# Patient Record
Sex: MALE | Race: WHITE | NOT HISPANIC OR LATINO | Employment: FULL TIME | URBAN - NONMETROPOLITAN AREA
[De-identification: names, ages, dates, MRNs, and addresses within clinical notes are randomized per-mention and may not be internally consistent; named-entity substitution may affect disease eponyms.]

---

## 2017-04-11 ENCOUNTER — OFFICE VISIT (OUTPATIENT)
Dept: MEDICAL GROUP | Facility: CLINIC | Age: 57
End: 2017-04-11
Payer: OTHER MISCELLANEOUS

## 2017-04-11 VITALS
TEMPERATURE: 98.8 F | BODY MASS INDEX: 39.55 KG/M2 | HEIGHT: 72 IN | SYSTOLIC BLOOD PRESSURE: 142 MMHG | OXYGEN SATURATION: 98 % | HEART RATE: 88 BPM | RESPIRATION RATE: 18 BRPM | WEIGHT: 292 LBS | DIASTOLIC BLOOD PRESSURE: 90 MMHG

## 2017-04-11 DIAGNOSIS — M54.50 CHRONIC LOW BACK PAIN WITHOUT SCIATICA, UNSPECIFIED BACK PAIN LATERALITY: ICD-10-CM

## 2017-04-11 DIAGNOSIS — G89.29 CHRONIC LOW BACK PAIN WITHOUT SCIATICA, UNSPECIFIED BACK PAIN LATERALITY: ICD-10-CM

## 2017-04-11 DIAGNOSIS — L91.8 SKIN TAG: ICD-10-CM

## 2017-04-11 DIAGNOSIS — M25.561 CHRONIC PAIN OF BOTH KNEES: ICD-10-CM

## 2017-04-11 DIAGNOSIS — Z13.6 SCREENING FOR CARDIOVASCULAR CONDITION: ICD-10-CM

## 2017-04-11 DIAGNOSIS — Z72.0 TOBACCO ABUSE: ICD-10-CM

## 2017-04-11 DIAGNOSIS — Z00.00 WELL ADULT EXAM: ICD-10-CM

## 2017-04-11 DIAGNOSIS — G89.29 CHRONIC PAIN OF BOTH KNEES: ICD-10-CM

## 2017-04-11 DIAGNOSIS — E66.9 OBESITY (BMI 30-39.9): ICD-10-CM

## 2017-04-11 DIAGNOSIS — M25.562 CHRONIC PAIN OF BOTH KNEES: ICD-10-CM

## 2017-04-11 PROCEDURE — 99386 PREV VISIT NEW AGE 40-64: CPT | Performed by: PHYSICIAN ASSISTANT

## 2017-04-11 RX ORDER — COVID-19 ANTIGEN TEST
KIT MISCELLANEOUS
Qty: 60 CAP | COMMUNITY
Start: 2017-04-11 | End: 2019-01-15

## 2017-04-11 RX ORDER — MULTIVIT-MIN/FOLIC/VIT K/LYCOP 400-300MCG
TABLET ORAL
Qty: 30 TAB | COMMUNITY
Start: 2017-04-11 | End: 2019-01-15

## 2017-04-11 ASSESSMENT — PATIENT HEALTH QUESTIONNAIRE - PHQ9: CLINICAL INTERPRETATION OF PHQ2 SCORE: 0

## 2017-04-11 NOTE — ASSESSMENT & PLAN NOTE
Pt has chronic pain that he feels is not worth treating at this time. It doesn't change throughout the day and he doesn't notice anything makes it better or worse.

## 2017-04-11 NOTE — PROGRESS NOTES
Chief Complaint   Patient presents with   • Establish Care       HISTORY OF THE PRESENT ILLNESS: This is a 56 y.o. male new patient to our practice. This patient is here today for discussion and treatment of the following:    Chronic low back pain without sciatica  Previously diagnosed Bone spurs, patient has pain but has no treatment desired.    Obesity (BMI 30-39.9)  Patient's goal weight is approximately 220 pounds. He understands the need to lose weight and would like to start exercising more and eating better. He has not wished out counseling regarding this issue and will return for follow-up visit if he feels he needs more support.    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 Patient recalls having one high blood pressure reading at one [employer] visit but stopped taking the medication after 2 months.    Tobacco abuse  Patient and his wife concur that they both would like to stop smoking. He has stopped smoking in the past with the assistance of Wellbutrin. Patient states he would like to stop smoking on his own but will return to the clinic if he feels he needs medical assistance in doing so.    Skin tag  10-12 skin tags visible above the patient's collar line. These are bothering him on a daily basis and he would like to have them removed.    Chronic pain of both knees  Pt has chronic pain that he feels is not worth treating at this time. It doesn't change throughout the day and he doesn't notice anything makes it better or worse.        Past Medical History   Diagnosis Date   • Hypertension      stopped taking blood pressure medication on his own   • Alcoholic (CMS-HCC)      stopped drinking in        Past Surgical History   Procedure Laterality Date   • Cholecystectomy         Family Status   Relation Status Death Age   • Mother     • Father     • Sister Alive    • Sister Alive      Family History    Problem Relation Age of Onset   • Cancer Mother      colon   • Respiratory Disease Mother      emphysema- smoker   • Alcohol abuse Mother    • Heart Disease Father    • Cancer Sister      brain tumor   • GI Sister        Social History   Substance Use Topics   • Smoking status: Current Every Day Smoker -- 1.00 packs/day for 35 years   • Smokeless tobacco: None   • Alcohol Use: 0.0 oz/week     0 Standard drinks or equivalent per week       Allergies: Review of patient's allergies indicates no known allergies.    Current Outpatient Prescriptions Ordered in [facility]   Medication Sig Dispense Refill   • Naproxen Sodium (ALEVE) 220 MG Cap Take  by mouth. 60 Cap    • Multiple Vitamin (ONE-A-DAY MENS) Tab Take  by mouth. 30 Tab      No current Epic-ordered facility-administered medications on file.     Review of Systems:   Constitutional: Negative for fever, chills, unplanned weight change and malaise/fatigue.   HENT: Negative for ear pain or tinnitus, nosebleeds, congestion, sore throat and neck pain.    Eyes: Negative for blurred or decreased vision.   Respiratory: Negative for cough, sputum production, shortness of breath and wheezing.    Cardiovascular: Negative for chest pain, palpitations, orthopnea, syncope and leg swelling.   Gastrointestinal: Negative for heartburn, nausea, vomiting and abdominal pain.   Musculoskeletal: Positive for back pain and joint pain.  Especially lumbar and knees bilaterally.  Skin: Positive for tenderness of skin tags on the patient's background is collar line. These have been bothering him for multiple years and he would like to have them removed. Negative for rash, itching, nail changes.  Neurological: Negative for dizziness, tremors, sensory change, focal weakness, tingling and headaches.   Endo/Heme/Allergies: Does not bruise/bleed easily.    Psychiatric/Behavioral: Negative for depression, suicidal ideas and memory loss. The patient is not nervous/anxious and does not have insomnia.   "Pt does not use recreational drugs or excessive alcohol although he does have a history of alcohol abuse he has been sober for 8 years.   All other systems reviewed and are negative except as in HPI.      Exam:  Blood pressure 142/90, pulse 88, temperature 37.1 °C (98.8 °F), resp. rate 18, height 1.829 m (6' 0.01\"), weight 132.45 kg (292 lb), SpO2 98 %.  General: Obese male in NAD  Eyes: Conjunctiva clear, lids without ptosis, pupils equal and reactive to light accommodation.  ENMT: Nose and lips without deformity. Nasal mucosa and septum pink without evidence of purulent drainage. External auditory canals clear of excessive cerumen. Tympanic membranes pearly grey without bulge or visible fluid line. Oropharynx pink without lesions or edema.   Neck: Thick and Supple without masses upon palpation. Thyroid is not enlarged and rises symmetrically upon swallowing.  Pulmonary: Normal effort. Fine crackles and inspiratory wheezes noted on auscultation of bilateral lower lung fields   Cardiovascular: Regular rate and rhythm without murmur. Carotid and radial pulses are intact and equal bilaterally. Bilateral upper and lower extremities without edema.   Extremities: Mild clubbing. No cyanosis.  GI: Normoactive bowel sounds in all 4 quadrants. Soft, protuberant, nontender, nondistended. Without palpable hepatosplenomegaly.   Neurologic: Deep tendon reflexes 2+/4 bilaterally.   Skin: Warm and dry. Multiple skin tags present on the patient's collar line.  Musculoskeletal: Normal gait. No extremity cyanosis, clubbing, or edema.  Psych: Normal mood and affect. Alert and oriented x3. Judgment and insight is normal.      Medical Decision Making & Discussion: Patient is an obese 56-year-old male presenting for a wellness visit. The patient is overweight and continues to smoke. Patient takes a daily multivitamin and uses Aleve on a regular basis to alleviate joint pain. Otherwise patient takes no medications.      Patient will like " to have skin tags removed at a following appointment which will be scheduled today.     Please note that this dictation was created using voice recognition software. I have made every reasonable attempt to correct obvious errors, but I expect that there are errors of grammar and possibly content that I did not discover before finalizing the note.      Assessment/Plan  1. Obesity (BMI 30-39.9)     2. Well adult exam  REFERRAL TO GI FOR COLONOSCOPY    VITAMIN D, 1,25 + 25-HYDROXY    TSH WITH REFLEX TO FT4    PROSTATE SPECIFIC AG SCREENING    LIPID PANEL    CMP (12)    CBC WITHOUT DIFFERENTIAL   3. Skin tag     4. Screening for cardiovascular condition  Patient identified as having weight management issue.  Appropriate orders and counseling given.    REFERRAL TO GI FOR COLONOSCOPY    LIPID PANEL    CMP (12)    CBC WITHOUT DIFFERENTIAL   5. Tobacco abuse     6. Chronic pain of both knees     7. Chronic low back pain without sciatica, unspecified back pain laterality

## 2017-04-11 NOTE — ASSESSMENT & PLAN NOTE
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 Patient recalls having one high blood pressure reading at one [employer] visit but stopped taking the medication after 2 months.

## 2017-04-11 NOTE — ASSESSMENT & PLAN NOTE
10-12 skin tags visible above the patient's collar line. These are bothering him on a daily basis and he would like to have them removed.

## 2017-04-11 NOTE — ASSESSMENT & PLAN NOTE
Patient's goal weight is approximately 220 pounds. He understands the need to lose weight and would like to start exercising more and eating better. He has not wished out counseling regarding this issue and will return for follow-up visit if he feels he needs more support.

## 2017-04-11 NOTE — MR AVS SNAPSHOT
"        Maximino Crowley   2017 2:00 PM   Office Visit   MRN: [id number]    [facility] Phone[facility]  [phone]    Description:  Male : 1960   Provider:  Phyllis Sparks PA-C           Reason for Visit     Establish Care           Allergies as of 2017     No Known Allergies      You were diagnosed with     Obesity (BMI 30-39.9)   [607025]       Well adult exam   [771242]       Skin tag   [226537]       Screening for cardiovascular condition   [905846]       Tobacco abuse   [649537]       Chronic pain of both knees   [8977813]       Chronic low back pain without sciatica, unspecified back pain laterality   [6073385]         Vital Signs     Blood Pressure Pulse Temperature Respirations Height Weight    142/90 mmHg 88 37.1 °C (98.8 °F) 18 1.829 m (6' 0.01\") 132.45 kg (292 lb)    Body Mass Index Oxygen Saturation Smoking Status             39.59 kg/m2 98% Current Every Day Smoker         Basic Information     Date Of Birth Sex Race Ethnicity Preferred Language    1960 Male White Non- English      Problem List              ICD-10-CM Priority Class Noted - Resolved    Obesity (BMI 30-39.9) E66.9   2017 - Present    Well adult exam Z00.00   2017 - Present    Tobacco abuse Z72.0   2017 - Present    Skin tag L91.8   2017 - Present    Chronic low back pain without sciatica M54.5, G89.29   2017 - Present    Chronic pain of both knees M25.561, M25.562, G89.29   2017 - Present      Health Maintenance        Date Due Completion Dates    IMM DTaP/Tdap/Td Vaccine (1 - Tdap) 1979 ---    COLONOSCOPY 2010 ---            Current Immunizations     No immunizations on file.      Below and/or attached are the medications your provider expects you to take. Review all of your home medications and newly ordered medications with your provider and/or pharmacist. Follow medication instructions as directed by your provider and/or pharmacist. Please keep " your medication list with you and share with your provider. Update the information when medications are discontinued, doses are changed, or new medications (including over-the-counter products) are added; and carry medication information at all times in the event of emergency situations     Allergies:  No Known Allergies          Medications  Valid as of: April 11, 2017 -  3:11 PM    Generic Name Brand Name Tablet Size Instructions for use    Multiple Vitamin (Tab) ONE-A-DAY MENS  Take  by mouth.        Naproxen Sodium (Cap) Naproxen Sodium 220 MG Take  by mouth.        .                 Medicines prescribed today were sent to:     [facility] - [place], [place] - [address]    [address] [facility] [address]    Phone: [phone] Fax: [phone]    Open 24 Hours?: No      Medication refill instructions:       If your prescription bottle indicates you have medication refills left, it is not necessary to call your provider’s office. Please contact your pharmacy and they will refill your medication.    If your prescription bottle indicates you do not have any refills left, you may request refills at any time through one of the following ways: The online [employer] system (except Urgent Care), by calling your provider’s office, or by asking your pharmacy to contact your provider’s office with a refill request. Medication refills are processed only during regular business hours and may not be available until the next business day. Your provider may request additional information or to have a follow-up visit with you prior to refilling your medication.   *Please Note: Medication refills are assigned a new Rx number when refilled electronically. Your pharmacy may indicate that no refills were authorized even though a new prescription for the same medication is available at the pharmacy. Please request the medicine by name with the pharmacy before contacting your provider for a refill.           Your To Do List     Future Labs/Procedures Complete By Expires    CBC WITHOUT DIFFERENTIAL  As directed 10/12/2017    PROSTATE SPECIFIC AG SCREENING  As directed 4/11/2018    TSH WITH REFLEX TO FT4  As directed 4/11/2018      Referral     A referral request has been sent to our patient care coordination department. Please allow 3-5 business days for us to process this request and contact you either by phone or mail. If you do not hear from us by the 5th business day, please call us at [phone].           "[employer] Access Code: [email or web address]  Expires: 5/11/2017  3:11 PM    Your email address is not on file at [facility].  Email Addresses are required for you to sign up for "[employer] please contact [phone] to verify your personal information and to provide your email address prior to attempting to register for "[employer].    Maximino Crowley  [address] [place] [address]    "[employer]  A secure, online tool to manage your health information     [facility]’s "[employer]® is a secure, online tool that connects you to your personalized health information from the privacy of your home -- day or night - making it very easy for you to manage your healthcare. Once the activation process is completed, you can even access your medical information using the "[employer] micheal, which is available for free in the Apple Micheal store or Google Play store.     To learn more about "[employer] visit [email or web address]/"[email or web address]    There are two levels of access available (as shown below):   My Chart Features  [facility] Primary Care Doctor [facility]  Specialists [facility]  Urgent  Care Non-[facility] Primary Care Doctor   Email your healthcare team securely and privately 24/7 X X X    Manage appointments: schedule your next appointment; view details of past/upcoming appointments X      Request prescription refills. X      View recent personal medical records, including lab and immunizations X X X X   View health record, including health  history, allergies, medications X X X X   Read reports about your outpatient visits, procedures, consult and ER notes X X X X   See your discharge summary, which is a recap of your hospital and/or ER visit that includes your diagnosis, lab results, and care plan X X  X     How to register for [employer]:  Once your e-mail address has been verified, follow the following steps to sign up for [employer].     1. Go to  [email or web address]  2. Click on the Sign Up Now box, which takes you to the New Member Sign Up page. You will need to provide the following information:  a. Enter your [employer] Access Code exactly as it appears at the top of this page. (You will not need to use this code after you’ve completed the sign-up process. If you do not sign up before the expiration date, you must request a new code.)   b. Enter your date of birth.   c. Enter your home email address.   d. Click Submit, and follow the next screen’s instructions.  3. Create a One[employer] ID. This will be your [employer] login ID and cannot be changed, so think of one that is secure and easy to remember.  4. Create a [employer] password. You can change your password at any time.  5. Enter your Password Reset Question and Answer. This can be used at a later time if you forget your password.   6. Enter your e-mail address. This allows you to receive e-mail notifications when new information is available in [employer].  7. Click Sign Up. You can now view your health information.    For assistance activating your [employer] account, call [phone]         Quit Tobacco Information     Do you want to quit using tobacco?    Quitting tobacco decreases risks of cancer, heart and lung disease, increases life expectancy, improves sense of taste and smell, and increases spending money, among other benefits.    If you are thinking about quitting, we can help.  • [facility] Quit Tobacco Program: 483.965.5635  o Program occurs weekly for four weeks and includes pharmacist  consultation on products to support quitting smoking or chewing tobacco. A provider referral is needed for pharmacist consultation.  • Tobacco Users Help Hotline: 2-800QUIT-NOW (322-2487) or https://[place].quitlogix.org/  o Free, confidential telephone and online coaching for [place] residents. Sessions are designed on a schedule that is convenient for you. Eligible clients receive free nicotine replacement therapy.  • Nationally: www.smokefree.gov  o Information and professional assistance to support both immediate and long-term needs as you become, and remain, a non-smoker. Smokefree.gov allows you to choose the help that best fits your needs.

## 2017-04-11 NOTE — ASSESSMENT & PLAN NOTE
Patient and his wife concur that they both would like to stop smoking. He has stopped smoking in the past with the assistance of Wellbutrin. Patient states he would like to stop smoking on his own but will return to the clinic if he feels he needs medical assistance in doing so.

## 2017-04-13 ENCOUNTER — NON-PROVIDER VISIT (OUTPATIENT)
Dept: MEDICAL GROUP | Facility: CLINIC | Age: 57
End: 2017-04-13
Payer: OTHER MISCELLANEOUS

## 2017-04-13 ENCOUNTER — HOSPITAL ENCOUNTER (OUTPATIENT)
Facility: MEDICAL CENTER | Age: 57
End: 2017-04-13
Attending: PHYSICIAN ASSISTANT
Payer: OTHER MISCELLANEOUS

## 2017-04-13 DIAGNOSIS — Z13.6 SCREENING FOR CARDIOVASCULAR CONDITION: ICD-10-CM

## 2017-04-13 DIAGNOSIS — Z00.00 WELL ADULT EXAM: ICD-10-CM

## 2017-04-13 DIAGNOSIS — G89.29 OTHER CHRONIC PAIN: ICD-10-CM

## 2017-04-13 LAB
25(OH)D3 SERPL-MCNC: 6 NG/ML (ref 30–100)
ALBUMIN SERPL BCP-MCNC: 3.7 G/DL (ref 3.2–4.9)
ALBUMIN/GLOB SERPL: 1 G/DL
ALP SERPL-CCNC: 97 U/L (ref 30–99)
ALT SERPL-CCNC: 175 U/L (ref 2–50)
ANION GAP SERPL CALC-SCNC: 9 MMOL/L (ref 0–11.9)
AST SERPL-CCNC: 126 U/L (ref 12–45)
BILIRUB SERPL-MCNC: 0.8 MG/DL (ref 0.1–1.5)
BUN SERPL-MCNC: 13 MG/DL (ref 8–22)
CALCIUM SERPL-MCNC: 9.5 MG/DL (ref 8.5–10.5)
CHLORIDE SERPL-SCNC: 108 MMOL/L (ref 96–112)
CHOLEST SERPL-MCNC: 205 MG/DL (ref 100–199)
CO2 SERPL-SCNC: 25 MMOL/L (ref 20–33)
CREAT SERPL-MCNC: 0.56 MG/DL (ref 0.5–1.4)
ERYTHROCYTE [DISTWIDTH] IN BLOOD BY AUTOMATED COUNT: 41.9 FL (ref 35.9–50)
GFR SERPL CREATININE-BSD FRML MDRD: >60 ML/MIN/1.73 M 2
GLOBULIN SER CALC-MCNC: 3.6 G/DL (ref 1.9–3.5)
GLUCOSE SERPL-MCNC: 158 MG/DL (ref 65–99)
HCT VFR BLD AUTO: 50 % (ref 42–52)
HDLC SERPL-MCNC: 44 MG/DL
HGB BLD-MCNC: 17.6 G/DL (ref 14–18)
LDLC SERPL CALC-MCNC: 141 MG/DL
MCH RBC QN AUTO: 32.7 PG (ref 27–33)
MCHC RBC AUTO-ENTMCNC: 35.2 G/DL (ref 33.7–35.3)
MCV RBC AUTO: 92.8 FL (ref 81.4–97.8)
PLATELET # BLD AUTO: 87 K/UL (ref 164–446)
POTASSIUM SERPL-SCNC: 3.9 MMOL/L (ref 3.6–5.5)
PROT SERPL-MCNC: 7.3 G/DL (ref 6–8.2)
PSA SERPL-MCNC: 0.08 NG/ML (ref 0–4)
RBC # BLD AUTO: 5.39 M/UL (ref 4.7–6.1)
SODIUM SERPL-SCNC: 142 MMOL/L (ref 135–145)
TRIGL SERPL-MCNC: 100 MG/DL (ref 0–149)
TSH SERPL DL<=0.005 MIU/L-ACNC: 1.05 UIU/ML (ref 0.3–3.7)
WBC # BLD AUTO: 7.6 K/UL (ref 4.8–10.8)

## 2017-04-13 PROCEDURE — 82306 VITAMIN D 25 HYDROXY: CPT

## 2017-04-13 PROCEDURE — 99000 SPECIMEN HANDLING OFFICE-LAB: CPT | Performed by: PHYSICIAN ASSISTANT

## 2017-04-13 PROCEDURE — 80053 COMPREHEN METABOLIC PANEL: CPT

## 2017-04-13 PROCEDURE — 84153 ASSAY OF PSA TOTAL: CPT

## 2017-04-13 PROCEDURE — 85027 COMPLETE CBC AUTOMATED: CPT

## 2017-04-13 PROCEDURE — 84443 ASSAY THYROID STIM HORMONE: CPT

## 2017-04-13 PROCEDURE — 80061 LIPID PANEL: CPT

## 2017-04-13 PROCEDURE — 36415 COLL VENOUS BLD VENIPUNCTURE: CPT | Performed by: PHYSICIAN ASSISTANT

## 2017-04-14 ENCOUNTER — TELEPHONE (OUTPATIENT)
Dept: MEDICAL GROUP | Facility: CLINIC | Age: 57
End: 2017-04-14

## 2017-04-14 NOTE — TELEPHONE ENCOUNTER
----- Message from Phyllis Sparks PA-C sent at 4/14/2017  8:02 AM PDT -----  I reviewed labs. I would like to discuss a few of the values with the patient including Vitamin D, Cholesterol and a couple of others. He should keep his appointment on April 25th with me and start taking Vitamin D supplements 4000 units per day in the mean time.

## 2017-04-14 NOTE — PROGRESS NOTES
Quick Note:    I reviewed labs. I would like to discuss a few of the values with the patient including Vitamin D, Cholesterol and a couple of others. He should keep his appointment on April 25th with me and start taking Vitamin D supplements 4000 units per day in the mean time.  ______

## 2017-04-18 ENCOUNTER — TELEPHONE (OUTPATIENT)
Dept: MEDICAL GROUP | Facility: CLINIC | Age: 57
End: 2017-04-18

## 2017-04-25 ENCOUNTER — NON-PROVIDER VISIT (OUTPATIENT)
Dept: URGENT CARE | Facility: PHYSICIAN GROUP | Age: 57
End: 2017-04-25

## 2017-04-25 ENCOUNTER — OFFICE VISIT (OUTPATIENT)
Dept: MEDICAL GROUP | Facility: CLINIC | Age: 57
End: 2017-04-25
Payer: OTHER MISCELLANEOUS

## 2017-04-25 ENCOUNTER — TELEPHONE (OUTPATIENT)
Dept: MEDICAL GROUP | Facility: CLINIC | Age: 57
End: 2017-04-25

## 2017-04-25 VITALS
HEIGHT: 72 IN | BODY MASS INDEX: 39.55 KG/M2 | TEMPERATURE: 98.5 F | HEART RATE: 76 BPM | RESPIRATION RATE: 18 BRPM | OXYGEN SATURATION: 94 % | SYSTOLIC BLOOD PRESSURE: 150 MMHG | DIASTOLIC BLOOD PRESSURE: 80 MMHG | WEIGHT: 292 LBS

## 2017-04-25 DIAGNOSIS — R20.8 LOCALIZED SKIN TENDERNESS: ICD-10-CM

## 2017-04-25 DIAGNOSIS — L91.8 SKIN TAG: ICD-10-CM

## 2017-04-25 DIAGNOSIS — Z02.1 PRE-EMPLOYMENT DRUG SCREENING: ICD-10-CM

## 2017-04-25 DIAGNOSIS — I10 ESSENTIAL HYPERTENSION: ICD-10-CM

## 2017-04-25 LAB
BREATH ALCOHOL COMMENT: NORMAL
POC BREATHALIZER: 0 PERCENT (ref 0–0.01)

## 2017-04-25 PROCEDURE — 82075 ASSAY OF BREATH ETHANOL: CPT | Performed by: NURSE PRACTITIONER

## 2017-04-25 PROCEDURE — 99213 OFFICE O/P EST LOW 20 MIN: CPT | Mod: 25 | Performed by: PHYSICIAN ASSISTANT

## 2017-04-25 PROCEDURE — 80305 DRUG TEST PRSMV DIR OPT OBS: CPT | Performed by: NURSE PRACTITIONER

## 2017-04-25 PROCEDURE — 11400 EXC TR-EXT B9+MARG 0.5 CM<: CPT | Performed by: PHYSICIAN ASSISTANT

## 2017-04-25 RX ORDER — CHLORTHALIDONE 25 MG/1
12.5 TABLET ORAL EVERY MORNING
Qty: 45 TAB | Refills: 0 | Status: SHIPPED | OUTPATIENT
Start: 2017-04-25 | End: 2017-06-01 | Stop reason: SDUPTHER

## 2017-04-25 NOTE — MR AVS SNAPSHOT
Maximino Crowley   2017 2:40 PM   Office Visit   MRN: [id number]    [facility]  [facility] Phone:  [phone]    Description:  Male : 1960   Provider:  Phyllis Sparks PA-C           Reason for Visit     Other skin tag removal      Allergies as of 2017     No Known Allergies      You were diagnosed with     Essential hypertension   [[id number]]         Vital Signs     Blood Pressure Pulse Temperature Respirations Height Weight    150/80 mmHg 76 36.9 °C (98.5 °F) 18 1.829 m (6') 132.45 kg (292 lb)    Body Mass Index Oxygen Saturation Smoking Status             39.59 kg/m2 94% Current Every Day Smoker         Basic Information     Date Of Birth Sex Race Ethnicity Preferred Language    1960 Male White Non- English      Your appointments     May 01, 2017  2:30 PM   Established Patient with Eladio Horton M.D.   [facility] (--)    [address] [place] [id number] [phone]           You will be receiving a confirmation call a few days before your appointment from our automated call confirmation system.            Oct 10, 2017  2:00 PM   Established Patient with Phyllis Sparks PA-C   [facility] (--)    [address] [place] [address] [id number] [phone]           You will be receiving a confirmation call a few days before your appointment from our automated call confirmation system.              Problem List              ICD-10-CM Priority Class Noted - Resolved    Obesity (BMI 30-39.9) E66.9   2017 - Present    Well adult exam Z00.00   2017 - Present    Tobacco abuse Z72.0   2017 - Present    Skin tag L91.8   2017 - Present    Chronic low back pain without sciatica M54.5, G89.29   2017 - Present    Chronic pain of both knees M25.561, M25.562, G89.29   2017 - Present      Health Maintenance        Date Due Completion Dates    IMM DTaP/Tdap/Td Vaccine (1 -  Tdap) 6/21/1979 ---    IMM PNEUMOCOCCAL 19-64 (ADULT) MEDIUM RISK SERIES (1 of 1 - PPSV23) 6/21/1979 ---    COLONOSCOPY 6/21/2010 ---            Current Immunizations     No immunizations on file.      Below and/or attached are the medications your provider expects you to take. Review all of your home medications and newly ordered medications with your provider and/or pharmacist. Follow medication instructions as directed by your provider and/or pharmacist. Please keep your medication list with you and share with your provider. Update the information when medications are discontinued, doses are changed, or new medications (including over-the-counter products) are added; and carry medication information at all times in the event of emergency situations     Allergies:  No Known Allergies          Medications  Valid as of: April 25, 2017 -  3:13 PM    Generic Name Brand Name Tablet Size Instructions for use    Chlorthalidone (Tab) HYGROTON 25 MG Take 0.5 Tabs by mouth every morning.        Cholecalciferol (Tab) cholecalciferol 1000 UNIT Take 5,000 Units by mouth every day.        Multiple Vitamin (Tab) ONE-A-DAY MENS  Take  by mouth.        Naproxen Sodium (Cap) Naproxen Sodium 220 MG Take  by mouth.        .                 Medicines prescribed today were sent to:     [facility] - [place], [place] - [address] [place] [address] [address] [facility] [address]    Phone: [phone] Fax: [phone]    Open 24 Hours?: No      Medication refill instructions:       If your prescription bottle indicates you have medication refills left, it is not necessary to call your provider’s office. Please contact your pharmacy and they will refill your medication.    If your prescription bottle indicates you do not have any refills left, you may request refills at any time through one of the following ways: The online [employer] system (except Urgent Care), by calling your provider’s office, or by asking your  pharmacy to contact your provider’s office with a refill request. Medication refills are processed only during regular business hours and may not be available until the next business day. Your provider may request additional information or to have a follow-up visit with you prior to refilling your medication.   *Please Note: Medication refills are assigned a new Rx number when refilled electronically. Your pharmacy may indicate that no refills were authorized even though a new prescription for the same medication is available at the pharmacy. Please request the medicine by name with the pharmacy before contacting your provider for a refill.           VILOOP Access Code: [email or web address]  Expires: 5/11/2017  3:11 PM    Your email address is not on file at [facility].  Email Addresses are required for you to sign up for VILOOP, please contact [phone] to verify your personal information and to provide your email address prior to attempting to register for VILOOP.    Maximino Crowley  [address] [place] [id number]    VILOOP  A secure, online tool to manage your health information     [facility]’s VILOOP® is a secure, online tool that connects you to your personalized health information from the privacy of your home -- day or night - making it very easy for you to manage your healthcare. Once the activation process is completed, you can even access your medical information using the VILOOP micheal, which is available for free in the Apple Micheal store or Google Play store.     To learn more about VILOOP, visit [email or web address]    There are two levels of access available (as shown below):   My Chart Features  [facility] Primary Care Doctor [facility]  Specialists [facility]  Urgent  Care Non-[facility] Primary Care Doctor   Email your healthcare team securely and privately 24/7 X X X    Manage appointments: schedule your next appointment; view details of past/upcoming appointments X      Request prescription  refills. X      View recent personal medical records, including lab and immunizations X X X X   View health record, including health history, allergies, medications X X X X   Read reports about your outpatient visits, procedures, consult and ER notes X X X X   See your discharge summary, which is a recap of your hospital and/or ER visit that includes your diagnosis, lab results, and care plan X X  X     How to register for Tervela:  Once your e-mail address has been verified, follow the following steps to sign up for Tervela.     1. Go to  [email or web address]  2. Click on the Sign Up Now box, which takes you to the New Member Sign Up page. You will need to provide the following information:  a. Enter your Tervela Access Code exactly as it appears at the top of this page. (You will not need to use this code after you’ve completed the sign-up process. If you do not sign up before the expiration date, you must request a new code.)   b. Enter your date of birth.   c. Enter your home email address.   d. Click Submit, and follow the next screen’s instructions.  3. Create a Tervela ID. This will be your Tervela login ID and cannot be changed, so think of one that is secure and easy to remember.  4. Create a Tervela password. You can change your password at any time.  5. Enter your Password Reset Question and Answer. This can be used at a later time if you forget your password.   6. Enter your e-mail address. This allows you to receive e-mail notifications when new information is available in Tervela.  7. Click Sign Up. You can now view your health information.    For assistance activating your Tervela account, call [phone]         Quit Tobacco Information     Do you want to quit using tobacco?    Quitting tobacco decreases risks of cancer, heart and lung disease, increases life expectancy, improves sense of taste and smell, and increases spending money, among other benefits.    If you are thinking about  quitting, we can help.  • [facility] Quit Tobacco Program: 127-939-7586  o Program occurs weekly for four weeks and includes pharmacist consultation on products to support quitting smoking or chewing tobacco. A provider referral is needed for pharmacist consultation.  • Tobacco Users Help Hotline: 7-800-QUIT-NOW (223-6560) or https://[place].quitlogix.org/  o Free, confidential telephone and online coaching for [place] residents. Sessions are designed on a schedule that is convenient for you. Eligible clients receive free nicotine replacement therapy.  • Nationally: www.smokefree.gov  o Information and professional assistance to support both immediate and long-term needs as you become, and remain, a non-smoker. Smokefree.gov allows you to choose the help that best fits your needs.

## 2017-04-25 NOTE — ASSESSMENT & PLAN NOTE
Procedure Note:  Discussed with the patient the risks benefits alternatives to excision of the lesion. Informed consent was obtained and the patient consented to the procedure with risks to include bleeding and infection as well as damage to adjacent organs. The area was identified. The area was betadine swabed and 2 cc of lidocaine with epi was administered. The area was prepped in the usual sterile fashion. A curved scissor with tweezers was used to excise the 9 lesions. Hemostasis was obtained with pressure. A bandage was applied to each area where a skin tag was removed. The patient was given wound care instructions. The patient was instructed to call if there was any excessive bleeding or followup sooner if there were any signs of infection to include pain, warmth, redness or purulence.

## 2017-04-25 NOTE — PROGRESS NOTES
Chief Complaint   Patient presents with   • Other     skin tag removal       HISTORY OF PRESENT ILLNESS: Patient is a 56 y.o. male established patient who presents today for evaluation and management of:    Skin tag  Procedure Note:  Discussed with the patient the risks benefits alternatives to excision of the lesion. Informed consent was obtained and the patient consented to the procedure with risks to include bleeding and infection as well as damage to adjacent organs. The area was identified. The area was betadine swabed and 2 cc of lidocaine with epi was administered. The area was prepped in the usual sterile fashion. A curved scissor with tweezers was used to excise the 9 lesions. Hemostasis was obtained with pressure. A bandage was applied to each area where a skin tag was removed. The patient was given wound care instructions. The patient was instructed to call if there was any excessive bleeding or followup sooner if there were any signs of infection to include pain, warmth, redness or purulence.        Essential hypertension  Patient's hypertension continues to be high at this visit. His machine was calibrated against our manual readings and his at-home pressure machine is approximately 30mmHg higher systolic.         Patient Active Problem List    Diagnosis Date Noted   • Localized skin tenderness 04/26/2017   • Essential hypertension 04/25/2017   • Obesity (BMI 30-39.9) 04/11/2017   • Well adult exam 04/11/2017   • Tobacco abuse 04/11/2017   • Skin tag 04/11/2017   • Chronic low back pain without sciatica 04/11/2017   • Chronic pain of both knees 04/11/2017       Allergies:Review of patient's allergies indicates no known allergies.    Current Outpatient Prescriptions   Medication Sig Dispense Refill   • vitamin D (CHOLECALCIFEROL) 1000 UNIT Tab Take 5,000 Units by mouth every day.     • chlorthalidone (HYGROTON) 25 MG Tab Take 0.5 Tabs by mouth every morning. 45 Tab 0   • Multiple Vitamin (ONE-A-DAY MENS)  Tab Take  by mouth. 30 Tab    • Naproxen Sodium (ALEVE) 220 MG Cap Take  by mouth. 60 Cap      No current facility-administered medications for this visit.       Social History   Substance Use Topics   • Smoking status: Current Every Day Smoker -- 1.00 packs/day for 35 years   • Smokeless tobacco: Not on file   • Alcohol Use: 0.0 oz/week     0 Standard drinks or equivalent per week       Family Status   Relation Status Death Age   • Mother     • Father     • Sister Alive    • Sister Alive      Family History   Problem Relation Age of Onset   • Cancer Mother      colon   • Respiratory Disease Mother      emphysema- smoker   • Alcohol abuse Mother    • Heart Disease Father    • Cancer Sister      brain tumor   • GI Sister        Review of Systems:   Constitutional: Negative for fever, chills, weight loss and malaise/fatigue.   HENT: Negative for ear pain, nosebleeds, congestion, sore throat and neck pain.    Eyes: Negative for blurred vision.   Cardiovascular: Negative for chest pain, palpitations, orthopnea and leg swelling.   Skin: Negative for rash and itching. Positive for multiple skin tags around the collar line and under the left armpit  Neurological: Negative for dizziness, tingling, tremors, sensory change, focal weakness and headaches.   Endo/Heme/Allergies: Does not bruise/bleed easily.   Psychiatric/Behavioral: Negative for depression, suicidal ideas and memory loss.  The patient is not nervous/anxious and does not have insomnia.      Exam:  Blood pressure 150/80, pulse 76, temperature 36.9 °C (98.5 °F), resp. rate 18, height 1.829 m (6'), weight 132.45 kg (292 lb), SpO2 94 %.  Body mass index is 39.59 kg/(m^2).  General:  Obese male in NAD  Head: is grossly normal.  Neck: Supple without masses. Thyroid is not visibly enlarged.  Skin: 2 skin tags exist under left axilla. There is one skin tag behind each of his left and right ears as well as 5 along his collar line.       Please note that  this dictation was created using voice recognition software. I have made every reasonable attempt to correct obvious errors, but I expect that there are errors of grammar and possibly content that I did not discover before finalizing the note.    Assessment/Plan:  1. Essential hypertension  chlorthalidone (HYGROTON) 25 MG Tab   2. Skin tag     3. Localized skin tenderness            Return if symptoms worsen or fail to improve.

## 2017-04-25 NOTE — ASSESSMENT & PLAN NOTE
Patient's hypertension continues to be high at this visit. His machine was calibrated against our manual readings and his at-home pressure machine is approximately 30mmHg higher systolic.

## 2017-04-26 ENCOUNTER — TELEPHONE (OUTPATIENT)
Dept: MEDICAL GROUP | Facility: CLINIC | Age: 57
End: 2017-04-26

## 2017-04-26 DIAGNOSIS — R73.03 PREDIABETES: ICD-10-CM

## 2017-04-26 PROBLEM — R20.8: Status: ACTIVE | Noted: 2017-04-26

## 2017-04-27 ENCOUNTER — HOSPITAL ENCOUNTER (OUTPATIENT)
Facility: MEDICAL CENTER | Age: 57
End: 2017-04-27
Attending: PHYSICIAN ASSISTANT
Payer: OTHER MISCELLANEOUS

## 2017-04-27 PROCEDURE — 82043 UR ALBUMIN QUANTITATIVE: CPT

## 2017-04-27 PROCEDURE — 83036 HEMOGLOBIN GLYCOSYLATED A1C: CPT

## 2017-04-27 PROCEDURE — 82570 ASSAY OF URINE CREATININE: CPT

## 2017-04-28 ENCOUNTER — NON-PROVIDER VISIT (OUTPATIENT)
Dept: MEDICAL GROUP | Facility: CLINIC | Age: 57
End: 2017-04-28
Payer: OTHER MISCELLANEOUS

## 2017-04-28 DIAGNOSIS — R73.03 PREDIABETES: ICD-10-CM

## 2017-04-28 DIAGNOSIS — I10 ESSENTIAL HYPERTENSION: ICD-10-CM

## 2017-04-28 LAB
CREAT UR-MCNC: 205.3 MG/DL
EST. AVERAGE GLUCOSE BLD GHB EST-MCNC: 160 MG/DL
HBA1C MFR BLD: 7.2 % (ref 0–5.6)
MICROALBUMIN UR-MCNC: 1.6 MG/DL
MICROALBUMIN/CREAT UR: 8 MG/G (ref 0–30)

## 2017-04-28 PROCEDURE — 36415 COLL VENOUS BLD VENIPUNCTURE: CPT | Performed by: NURSE PRACTITIONER

## 2017-05-01 ENCOUNTER — OFFICE VISIT (OUTPATIENT)
Dept: MEDICAL GROUP | Facility: CLINIC | Age: 57
End: 2017-05-01

## 2017-05-01 VITALS
OXYGEN SATURATION: 96 % | HEIGHT: 73 IN | BODY MASS INDEX: 38.43 KG/M2 | WEIGHT: 290 LBS | SYSTOLIC BLOOD PRESSURE: 150 MMHG | DIASTOLIC BLOOD PRESSURE: 82 MMHG | TEMPERATURE: 98.4 F | RESPIRATION RATE: 16 BRPM | HEART RATE: 94 BPM

## 2017-05-01 DIAGNOSIS — Z02.4 ENCOUNTER FOR CDL (COMMERCIAL DRIVING LICENSE) EXAM: ICD-10-CM

## 2017-05-01 PROCEDURE — 7100 PR DOT PHYSICAL: Performed by: FAMILY MEDICINE

## 2017-05-01 NOTE — PROGRESS NOTES
Quick Note:    I reviewed labs. There is a value I am concerned about and will start him on a medication called Metformin for diabetes. He should take this as prescribed and return for a follow up visit in 1 month.  ______

## 2017-05-01 NOTE — MR AVS SNAPSHOT
"        Maximino La Liat   2017 2:30 PM   Office Visit   MRN: [id number]    Department:  [facility]   Dept Phone:  [phone]    Description:  Male : 1960   Provider:  Eladio Horton M.D.           Reason for Visit     Annual Exam dmv      Allergies as of 2017     No Known Allergies      Vital Signs     Blood Pressure Pulse Temperature Respirations Height Weight    150/82 mmHg 94 36.9 °C (98.4 °F) 16 1.854 m (6' 1\") 131.543 kg (290 lb)    Body Mass Index Oxygen Saturation Smoking Status             38.27 kg/m2 96% Current Every Day Smoker         Basic Information     Date Of Birth Sex Race Ethnicity Preferred Language    1960 Male White Non- English      Your appointments     Oct 10, 2017  2:00 PM   Established Patient with Phyllis Sparks PA-C   [facility] (--)    [address] [id number] [phone]           You will be receiving a confirmation call a few days before your appointment from our automated call confirmation system.              Problem List              ICD-10-CM Priority Class Noted - Resolved    Obesity (BMI 30-39.9) E66.9   2017 - Present    Well adult exam Z00.00   2017 - Present    Tobacco abuse Z72.0   2017 - Present    Skin tag L91.8   2017 - Present    Chronic low back pain without sciatica M54.5, G89.29   2017 - Present    Chronic pain of both knees M25.561, M25.562, G89.29   2017 - Present    Essential hypertension I10   2017 - Present    Localized skin tenderness R20.8   2017 - Present      Health Maintenance        Date Due Completion Dates    IMM DTaP/Tdap/Td Vaccine (1 - Tdap) 1979 ---    IMM PNEUMOCOCCAL 19-64 (ADULT) MEDIUM RISK SERIES (1 of 1 - PPSV23) 1979 ---    COLONOSCOPY 2010 ---            Current Immunizations     No immunizations on file.      Below and/or attached are the medications your provider expects you to take. Review all of " your home medications and newly ordered medications with your provider and/or pharmacist. Follow medication instructions as directed by your provider and/or pharmacist. Please keep your medication list with you and share with your provider. Update the information when medications are discontinued, doses are changed, or new medications (including over-the-counter products) are added; and carry medication information at all times in the event of emergency situations     Allergies:  No Known Allergies          Medications  Valid as of: May 01, 2017 -  3:01 PM    Generic Name Brand Name Tablet Size Instructions for use    Chlorthalidone (Tab) HYGROTON 25 MG Take 0.5 Tabs by mouth every morning.        Cholecalciferol (Tab) cholecalciferol 1000 UNIT Take 5,000 Units by mouth every day.        Multiple Vitamin (Tab) ONE-A-DAY MENS  Take  by mouth.        Naproxen Sodium (Cap) Naproxen Sodium 220 MG Take  by mouth.        .                 Medicines prescribed today were sent to:     [facility] - [place], [place] - [address] [facility]    [address] [place] [address] [address] [facility] [address]    Phone: [phone] Fax: [phone]    Open 24 Hours?: No      Medication refill instructions:       If your prescription bottle indicates you have medication refills left, it is not necessary to call your provider’s office. Please contact your pharmacy and they will refill your medication.    If your prescription bottle indicates you do not have any refills left, you may request refills at any time through one of the following ways: The online [employer] system (except Urgent Care), by calling your provider’s office, or by asking your pharmacy to contact your provider’s office with a refill request. Medication refills are processed only during regular business hours and may not be available until the next business day. Your provider may request additional information or to have a follow-up visit with you prior to refilling your  medication.   *Please Note: Medication refills are assigned a new Rx number when refilled electronically. Your pharmacy may indicate that no refills were authorized even though a new prescription for the same medication is available at the pharmacy. Please request the medicine by name with the pharmacy before contacting your provider for a refill.           PEVESA Access Code: [email or web address]  Expires: 5/11/2017  3:11 PM    Your email address is not on file at [facility].  Email Addresses are required for you to sign up for PEVESA, please contact [phone] to verify your personal information and to provide your email address prior to attempting to register for PEVESA.    Maximino Crowley  [address] [place] [id number]    PEVESA  A secure, online tool to manage your health information     [facility]’s PEVESA® is a secure, online tool that connects you to your personalized health information from the privacy of your home -- day or night - making it very easy for you to manage your healthcare. Once the activation process is completed, you can even access your medical information using the PEVESA micheal, which is available for free in the Apple Micheal store or Google Play store.     To learn more about PEVESA, visit [email or web address]    There are two levels of access available (as shown below):   My Chart Features  [facility] Primary Care Doctor [facility]  Specialists [facility]  Urgent  Care Non-[facility] Primary Care Doctor   Email your healthcare team securely and privately 24/7 X X X    Manage appointments: schedule your next appointment; view details of past/upcoming appointments X      Request prescription refills. X      View recent personal medical records, including lab and immunizations X X X X   View health record, including health history, allergies, medications X X X X   Read reports about your outpatient visits, procedures, consult and ER notes X X X X   See your discharge summary, which is a  recap of your hospital and/or ER visit that includes your diagnosis, lab results, and care plan X X  X     How to register for [facility]:  Once your e-mail address has been verified, follow the following steps to sign up for [employer].     1. Go to  [email or web address]  2. Click on the Sign Up Now box, which takes you to the New Member Sign Up page. You will need to provide the following information:  a. Enter your [employer] Access Code exactly as it appears at the top of this page. (You will not need to use this code after you’ve completed the sign-up process. If you do not sign up before the expiration date, you must request a new code.)   b. Enter your date of birth.   c. Enter your home email address.   d. Click Submit, and follow the next screen’s instructions.  3. Create a [employer] ID. This will be your [facility] login ID and cannot be changed, so think of one that is secure and easy to remember.  4. Create a [employer] password. You can change your password at any time.  5. Enter your Password Reset Question and Answer. This can be used at a later time if you forget your password.   6. Enter your e-mail address. This allows you to receive e-mail notifications when new information is available in [employer].  7. Click Sign Up. You can now view your health information.    For assistance activating your [facility] account, call [phone]         Quit Tobacco Information     Do you want to quit using tobacco?    Quitting tobacco decreases risks of cancer, heart and lung disease, increases life expectancy, improves sense of taste and smell, and increases spending money, among other benefits.    If you are thinking about quitting, we can help.  • [facility] Quit Tobacco Program: 570.335.3822  o Program occurs weekly for four weeks and includes pharmacist consultation on products to support quitting smoking or chewing tobacco. A provider referral is needed for pharmacist consultation.  • Tobacco Users Help Hotline:  1-800-QUIT-NOW (143-5478) or https://[place].quitlogix.org/  o Free, confidential telephone and online coaching for [place] residents. Sessions are designed on a schedule that is convenient for you. Eligible clients receive free nicotine replacement therapy.  • Nationally: www.smokefree.gov  o Information and professional assistance to support both immediate and long-term needs as you become, and remain, a non-smoker. Smokefree.gov allows you to choose the help that best fits your needs.

## 2017-05-04 ENCOUNTER — TELEPHONE (OUTPATIENT)
Dept: MEDICAL GROUP | Facility: CLINIC | Age: 57
End: 2017-05-04

## 2017-05-04 NOTE — TELEPHONE ENCOUNTER
----- Message from Phyllis Sparks PA-C sent at 5/1/2017  3:16 PM PDT -----  I reviewed labs. There is a value I am concerned about and will start him on a medication called Metformin for diabetes. He should take this as prescribed and return for a follow up visit in 1 month.

## 2017-06-01 ENCOUNTER — OFFICE VISIT (OUTPATIENT)
Dept: MEDICAL GROUP | Facility: CLINIC | Age: 57
End: 2017-06-01
Payer: OTHER MISCELLANEOUS

## 2017-06-01 VITALS
OXYGEN SATURATION: 93 % | TEMPERATURE: 97.9 F | SYSTOLIC BLOOD PRESSURE: 134 MMHG | DIASTOLIC BLOOD PRESSURE: 86 MMHG | BODY MASS INDEX: 38.5 KG/M2 | RESPIRATION RATE: 20 BRPM | WEIGHT: 290.5 LBS | HEART RATE: 87 BPM | HEIGHT: 73 IN

## 2017-06-01 DIAGNOSIS — R74.8 ELEVATED LIVER ENZYMES: ICD-10-CM

## 2017-06-01 DIAGNOSIS — I10 ESSENTIAL HYPERTENSION: ICD-10-CM

## 2017-06-01 DIAGNOSIS — E11.9 TYPE 2 DIABETES MELLITUS WITHOUT COMPLICATION, WITHOUT LONG-TERM CURRENT USE OF INSULIN (HCC): ICD-10-CM

## 2017-06-01 PROBLEM — L91.8 SKIN TAG: Status: RESOLVED | Noted: 2017-04-11 | Resolved: 2017-06-01

## 2017-06-01 PROBLEM — Z00.00 WELL ADULT EXAM: Status: RESOLVED | Noted: 2017-04-11 | Resolved: 2017-06-01

## 2017-06-01 PROCEDURE — 99213 OFFICE O/P EST LOW 20 MIN: CPT | Performed by: PHYSICIAN ASSISTANT

## 2017-06-01 RX ORDER — CHLORTHALIDONE 25 MG/1
12.5 TABLET ORAL EVERY MORNING
Qty: 45 TAB | Refills: 2 | Status: SHIPPED | OUTPATIENT
Start: 2017-06-01 | End: 2019-01-15

## 2017-06-01 NOTE — PROGRESS NOTES
Chief Complaint   Patient presents with   • Results     DM, HTN       HISTORY OF PRESENT ILLNESS: Patient is a 56 y.o. male established patient who presents today for evaluation and management of:    Essential hypertension  Well-controlled on current dose of chlorthalidone. No recent chest pain, shortness of breath or swelling in legs. Patient has not noticed increase in urination frequency.    Type 2 diabetes mellitus without complication, without long-term current use of insulin (CMS-HCC)  Patient has been checking his blood sugars at home and has been running at approximately 200-225. He does not feel symptomatic as he is not excessively thirsty or hungry and does not feel other various endocrine symptoms.    Elevated liver enzymes  Patient was an alcoholic for many years and his liver enzymes remain approximately twice the upper limit of normal. He is asymptomatic without pain in his right upper quadrant or jaundice.         Patient Active Problem List    Diagnosis Date Noted   • Type 2 diabetes mellitus without complication, without long-term current use of insulin (CMS-HCC) 06/01/2017   • Elevated liver enzymes 06/01/2017   • Localized skin tenderness 04/26/2017   • Essential hypertension 04/25/2017   • Obesity (BMI 30-39.9) 04/11/2017   • Tobacco abuse 04/11/2017   • Chronic low back pain without sciatica 04/11/2017   • Chronic pain of both knees 04/11/2017       Allergies:Review of patient's allergies indicates no known allergies.    Current Outpatient Prescriptions   Medication Sig Dispense Refill   • metformin (GLUCOPHAGE) 500 MG Tab 1000mg PO with first meal of the day and 500 mg PO with last meal of day 45 Tab 5   • chlorthalidone (HYGROTON) 25 MG Tab Take 0.5 Tabs by mouth every morning. 45 Tab 2   • vitamin D (CHOLECALCIFEROL) 1000 UNIT Tab Take 5,000 Units by mouth every day.     • Naproxen Sodium (ALEVE) 220 MG Cap Take  by mouth. 60 Cap    • Multiple Vitamin (ONE-A-DAY MENS) Tab Take  by mouth. 30  "Tab      No current facility-administered medications for this visit.       Social History   Substance Use Topics   • Smoking status: Current Every Day Smoker -- 1.00 packs/day for 35 years   • Smokeless tobacco: None   • Alcohol Use: 0.0 oz/week     0 Standard drinks or equivalent per week       Family Status   Relation Status Death Age   • Mother     • Father     • Sister Alive    • Sister Alive      Family History   Problem Relation Age of Onset   • Cancer Mother      colon   • Respiratory Disease Mother      emphysema- smoker   • Alcohol abuse Mother    • Heart Disease Father    • Cancer Sister      brain tumor   • GI Sister        Review of Systems:   Constitutional: Negative for fever, chills, weight loss and malaise/fatigue.   HENT: Negative for ear pain, nosebleeds, congestion, sore throat and neck pain.    Eyes: Negative for blurred vision.   Respiratory: Negative for cough, sputum production, shortness of breath and wheezing.    Cardiovascular: Negative for chest pain, palpitations, orthopnea and leg swelling.   Gastrointestinal: Negative for heartburn, nausea, vomiting and abdominal pain.   Genitourinary: Negative for dysuria, urgency and frequency.   Musculoskeletal: Negative for myalgias, back pain and joint pain.   Skin: Negative for rash and itching.   Neurological: Negative for dizziness, tingling, tremors, sensory change, focal weakness and headaches.   Endo/Heme/Allergies: Does not bruise/bleed easily.   Psychiatric/Behavioral: Negative for depression, suicidal ideas and memory loss.  The patient is not nervous/anxious and does not have insomnia.      Exam:  Blood pressure 134/86, pulse 87, temperature 36.6 °C (97.9 °F), resp. rate 20, height 1.854 m (6' 1\"), weight 131.77 kg (290 lb 8 oz), SpO2 93 %.  Body mass index is 38.34 kg/(m^2).  General:  Obese male in NAD  Head: is grossly normal.  Neck: Supple without masses. Thyroid is not visibly enlarged.  Pulmonary: Clear to " ausculation. Normal effort. No rales, ronchi, or wheezing.  Cardiovascular: Regular rate and rhythm without murmur. Carotid and radial pulses are intact and equal bilaterally.  Extremities: no clubbing, cyanosis, or edema.    Medical decision-making and discussion:  1. Type 2 diabetes mellitus without complication, without long-term current use of insulin (CMS-[facility])  Increase metformin dose from 500 mg 2 times a day to the following  - metformin (GLUCOPHAGE) 500 MG Tab; 1000mg PO with first meal of the day and 500 mg PO with last meal of day  Dispense: 45 Tab; Refill: 5    2. Essential hypertension  Remain on current chlorthalidone dose.   - chlorthalidone (HYGROTON) 25 MG Tab; Take 0.5 Tabs by mouth every morning.  Dispense: 45 Tab; Refill: 2    3. Elevated liver enzymes  - HEPATIC FUNCTION PANEL; Future      Please note that this dictation was created using voice recognition software. I have made every reasonable attempt to correct obvious errors, but I expect that there are errors of grammar and possibly content that I did not discover before finalizing the note.      Return in about 3 months (around 9/1/2017) for DM, HTN, liver recheck.

## 2017-06-01 NOTE — ASSESSMENT & PLAN NOTE
Well-controlled on current dose of chlorthalidone. No recent chest pain, shortness of breath or swelling in legs. Patient has not noticed increase in urination frequency.

## 2017-06-01 NOTE — ASSESSMENT & PLAN NOTE
Patient was an alcoholic for many years and his liver enzymes remain approximately twice the upper limit of normal. He is asymptomatic without pain in his right upper quadrant or jaundice.

## 2017-06-01 NOTE — ASSESSMENT & PLAN NOTE
Patient has been checking his blood sugars at home and has been running at approximately 200-225. He does not feel symptomatic as he is not excessively thirsty or hungry and does not feel other various endocrine symptoms.

## 2017-10-23 ENCOUNTER — OFFICE VISIT (OUTPATIENT)
Dept: MEDICAL GROUP | Facility: CLINIC | Age: 57
End: 2017-10-23
Payer: OTHER MISCELLANEOUS

## 2017-10-23 VITALS
RESPIRATION RATE: 14 BRPM | TEMPERATURE: 98.4 F | DIASTOLIC BLOOD PRESSURE: 80 MMHG | OXYGEN SATURATION: 96 % | BODY MASS INDEX: 39.1 KG/M2 | HEIGHT: 73 IN | WEIGHT: 295 LBS | HEART RATE: 83 BPM | SYSTOLIC BLOOD PRESSURE: 144 MMHG

## 2017-10-23 DIAGNOSIS — E66.9 OBESITY (BMI 35.0-39.9 WITHOUT COMORBIDITY): ICD-10-CM

## 2017-10-23 DIAGNOSIS — E11.9 TYPE 2 DIABETES MELLITUS WITHOUT COMPLICATION, WITHOUT LONG-TERM CURRENT USE OF INSULIN (HCC): ICD-10-CM

## 2017-10-23 DIAGNOSIS — I10 ESSENTIAL HYPERTENSION: ICD-10-CM

## 2017-10-23 DIAGNOSIS — Z72.0 TOBACCO ABUSE: ICD-10-CM

## 2017-10-23 LAB
HBA1C MFR BLD: 7.4 % (ref ?–5.8)
INT CON NEG: NEGATIVE
INT CON POS: POSITIVE

## 2017-10-23 PROCEDURE — 83036 HEMOGLOBIN GLYCOSYLATED A1C: CPT | Performed by: PHYSICIAN ASSISTANT

## 2017-10-23 PROCEDURE — 99213 OFFICE O/P EST LOW 20 MIN: CPT | Performed by: PHYSICIAN ASSISTANT

## 2017-10-23 RX ORDER — BUPROPION HYDROCHLORIDE 100 MG/1
100 TABLET ORAL 2 TIMES DAILY
Qty: 60 TAB | Refills: 2 | Status: SHIPPED | OUTPATIENT
Start: 2017-10-23 | End: 2019-01-15

## 2017-10-23 NOTE — ASSESSMENT & PLAN NOTE
Last A1c: 7.4% today   DM Medications: metformin Patient reports poor medication compliance.   HTN: Blood pressure goal <140/<90 No  ACE: chlorthaladone  Hyperlipidemia: Cholesterol goal LDL <100 No.   Currently Rx Statin: none, working on diet and stabilizing on current medications before starting new.   Diabetic diet: No  Exercise: not much, walks dogs occasionally.   Last monofilament foot exam: 10/23/17 Checks feet at home: No, no sores currently   Last Eye exam: mid 2017 wnl for diabetic check.    Kidney function: wnl in April 2017  Microalbumin screening: wnl April 2017  Has patient received flu vaccine: Yes  Has patient received Hep B series:No    A1c goal <7 No  Current barriers to control include - few food choices, education on diabetic diet  Glucose monitoring frequency: rare  Hypoglycemic episodes No  Diabetic complications: none    The patient is not taking ASA every day and is not taking all other medications as prescribed. Patient denies any side effects of medication.

## 2017-10-23 NOTE — ASSESSMENT & PLAN NOTE
Patient is now smoking about 18 cigarettes per day, down from 1.5ppd. He and his wife are working together to quit at this time.

## 2017-10-23 NOTE — ASSESSMENT & PLAN NOTE
Patient has not been taking his prescribed medications. He is without chest pain, shortness of breath or increasing edema

## 2017-10-23 NOTE — PROGRESS NOTES
Chief Complaint   Patient presents with   • Hypertension       HISTORY OF PRESENT ILLNESS: Patient is a 57 y.o. male established patient who presents today for evaluation and management of:    Type 2 diabetes mellitus without complication, without long-term current use of insulin (CMS-[facility])  Last A1c: 7.4% today   DM Medications: metformin Patient reports poor medication compliance.   HTN: Blood pressure goal <140/<90 No  ACE: chlorthaladone  Hyperlipidemia: Cholesterol goal LDL <100 No.   Currently Rx Statin: none, working on diet and stabilizing on current medications before starting new.   Diabetic diet: No  Exercise: not much, walks dogs occasionally.   Last monofilament foot exam: 10/23/17 Checks feet at home: No, no sores currently   Last Eye exam: mid 2017 wnl for diabetic check.    Kidney function: wnl in April 2017  Microalbumin screening: wnl April 2017  Has patient received flu vaccine: Yes  Has patient received Hep B series:No    A1c goal <7 No  Current barriers to control include - few food choices, education on diabetic diet  Glucose monitoring frequency: rare  Hypoglycemic episodes No  Diabetic complications: none    The patient is not taking ASA every day and is not taking all other medications as prescribed. Patient denies any side effects of medication.        Tobacco abuse  Patient is now smoking about 18 cigarettes per day, down from 1.5ppd. He and his wife are working together to quit at this time.    Essential hypertension  Patient has not been taking his prescribed medications. He is without chest pain, shortness of breath or increasing edema    Obesity (BMI 30-39.9)  Patient is aware that he is obese and wishes to lose about 100lbs. He realizes that if he were able to lose this weight he would likely be able to stop taking all of the medications he is now supposed to be taking.        Patient Active Problem List    Diagnosis Date Noted   • Obesity (BMI 35.0-39.9 without  comorbidity) ([facility]) 10/23/2017   • Type 2 diabetes mellitus without complication, without long-term current use of insulin (CMS-HCC) 2017   • Elevated liver enzymes 2017   • Localized skin tenderness 2017   • Essential hypertension 2017   • Obesity (BMI 30-39.9) 2017   • Tobacco abuse 2017   • Chronic low back pain without sciatica 2017   • Chronic pain of both knees 2017       Allergies:Review of patient's allergies indicates no known allergies.    Current Outpatient Prescriptions   Medication Sig Dispense Refill   • buPROPion (WELLBUTRIN) 100 MG Tab Take 1 Tab by mouth 2 times a day. 60 Tab 2   • Multiple Vitamin (ONE-A-DAY MENS) Tab Take  by mouth. 30 Tab    • metformin (GLUCOPHAGE) 500 MG Tab 1000mg PO with first meal of the day and 500 mg PO with last meal of day 45 Tab 5   • chlorthalidone (HYGROTON) 25 MG Tab Take 0.5 Tabs by mouth every morning. 45 Tab 2   • vitamin D (CHOLECALCIFEROL) 1000 UNIT Tab Take 5,000 Units by mouth every day.     • Naproxen Sodium (ALEVE) 220 MG Cap Take  by mouth. 60 Cap      No current facility-administered medications for this visit.        Social History   Substance Use Topics   • Smoking status: Current Every Day Smoker     Packs/day: 1.00     Years: 35.00   • Smokeless tobacco: Never Used   • Alcohol use 0.0 oz/week       Family Status   Relation Status   • Mother    • Father    • Sister Alive   • Sister Alive   • Sister    • Sister      Family History   Problem Relation Age of Onset   • Cancer Mother      colon   • Respiratory Disease Mother      emphysema- smoker   • Alcohol abuse Mother    • Heart Disease Father    • Cancer Sister      brain tumor   • GI Sister        Review of Systems:   Constitutional: Negative for fever, chills, weight loss and malaise.   HENT: Negative for ear pain, nosebleeds, congestion, sore throat and neck pain.    Eyes: Negative for blurred vision.   Respiratory: Positive for daily  "a.m. smoker's cough. Negative for other cough, sputum production, shortness of breath and wheezing.    Cardiovascular: Negative for chest pain, palpitations, orthopnea.   Gastrointestinal: Negative for heartburn, nausea, vomiting and abdominal pain.   Genitourinary: Positive for urinary frequency. Negative for dysuria, urgency .   Skin: Negative for rash and itching.   Neurological: Negative for dizziness, tingling, tremors, sensory change, focal weakness and headaches.   Endo/Heme/Allergies: Does not bruise/bleed easily.   Psychiatric/Behavioral: Negative for depression, suicidal ideas and memory loss.  The patient is not nervous/anxious and does not have insomnia.      Exam:  Blood pressure 144/80, pulse 83, temperature 36.9 °C (98.4 °F), resp. rate 14, height 1.854 m (6' 1\"), weight (!) 133.8 kg (295 lb), SpO2 96 %.  Body mass index is 38.92 kg/m².  General:  Obese male in NAD  Head: is grossly normal.  Neck: Supple without masses. Thyroid is not visibly enlarged.  Pulmonary:  Normal effort.  Cardiovascular: Radial pulses are intact and equal bilaterally.  Extremities:clubbing, cyanosis, and 1+/4 pitting edema present.    Medical decision-making and discussion:  1. Type 2 diabetes mellitus without complication, without long-term current use of insulin (CMS-[facility])  Patient advised that taking his 500 mg metformin dose once daily will likely reduce his blood sugars into a normal range.  - POCT  A1C  - Diabetic Monofilament LE Exam    2. Tobacco abuse  Patient and his wife have set a quit date in order to quit together. Her health is very dependent on doing so so the patient is more dedicated to doing this at this time.  - buPROPion (WELLBUTRIN) 100 MG Tab; Take 1 Tab by mouth 2 times a day.  Dispense: 60 Tab; Refill: 2    3. Essential hypertension  Patient advised to take his daily chlorthalidone as directed and to reduce his dietary salt intake    4. Obesity (BMI 30-39.9)  Patient advised to count the calories and " his food and, while at home on Wednesday, his day off, he should research on the Internet how many calories are in each item of food that he consumes in order to better understand how to cut calories in order to lose weight. He was encouraged to increase his daily exercise as he is able.      Please note that this dictation was created using voice recognition software. I have made every reasonable attempt to correct obvious errors, but I expect that there are errors of grammar and possibly content that I did not discover before finalizing the note.      Return in about 6 weeks (around 12/4/2017) for diabetes and smoking recheck.

## 2017-10-23 NOTE — ASSESSMENT & PLAN NOTE
Patient is aware that he is obese and wishes to lose about 100lbs. He realizes that if he were able to lose this weight he would likely be able to stop taking all of the medications he is now supposed to be taking.

## 2018-12-21 ENCOUNTER — APPOINTMENT (OUTPATIENT)
Dept: RADIOLOGY | Facility: MEDICAL CENTER | Age: 58
End: 2018-12-21
Attending: OTOLARYNGOLOGY
Payer: COMMERCIAL

## 2019-01-09 ENCOUNTER — PATIENT OUTREACH (OUTPATIENT)
Dept: OTHER | Facility: MEDICAL CENTER | Age: 59
End: 2019-01-09

## 2019-01-09 NOTE — PROGRESS NOTES
On 1/9/2019 at 1353 this ONN called patient. Introduced self and explained role of nurse navigator. At this time patient states his biggest barrier to care is financial. Patient states there is very little money coming in right now as patient is working limited shifts and patient's wife is receiving disability. Patient emphasized that if he does treatment he will not be missing work to do so. Patient states that even with the Financial Assistance Program and outside organizations helping, patient is concerned about cost of care. Patient states that with his insurance his copays are $90 per visit. Patient is concerned that radiation would be $90 per visit. This ONN explained that this ONN cannot speak to that but would refer patient back to [facility] [place]. Patient agreeable to speaking with FRA again. Patient states when he first spoke with FRA that patient did not have full information on diagnosis and plan of care. Explained to patient that plan of care would likely be developed after consult with Radiation Oncologist and this ONN encouraged patient to make appointment with Radiation Oncologist. Patient agreeable to this ONN transferring patient to Radiation Oncology and to referring patient back to FRA for further discussion.

## 2019-01-15 ENCOUNTER — HOSPITAL ENCOUNTER (OUTPATIENT)
Dept: RADIATION ONCOLOGY | Facility: MEDICAL CENTER | Age: 59
End: 2019-01-31
Attending: RADIOLOGY
Payer: COMMERCIAL

## 2019-01-15 VITALS
HEART RATE: 75 BPM | HEIGHT: 73 IN | OXYGEN SATURATION: 96 % | BODY MASS INDEX: 36.45 KG/M2 | DIASTOLIC BLOOD PRESSURE: 84 MMHG | WEIGHT: 275 LBS | TEMPERATURE: 97.2 F | SYSTOLIC BLOOD PRESSURE: 171 MMHG

## 2019-01-15 DIAGNOSIS — C09.9 TONSIL CANCER (HCC): ICD-10-CM

## 2019-01-15 DIAGNOSIS — R22.1 LOCALIZED SWELLING, MASS OR LUMP OF NECK: ICD-10-CM

## 2019-01-15 PROCEDURE — 99205 OFFICE O/P NEW HI 60 MIN: CPT | Mod: 25 | Performed by: RADIOLOGY

## 2019-01-15 PROCEDURE — 31575 DIAGNOSTIC LARYNGOSCOPY: CPT | Performed by: RADIOLOGY

## 2019-01-15 PROCEDURE — 99214 OFFICE O/P EST MOD 30 MIN: CPT | Performed by: RADIOLOGY

## 2019-01-15 RX ORDER — ASPIRIN 325 MG
325 TABLET ORAL EVERY 6 HOURS PRN
COMMUNITY
End: 2019-02-20

## 2019-01-15 ASSESSMENT — PAIN SCALES - GENERAL: PAINLEVEL: NO PAIN

## 2019-01-15 NOTE — CONSULTS
RADIATION ONCOLOGY H&N CONSULT    DATE OF SERVICE:   1/15/2019    IDENTIFICATION:   A 58 y.o. 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  He is being seen at the kind request of Dr. Wolf for radiotherapy evaluation..      HISTORY OF PRESENT ILLNESS:   Patient is a  with at least a 40-pack-year tobacco history and still continues to smoke approximately quarter pack per day.  He has poor dentition and is been under regular dental care.  Approximately 2 months ago on a dental visit he was noted to have abnormality involving the right tonsillar region suspicious for malignancy.  He was referred initially to oral surgery and subsequently from oral surgery was referred to Dr. Wolf.  On Dr. Wolf's evaluation December 13, 2018 he noted a large right tonsillar mass extending onto the right soft palate and towards the base of tongue involving a small amount of retromolar trigone region.  In addition he noted bilateral neck nodes approximately 2 nodes in the right neck and one on the left neck level 2 region.    Biopsies were arranged as well as CT neck and soft tissue.  Patient however canceled most of these appointments concerned about the cost associated with care.  Dr. Wolf was able to convince him to at least have biopsy of the tonsil region which was done on 12/27/2018 demonstrating squamous cell carcinoma.  No differentiation given.  Positive for P 16.    He was referred here for additional evaluation and management.  Patient presents today still concerned about costs associated with care which is resulting in significant distress.  I did assuage some of his concerns by referring him to financial counseling as well as the .    Surprisingly, he offers very little pharyngeal symptoms.  He reports mild sore throat denies otalgia.  He denies any significant weight loss, he denies dysphagia, he denies odynophagia.      PAST MEDICAL HISTORY:   Past Medical  "History:   Diagnosis Date   • Alcoholic (HCC)     stopped drinking in 2009   • Diabetes (HCC)     diet controlled   • Hypertension     stopped taking blood pressure medication on his own   • Tonsil cancer (HCC)      right tonsil - SCC       PAST SURGICAL HISTORY:  Past Surgical History:   Procedure Laterality Date   • OTHER  12/27/2018    rt tonsil biopsy    • CHOLECYSTECTOMY         CURRENT MEDICATIONS:  Current Outpatient Prescriptions   Medication Sig Dispense Refill   • aspirin (ASA) 325 MG Tab Take 325 mg by mouth every 6 hours as needed.       No current facility-administered medications for this encounter.        ALLERGIES:  Patient has no known allergies.    FAMILY HISTORY:    Mother - colon cancer, sister - brain tumor.    SOCIAL HISTORY:     reports that he has been smoking.  He has a 35.00 pack-year smoking history. He has never used smokeless tobacco. He reports that he does not drink alcohol or use drugs.   Patient is , has 2 grown children and lives in [place]. Patient is a .     REVIEW OF SYSTEMS:    A review of systems for today's date of service was reviewed and uploaded into the electronic medical record.     PHYSICAL EXAM:   ECOG PERFORMANCE STATUS:  0= Fully active, able to carry on all pre-disease performance without restriction.     Pain Scale: 0-10  Pain Assessement: 0  Pain Location, Orientation and Scale: no complaints of pain.    BP (!) 171/84   Pulse 75   Temp 36.2 °C (97.2 °F)   Ht 1.854 m (6' 1\")   Wt 124.7 kg (275 lb)   SpO2 96%   BMI 36.28 kg/m²   GENERAL: Alert, oriented, no acute distress  HEENT:  Pupils are equal, round, and reactive to light.  Extraocular muscles   are intact. Sclerae nonicteric.  Conjunctivae pink.  Oral cavity, tongue   protrudes midline.  Dentition is poor with several loose teeth.  In the right tonsillar region there is a large mass that involves the entire right tonsil area extending to the base of tongue onto the tonsillar " pillars and small part of the soft palate and retromolar trigone region.  To palpation this area is firm and tender.  There does expect appear to be extension fdown the right lateral pharyngeal wall.  Exam was limited secondary to gag reflex.  NODES: At least 2 palpable nodes on the right level 2 region and one in the left level 2 region.  On the right the largest node measures approximately 2 cm left approximately 1.  LUNGS:  Clear to ascultation and resonant to percussion.  HEART:  Regular rate and rhythm.  No murmur appreciated  ABDOMEN:  Soft. No evidence of hepatosplenomegaly.  Positive bowel sounds.  EXTREMITIES:  Without Edema.  NEUROLOGIC:  Cranial nerves II through XII were intact.  Strength is 5/5 in   lower extremities bilaterally.  DTRs were symmetrical.  There was no focal   sensory deficit appreciated.    FLEXIBLE FIBEROPTIC EXAM:  See separate report.  Mass noted involving the right lingual tonsil extending down to the right side of the vallecula and possibly PE fold and adjacent base of tongue.      LABORATORY DATA:   Lab Results   Component Value Date/Time    SODIUM 142 04/13/2017 07:40 AM    POTASSIUM 3.9 04/13/2017 07:40 AM    CHLORIDE 108 04/13/2017 07:40 AM    CO2 25 04/13/2017 07:40 AM    GLUCOSE 158 (H) 04/13/2017 07:40 AM    BUN 13 04/13/2017 07:40 AM    CREATININE 0.56 04/13/2017 07:40 AM     Lab Results   Component Value Date/Time    ALKPHOSPHAT 97 04/13/2017 07:40 AM    ASTSGOT 126 (H) 04/13/2017 07:40 AM    ALTSGPT 175 (H) 04/13/2017 07:40 AM    TBILIRUBIN 0.8 04/13/2017 07:40 AM      Lab Results   Component Value Date/Time    WBC 7.6 04/13/2017 07:40 AM    RBC 5.39 04/13/2017 07:40 AM    HEMOGLOBIN 17.6 04/13/2017 07:40 AM    HEMATOCRIT 50.0 04/13/2017 07:40 AM    MCV 92.8 04/13/2017 07:40 AM    MCH 32.7 04/13/2017 07:40 AM    MCHC 35.2 04/13/2017 07:40 AM        RADIOLOGY DATA:  No results found.    IMPRESSION:    A 58 y.o. 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  Did recommend additional staging workup including MRI of the soft tissue neck and PET/CT.  Assuming he has no metastatic disease would be stage II using the AJCC eighth addition HPV mediated oropharyngeal cancer staging guideline.  He did fall into the intermediate risk group considering his 40+-pack-year smoking history with a 60% 5-year survival rate.    He would benefit from cisplatin-based chemotherapy given concurrently with radiotherapy.  He is scheduled to see  medical oncology on January 28    We discussed IMRT based radiotherapy. This will involve 35 treatments delivered over 6 weeks. Treatments would be daily except 1 day out of the week when 2 treatments will be given. We discussed the acute radiation effects associated with therapy including but not limited to: Xerostomia, dysgeusia, radiation pharyngitis, radiation mucositis, rare risk of osteoradionecrosis, dysphagia, and odynophagia. We discussed the importance of nutrition and maintaining fluid balance during therapy. We also discussed the importance of not having any treatment breaks during therapy. Measures to ameliorate radiation effects include analgesics, and placement of a feeding tube  to help with nutrition and fluid during therapy.     We reviewed the importance of dental care prior to start of radiotherapy, during and after completion of radiotherapy.  He is previously seen oral surgery and can have spoken with his dentist.  He recommended patient have a full extraction.  Have spoken with Dr. Escalante, oral surgeon, who will be contacting patient to make appropriate arrangements.    Patient will return for follow-up after undergoing staging MRI and PET/CT to review results.  Will begin the planning process after dental extractions date TBD.  Anticipate start of therapy approximately 10 days to 2  weeks post extractions to allow time for wound healing.    Thank you for the opportunity to participate in his care.  If any questions or comments, please do not hesitate in calling.    Edward SKELTON M.D.  Electronically signed by: Edward Harris V, 1/15/2019 4:20 PM  [phone]

## 2019-01-15 NOTE — NON-PROVIDER
"Patient was seen today in clinic with Dr. Harris for rt tonsil cancer.  Vitals signs and weight were obtained and pain assessment was completed.  Allergies and medications were reviewed with the patient.  Review of systems completed.     Vitals/Pain:  Vitals:    01/15/19 1418   BP: (!) 171/84   Pulse: 75   Temp: 36.2 °C (97.2 °F)   SpO2: 96%   Weight: 124.7 kg (275 lb)   Height: 1.854 m (6' 1\")      No complaints of pain.       Allergies:   Patient has no known allergies.    Current Medications:  Current Outpatient Prescriptions   Medication Sig Dispense Refill   • aspirin (ASA) 325 MG Tab Take 325 mg by mouth every 6 hours as needed.       No current facility-administered medications for this encounter.          PCP:  No primary care provider on file.        Stephany Raines R.N.  "

## 2019-01-16 NOTE — PROCEDURES
DATE OF SERVICE: 01/15/19        Fiberoptic Naso-pharyngoscopy Note      Flexible fiberoptic exam performed after anesthesia of left nostril and oropharynx.    Nasopharynx: Eustachian canal opening, torus, fossa of Rosenmuller normal       Oropharynx:   Mass noted involving the right lateral pharyngeal wall/lingual tonsil extending downward towards   the vallecula and PE fold obscuring PE folds on the right.  There appears to be medial extension   onto the base of tongue.      Larynx:  AE folds, false vocal folds, arytenoids normal    Cords meet at midline on the phonation of vowel E.      Piriform sinuses showed no masses.    Edward SKELTON M.D.  Electronically signed by: Edward Harris V, 1/15/2019 4:26 PM  [phone]

## 2019-01-17 ENCOUNTER — HOSPITAL ENCOUNTER (OUTPATIENT)
Dept: RADIOLOGY | Facility: MEDICAL CENTER | Age: 59
End: 2019-01-17
Attending: RADIOLOGY
Payer: COMMERCIAL

## 2019-01-17 DIAGNOSIS — R22.1 LOCALIZED SWELLING, MASS OR LUMP OF NECK: ICD-10-CM

## 2019-01-17 PROCEDURE — 700117 HCHG RX CONTRAST REV CODE 255: Performed by: RADIOLOGY

## 2019-01-17 PROCEDURE — 70543 MRI ORBT/FAC/NCK W/O &W/DYE: CPT

## 2019-01-17 PROCEDURE — A9585 GADOBUTROL INJECTION: HCPCS | Performed by: RADIOLOGY

## 2019-01-17 RX ORDER — GADOBUTROL 604.72 MG/ML
13 INJECTION INTRAVENOUS ONCE
Status: COMPLETED | OUTPATIENT
Start: 2019-01-17 | End: 2019-01-17

## 2019-01-17 RX ADMIN — GADOBUTROL 13 ML: 604.72 INJECTION INTRAVENOUS at 14:58

## 2019-01-18 ENCOUNTER — HOSPITAL ENCOUNTER (OUTPATIENT)
Dept: RADIOLOGY | Facility: MEDICAL CENTER | Age: 59
End: 2019-01-18
Attending: RADIOLOGY
Payer: COMMERCIAL

## 2019-01-18 DIAGNOSIS — C09.9 TONSIL CANCER (HCC): ICD-10-CM

## 2019-01-18 PROCEDURE — A9552 F18 FDG: HCPCS

## 2019-01-28 ENCOUNTER — OFFICE VISIT (OUTPATIENT)
Dept: HEMATOLOGY ONCOLOGY | Facility: MEDICAL CENTER | Age: 59
End: 2019-01-28
Payer: COMMERCIAL

## 2019-01-28 VITALS
SYSTOLIC BLOOD PRESSURE: 134 MMHG | DIASTOLIC BLOOD PRESSURE: 80 MMHG | RESPIRATION RATE: 16 BRPM | HEIGHT: 73 IN | HEART RATE: 74 BPM | TEMPERATURE: 97.8 F | WEIGHT: 280.87 LBS | BODY MASS INDEX: 37.22 KG/M2 | OXYGEN SATURATION: 96 %

## 2019-01-28 DIAGNOSIS — I10 ESSENTIAL HYPERTENSION: ICD-10-CM

## 2019-01-28 DIAGNOSIS — C09.9 TONSIL CANCER (HCC): ICD-10-CM

## 2019-01-28 DIAGNOSIS — E66.9 OBESITY (BMI 30-39.9): ICD-10-CM

## 2019-01-28 PROCEDURE — 99205 OFFICE O/P NEW HI 60 MIN: CPT | Performed by: INTERNAL MEDICINE

## 2019-01-28 RX ORDER — 0.9 % SODIUM CHLORIDE 0.9 %
5 VIAL (ML) INJECTION PRN
Status: CANCELLED | OUTPATIENT
Start: 2019-02-10

## 2019-01-28 RX ORDER — SODIUM CHLORIDE 9 MG/ML
500 INJECTION, SOLUTION INTRAVENOUS ONCE
Status: CANCELLED | OUTPATIENT
Start: 2019-02-11

## 2019-01-28 RX ORDER — ONDANSETRON 2 MG/ML
4 INJECTION INTRAMUSCULAR; INTRAVENOUS
Status: CANCELLED | OUTPATIENT
Start: 2019-02-11

## 2019-01-28 RX ORDER — SODIUM CHLORIDE 9 MG/ML
INJECTION, SOLUTION INTRAVENOUS CONTINUOUS
Status: CANCELLED | OUTPATIENT
Start: 2019-02-11

## 2019-01-28 RX ORDER — PROCHLORPERAZINE MALEATE 10 MG
10 TABLET ORAL EVERY 6 HOURS PRN
Status: CANCELLED | OUTPATIENT
Start: 2019-02-11

## 2019-01-28 RX ORDER — 0.9 % SODIUM CHLORIDE 0.9 %
5 VIAL (ML) INJECTION PRN
Status: CANCELLED | OUTPATIENT
Start: 2019-02-11

## 2019-01-28 RX ORDER — 0.9 % SODIUM CHLORIDE 0.9 %
VIAL (ML) INJECTION PRN
Status: CANCELLED | OUTPATIENT
Start: 2019-02-11

## 2019-01-28 RX ORDER — 0.9 % SODIUM CHLORIDE 0.9 %
VIAL (ML) INJECTION PRN
Status: CANCELLED | OUTPATIENT
Start: 2019-02-10

## 2019-01-28 RX ORDER — ONDANSETRON 8 MG/1
8 TABLET, ORALLY DISINTEGRATING ORAL
Status: CANCELLED | OUTPATIENT
Start: 2019-02-11

## 2019-01-28 RX ORDER — IBUPROFEN 800 MG/1
800 TABLET ORAL EVERY 8 HOURS PRN
COMMUNITY
End: 2019-02-20

## 2019-01-28 ASSESSMENT — PAIN SCALES - GENERAL: PAINLEVEL: NO PAIN

## 2019-01-28 NOTE — PROGRESS NOTES
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  In addition he noted bilateral neck nodes approximately 2 nodes in the right neck and one on the left neck level 2 region.  -Patient had a lot of concerns about cost of care and inability to work and delayed care  -12/27/18-tonsillar biopsy-squamous cell carcinoma, P 16+  -Seen by Dr. Harris.  -1/70/19-MRI-RIGHT base of tongue mass  2.  Masslike enlargement of RIGHT palatine tonsil  3.  Enlarged RIGHT level Ib, 2 and 3 neck lymph nodes  4.  Enlarged necrotic appearing LEFT level 2 neck lymph node  -PET scan-uptake within the known right-sided tonsillar cancer.  2.  There is uptake within right-sided level IB, II, and III lymph nodes consistent with metastatic disease.  3.  There is uptake within a left level II node consistent with metastases  -He is awaiting dental workup.  He has a lot of anxiety about missing work and cost of care.  He has not had developed significant dysphagia and denies any acute complaints    Past Medical History:    Past Medical History:   Diagnosis Date   • Alcoholic (HCC)     stopped drinking in 2009   • Diabetes (HCC)     diet controlled   • Hypertension     stopped taking blood pressure medication on his own   • Tonsil cancer (HCC)      right tonsil - SCC       Past surgical history:    Past Surgical History:   Procedure Laterality Date   • OTHER  12/27/2018    rt tonsil biopsy    • CHOLECYSTECTOMY          Allergies:  Patient has no known allergies.    Medications:    Current Outpatient Prescriptions   Medication Sig Dispense Refill   • ibuprofen (MOTRIN) 800 MG Tab Take 800 mg by mouth every 8 hours as needed.     • aspirin (ASA) 325 MG Tab Take 325 mg by mouth every 6 hours as needed.       No current facility-administered medications for this visit.        Social History:     Social History     Social History   • Marital status:      Spouse name: N/A   • Number of children: N/A   • Years of education: N/A     Occupational History   •       Social History Main Topics   • Smoking status: Current Every Day Smoker     Packs/day: 1.00     Years: 35.00   • Smokeless tobacco: Never Used      Comment: pt is trying to cut down currently 3/4 ppd   • Alcohol use No      Comment: alcoholism  last 2009   • Drug use: No   • Sexual activity: No     Other Topics Concern   • Not on file     Social History Narrative   • No narrative on file       Family History:     Family History   Problem Relation Age of Onset   • Cancer Mother         colon   • Respiratory Disease Mother         emphysema- smoker   • Alcohol abuse Mother    • Heart Disease Father    • Cancer Sister         brain tumor   • GI Sister        Review of Systems:  All other review of systems are negative except what was mentioned above in the HPI.    Constitutional: No fever, chills, weight loss ,malaise/fatigue.    HEENT: No new auditory or visual complaints. No sore throat and neck pain.     Respiratory:No new cough, sputum production, shortness of breath and wheezing.    Cardiovascular: No new chest pain, palpitations, orthopnea and leg swelling.    Gastrointestinal: No heartburn, nausea, vomiting ,abdominal pain, hematochezia or melena     Genitourinary: Negative for dysuria, hematuria    Musculoskeletal: No new arthralgias or myalgias   Skin: Negative for rash and itching.    Neurological: Negative for focal weakness or headaches.   "  Endo/Heme/Allergies: No abnormal bleed/bruise.    Psychiatric/Behavioral: No new depression/anxiety.    Physical Exam:  Vitals:   /80 (BP Location: Right arm, Patient Position: Sitting, BP Cuff Size: Large adult)   Pulse 74   Temp 36.6 °C (97.8 °F) (Temporal)   Resp 16   Ht 1.854 m (6' 1\")   Wt (!) 127.4 kg (280 lb 13.9 oz)   SpO2 96%   BMI 37.06 kg/m²     General: Not in acute distress, alert and oriented x 3  HEENT: No pallor, icterus. Oropharynx-enlarged right tonsillar mass visible  Neck: Supple, significant lymphadenopathy involving the upper cervical region.  Rest of the lymph nodes are hard to palpate. CVS: regular rate and rhythm, no rubs or gallops  RESP: Clear to auscultate bilaterally, no wheezing or crackles.   ABD: Soft, non tender, non distended, positive bowel sounds, no palpable organomegaly  EXT: No edema or cyanosis  CNS: Alert and oriented x3, No focal deficits.  Skin- No rash      Labs:   No results for input(s): RBC, HEMOGLOBIN, HEMATOCRIT, PLATELETCT, PROTHROMBTM, APTT, INR, IRON, FERRITIN, TOTIRONBC in the last 72 hours.  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  PET scan also showed involvement of a small lymph node in the contralateral side.    -He has significant issues with getting treatment due to work-related/financial issues.  -He insists on continuing his job as a  during the treatment.  -Reviewed the PET scan results with him.  Informed him that standard of care will be " chemoradiation which will give him a chance of cure.  -Even though he is P 16+, significant history of tobacco use will abrogate some of the favorable risk associated with HPV related malignancy.    -Discussed the standard of care which will be a full dose cisplatin however this comes with significant adverse effects and the patient informs me that he prefers not to go through full dose cisplatin.  Discussed alternate option including weekly cisplatin at 40 mg/m² which has shown equal and outcomes according to the [facility] data analysis.  He is amenable to this with the understanding that he will not be able to spend much time for chemotherapy due to work and radiation.  We will obtain routine labs and arrange chemotherapy education.  He will receive only pre-chemo hydration with normal saline 500 cc along with potassium and magnesium replacement  Discussed the potential adverse effects including but not limited to fatigue, cytopenias, additive radiation toxicity including dysphagia mucositis, renal insufficiency loculated abnormalities nausea including delayed emesis.  Hopefully, he can avoid PEG tube placement.  We will coordinate starting chemotherapy along with his radiation.      Complex patient requiring complex decision making. Reviewed images/ lab with patient.  Antineoplastic therapy requiring close monitoring.  He agreed and verbalized his agreement and understanding with the current plan.  I answered all questions and concerns he has at this time.              Thank you for allowing me to participate in his care.    Please note that this dictation was created using voice recognition software. I have made every reasonable attempt to correct obvious errors, but I expect that there are errors of grammar and possibly content that I did not discover before finalizing the note.      SIGNATURES:  Surjit Looney    CC:  No primary care provider on file.  Khalida Porter M.*

## 2019-01-30 ENCOUNTER — HOSPITAL ENCOUNTER (OUTPATIENT)
Dept: LAB | Facility: MEDICAL CENTER | Age: 59
End: 2019-01-30
Attending: INTERNAL MEDICINE
Payer: COMMERCIAL

## 2019-01-30 DIAGNOSIS — C09.9 TONSIL CANCER (HCC): ICD-10-CM

## 2019-01-30 LAB
ALBUMIN SERPL BCP-MCNC: 3.5 G/DL (ref 3.2–4.9)
ALBUMIN/GLOB SERPL: 0.9 G/DL
ALP SERPL-CCNC: 95 U/L (ref 30–99)
ALT SERPL-CCNC: 151 U/L (ref 2–50)
ANION GAP SERPL CALC-SCNC: 3 MMOL/L (ref 0–11.9)
AST SERPL-CCNC: 160 U/L (ref 12–45)
BASOPHILS # BLD AUTO: 1.2 % (ref 0–1.8)
BASOPHILS # BLD: 0.07 K/UL (ref 0–0.12)
BILIRUB SERPL-MCNC: 0.9 MG/DL (ref 0.1–1.5)
BUN SERPL-MCNC: 8 MG/DL (ref 8–22)
CALCIUM SERPL-MCNC: 9.9 MG/DL (ref 8.5–10.5)
CHLORIDE SERPL-SCNC: 109 MMOL/L (ref 96–112)
CO2 SERPL-SCNC: 28 MMOL/L (ref 20–33)
CREAT SERPL-MCNC: 0.66 MG/DL (ref 0.5–1.4)
EOSINOPHIL # BLD AUTO: 0.38 K/UL (ref 0–0.51)
EOSINOPHIL NFR BLD: 6.4 % (ref 0–6.9)
ERYTHROCYTE [DISTWIDTH] IN BLOOD BY AUTOMATED COUNT: 43.1 FL (ref 35.9–50)
FASTING STATUS PATIENT QL REPORTED: NORMAL
GLOBULIN SER CALC-MCNC: 3.9 G/DL (ref 1.9–3.5)
GLUCOSE SERPL-MCNC: 102 MG/DL (ref 65–99)
HCT VFR BLD AUTO: 46.2 % (ref 42–52)
HGB BLD-MCNC: 15.8 G/DL (ref 14–18)
IMM GRANULOCYTES # BLD AUTO: 0.01 K/UL (ref 0–0.11)
IMM GRANULOCYTES NFR BLD AUTO: 0.2 % (ref 0–0.9)
LYMPHOCYTES # BLD AUTO: 1.93 K/UL (ref 1–4.8)
LYMPHOCYTES NFR BLD: 32.3 % (ref 22–41)
MCH RBC QN AUTO: 32.6 PG (ref 27–33)
MCHC RBC AUTO-ENTMCNC: 34.2 G/DL (ref 33.7–35.3)
MCV RBC AUTO: 95.5 FL (ref 81.4–97.8)
MONOCYTES # BLD AUTO: 0.5 K/UL (ref 0–0.85)
MONOCYTES NFR BLD AUTO: 8.4 % (ref 0–13.4)
NEUTROPHILS # BLD AUTO: 3.08 K/UL (ref 1.82–7.42)
NEUTROPHILS NFR BLD: 51.5 % (ref 44–72)
NRBC # BLD AUTO: 0 K/UL
NRBC BLD-RTO: 0 /100 WBC
PLATELET # BLD AUTO: 92 K/UL (ref 164–446)
POTASSIUM SERPL-SCNC: 3.8 MMOL/L (ref 3.6–5.5)
PROT SERPL-MCNC: 7.4 G/DL (ref 6–8.2)
RBC # BLD AUTO: 4.84 M/UL (ref 4.7–6.1)
SODIUM SERPL-SCNC: 140 MMOL/L (ref 135–145)
WBC # BLD AUTO: 6 K/UL (ref 4.8–10.8)

## 2019-01-30 PROCEDURE — 85025 COMPLETE CBC W/AUTO DIFF WBC: CPT

## 2019-01-30 PROCEDURE — 36415 COLL VENOUS BLD VENIPUNCTURE: CPT

## 2019-01-30 PROCEDURE — 80053 COMPREHEN METABOLIC PANEL: CPT

## 2019-01-31 ENCOUNTER — TELEPHONE (OUTPATIENT)
Dept: HEMATOLOGY ONCOLOGY | Facility: MEDICAL CENTER | Age: 59
End: 2019-01-31

## 2019-01-31 NOTE — TELEPHONE ENCOUNTER
"Patient called regarding upcoming appointment with our office and stated \"I noticed I have two upcoming appointments with your office. So I would like to get a call back regarding the appointment on 02/08/2019, is there a way in which I can do for the same day. It's not easy to get off work and I would not like to miss if I can have on the same day.\" I let patient know I will relay message to Micki PERRY, Medical Assistant of Dr. Looney and will give a call back as soon as we can. Patient agreed.  "

## 2019-02-01 ENCOUNTER — HOSPITAL ENCOUNTER (OUTPATIENT)
Dept: RADIATION ONCOLOGY | Facility: MEDICAL CENTER | Age: 59
End: 2019-02-01

## 2019-02-01 ENCOUNTER — PATIENT OUTREACH (OUTPATIENT)
Dept: OTHER | Facility: MEDICAL CENTER | Age: 59
End: 2019-02-01

## 2019-02-01 ENCOUNTER — DOCUMENTATION (OUTPATIENT)
Dept: HEMATOLOGY ONCOLOGY | Facility: MEDICAL CENTER | Age: 59
End: 2019-02-01

## 2019-02-01 ENCOUNTER — HOSPITAL ENCOUNTER (OUTPATIENT)
Dept: RADIATION ONCOLOGY | Facility: MEDICAL CENTER | Age: 59
End: 2019-02-28
Attending: RADIOLOGY
Payer: COMMERCIAL

## 2019-02-01 PROCEDURE — 77334 RADIATION TREATMENT AID(S): CPT | Mod: 26 | Performed by: RADIOLOGY

## 2019-02-01 PROCEDURE — 77334 RADIATION TREATMENT AID(S): CPT | Performed by: RADIOLOGY

## 2019-02-01 PROCEDURE — 77470 SPECIAL RADIATION TREATMENT: CPT | Performed by: RADIOLOGY

## 2019-02-01 PROCEDURE — 77263 THER RADIOLOGY TX PLNG CPLX: CPT | Performed by: RADIOLOGY

## 2019-02-01 PROCEDURE — 77470 SPECIAL RADIATION TREATMENT: CPT | Mod: 26 | Performed by: RADIOLOGY

## 2019-02-01 PROCEDURE — 77290 THER RAD SIMULAJ FIELD CPLX: CPT | Performed by: RADIOLOGY

## 2019-02-01 NOTE — PROGRESS NOTES
"Nutrition Services: RD consultation/new XRT/chemo start - actual start date is currently pending.  Dx: P 16+ small cell carcinoma of R tonsil  PMHx: tobacco abuse; pt still continues to smoke, poor dentition s/p full teeth extraction on 1/25, ETOH abuse (pt stopped drinking in 2009), type diabetes (pt does not currently take medication or insulin; well controlled), hypertension, obesity.     RD met with patient today to establish rapport and review estimated nutrient needs for weight maintenance throughout cancer treatment.    · Patient is agreeable to PEG tube placement; choice form has been signed by patient and obtained - [facility] likely most appropriate d/t patient's insurance.  · Placement of PEG tube pending for week of February 28, 2019, per RN report.   · Pt states UBW of 300 lbs six months ago - he has been voluntarily changing PO diet habits in an effort to lose weight over the last six months, he reports.   · Per RD physical exam, patient appears well nourished; did not observe temporal, clavicle muscle wasting, nor any muscle wasting to interosseus spaces of the forearms during visit today.   · Obese status appears to be partially related to high % muscle composition, per RD observation today.    · Pt is s/p full teeth extraction on 1/25 - he is only able to tolerate PO soft items, cooked/peeled vegetables that he can \"mash\" up, ice cream, mashed potatoes with gravy and ground meat.  Pt is unable to \"gum\" foods d/t his gums do not fit together when he closes them.  · Temporary dentures are going to be available on 2/7, pt states  · RD reviewed potential side of effects during chemotherapy that could become nutritionally pertinent: N/V, diarrhea, constipation, changes in taste, loss of appetite resulting in weight loss.  · Date for chemo/RTX start is currently pending    Recent wt change: 20 lbs (% wt change x6 months = 6.6, which is classified as insignificant loss, per guidelines).    Labs (1/30): " fasting glucose 102, AST//151   GI: pt denies any changes in bowel habits, N/V or abdominal pain after eating, he reports.     Malnutrition Risk: patient at risk for severe malnutrition status r/t hypermetabolic disease state; increased protein needs r/t H/N cancer diagnosis, as well as need for PEG tube.    Goals/ Recommendations:   1) RD reviewed specific foods and snacks containing high protein for weight maintenance and preservation of LBM, optimal nutrition throughout cancer treatment.  Handouts provided.    -RD encouraged pt to select nutrition supplement = minimum 350 kcal, 14 grams protein/ 8 oz bottle.     2)    Pt to benefit from PEG tube placement to meet increased protein and kcal needs during cancer treatment; prevention of cancer related cachexia.   -will write orders for TwoCal HN initially, given patient is still able to swallow PO diet at this time/ reduced daily volume - 6 cartons/day will provide patient with 2850 kcal, 119 grams protein, 996 ml free water + PO diet.   -Pt is also a good candidate for Pivot 1.5 d/t H/N cancer d/t provision of immune support from arginine, GI tract integrity from glutamine and modulation of inflammatory response from omega-3 fatty acids (DHA + EPA) - RD will monitor pt's nutrition status and change prn.     3) Reviewed with patient remedies to try for changes in taste, if this becomes an issue throughout treatment.    Male            Nutrition Needs Assessment   cm/kg       Height (inches) 73 185       Weight (lbs)  280 127       Age (years) 58         BMI 37         Total Calorie Needs per HBE x.1.0  2820 22 kcal/kg   Total Protein Needs (1.2 - 1.5g/kg of CBW) 153 191       Daily Fluids (20 ml/kg of CBW) 2545             RD available at [phone] prn.

## 2019-02-01 NOTE — PROGRESS NOTES
On 1/31/2019 patient left voicemail for this ONN requesting a call back.     On 2/1/2019 at 0823 this ONN called patient. Patient states that he is unable to make it to appointment on 2/6/19 for chemo ed and then come back for pre-chemo on 2/8/2019. This ONN agreed to call [facility] to help adjust schedule to fit patient's work schedule. Patient unaware that chemotherapy appointment is longer than 30 minutes, patient states in [facility] chemo appointments are listed as being 30 minutes. This ONN explained that chemo appointments would be about 4 hours. Patient states he was unaware but made verbal understanding. Patient denies other concerns at this time.     After speaking with patient this ONN called [facility]. This ONN spoke with ANITA Alexander and explained situation and patient unable to come to two appointments next week. This ONN was informed that [facility] would work on adjusting schedule.     At 0918 this ONN called [facility] again, [facility] staff stated that they had spoken to patient and adjusted schedule.     This ONN then called patient and patient stated new schedule was acceptable for patient. Patient denied other questions but stated he would call this ONN as needed. This ONN agreed to meet patient at first Infusion appointment on 2/11/2019.

## 2019-02-07 PROCEDURE — 77301 RADIOTHERAPY DOSE PLAN IMRT: CPT | Performed by: RADIOLOGY

## 2019-02-07 PROCEDURE — 77301 RADIOTHERAPY DOSE PLAN IMRT: CPT | Mod: 26 | Performed by: RADIOLOGY

## 2019-02-07 PROCEDURE — 77300 RADIATION THERAPY DOSE PLAN: CPT | Mod: 26 | Performed by: RADIOLOGY

## 2019-02-07 PROCEDURE — 77338 DESIGN MLC DEVICE FOR IMRT: CPT | Performed by: RADIOLOGY

## 2019-02-07 PROCEDURE — 77300 RADIATION THERAPY DOSE PLAN: CPT | Performed by: RADIOLOGY

## 2019-02-07 PROCEDURE — 77338 DESIGN MLC DEVICE FOR IMRT: CPT | Mod: 26 | Performed by: RADIOLOGY

## 2019-02-08 ENCOUNTER — OFFICE VISIT (OUTPATIENT)
Dept: HEMATOLOGY ONCOLOGY | Facility: MEDICAL CENTER | Age: 59
End: 2019-02-08
Payer: COMMERCIAL

## 2019-02-08 VITALS
BODY MASS INDEX: 36.32 KG/M2 | WEIGHT: 274.03 LBS | HEART RATE: 69 BPM | SYSTOLIC BLOOD PRESSURE: 142 MMHG | HEIGHT: 73 IN | OXYGEN SATURATION: 97 % | RESPIRATION RATE: 16 BRPM | TEMPERATURE: 97.5 F | DIASTOLIC BLOOD PRESSURE: 80 MMHG

## 2019-02-08 DIAGNOSIS — Z71.89 ENCOUNTER FOR MEDICATION COUNSELING: ICD-10-CM

## 2019-02-08 DIAGNOSIS — C09.9 TONSIL CANCER (HCC): ICD-10-CM

## 2019-02-08 RX ORDER — PROCHLORPERAZINE MALEATE 10 MG
10 TABLET ORAL EVERY 6 HOURS PRN
Qty: 30 TAB | Refills: 3 | Status: CANCELLED | OUTPATIENT
Start: 2019-02-08

## 2019-02-08 RX ORDER — ONDANSETRON 4 MG/1
4 TABLET, FILM COATED ORAL EVERY 4 HOURS PRN
Qty: 30 TAB | Refills: 6 | Status: SHIPPED | OUTPATIENT
Start: 2019-02-08 | End: 2019-02-11 | Stop reason: SDUPTHER

## 2019-02-08 RX ORDER — LIDOCAINE AND PRILOCAINE 25; 25 MG/G; MG/G
CREAM TOPICAL
Qty: 1 TUBE | Refills: 3 | Status: SHIPPED | OUTPATIENT
Start: 2019-02-08 | End: 2019-02-11

## 2019-02-08 RX ORDER — PROCHLORPERAZINE MALEATE 10 MG
10 TABLET ORAL EVERY 6 HOURS PRN
Qty: 30 TAB | Refills: 6 | Status: SHIPPED | OUTPATIENT
Start: 2019-02-08 | End: 2019-02-11 | Stop reason: SDUPTHER

## 2019-02-08 RX ORDER — ONDANSETRON 4 MG/1
4 TABLET, FILM COATED ORAL EVERY 4 HOURS PRN
Qty: 30 TAB | Refills: 3 | Status: CANCELLED | OUTPATIENT
Start: 2019-02-08

## 2019-02-08 ASSESSMENT — PAIN SCALES - GENERAL: PAINLEVEL: NO PAIN

## 2019-02-08 NOTE — PROGRESS NOTES
"Subjective:      Maximino Green is a 58 y.o. 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    HPI     Patient in clinic for chemotherapy education prior to starting concurrent weekly Cisplatin and radiation for newly diagnosed squamous cell carcinoma of tonsil.  He is established with Dr. Looney for cancer care.  Patient is unaccompanied.  Denies oral pain and states cancer was discovered by an oral surgeon.  Patient lives one hour from [place].  He is  but his wife is unable to accompany him.  He has sister in [place] who can help with transportation.      No Known Allergies    Current Outpatient Prescriptions on File Prior to Visit   Medication Sig Dispense Refill   • ibuprofen (MOTRIN) 800 MG Tab Take 800 mg by mouth every 8 hours as needed.     • aspirin (ASA) 325 MG Tab Take 325 mg by mouth every 6 hours as needed.       No current facility-administered medications on file prior to visit.        ROS       Objective:     /80 (BP Location: Left arm, Patient Position: Sitting, BP Cuff Size: Adult)   Pulse 69   Temp 36.4 °C (97.5 °F) (Temporal)   Resp 16   Ht 1.854 m (6' 1\")   Wt 124.3 kg (274 lb 0.5 oz)   SpO2 97%   BMI 36.15 kg/m²      Physical Exam    Hospital Outpatient Visit on 01/30/2019   Component Date Value Ref Range Status   • WBC 01/30/2019 6.0  4.8 - 10.8 K/uL Final   • RBC 01/30/2019 4.84  4.70 - 6.10 M/uL Final   • Hemoglobin 01/30/2019 15.8  14.0 - 18.0 g/dL Final   • Hematocrit 01/30/2019 46.2  42.0 - 52.0 % Final   • MCV 01/30/2019 95.5  81.4 - 97.8 fL Final   • MCH 01/30/2019 32.6  27.0 - 33.0 pg Final   • MCHC 01/30/2019 34.2  33.7 - 35.3 g/dL Final   • RDW 01/30/2019 43.1  35.9 - 50.0 fL Final   • Platelet Count 01/30/2019 92* 164 - 446 K/uL Final   • Neutrophils-Polys 01/30/2019 51.50  44.00 - 72.00 % Final   • Lymphocytes 01/30/2019 32.30  22.00 - 41.00 % Final   • Monocytes 01/30/2019 8.40  0.00 - 13.40 " % Final   • Eosinophils 01/30/2019 6.40  0.00 - 6.90 % Final   • Basophils 01/30/2019 1.20  0.00 - 1.80 % Final   • Immature Granulocytes 01/30/2019 0.20  0.00 - 0.90 % Final   • Nucleated RBC 01/30/2019 0.00  /100 WBC Final   • Neutrophils (Absolute) 01/30/2019 3.08  1.82 - 7.42 K/uL Final    Includes immature neutrophils, if present.   • Lymphs (Absolute) 01/30/2019 1.93  1.00 - 4.80 K/uL Final   • Monos (Absolute) 01/30/2019 0.50  0.00 - 0.85 K/uL Final   • Eos (Absolute) 01/30/2019 0.38  0.00 - 0.51 K/uL Final   • Baso (Absolute) 01/30/2019 0.07  0.00 - 0.12 K/uL Final   • Immature Granulocytes (abs) 01/30/2019 0.01  0.00 - 0.11 K/uL Final   • NRBC (Absolute) 01/30/2019 0.00  K/uL Final   • Sodium 01/30/2019 140  135 - 145 mmol/L Final   • Potassium 01/30/2019 3.8  3.6 - 5.5 mmol/L Final   • Chloride 01/30/2019 109  96 - 112 mmol/L Final   • Co2 01/30/2019 28  20 - 33 mmol/L Final   • Anion Gap 01/30/2019 3.0  0.0 - 11.9 Final   • Glucose 01/30/2019 102* 65 - 99 mg/dL Final   • Bun 01/30/2019 8  8 - 22 mg/dL Final   • Creatinine 01/30/2019 0.66  0.50 - 1.40 mg/dL Final   • Calcium 01/30/2019 9.9  8.5 - 10.5 mg/dL Final   • AST(SGOT) 01/30/2019 160* 12 - 45 U/L Final   • ALT(SGPT) 01/30/2019 151* 2 - 50 U/L Final   • Alkaline Phosphatase 01/30/2019 95  30 - 99 U/L Final   • Total Bilirubin 01/30/2019 0.9  0.1 - 1.5 mg/dL Final   • Albumin 01/30/2019 3.5  3.2 - 4.9 g/dL Final   • Total Protein 01/30/2019 7.4  6.0 - 8.2 g/dL Final   • Globulin 01/30/2019 3.9* 1.9 - 3.5 g/dL Final   • A-G Ratio 01/30/2019 0.9  g/dL Final   • Fasting Status 01/30/2019 Fasting   Final   • GFR If  01/30/2019 >60  >60 mL/min/1.73 m 2 Final   • GFR If Non  01/30/2019 >60  >60 mL/min/1.73 m 2 Final       Assessment/Plan:     1. Tonsil cancer (HCC).  Encounter for medication counseling.   Educated patient on chemotherapy including but not limited to: Infusion Policies and procedures, cycling of chemotherapy,  infusion reactions, rare but severe side effects, alopecia, myelosuppression, infection prevention, peripheral neuropathy, fatigue, sexual changes, GI distress, use of specific nausea medications, avoidance of alcoholic beverages and supplement medications, nutrition, hydration, use of sunscreen, chemotherapy precautions at home and invasive procedures. Patient informed of severe and emergent side effects that warrant a call to MD and/or visit to the emergency department.  Patient was provided with [facility] chemotherapy handout, [facility] chemotherapy and you book, [facility] eating hints book, [facility] side effects sheet.  Neutropenia reminder card provided to patient.    CBC and CMP reviewed.  Platelets low at 92. AST/ALT elevated at 160 and 151.  Messaged Dr. Looney regarding lab results.  Will defer to him for next steps regarding treatment and dosing.  Patient will have labs redrawn prior to first chemotherapy in 3 days.    Additional teaching includes: possible seizure, encephalopathy  If hospitalized, inform staff of immunotherapy treatment and have them contact oncologist - immunotherapy alert card provided    The following appointments, labs, and medications have been provided to the patient:  Line: PIV  Labs: In treatment plan.  To be drawn prior to treatment for weekly Cisplatin.  Follow up appts: scheduled  Chemo/immunotherapy teaching: completed  Nausea medication: zofran/compazine prescribed  Nurse trena: Referral placed  Dietician:  Previous encounter 2/1/19.  Patient to have PEG tube placed for anticipated difficulty eating due to radiation.  SW: Referral placed    Consent for chemotherapy signed    Spent 90 minutes of continuous, uninterrupted, face-to-face patient contact in which greater than 50% of the visit was spent counseling and coordinating of care.

## 2019-02-10 NOTE — PROGRESS NOTES
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 Int J Radiat Oncol Biol Phys. 2011 Mar 15;79(4):1073-80.   Winsome MORGAN, et al. Radiother Oncol. 2006 Apr;79(1):34-8.      Allergies: Patient has no known allergies.    /68   Pulse 77   Temp 36.4 °C (97.5 °F) (Temporal)   Resp 18   Ht 1.829 m (6')   Wt 123.1 kg (271 lb 6.2 oz)   SpO2 96%   BMI 36.81 kg/m²  Body surface area is 2.5 meters squared.    Labs 02/11/19  ANC~ 3500   Plt = 97k     Hgb = 16.3 SCr = 0.59 mg/dL CrCl >125 ml/min    LFTs = AST/ALT/AP = 151/154/91   Tbili = 0.6 K = 3.7  Mg = 2   MD is aware of labs, ok to proceed with chemo today       Drug Order   (Drug name, dose, route, IV Fluid & volume, frequency, number of doses) Cycle 1      Previous treatment: n/a      Medication = cisplatin (Platinol)  Base Dose= 40mg/m2  Calc Dose: Base Dose x 2.5m2 = 100mg  Final Dose = 100 mg  Route = IV  Fluid & Volume =  mL  Admin Duration = Over 1 hour           <5% difference, ok to treat with final dose       By my signature below, I confirm this process was performed independently with the BSA and all final chemotherapy dosing calculations congruent. I have reviewed the above chemotherapy order and that my calculation of the final dose and BSA (when applicable) corroborate those calculations of the  pharmacist. Discrepancies of 5% or greater in the written dose have been addressed and documented within the [facility] Progress notes.    Lea Velez, PharmD, BCOP

## 2019-02-10 NOTE — PROGRESS NOTES
Pharmacy Chemotherapy calculation:      Patient Name: Maximino Green  Dx: squamous cell carcinoma of the right tonsil     Cycle 1  Previous treatment = none    Protocol: cisplatin + XRT     *Dosing Reference*  Cisplatin (CDDP) 30-40mg/m2 IV on day 1  Every 7 days for 6 weeks with radiation  NCCN Guidelines for Head and Neck Cancers.V.1.2017  Samantha PATTERSON, et al. Int J Radiat Oncol Biol Phys. 2011 Mar 15;79(4):1073-80.   Winsome MORGAN, et al. Radiother Oncol. 2006 Apr;79(1):34-8.     Allergies: Patient has no known allergies.    /68   Pulse 77   Temp 36.4 °C (97.5 °F) (Temporal)   Resp 18   Ht 1.829 m (6')   Wt 123.1 kg (271 lb 6.2 oz)   SpO2 96%   BMI 36.81 kg/m²   Body surface area is 2.5 meters squared.     All lab results within treatment parameters, except for plt and LFts.  MD aware of all current lab results. Orders received to proceed with treatment.       Cisplatin (CDDP) 40mg/m2  X 2.5m2 = 100mg   <5% difference, ok to treat with final dose = 100mg IV      NORA Carter Pharm.D.

## 2019-02-11 ENCOUNTER — DOCUMENTATION (OUTPATIENT)
Dept: HEMATOLOGY ONCOLOGY | Facility: MEDICAL CENTER | Age: 59
End: 2019-02-11

## 2019-02-11 ENCOUNTER — OUTPATIENT INFUSION SERVICES (OUTPATIENT)
Dept: ONCOLOGY | Facility: MEDICAL CENTER | Age: 59
End: 2019-02-11
Attending: INTERNAL MEDICINE
Payer: COMMERCIAL

## 2019-02-11 VITALS
RESPIRATION RATE: 18 BRPM | TEMPERATURE: 97.5 F | HEIGHT: 72 IN | DIASTOLIC BLOOD PRESSURE: 68 MMHG | BODY MASS INDEX: 36.76 KG/M2 | OXYGEN SATURATION: 96 % | WEIGHT: 271.39 LBS | HEART RATE: 77 BPM | SYSTOLIC BLOOD PRESSURE: 153 MMHG

## 2019-02-11 DIAGNOSIS — C09.9 TONSIL CANCER (HCC): ICD-10-CM

## 2019-02-11 LAB
ALBUMIN SERPL BCP-MCNC: 3.5 G/DL (ref 3.2–4.9)
ALBUMIN/GLOB SERPL: 0.9 G/DL
ALP SERPL-CCNC: 91 U/L (ref 30–99)
ALT SERPL-CCNC: 154 U/L (ref 2–50)
ANION GAP SERPL CALC-SCNC: 6 MMOL/L (ref 0–11.9)
AST SERPL-CCNC: 151 U/L (ref 12–45)
BASOPHILS # BLD AUTO: 1.3 % (ref 0–1.8)
BASOPHILS # BLD: 0.09 K/UL (ref 0–0.12)
BILIRUB SERPL-MCNC: 0.6 MG/DL (ref 0.1–1.5)
BUN SERPL-MCNC: 7 MG/DL (ref 8–22)
CALCIUM SERPL-MCNC: 9.5 MG/DL (ref 8.5–10.5)
CHLORIDE SERPL-SCNC: 109 MMOL/L (ref 96–112)
CO2 SERPL-SCNC: 25 MMOL/L (ref 20–33)
CREAT SERPL-MCNC: 0.59 MG/DL (ref 0.5–1.4)
EOSINOPHIL # BLD AUTO: 0.36 K/UL (ref 0–0.51)
EOSINOPHIL NFR BLD: 5.4 % (ref 0–6.9)
ERYTHROCYTE [DISTWIDTH] IN BLOOD BY AUTOMATED COUNT: 43 FL (ref 35.9–50)
GLOBULIN SER CALC-MCNC: 3.9 G/DL (ref 1.9–3.5)
GLUCOSE SERPL-MCNC: 89 MG/DL (ref 65–99)
HCT VFR BLD AUTO: 46.2 % (ref 42–52)
HGB BLD-MCNC: 16.3 G/DL (ref 14–18)
IMM GRANULOCYTES # BLD AUTO: 0.02 K/UL (ref 0–0.11)
IMM GRANULOCYTES NFR BLD AUTO: 0.3 % (ref 0–0.9)
LYMPHOCYTES # BLD AUTO: 2.22 K/UL (ref 1–4.8)
LYMPHOCYTES NFR BLD: 33.1 % (ref 22–41)
MAGNESIUM SERPL-MCNC: 2 MG/DL (ref 1.5–2.5)
MCH RBC QN AUTO: 33.1 PG (ref 27–33)
MCHC RBC AUTO-ENTMCNC: 35.3 G/DL (ref 33.7–35.3)
MCV RBC AUTO: 93.9 FL (ref 81.4–97.8)
MONOCYTES # BLD AUTO: 0.53 K/UL (ref 0–0.85)
MONOCYTES NFR BLD AUTO: 7.9 % (ref 0–13.4)
NEUTROPHILS # BLD AUTO: 3.48 K/UL (ref 1.82–7.42)
NEUTROPHILS NFR BLD: 52 % (ref 44–72)
NRBC # BLD AUTO: 0 K/UL
NRBC BLD-RTO: 0 /100 WBC
PLATELET # BLD AUTO: 97 K/UL (ref 164–446)
POTASSIUM SERPL-SCNC: 3.7 MMOL/L (ref 3.6–5.5)
PROT SERPL-MCNC: 7.4 G/DL (ref 6–8.2)
RBC # BLD AUTO: 4.92 M/UL (ref 4.7–6.1)
SODIUM SERPL-SCNC: 140 MMOL/L (ref 135–145)
WBC # BLD AUTO: 6.7 K/UL (ref 4.8–10.8)

## 2019-02-11 PROCEDURE — 80053 COMPREHEN METABOLIC PANEL: CPT

## 2019-02-11 PROCEDURE — 700105 HCHG RX REV CODE 258: Performed by: INTERNAL MEDICINE

## 2019-02-11 PROCEDURE — 96375 TX/PRO/DX INJ NEW DRUG ADDON: CPT

## 2019-02-11 PROCEDURE — 96367 TX/PROPH/DG ADDL SEQ IV INF: CPT

## 2019-02-11 PROCEDURE — 96361 HYDRATE IV INFUSION ADD-ON: CPT

## 2019-02-11 PROCEDURE — 77386 HCHG IMRT DELIVERY COMPLEX: CPT | Performed by: RADIOLOGY

## 2019-02-11 PROCEDURE — 83735 ASSAY OF MAGNESIUM: CPT

## 2019-02-11 PROCEDURE — 96413 CHEMO IV INFUSION 1 HR: CPT

## 2019-02-11 PROCEDURE — 700111 HCHG RX REV CODE 636 W/ 250 OVERRIDE (IP): Performed by: INTERNAL MEDICINE

## 2019-02-11 PROCEDURE — 77280 THER RAD SIMULAJ FIELD SMPL: CPT | Performed by: RADIOLOGY

## 2019-02-11 PROCEDURE — 85025 COMPLETE CBC W/AUTO DIFF WBC: CPT

## 2019-02-11 RX ORDER — PROCHLORPERAZINE MALEATE 10 MG
10 TABLET ORAL EVERY 6 HOURS PRN
Qty: 30 TAB | Refills: 6 | Status: SHIPPED | OUTPATIENT
Start: 2019-02-11 | End: 2019-02-20

## 2019-02-11 RX ORDER — SODIUM CHLORIDE 9 MG/ML
INJECTION, SOLUTION INTRAVENOUS CONTINUOUS
Status: DISCONTINUED | OUTPATIENT
Start: 2019-02-11 | End: 2019-02-11 | Stop reason: HOSPADM

## 2019-02-11 RX ORDER — ONDANSETRON 4 MG/1
4 TABLET, FILM COATED ORAL EVERY 4 HOURS PRN
Qty: 30 TAB | Refills: 6 | Status: SHIPPED | OUTPATIENT
Start: 2019-02-11 | End: 2019-02-20

## 2019-02-11 RX ORDER — SODIUM CHLORIDE 9 MG/ML
500 INJECTION, SOLUTION INTRAVENOUS ONCE
Status: COMPLETED | OUTPATIENT
Start: 2019-02-11 | End: 2019-02-11

## 2019-02-11 RX ADMIN — ONDANSETRON HYDROCHLORIDE 16 MG: 2 SOLUTION INTRAMUSCULAR; INTRAVENOUS at 12:59

## 2019-02-11 RX ADMIN — SODIUM CHLORIDE: 9 INJECTION, SOLUTION INTRAVENOUS at 12:00

## 2019-02-11 RX ADMIN — POTASSIUM CHLORIDE: 2 INJECTION, SOLUTION, CONCENTRATE INTRAVENOUS at 15:47

## 2019-02-11 RX ADMIN — CISPLATIN 100 MG: 1 INJECTION, SOLUTION INTRAVENOUS at 14:31

## 2019-02-11 RX ADMIN — SODIUM CHLORIDE 500 ML: 9 INJECTION, SOLUTION INTRAVENOUS at 10:30

## 2019-02-11 RX ADMIN — DEXAMETHASONE SODIUM PHOSPHATE 12 MG: 4 INJECTION, SOLUTION INTRAMUSCULAR; INTRAVENOUS at 13:20

## 2019-02-11 RX ADMIN — SODIUM CHLORIDE 150 MG: 9 INJECTION, SOLUTION INTRAVENOUS at 13:42

## 2019-02-11 NOTE — PROGRESS NOTES
Chemotherapy Verification - PRIMARY RN      Height = 182.9 cm  Weight = 123.1 kg  BSA = 2.5 m2       Medication: Cisplatin  Dose: 40 mg/m2  Calculated Dose: 100.3 mg                             (In mg/m2, AUC, mg/kg)         I confirm this process was performed independently with the BSA and all final chemotherapy dosing calculations congruent.  Any discrepancies of 5% or greater have been addressed with the chemotherapy pharmacist. The resolution of the discrepancy has been documented in the EPIC progress notes.

## 2019-02-11 NOTE — PROGRESS NOTES
Chemotherapy Verification - SECONDARY RN       Height = 1.829 m  Weight = 123.1 kg  BSA = 2.5 m2       Medication: cisplatin  Dose: 40 mg/m2  Calculated Dose: 100 mg                             (In mg/m2, AUC, mg/kg)     I confirm that this process was performed independently.

## 2019-02-12 ENCOUNTER — HOSPITAL ENCOUNTER (OUTPATIENT)
Dept: RADIATION ONCOLOGY | Facility: MEDICAL CENTER | Age: 59
End: 2019-02-12

## 2019-02-12 ENCOUNTER — PATIENT OUTREACH (OUTPATIENT)
Dept: OTHER | Facility: MEDICAL CENTER | Age: 59
End: 2019-02-12

## 2019-02-12 PROCEDURE — 77014 PR CT GUIDANCE PLACEMENT RAD THERAPY FIELDS: CPT | Mod: 26 | Performed by: RADIOLOGY

## 2019-02-12 PROCEDURE — 77386 HCHG IMRT DELIVERY COMPLEX: CPT | Performed by: RADIOLOGY

## 2019-02-12 NOTE — PROGRESS NOTES
On 2/12/2019 at 1148 this ONN called patient. Patient states he is doing well after starting treatment yesterday. Patient did relay to this ONN frustration regarding length of chemotherapy appointment. Patient states he was told appointment would be 5 hours but that appointment was 7 1/2 hours. Patient states his platelets were lower than parameters for chemotherapy and patient had to wait for physician approval to move forward with treatment. Patient was very understanding of circumstances. Patient states he was reviewing his scheduled appointments and patient noted that on 3/11/2019 patient's chemo was scheduled for 0900 but patient's pre-chemo was scheduled for 0940. This ONN reviewed rest of schedule with patient. Patient understands that on 2/18/2019 patient will not have radiation therapy due to office being closed for President's Day. This ONN agreed to call [facility] and try and fix schedule on 3/11/2019. Patient denies other questions or concerns at this time. Patient took this ONN's phone number.  Patient did say he has an upcoming appointment for g-tube education. Patient states he has not yet filled script for anti-emetics but patient agreed to do so today. Encouraged patient to call as needed.

## 2019-02-12 NOTE — PROGRESS NOTES
Arrives for first chemotherapy tx.  Labs redrawn re cisplatin tx and low plt ct.  Reviewed potential side effects of tx as went through day.  Hydration completed while waiting for labs and approval to tx from MD.  Premeds and chemo as well as post hydration infused without issue.  Also reported to KELLY ALEMAN that upper mucosal membrane red, not quite healed after teeth extraction.  Pt states may be new plate rubbing on it.  Will continue with salt and soda rinses.  Also clarified we are unable to infuse post hydration faster re potassium content.  Delay with completion waiting for approval to tx.  Pt, though a , prefers not to drive at [facility].  Schedule adjusted for Mar 11 and we will see first to start labs and hydration before MD visit.  DC to self care.  MD office also sending new rx for antiemetics as pts pharmacy had to change re insurance.

## 2019-02-13 ENCOUNTER — PATIENT OUTREACH (OUTPATIENT)
Dept: OTHER | Facility: MEDICAL CENTER | Age: 59
End: 2019-02-13

## 2019-02-13 ENCOUNTER — HOSPITAL ENCOUNTER (OUTPATIENT)
Dept: RADIATION ONCOLOGY | Facility: MEDICAL CENTER | Age: 59
End: 2019-02-13

## 2019-02-13 PROCEDURE — 77386 HCHG IMRT DELIVERY COMPLEX: CPT | Performed by: RADIOLOGY

## 2019-02-13 PROCEDURE — 77014 PR CT GUIDANCE PLACEMENT RAD THERAPY FIELDS: CPT | Mod: 26 | Performed by: RADIOLOGY

## 2019-02-13 PROCEDURE — 77336 RADIATION PHYSICS CONSULT: CPT | Performed by: RADIOLOGY

## 2019-02-13 NOTE — PROGRESS NOTES
On 2/13/2019 at 0941 this ONN called patient. This ONN updated patient on new schedule. This ONN explained that patient would come in and got to Radiation on 3/11/2019 at 0745 and from there go to Medical Oncology at 0800 and then patient would go to chemotherapy appointment at 0900. Patient made verbal understanding of adjusted scheduled. Patient inquired about 3/4/2019 schedule as Radiation Therapy and Chemotherapy are both schedule for 0830. Explained to patient that patient should go to Radiation first and then Chemotherapy appointment. Patient made verbal understanding. Patient denies further questions or concerns at this time.

## 2019-02-14 ENCOUNTER — HOSPITAL ENCOUNTER (OUTPATIENT)
Dept: RADIATION ONCOLOGY | Facility: MEDICAL CENTER | Age: 59
End: 2019-02-14

## 2019-02-14 ENCOUNTER — OFFICE VISIT (OUTPATIENT)
Dept: HEMATOLOGY ONCOLOGY | Facility: MEDICAL CENTER | Age: 59
End: 2019-02-14
Payer: COMMERCIAL

## 2019-02-14 VITALS
BODY MASS INDEX: 38.09 KG/M2 | TEMPERATURE: 98.4 F | SYSTOLIC BLOOD PRESSURE: 138 MMHG | HEIGHT: 72 IN | HEART RATE: 60 BPM | OXYGEN SATURATION: 96 % | WEIGHT: 281.2 LBS | RESPIRATION RATE: 18 BRPM | DIASTOLIC BLOOD PRESSURE: 82 MMHG

## 2019-02-14 DIAGNOSIS — C09.9 TONSIL CANCER (HCC): ICD-10-CM

## 2019-02-14 DIAGNOSIS — Z51.11 ENCOUNTER FOR ANTINEOPLASTIC CHEMOTHERAPY: ICD-10-CM

## 2019-02-14 PROCEDURE — 77014 PR CT GUIDANCE PLACEMENT RAD THERAPY FIELDS: CPT | Mod: 26 | Performed by: RADIOLOGY

## 2019-02-14 PROCEDURE — 77386 HCHG IMRT DELIVERY COMPLEX: CPT | Performed by: RADIOLOGY

## 2019-02-14 PROCEDURE — 99214 OFFICE O/P EST MOD 30 MIN: CPT | Performed by: NURSE PRACTITIONER

## 2019-02-14 RX ORDER — 0.9 % SODIUM CHLORIDE 0.9 %
VIAL (ML) INJECTION PRN
Status: CANCELLED | OUTPATIENT
Start: 2019-02-17

## 2019-02-14 RX ORDER — ONDANSETRON 8 MG/1
8 TABLET, ORALLY DISINTEGRATING ORAL
Status: CANCELLED | OUTPATIENT
Start: 2019-02-18

## 2019-02-14 RX ORDER — 0.9 % SODIUM CHLORIDE 0.9 %
5 VIAL (ML) INJECTION PRN
Status: CANCELLED | OUTPATIENT
Start: 2019-02-18

## 2019-02-14 RX ORDER — 0.9 % SODIUM CHLORIDE 0.9 %
VIAL (ML) INJECTION PRN
Status: CANCELLED | OUTPATIENT
Start: 2019-02-18

## 2019-02-14 RX ORDER — SODIUM CHLORIDE 9 MG/ML
INJECTION, SOLUTION INTRAVENOUS CONTINUOUS
Status: CANCELLED | OUTPATIENT
Start: 2019-02-18

## 2019-02-14 RX ORDER — SODIUM CHLORIDE 9 MG/ML
500 INJECTION, SOLUTION INTRAVENOUS ONCE
Status: CANCELLED | OUTPATIENT
Start: 2019-02-18

## 2019-02-14 RX ORDER — PROCHLORPERAZINE MALEATE 10 MG
10 TABLET ORAL EVERY 6 HOURS PRN
Status: CANCELLED | OUTPATIENT
Start: 2019-02-18

## 2019-02-14 RX ORDER — 0.9 % SODIUM CHLORIDE 0.9 %
5 VIAL (ML) INJECTION PRN
Status: CANCELLED | OUTPATIENT
Start: 2019-02-17

## 2019-02-14 RX ORDER — ONDANSETRON 2 MG/ML
4 INJECTION INTRAMUSCULAR; INTRAVENOUS
Status: CANCELLED | OUTPATIENT
Start: 2019-02-18

## 2019-02-14 ASSESSMENT — ENCOUNTER SYMPTOMS
WHEEZING: 0
CHILLS: 0
HEADACHES: 0
TINGLING: 0
DIARRHEA: 0
COUGH: 0
MYALGIAS: 0
SPUTUM PRODUCTION: 1
PALPITATIONS: 0
DIZZINESS: 0
NAUSEA: 0
VOMITING: 0
SORE THROAT: 0
SHORTNESS OF BREATH: 0
FEVER: 0
INSOMNIA: 0
CONSTIPATION: 0
WEIGHT LOSS: 0

## 2019-02-14 ASSESSMENT — PAIN SCALES - GENERAL: PAINLEVEL: NO PAIN

## 2019-02-14 NOTE — PROGRESS NOTES
"Subjective:      Maximino Green is a 58 y.o. male who presents for Follow-Up ( prechemo (Dr. Looney)) evaluation following initiation/prior to cycle 2 weekly cisplatin with concurrent radiation for tonsillar cancer        HPI   Mr. 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  He is unaccompanied for today's visit.    Patient reports mild fatigue but is continuing at the same activity, continues working although workload was decreased this week due to weather.  He is completing saline rinses 3-6 times daily and denies increase in dysphasia.  He is noted to have gained weight and is trying to \"bulk up\" prior to placement of G-tube.  Overall he reports he is fairly asymptomatic and is tolerating treatment very well.        No Known Allergies      No current outpatient prescriptions on file prior to visit.     No current facility-administered medications on file prior to visit.          Review of Systems   Constitutional: Positive for malaise/fatigue (same activity; continues to drive to treatment from [place] daily). Negative for chills, fever and weight loss (stable).   HENT: Negative for sore throat and tinnitus.         Saline rinses 3-6   Respiratory: Positive for sputum production (a bit more). Negative for cough, shortness of breath and wheezing.    Cardiovascular: Negative for chest pain, palpitations and leg swelling.   Gastrointestinal: Negative for constipation (Last BM this am), diarrhea, nausea and vomiting.   Genitourinary: Negative for dysuria.   Musculoskeletal: Negative for myalgias.   Neurological: Negative for dizziness, tingling and headaches.   Psychiatric/Behavioral: Positive for substance abuse (continues smoking). The patient does not have insomnia.           Objective:     /82 (BP Location: Left arm, Patient Position: " Sitting, BP Cuff Size: Adult)   Pulse 60   Temp 36.9 °C (98.4 °F) (Temporal)   Resp 18   Ht 1.829 m (6')   Wt (!) 127.6 kg (281 lb 3.2 oz)   SpO2 96%   BMI 38.14 kg/m²        Physical Exam   Constitutional: He is oriented to person, place, and time. He appears well-developed and well-nourished. No distress.   HENT:   Head: Normocephalic and atraumatic.   Eyes: EOM are normal. Right eye exhibits no discharge. Left eye exhibits no discharge. No scleral icterus.   Neck: Normal range of motion.   Tender at right neck   Cardiovascular: Normal rate, regular rhythm and normal heart sounds.  Exam reveals no gallop and no friction rub.    No murmur heard.  Pulmonary/Chest: Effort normal and breath sounds normal. No respiratory distress. He has no wheezes.   Abdominal: Soft. He exhibits no distension. There is no tenderness.   Musculoskeletal: Normal range of motion. He exhibits no edema.   Neurological: He is alert and oriented to person, place, and time.   Skin: Skin is warm and dry. No rash noted. He is not diaphoretic. No erythema. No pallor.   Psychiatric: He has a normal mood and affect. His behavior is normal.   Vitals reviewed.      No visits with results within 1 Day(s) from this visit.   Latest known visit with results is:   Outpatient Infusion Services on 02/11/2019   Component Date Value Ref Range Status   • Sodium 02/11/2019 140  135 - 145 mmol/L Final   • Potassium 02/11/2019 3.7  3.6 - 5.5 mmol/L Final   • Chloride 02/11/2019 109  96 - 112 mmol/L Final   • Co2 02/11/2019 25  20 - 33 mmol/L Final   • Anion Gap 02/11/2019 6.0  0.0 - 11.9 Final   • Glucose 02/11/2019 89  65 - 99 mg/dL Final   • Bun 02/11/2019 7* 8 - 22 mg/dL Final   • Creatinine 02/11/2019 0.59  0.50 - 1.40 mg/dL Final   • Calcium 02/11/2019 9.5  8.5 - 10.5 mg/dL Final   • AST(SGOT) 02/11/2019 151* 12 - 45 U/L Final   • ALT(SGPT) 02/11/2019 154* 2 - 50 U/L Final   • Alkaline Phosphatase 02/11/2019 91  30 - 99 U/L Final   • Total Bilirubin  02/11/2019 0.6  0.1 - 1.5 mg/dL Final   • Albumin 02/11/2019 3.5  3.2 - 4.9 g/dL Final   • Total Protein 02/11/2019 7.4  6.0 - 8.2 g/dL Final   • Globulin 02/11/2019 3.9* 1.9 - 3.5 g/dL Final   • A-G Ratio 02/11/2019 0.9  g/dL Final   • Magnesium 02/11/2019 2.0  1.5 - 2.5 mg/dL Final   • WBC 02/11/2019 6.7  4.8 - 10.8 K/uL Final   • RBC 02/11/2019 4.92  4.70 - 6.10 M/uL Final   • Hemoglobin 02/11/2019 16.3  14.0 - 18.0 g/dL Final   • Hematocrit 02/11/2019 46.2  42.0 - 52.0 % Final   • MCV 02/11/2019 93.9  81.4 - 97.8 fL Final   • MCH 02/11/2019 33.1* 27.0 - 33.0 pg Final   • MCHC 02/11/2019 35.3  33.7 - 35.3 g/dL Final   • RDW 02/11/2019 43.0  35.9 - 50.0 fL Final   • Platelet Count 02/11/2019 97* 164 - 446 K/uL Final   • Neutrophils-Polys 02/11/2019 52.00  44.00 - 72.00 % Final   • Lymphocytes 02/11/2019 33.10  22.00 - 41.00 % Final   • Monocytes 02/11/2019 7.90  0.00 - 13.40 % Final   • Eosinophils 02/11/2019 5.40  0.00 - 6.90 % Final   • Basophils 02/11/2019 1.30  0.00 - 1.80 % Final   • Immature Granulocytes 02/11/2019 0.30  0.00 - 0.90 % Final   • Nucleated RBC 02/11/2019 0.00  /100 WBC Final   • Neutrophils (Absolute) 02/11/2019 3.48  1.82 - 7.42 K/uL Final    Includes immature neutrophils, if present.   • Lymphs (Absolute) 02/11/2019 2.22  1.00 - 4.80 K/uL Final   • Monos (Absolute) 02/11/2019 0.53  0.00 - 0.85 K/uL Final   • Eos (Absolute) 02/11/2019 0.36  0.00 - 0.51 K/uL Final   • Baso (Absolute) 02/11/2019 0.09  0.00 - 0.12 K/uL Final   • Immature Granulocytes (abs) 02/11/2019 0.02  0.00 - 0.11 K/uL Final   • NRBC (Absolute) 02/11/2019 0.00  K/uL Final   • GFR If  02/11/2019 >60  >60 mL/min/1.73 m 2 Final   • GFR If Non  02/11/2019 >60  >60 mL/min/1.73 m 2 Final         PET  1/18/2019 3:10 PM    HISTORY/REASON FOR EXAM:  Initial staging right tonsillar cancer with neck adenopathy.      TECHNIQUE/EXAM DESCRIPTION AND NUMBER OF VIEWS:  PET body imaging.    Initially, 17.6 mCi  F-18 FDG was administered intravenously under standardized conditions.  Approximately 45 minutes after FDG administration, the patient was placed in the supine position on the PET CT table. Blood glucose level was 87mg/dL.    Low dose spiral CT imaging was performed from the skull vertex to the mid thighs.    PET imaging was then performed from the skull vertex to the mid thighs. CT images, PET images, and PET/CT fused images were reviewed on a PACS 3D workstation.    The limited non-contrast CT data are used primarily for attenuation correction and anatomic correlation.  Evaluation of solid organs and bowel are especially limited utilizing this technique.      COMPARISON: Soft tissue MRI of the neck 1/17/2019    FINDINGS:  Head and Neck:  There is uptake within the known right-sided tonsillar mass with extension into the right nasopharynx and probably right posterior tongue base. Maximum SUV is approximately 32.17.    There is uptake within several right-sided level II lymph nodes as well as a right level IB lymph node in the submandibular region. Maximum SUV in the largest right level II necrotic node measuring approximately 2.8 cm is 19.32. There is uptake within a   right level III node at the level the true cords measuring 12 mm with maximum SUV 10.55.    There is also uptake within a smaller left level II node measuring approximately 9 mm with maximum SUV of 6.41.    Thorax:   There is no abnormal FDG uptake.    Abdomen/Pelvis:   There is no abnormal FDG uptake.    Musculoskeletal:  There is no abnormal FDG uptake.    Additional CT findings:  There are no suspicious lung nodules. There is a nonobstructive 11 mm right renal calcification. The gallbladder is surgically absent. There is atherosclerosis of the aorta.     Impression   1.  There is uptake within the known right-sided tonsillar cancer.  2.  There is uptake within right-sided level IB, II, and III lymph nodes consistent with metastatic disease.  3.   There is uptake within a left level II node consistent with metastases.  4.  There is no evidence of metastatic disease in the chest, abdomen, or pelvis.     Reading Provider Reading Date   Ness Mahoney M.D. Jan 18, 2019   Signing Provider Signing Date Signing Time   Ness Mahoney M.D. Jan 18, 2019  5:03 PM            Assessment/Plan:     1. Tonsil cancer (HCC)     2. Encounter for antineoplastic chemotherapy           1.  Tonsillar cancer: Patient appears to be tolerating treatment very well.  CBC and CMP will be repeated prior to dosing and if within acceptable limits he will proceed with cycle 2 of treatment.  He will return in 1 week for evaluation prior to cycle 3, sooner as needed.    Patient is scheduled for G-tube placement on 2/22/19.            The patient verbalized agreement and understanding of current plan. All questions and concerns were addressed at time of visit.    Please note that this dictation was created using voice recognition software. I have made every reasonable attempt to correct obvious errors, but I expect that there are errors of grammar and possibly content that I did not discover before finalizing the note.

## 2019-02-15 ENCOUNTER — HOSPITAL ENCOUNTER (OUTPATIENT)
Dept: RADIATION ONCOLOGY | Facility: MEDICAL CENTER | Age: 59
End: 2019-02-15

## 2019-02-15 PROCEDURE — 77386 HCHG IMRT DELIVERY COMPLEX: CPT | Performed by: RADIOLOGY

## 2019-02-15 PROCEDURE — 77427 RADIATION TX MANAGEMENT X5: CPT | Performed by: RADIOLOGY

## 2019-02-15 PROCEDURE — 77014 PR CT GUIDANCE PLACEMENT RAD THERAPY FIELDS: CPT | Mod: 26 | Performed by: RADIOLOGY

## 2019-02-18 ENCOUNTER — OUTPATIENT INFUSION SERVICES (OUTPATIENT)
Dept: ONCOLOGY | Facility: MEDICAL CENTER | Age: 59
End: 2019-02-18
Attending: INTERNAL MEDICINE
Payer: COMMERCIAL

## 2019-02-18 VITALS
BODY MASS INDEX: 35.83 KG/M2 | DIASTOLIC BLOOD PRESSURE: 75 MMHG | OXYGEN SATURATION: 96 % | RESPIRATION RATE: 18 BRPM | SYSTOLIC BLOOD PRESSURE: 95 MMHG | HEIGHT: 72 IN | TEMPERATURE: 97 F | HEART RATE: 68 BPM | WEIGHT: 264.55 LBS

## 2019-02-18 DIAGNOSIS — E87.6 HYPOKALEMIA: ICD-10-CM

## 2019-02-18 DIAGNOSIS — C09.9 TONSIL CANCER (HCC): ICD-10-CM

## 2019-02-18 LAB
ANION GAP SERPL CALC-SCNC: 7 MMOL/L (ref 0–11.9)
BASOPHILS # BLD AUTO: 0.8 % (ref 0–1.8)
BASOPHILS # BLD: 0.06 K/UL (ref 0–0.12)
BUN SERPL-MCNC: 11 MG/DL (ref 8–22)
CALCIUM SERPL-MCNC: 9.3 MG/DL (ref 8.5–10.5)
CHLORIDE SERPL-SCNC: 108 MMOL/L (ref 96–112)
CO2 SERPL-SCNC: 23 MMOL/L (ref 20–33)
CREAT SERPL-MCNC: 0.62 MG/DL (ref 0.5–1.4)
EOSINOPHIL # BLD AUTO: 0.3 K/UL (ref 0–0.51)
EOSINOPHIL NFR BLD: 4 % (ref 0–6.9)
ERYTHROCYTE [DISTWIDTH] IN BLOOD BY AUTOMATED COUNT: 41.7 FL (ref 35.9–50)
GLUCOSE SERPL-MCNC: 229 MG/DL (ref 65–99)
HCT VFR BLD AUTO: 45.9 % (ref 42–52)
HGB BLD-MCNC: 16.2 G/DL (ref 14–18)
IMM GRANULOCYTES # BLD AUTO: 0.04 K/UL (ref 0–0.11)
IMM GRANULOCYTES NFR BLD AUTO: 0.5 % (ref 0–0.9)
LYMPHOCYTES # BLD AUTO: 1.75 K/UL (ref 1–4.8)
LYMPHOCYTES NFR BLD: 23.6 % (ref 22–41)
MAGNESIUM SERPL-MCNC: 1.6 MG/DL (ref 1.5–2.5)
MCH RBC QN AUTO: 32.8 PG (ref 27–33)
MCHC RBC AUTO-ENTMCNC: 35.3 G/DL (ref 33.7–35.3)
MCV RBC AUTO: 92.9 FL (ref 81.4–97.8)
MONOCYTES # BLD AUTO: 0.72 K/UL (ref 0–0.85)
MONOCYTES NFR BLD AUTO: 9.7 % (ref 0–13.4)
NEUTROPHILS # BLD AUTO: 4.54 K/UL (ref 1.82–7.42)
NEUTROPHILS NFR BLD: 61.4 % (ref 44–72)
NRBC # BLD AUTO: 0 K/UL
NRBC BLD-RTO: 0 /100 WBC
PLATELET # BLD AUTO: 115 K/UL (ref 164–446)
PMV BLD AUTO: 14.7 FL (ref 9–12.9)
POTASSIUM SERPL-SCNC: 3.5 MMOL/L (ref 3.6–5.5)
RBC # BLD AUTO: 4.94 M/UL (ref 4.7–6.1)
SODIUM SERPL-SCNC: 138 MMOL/L (ref 135–145)
WBC # BLD AUTO: 7.4 K/UL (ref 4.8–10.8)

## 2019-02-18 PROCEDURE — 700105 HCHG RX REV CODE 258: Performed by: NURSE PRACTITIONER

## 2019-02-18 PROCEDURE — 96361 HYDRATE IV INFUSION ADD-ON: CPT

## 2019-02-18 PROCEDURE — 96375 TX/PRO/DX INJ NEW DRUG ADDON: CPT

## 2019-02-18 PROCEDURE — 96367 TX/PROPH/DG ADDL SEQ IV INF: CPT

## 2019-02-18 PROCEDURE — 700111 HCHG RX REV CODE 636 W/ 250 OVERRIDE (IP): Performed by: NURSE PRACTITIONER

## 2019-02-18 PROCEDURE — 80048 BASIC METABOLIC PNL TOTAL CA: CPT

## 2019-02-18 PROCEDURE — 700102 HCHG RX REV CODE 250 W/ 637 OVERRIDE(OP): Performed by: INTERNAL MEDICINE

## 2019-02-18 PROCEDURE — 85025 COMPLETE CBC W/AUTO DIFF WBC: CPT

## 2019-02-18 PROCEDURE — A9270 NON-COVERED ITEM OR SERVICE: HCPCS | Performed by: INTERNAL MEDICINE

## 2019-02-18 PROCEDURE — 96413 CHEMO IV INFUSION 1 HR: CPT

## 2019-02-18 PROCEDURE — 83735 ASSAY OF MAGNESIUM: CPT

## 2019-02-18 RX ORDER — SODIUM CHLORIDE 9 MG/ML
500 INJECTION, SOLUTION INTRAVENOUS ONCE
Status: COMPLETED | OUTPATIENT
Start: 2019-02-18 | End: 2019-02-18

## 2019-02-18 RX ORDER — POTASSIUM CHLORIDE 20 MEQ/1
40 TABLET, EXTENDED RELEASE ORAL ONCE
Status: COMPLETED | OUTPATIENT
Start: 2019-02-18 | End: 2019-02-18

## 2019-02-18 RX ADMIN — SODIUM CHLORIDE 500 ML: 9 INJECTION, SOLUTION INTRAVENOUS at 09:14

## 2019-02-18 RX ADMIN — CISPLATIN 98.8 MG: 1 INJECTION, SOLUTION INTRAVENOUS at 10:11

## 2019-02-18 RX ADMIN — POTASSIUM CHLORIDE 40 MEQ: 1500 TABLET, EXTENDED RELEASE ORAL at 10:37

## 2019-02-18 RX ADMIN — DEXAMETHASONE SODIUM PHOSPHATE 12 MG: 4 INJECTION, SOLUTION INTRAMUSCULAR; INTRAVENOUS at 09:20

## 2019-02-18 RX ADMIN — POTASSIUM CHLORIDE: 2 INJECTION, SOLUTION, CONCENTRATE INTRAVENOUS at 11:12

## 2019-02-18 RX ADMIN — SODIUM CHLORIDE 150 MG: 9 INJECTION, SOLUTION INTRAVENOUS at 09:32

## 2019-02-18 RX ADMIN — ONDANSETRON HYDROCHLORIDE 16 MG: 2 SOLUTION INTRAMUSCULAR; INTRAVENOUS at 09:10

## 2019-02-18 NOTE — PROGRESS NOTES
"Pharmacy Chemotherapy calculation:    Patient Name: Maximino Green  Dx: squamous cell carcinoma of the right tonsil     Cycle 2  Previous treatment = C1 on 2/11/19    Protocol: cisplatin + XRT     *Dosing Reference*  Cisplatin (CDDP) 30-40mg/m2 IV on day 1  Every 7 days for 6 weeks with radiation  NCCN Guidelines for Head and Neck Cancers.V.1.2017  Samantha PATTERSON, et al. Int J Radiat Oncol Biol Phys. 2011 Mar 15;79(4):1073-80.   Winsome MORGAN et al. Radiother Oncol. 2006 Apr;79(1):34-8.     Allergies: Patient has no known allergies.    BP (!) 95/75   Pulse 68   Temp 36.1 °C (97 °F) (Temporal)   Resp 18   Ht 1.825 m (5' 11.85\")   Wt 120 kg (264 lb 8.8 oz)   SpO2 96%   BMI 36.03 kg/m²   Body surface area is 2.47 meters squared.     Labs 2/18/19  ANC~ 4540 Plt = 115k   Hgb = 16.2     SCr = 0.62 mg/dL CrCl ~ >125 mL/min (minimum SCr of 0.7)   K = 3.5  Mg = 1.6    Labs 2/11/19  LFT's = 151/154/191 TBili = 0.6      Cisplatin (CDDP) 40 mg/m2  X 2.47 m2 = 98.8 mg   <5% difference, ok to treat with final dose = 98.8 mg IV    Buck Atknis, PharmD          "

## 2019-02-18 NOTE — PROGRESS NOTES
Chemotherapy Verification - SECONDARY RN       Height = 71.85in  Weight = 120kg  BSA = 2.46m2       Medication: Cisplatin  Dose: 40mg/m2  Calculated Dose: 98.4mg                             (In mg/m2, AUC, mg/kg)         I confirm that this process was performed independently.

## 2019-02-18 NOTE — PROGRESS NOTES
"Pt is here for his C2D1 weekly Cisplatin (with XRT). Pt reports his saliva getting \"stringier.\" Some taste changes. His mouth is healing well after teeth extracted - he uses his palate plate intermittently  - getting used to using it. Denies mouth sores. NS rinses in use. Labs drawn - K 3.5 today; KCl 40 meq given per electrolyte replacement protocol. Pre-hydrated and pre-medicated as ordered followed by cisplatin and post-hydration. Treatment well tolerated. Discharged home to self care. Returns weekly.  "

## 2019-02-18 NOTE — PROGRESS NOTES
"Pharmacy Chemotherapy Calculation Verification:    Patient Name: Maximino Green  Dx:  T3N2M0 squamous cell carcinoma of the right tonsil, P16+     Protocol: Cisplatin + XRT       *Dosing Reference*  Cisplatin (CDDP) 30-40mg/m2 IV on day 1  Every 7 days for 6 weeks with radiation    NCCN Guidelines for Head and Neck Cancers.V.1.2017  Samantha PATTERSON, et al. Int J Radiat Oncol Biol Phys. 2011 Mar 15;79(4):1073-80.   Winsome MORGAN, et al. Radiother Oncol. 2006;79(1):34-8.      Allergies: Patient has no known allergies.    BP (!) 95/75   Pulse 68   Temp 36.1 °C (97 °F) (Temporal)   Resp 18   Ht 1.825 m (5' 11.85\")   Wt 120 kg (264 lb 8.8 oz)   SpO2 96%   BMI 36.03 kg/m²  Body surface area is 2.47 meters squared.    LABS 2019:  ANC: 4540      WBC: 7.4     Plt: 115k   Hgb/Hct: 16.2/45.9     SCr: 0.62 mg/dL CrCl: >125mL/min    Ma.6   K: 3.5  Na: 138          Glu: 229    LABS 2019  LFT's: wnl TBili = 0.6      Drug Order   (Drug name, dose, route, IV Fluid & volume, frequency, number of doses) Cycle 2     Previous treatment: 1 (2019)     Medication = cisplatin (Platinol)  Base Dose= 40mg/m2  Calc Dose: Base Dose x Body surface area is 2.47 meters squared.m2 = 98.8 mg  Final Dose = 98.8 mg  Route = IV  Fluid & Volume =  mL  Admin Duration = Over 1 hour           <5% difference, ok to treat with final dose       By my signature below, I confirm this process was performed independently with the BSA and all final chemotherapy dosing calculations congruent. I have reviewed the above chemotherapy order and that my calculation of the final dose and BSA (when applicable) corroborate those calculations of the  pharmacist. Discrepancies of 5% or greater in the written dose have been addressed and documented within the [facility] Progress notes.    JOE Koenig, PharmD      "

## 2019-02-18 NOTE — PROGRESS NOTES
Chemotherapy Verification - PRIMARY RN      Height = 182.5cm  Weight = 120kg  BSA = 2.47m2       Medication: Cisplatin  Dose: 40 mg/m2  Calculated Dose: 98.8mg                             (In mg/m2, AUC, mg/kg)           I confirm this process was performed independently with the BSA and all final chemotherapy dosing calculations congruent.  Any discrepancies of 5% or greater have been addressed with the chemotherapy pharmacist. The resolution of the discrepancy has been documented in the EPIC progress notes.

## 2019-02-19 ENCOUNTER — HOSPITAL ENCOUNTER (OUTPATIENT)
Dept: RADIATION ONCOLOGY | Facility: MEDICAL CENTER | Age: 59
End: 2019-02-19

## 2019-02-19 PROCEDURE — 77014 PR CT GUIDANCE PLACEMENT RAD THERAPY FIELDS: CPT | Mod: 26 | Performed by: RADIOLOGY

## 2019-02-19 PROCEDURE — 77386 HCHG IMRT DELIVERY COMPLEX: CPT | Performed by: RADIOLOGY

## 2019-02-20 ENCOUNTER — DOCUMENTATION (OUTPATIENT)
Dept: HEMATOLOGY ONCOLOGY | Facility: MEDICAL CENTER | Age: 59
End: 2019-02-20

## 2019-02-20 ENCOUNTER — APPOINTMENT (OUTPATIENT)
Dept: ADMISSIONS | Facility: MEDICAL CENTER | Age: 59
End: 2019-02-20
Attending: RADIOLOGY
Payer: COMMERCIAL

## 2019-02-20 DIAGNOSIS — Z01.812 PRE-OPERATIVE LABORATORY EXAMINATION: ICD-10-CM

## 2019-02-20 LAB
EST. AVERAGE GLUCOSE BLD GHB EST-MCNC: 137 MG/DL
HBA1C MFR BLD: 6.4 % (ref 0–5.6)
INR PPP: 1.07 (ref 0.87–1.13)
PROTHROMBIN TIME: 14 SEC (ref 12–14.6)

## 2019-02-20 PROCEDURE — 77014 PR CT GUIDANCE PLACEMENT RAD THERAPY FIELDS: CPT | Mod: 26 | Performed by: RADIOLOGY

## 2019-02-20 PROCEDURE — 77386 HCHG IMRT DELIVERY COMPLEX: CPT | Performed by: RADIOLOGY

## 2019-02-20 PROCEDURE — 36415 COLL VENOUS BLD VENIPUNCTURE: CPT

## 2019-02-20 PROCEDURE — 85610 PROTHROMBIN TIME: CPT

## 2019-02-20 PROCEDURE — 83036 HEMOGLOBIN GLYCOSYLATED A1C: CPT

## 2019-02-20 NOTE — OR NURSING
Met briefly with Maximino to discuss G-tube placement for 2/22/19. He had already been referred to [facility] and KATELYNN Morrissey, had met with him during a chemotherapy to provide education and a starter kit. The G-tube is prophylactic at this time. Questions answered. Anticipate discharge home.

## 2019-02-21 ENCOUNTER — HOSPITAL ENCOUNTER (OUTPATIENT)
Dept: RADIATION ONCOLOGY | Facility: MEDICAL CENTER | Age: 59
End: 2019-02-21

## 2019-02-21 PROCEDURE — 77386 HCHG IMRT DELIVERY COMPLEX: CPT | Performed by: RADIOLOGY

## 2019-02-21 PROCEDURE — 77336 RADIATION PHYSICS CONSULT: CPT | Performed by: RADIOLOGY

## 2019-02-21 PROCEDURE — 77014 PR CT GUIDANCE PLACEMENT RAD THERAPY FIELDS: CPT | Mod: 26 | Performed by: RADIOLOGY

## 2019-02-21 NOTE — PROGRESS NOTES
Nutrition Services: Update  Weight: 274# (Pt has experienced 6# / 2% unintentional weight loss x 2 weeks, not classified as significant but is worth noting)     Met with pt during chemo infusion to follow-up on current nutrition status. Note pt is edentulous s/p dental extraction. Pt repots he has a good appetite, he reports he has been eating well, stating his wife is a great cook and has been making good easy to chew foods for him. He denied any GI distress. Advised pt to try Boost Glucose Control 1x/day in addition to meals, pt was agreeable. Encouraged hydration. PEG scheduled for 2/22, pt to have teaching early next week. RD following

## 2019-02-22 ENCOUNTER — HOSPITAL ENCOUNTER (OUTPATIENT)
Dept: RADIATION ONCOLOGY | Facility: MEDICAL CENTER | Age: 59
End: 2019-02-22
Payer: COMMERCIAL

## 2019-02-22 ENCOUNTER — APPOINTMENT (OUTPATIENT)
Dept: RADIOLOGY | Facility: MEDICAL CENTER | Age: 59
End: 2019-02-22
Attending: RADIOLOGY
Payer: COMMERCIAL

## 2019-02-22 ENCOUNTER — HOSPITAL ENCOUNTER (OUTPATIENT)
Facility: MEDICAL CENTER | Age: 59
End: 2019-02-23
Attending: RADIOLOGY | Admitting: INTERNAL MEDICINE
Payer: COMMERCIAL

## 2019-02-22 DIAGNOSIS — C09.9 TONSIL CANCER (HCC): ICD-10-CM

## 2019-02-22 PROBLEM — Z71.89 ENCOUNTER FOR MEDICATION COUNSELING: Status: RESOLVED | Noted: 2019-02-08 | Resolved: 2019-02-22

## 2019-02-22 LAB
ALBUMIN SERPL BCP-MCNC: 3.3 G/DL (ref 3.2–4.9)
ALBUMIN/GLOB SERPL: 0.9 G/DL
ALP SERPL-CCNC: 70 U/L (ref 30–99)
ALT SERPL-CCNC: 80 U/L (ref 2–50)
ANION GAP SERPL CALC-SCNC: 8 MMOL/L (ref 0–11.9)
AST SERPL-CCNC: 74 U/L (ref 12–45)
BASOPHILS # BLD AUTO: 0.6 % (ref 0–1.8)
BASOPHILS # BLD: 0.03 K/UL (ref 0–0.12)
BILIRUB SERPL-MCNC: 1.1 MG/DL (ref 0.1–1.5)
BUN SERPL-MCNC: 14 MG/DL (ref 8–22)
CALCIUM SERPL-MCNC: 9 MG/DL (ref 8.5–10.5)
CHLORIDE SERPL-SCNC: 105 MMOL/L (ref 96–112)
CO2 SERPL-SCNC: 26 MMOL/L (ref 20–33)
CREAT SERPL-MCNC: 0.56 MG/DL (ref 0.5–1.4)
EOSINOPHIL # BLD AUTO: 0.1 K/UL (ref 0–0.51)
EOSINOPHIL NFR BLD: 1.9 % (ref 0–6.9)
ERYTHROCYTE [DISTWIDTH] IN BLOOD BY AUTOMATED COUNT: 42.3 FL (ref 35.9–50)
GLOBULIN SER CALC-MCNC: 3.7 G/DL (ref 1.9–3.5)
GLUCOSE SERPL-MCNC: 120 MG/DL (ref 65–99)
HCT VFR BLD AUTO: 42.5 % (ref 42–52)
HGB BLD-MCNC: 14.6 G/DL (ref 14–18)
IMM GRANULOCYTES # BLD AUTO: 0.02 K/UL (ref 0–0.11)
IMM GRANULOCYTES NFR BLD AUTO: 0.4 % (ref 0–0.9)
LYMPHOCYTES # BLD AUTO: 0.64 K/UL (ref 1–4.8)
LYMPHOCYTES NFR BLD: 12.4 % (ref 22–41)
MAGNESIUM SERPL-MCNC: 1.7 MG/DL (ref 1.5–2.5)
MCH RBC QN AUTO: 33 PG (ref 27–33)
MCHC RBC AUTO-ENTMCNC: 34.4 G/DL (ref 33.7–35.3)
MCV RBC AUTO: 95.9 FL (ref 81.4–97.8)
MONOCYTES # BLD AUTO: 0.46 K/UL (ref 0–0.85)
MONOCYTES NFR BLD AUTO: 8.9 % (ref 0–13.4)
NEUTROPHILS # BLD AUTO: 3.9 K/UL (ref 1.82–7.42)
NEUTROPHILS NFR BLD: 75.8 % (ref 44–72)
NRBC # BLD AUTO: 0 K/UL
NRBC BLD-RTO: 0 /100 WBC
PHOSPHATE SERPL-MCNC: 3.5 MG/DL (ref 2.5–4.5)
PLATELET # BLD AUTO: 87 K/UL (ref 164–446)
PMV BLD AUTO: 14.2 FL (ref 9–12.9)
POTASSIUM SERPL-SCNC: 3.9 MMOL/L (ref 3.6–5.5)
PROT SERPL-MCNC: 7 G/DL (ref 6–8.2)
RBC # BLD AUTO: 4.43 M/UL (ref 4.7–6.1)
SODIUM SERPL-SCNC: 139 MMOL/L (ref 135–145)
T4 FREE SERPL-MCNC: 1.13 NG/DL (ref 0.53–1.43)
TROPONIN I SERPL-MCNC: <0.01 NG/ML (ref 0–0.04)
TSH SERPL DL<=0.005 MIU/L-ACNC: 1.17 UIU/ML (ref 0.38–5.33)
WBC # BLD AUTO: 5.2 K/UL (ref 4.8–10.8)

## 2019-02-22 PROCEDURE — 4410472 IR-GASTROSTOMY PLACEMENT

## 2019-02-22 PROCEDURE — 700101 HCHG RX REV CODE 250

## 2019-02-22 PROCEDURE — 83735 ASSAY OF MAGNESIUM: CPT

## 2019-02-22 PROCEDURE — 700102 HCHG RX REV CODE 250 W/ 637 OVERRIDE(OP): Performed by: ANESTHESIOLOGY

## 2019-02-22 PROCEDURE — 700105 HCHG RX REV CODE 258: Performed by: INTERNAL MEDICINE

## 2019-02-22 PROCEDURE — 85025 COMPLETE CBC W/AUTO DIFF WBC: CPT

## 2019-02-22 PROCEDURE — 700102 HCHG RX REV CODE 250 W/ 637 OVERRIDE(OP): Performed by: INTERNAL MEDICINE

## 2019-02-22 PROCEDURE — A9270 NON-COVERED ITEM OR SERVICE: HCPCS | Performed by: INTERNAL MEDICINE

## 2019-02-22 PROCEDURE — A9270 NON-COVERED ITEM OR SERVICE: HCPCS | Performed by: ANESTHESIOLOGY

## 2019-02-22 PROCEDURE — 80053 COMPREHEN METABOLIC PANEL: CPT

## 2019-02-22 PROCEDURE — 49440 PLACE GASTROSTOMY TUBE PERC: CPT

## 2019-02-22 PROCEDURE — 84100 ASSAY OF PHOSPHORUS: CPT

## 2019-02-22 PROCEDURE — 36415 COLL VENOUS BLD VENIPUNCTURE: CPT

## 2019-02-22 PROCEDURE — G0378 HOSPITAL OBSERVATION PER HR: HCPCS

## 2019-02-22 PROCEDURE — 700102 HCHG RX REV CODE 250 W/ 637 OVERRIDE(OP): Performed by: RADIOLOGY

## 2019-02-22 PROCEDURE — 99220 PR INITIAL OBSERVATION CARE,LEVL III: CPT | Mod: 25 | Performed by: INTERNAL MEDICINE

## 2019-02-22 PROCEDURE — 84484 ASSAY OF TROPONIN QUANT: CPT

## 2019-02-22 PROCEDURE — 84443 ASSAY THYROID STIM HORMONE: CPT

## 2019-02-22 PROCEDURE — 700111 HCHG RX REV CODE 636 W/ 250 OVERRIDE (IP): Performed by: HOSPITALIST

## 2019-02-22 PROCEDURE — 93005 ELECTROCARDIOGRAM TRACING: CPT | Performed by: ANESTHESIOLOGY

## 2019-02-22 PROCEDURE — 84439 ASSAY OF FREE THYROXINE: CPT

## 2019-02-22 PROCEDURE — 93010 ELECTROCARDIOGRAM REPORT: CPT | Performed by: INTERNAL MEDICINE

## 2019-02-22 PROCEDURE — A9270 NON-COVERED ITEM OR SERVICE: HCPCS | Performed by: RADIOLOGY

## 2019-02-22 PROCEDURE — 77386 HCHG IMRT DELIVERY COMPLEX: CPT | Performed by: RADIOLOGY

## 2019-02-22 PROCEDURE — 99406 BEHAV CHNG SMOKING 3-10 MIN: CPT | Performed by: INTERNAL MEDICINE

## 2019-02-22 PROCEDURE — 93005 ELECTROCARDIOGRAM TRACING: CPT | Performed by: SURGERY

## 2019-02-22 PROCEDURE — 160002 HCHG RECOVERY MINUTES (STAT)

## 2019-02-22 PROCEDURE — 700111 HCHG RX REV CODE 636 W/ 250 OVERRIDE (IP)

## 2019-02-22 PROCEDURE — 77014 PR CT GUIDANCE PLACEMENT RAD THERAPY FIELDS: CPT | Mod: 26 | Performed by: RADIOLOGY

## 2019-02-22 PROCEDURE — 700111 HCHG RX REV CODE 636 W/ 250 OVERRIDE (IP): Performed by: ANESTHESIOLOGY

## 2019-02-22 RX ORDER — ONDANSETRON 2 MG/ML
4 INJECTION INTRAMUSCULAR; INTRAVENOUS
Status: DISCONTINUED | OUTPATIENT
Start: 2019-02-22 | End: 2019-02-22 | Stop reason: HOSPADM

## 2019-02-22 RX ORDER — AMOXICILLIN 250 MG
2 CAPSULE ORAL 2 TIMES DAILY
Status: DISCONTINUED | OUTPATIENT
Start: 2019-02-22 | End: 2019-02-23 | Stop reason: HOSPADM

## 2019-02-22 RX ORDER — ONDANSETRON 4 MG/1
4 TABLET, ORALLY DISINTEGRATING ORAL EVERY 4 HOURS PRN
Status: DISCONTINUED | OUTPATIENT
Start: 2019-02-22 | End: 2019-02-23 | Stop reason: HOSPADM

## 2019-02-22 RX ORDER — ACETAMINOPHEN 325 MG/1
650 TABLET ORAL EVERY 6 HOURS PRN
Status: DISCONTINUED | OUTPATIENT
Start: 2019-02-22 | End: 2019-02-23 | Stop reason: HOSPADM

## 2019-02-22 RX ORDER — MEPERIDINE HYDROCHLORIDE 25 MG/ML
6.25 INJECTION INTRAMUSCULAR; INTRAVENOUS; SUBCUTANEOUS
Status: DISCONTINUED | OUTPATIENT
Start: 2019-02-22 | End: 2019-02-22 | Stop reason: HOSPADM

## 2019-02-22 RX ORDER — ONDANSETRON 2 MG/ML
4 INJECTION INTRAMUSCULAR; INTRAVENOUS EVERY 4 HOURS PRN
Status: DISCONTINUED | OUTPATIENT
Start: 2019-02-22 | End: 2019-02-23 | Stop reason: HOSPADM

## 2019-02-22 RX ORDER — HALOPERIDOL 5 MG/ML
1 INJECTION INTRAMUSCULAR
Status: DISCONTINUED | OUTPATIENT
Start: 2019-02-22 | End: 2019-02-22 | Stop reason: HOSPADM

## 2019-02-22 RX ORDER — SODIUM CHLORIDE, SODIUM LACTATE, POTASSIUM CHLORIDE, CALCIUM CHLORIDE 600; 310; 30; 20 MG/100ML; MG/100ML; MG/100ML; MG/100ML
INJECTION, SOLUTION INTRAVENOUS CONTINUOUS
Status: DISCONTINUED | OUTPATIENT
Start: 2019-02-22 | End: 2019-02-22 | Stop reason: HOSPADM

## 2019-02-22 RX ORDER — HYDROMORPHONE HYDROCHLORIDE 2 MG/ML
0.5 INJECTION, SOLUTION INTRAMUSCULAR; INTRAVENOUS; SUBCUTANEOUS
Status: DISPENSED | OUTPATIENT
Start: 2019-02-22 | End: 2019-02-23

## 2019-02-22 RX ORDER — OXYCODONE HYDROCHLORIDE 10 MG/1
10 TABLET ORAL
Status: DISPENSED | OUTPATIENT
Start: 2019-02-22 | End: 2019-02-23

## 2019-02-22 RX ORDER — METOPROLOL TARTRATE 1 MG/ML
5 INJECTION, SOLUTION INTRAVENOUS ONCE
Status: COMPLETED | OUTPATIENT
Start: 2019-02-22 | End: 2019-02-22

## 2019-02-22 RX ORDER — METOPROLOL TARTRATE 50 MG/1
25 TABLET, FILM COATED ORAL TWICE DAILY
Status: DISCONTINUED | OUTPATIENT
Start: 2019-02-22 | End: 2019-02-23 | Stop reason: HOSPADM

## 2019-02-22 RX ORDER — DIPHENHYDRAMINE HYDROCHLORIDE 50 MG/ML
12.5 INJECTION INTRAMUSCULAR; INTRAVENOUS
Status: DISCONTINUED | OUTPATIENT
Start: 2019-02-22 | End: 2019-02-22 | Stop reason: HOSPADM

## 2019-02-22 RX ORDER — SODIUM CHLORIDE 9 MG/ML
INJECTION, SOLUTION INTRAVENOUS
Status: DISCONTINUED | OUTPATIENT
Start: 2019-02-22 | End: 2019-02-22

## 2019-02-22 RX ORDER — BISACODYL 10 MG
10 SUPPOSITORY, RECTAL RECTAL
Status: DISCONTINUED | OUTPATIENT
Start: 2019-02-22 | End: 2019-02-23 | Stop reason: HOSPADM

## 2019-02-22 RX ORDER — MIDAZOLAM HYDROCHLORIDE 1 MG/ML
INJECTION INTRAMUSCULAR; INTRAVENOUS
Status: DISCONTINUED
Start: 2019-02-22 | End: 2019-02-23 | Stop reason: HOSPADM

## 2019-02-22 RX ORDER — POLYETHYLENE GLYCOL 3350 17 G/17G
1 POWDER, FOR SOLUTION ORAL
Status: DISCONTINUED | OUTPATIENT
Start: 2019-02-22 | End: 2019-02-23 | Stop reason: HOSPADM

## 2019-02-22 RX ORDER — ACETAMINOPHEN 500 MG
1000 TABLET ORAL EVERY 6 HOURS
Status: DISPENSED | OUTPATIENT
Start: 2019-02-22 | End: 2019-02-23

## 2019-02-22 RX ORDER — OXYCODONE HYDROCHLORIDE 5 MG/1
5 TABLET ORAL
Status: ACTIVE | OUTPATIENT
Start: 2019-02-22 | End: 2019-02-23

## 2019-02-22 RX ORDER — MIDAZOLAM HYDROCHLORIDE 1 MG/ML
1 INJECTION INTRAMUSCULAR; INTRAVENOUS
Status: DISCONTINUED | OUTPATIENT
Start: 2019-02-22 | End: 2019-02-22 | Stop reason: HOSPADM

## 2019-02-22 RX ORDER — METOPROLOL TARTRATE 1 MG/ML
INJECTION, SOLUTION INTRAVENOUS
Status: DISPENSED
Start: 2019-02-22 | End: 2019-02-23

## 2019-02-22 RX ORDER — ONDANSETRON 2 MG/ML
4 INJECTION INTRAMUSCULAR; INTRAVENOUS EVERY 8 HOURS PRN
Status: ACTIVE | OUTPATIENT
Start: 2019-02-22 | End: 2019-02-23

## 2019-02-22 RX ORDER — DILTIAZEM HYDROCHLORIDE 5 MG/ML
25 INJECTION INTRAVENOUS ONCE
Status: DISCONTINUED | OUTPATIENT
Start: 2019-02-22 | End: 2019-02-23 | Stop reason: HOSPADM

## 2019-02-22 RX ORDER — SODIUM CHLORIDE 9 MG/ML
INJECTION, SOLUTION INTRAVENOUS CONTINUOUS
Status: DISCONTINUED | OUTPATIENT
Start: 2019-02-22 | End: 2019-02-23 | Stop reason: HOSPADM

## 2019-02-22 RX ADMIN — FENTANYL CITRATE 50 MCG: 50 INJECTION, SOLUTION INTRAMUSCULAR; INTRAVENOUS at 15:55

## 2019-02-22 RX ADMIN — HYDROCODONE BITARTRATE AND ACETAMINOPHEN 30 ML: 2.5; 108 SOLUTION ORAL at 16:06

## 2019-02-22 RX ADMIN — FENTANYL CITRATE 50 MCG: 50 INJECTION, SOLUTION INTRAMUSCULAR; INTRAVENOUS at 16:00

## 2019-02-22 RX ADMIN — FENTANYL CITRATE 50 MCG: 50 INJECTION, SOLUTION INTRAMUSCULAR; INTRAVENOUS at 16:15

## 2019-02-22 RX ADMIN — METOPROLOL TARTRATE 5 MG: 1 INJECTION, SOLUTION INTRAVENOUS at 17:54

## 2019-02-22 RX ADMIN — METOPROLOL TARTRATE 5 MG: 1 INJECTION, SOLUTION INTRAVENOUS at 17:46

## 2019-02-22 RX ADMIN — SODIUM CHLORIDE: 9 INJECTION, SOLUTION INTRAVENOUS at 21:09

## 2019-02-22 RX ADMIN — SENNOSIDES AND DOCUSATE SODIUM 2 TABLET: 8.6; 5 TABLET ORAL at 20:56

## 2019-02-22 RX ADMIN — METOPROLOL TARTRATE 25 MG: 50 TABLET ORAL at 20:55

## 2019-02-22 RX ADMIN — SODIUM CHLORIDE: 9 INJECTION, SOLUTION INTRAVENOUS at 14:30

## 2019-02-22 RX ADMIN — LIDOCAINE HYDROCHLORIDE: 10 INJECTION, SOLUTION EPIDURAL; INFILTRATION; INTRACAUDAL; PERINEURAL at 15:00

## 2019-02-22 RX ADMIN — ACETAMINOPHEN 1000 MG: 500 TABLET ORAL at 20:56

## 2019-02-22 ASSESSMENT — PATIENT HEALTH QUESTIONNAIRE - PHQ9
7. TROUBLE CONCENTRATING ON THINGS, SUCH AS READING THE NEWSPAPER OR WATCHING TELEVISION: NOT AT ALL
2. FEELING DOWN, DEPRESSED, IRRITABLE, OR HOPELESS: SEVERAL DAYS
SUM OF ALL RESPONSES TO PHQ9 QUESTIONS 1 AND 2: 2
1. LITTLE INTEREST OR PLEASURE IN DOING THINGS: SEVERAL DAYS
3. TROUBLE FALLING OR STAYING ASLEEP OR SLEEPING TOO MUCH: NOT AT ALL
8. MOVING OR SPEAKING SO SLOWLY THAT OTHER PEOPLE COULD HAVE NOTICED. OR THE OPPOSITE, BEING SO FIGETY OR RESTLESS THAT YOU HAVE BEEN MOVING AROUND A LOT MORE THAN USUAL: NOT AT ALL
4. FEELING TIRED OR HAVING LITTLE ENERGY: NOT AT ALL
5. POOR APPETITE OR OVEREATING: NOT AT ALL
SUM OF ALL RESPONSES TO PHQ QUESTIONS 1-9: 3
6. FEELING BAD ABOUT YOURSELF - OR THAT YOU ARE A FAILURE OR HAVE LET YOURSELF OR YOUR FAMILY DOWN: SEVERAL DAYS
9. THOUGHTS THAT YOU WOULD BE BETTER OFF DEAD, OR OF HURTING YOURSELF: NOT AT ALL

## 2019-02-22 ASSESSMENT — COGNITIVE AND FUNCTIONAL STATUS - GENERAL
SUGGESTED CMS G CODE MODIFIER MOBILITY: CJ
SUGGESTED CMS G CODE MODIFIER DAILY ACTIVITY: CH
CLIMB 3 TO 5 STEPS WITH RAILING: A LITTLE
MOBILITY SCORE: 22
MOVING FROM LYING ON BACK TO SITTING ON SIDE OF FLAT BED: A LITTLE
DAILY ACTIVITIY SCORE: 24

## 2019-02-22 ASSESSMENT — LIFESTYLE VARIABLES: SUBSTANCE_ABUSE: 1

## 2019-02-22 NOTE — PROGRESS NOTES
IR Procedure RN's Note:    Patient consented by Dr. Morgan on PPU and Dr. Mota; pt and MD signed consent; Donny BABCOCK RN verified.    Gastrostomy tube placement done by Dr. Morgan; LEFT mid quadrant access site; pt assessed upon arrival, pt A/Ox4, pt aware of the procedure; SUNG NGUYỄN Bolus Gastrostomy fedding tube 18Fr REF#[id number]; pt appears comfortable throughout the procedure with no signs of distress or discomfort; Dr. Mota provided general anesthesia and monitored the patient throughout the procedure; access site CDI, soft with no signs of hematoma or bleeding, gauze and tegaderm for dressing; telephone report given to hospitals RN; pt is awake and on 3L NC O2; pt transported to hospitals.

## 2019-02-22 NOTE — OR SURGEON
Immediate Post- Operative Note        PostOp Diagnosis: HEAD AND NECK RADIATION      Procedure(s): G-TUBE PLACEMENT    Estimated Blood Loss: Less than 5 ml        Complications: None            2/22/2019     3:23 PM     Donny Morgan

## 2019-02-22 NOTE — OR NURSING
1547  Pt arrived from IR, report from RN and anesthesia. G tube on LUQ, dressing in place. Pt's VSS, pt said he is hurting a lot, notified anesthesia.   1628 Notified DR. Morgan that patient was in severe pian, asked if he will be giving patient prescription for pain medicine, he said he will call us back.   1713 Pt HR went up to 150-160. Denies unusual symptoms. Called Dr. Judd, she ordered a stat 12 lead EKG.   1733 Rapid response called  1736 Rapid Response team at bedside, Dr. Du also at bedside.  1827 Pt back to SR, HR 62, /72, pt alert, denying unusual symptoms. HR has been maintained for 20 minutes, pt alert, family at bedside  1835 Hand-off to RN Barb, pt to be admitted overnight.

## 2019-02-23 ENCOUNTER — APPOINTMENT (OUTPATIENT)
Dept: CARDIOLOGY | Facility: MEDICAL CENTER | Age: 59
End: 2019-02-23
Attending: INTERNAL MEDICINE
Payer: COMMERCIAL

## 2019-02-23 VITALS
BODY MASS INDEX: 35.62 KG/M2 | WEIGHT: 268.74 LBS | DIASTOLIC BLOOD PRESSURE: 67 MMHG | TEMPERATURE: 98.3 F | SYSTOLIC BLOOD PRESSURE: 152 MMHG | OXYGEN SATURATION: 90 % | HEIGHT: 73 IN | RESPIRATION RATE: 18 BRPM | HEART RATE: 74 BPM

## 2019-02-23 PROBLEM — I48.91 ATRIAL FIBRILLATION WITH RVR (HCC): Status: ACTIVE | Noted: 2019-02-23

## 2019-02-23 LAB
EKG IMPRESSION: NORMAL
LV EJECT FRACT  99904: 65
LV EJECT FRACT MOD 2C 99903: 64.54
LV EJECT FRACT MOD 4C 99902: 62.43
LV EJECT FRACT MOD BP 99901: 62.98

## 2019-02-23 PROCEDURE — 90686 IIV4 VACC NO PRSV 0.5 ML IM: CPT | Performed by: INTERNAL MEDICINE

## 2019-02-23 PROCEDURE — 700111 HCHG RX REV CODE 636 W/ 250 OVERRIDE (IP): Performed by: INTERNAL MEDICINE

## 2019-02-23 PROCEDURE — 99217 PR OBSERVATION CARE DISCHARGE: CPT | Performed by: INTERNAL MEDICINE

## 2019-02-23 PROCEDURE — 90471 IMMUNIZATION ADMIN: CPT

## 2019-02-23 PROCEDURE — 93306 TTE W/DOPPLER COMPLETE: CPT

## 2019-02-23 PROCEDURE — A9270 NON-COVERED ITEM OR SERVICE: HCPCS | Performed by: RADIOLOGY

## 2019-02-23 PROCEDURE — 700102 HCHG RX REV CODE 250 W/ 637 OVERRIDE(OP): Performed by: INTERNAL MEDICINE

## 2019-02-23 PROCEDURE — 93306 TTE W/DOPPLER COMPLETE: CPT | Mod: 26 | Performed by: INTERNAL MEDICINE

## 2019-02-23 PROCEDURE — A9270 NON-COVERED ITEM OR SERVICE: HCPCS | Performed by: INTERNAL MEDICINE

## 2019-02-23 PROCEDURE — 700105 HCHG RX REV CODE 258: Performed by: INTERNAL MEDICINE

## 2019-02-23 PROCEDURE — G0378 HOSPITAL OBSERVATION PER HR: HCPCS

## 2019-02-23 PROCEDURE — 93010 ELECTROCARDIOGRAM REPORT: CPT | Mod: 76 | Performed by: INTERNAL MEDICINE

## 2019-02-23 PROCEDURE — 700102 HCHG RX REV CODE 250 W/ 637 OVERRIDE(OP): Performed by: RADIOLOGY

## 2019-02-23 RX ORDER — NICOTINE 21 MG/24HR
14 PATCH, TRANSDERMAL 24 HOURS TRANSDERMAL
Status: DISCONTINUED | OUTPATIENT
Start: 2019-02-23 | End: 2019-02-23 | Stop reason: HOSPADM

## 2019-02-23 RX ADMIN — SODIUM CHLORIDE: 9 INJECTION, SOLUTION INTRAVENOUS at 10:48

## 2019-02-23 RX ADMIN — INFLUENZA A VIRUS A/MICHIGAN/45/2015 X-275 (H1N1) ANTIGEN (FORMALDEHYDE INACTIVATED), INFLUENZA A VIRUS A/SINGAPORE/INFIMH-16-0019/2016 IVR-186 (H3N2) ANTIGEN (FORMALDEHYDE INACTIVATED), INFLUENZA B VIRUS B/PHUKET/3073/2013 ANTIGEN (FORMALDEHYDE INACTIVATED), AND INFLUENZA B VIRUS B/MARYLAND/15/2016 BX-69A ANTIGEN (FORMALDEHYDE INACTIVATED) 0.5 ML: 15; 15; 15; 15 INJECTION, SUSPENSION INTRAMUSCULAR at 16:41

## 2019-02-23 RX ADMIN — ACETAMINOPHEN 1000 MG: 500 TABLET ORAL at 09:58

## 2019-02-23 RX ADMIN — METOPROLOL TARTRATE 25 MG: 50 TABLET ORAL at 05:55

## 2019-02-23 RX ADMIN — METOPROLOL TARTRATE 25 MG: 50 TABLET ORAL at 17:08

## 2019-02-23 ASSESSMENT — ENCOUNTER SYMPTOMS
BACK PAIN: 0
MYALGIAS: 0
SEIZURES: 0
SPUTUM PRODUCTION: 0
BRUISES/BLEEDS EASILY: 0
PALPITATIONS: 1
DIARRHEA: 0
CHILLS: 0
DIAPHORESIS: 0
NECK PAIN: 0
BLURRED VISION: 0
FOCAL WEAKNESS: 0
FLANK PAIN: 0
SHORTNESS OF BREATH: 0
COUGH: 0
BLOOD IN STOOL: 0
FEVER: 0
DIZZINESS: 0
HEADACHES: 0
ABDOMINAL PAIN: 0
NAUSEA: 0
SORE THROAT: 0
WHEEZING: 0
VOMITING: 0

## 2019-02-23 ASSESSMENT — PATIENT HEALTH QUESTIONNAIRE - PHQ9
9. THOUGHTS THAT YOU WOULD BE BETTER OFF DEAD, OR OF HURTING YOURSELF: NOT AT ALL
7. TROUBLE CONCENTRATING ON THINGS, SUCH AS READING THE NEWSPAPER OR WATCHING TELEVISION: NOT AT ALL
4. FEELING TIRED OR HAVING LITTLE ENERGY: NOT AT ALL
6. FEELING BAD ABOUT YOURSELF - OR THAT YOU ARE A FAILURE OR HAVE LET YOURSELF OR YOUR FAMILY DOWN: SEVERAL DAYS
2. FEELING DOWN, DEPRESSED, IRRITABLE, OR HOPELESS: SEVERAL DAYS
8. MOVING OR SPEAKING SO SLOWLY THAT OTHER PEOPLE COULD HAVE NOTICED. OR THE OPPOSITE, BEING SO FIGETY OR RESTLESS THAT YOU HAVE BEEN MOVING AROUND A LOT MORE THAN USUAL: NOT AT ALL
1. LITTLE INTEREST OR PLEASURE IN DOING THINGS: SEVERAL DAYS
5. POOR APPETITE OR OVEREATING: NOT AT ALL
SUM OF ALL RESPONSES TO PHQ9 QUESTIONS 1 AND 2: 2
3. TROUBLE FALLING OR STAYING ASLEEP OR SLEEPING TOO MUCH: NOT AT ALL
SUM OF ALL RESPONSES TO PHQ QUESTIONS 1-9: 3

## 2019-02-23 ASSESSMENT — COPD QUESTIONNAIRES
HAVE YOU SMOKED AT LEAST 100 CIGARETTES IN YOUR ENTIRE LIFE: YES
DURING THE PAST 4 WEEKS HOW MUCH DID YOU FEEL SHORT OF BREATH: SOME OF THE TIME
COPD SCREENING SCORE: 5
DO YOU EVER COUGH UP ANY MUCUS OR PHLEGM?: NO/ONLY WITH OCCASIONAL COLDS OR INFECTIONS

## 2019-02-23 ASSESSMENT — LIFESTYLE VARIABLES
ALCOHOL_USE: NO
EVER_SMOKED: YES

## 2019-02-23 NOTE — PROGRESS NOTES
Hospital Medicine Daily Progress Note    Date of Service  2/23/2019    Chief Complaint  58 y.o. male admitted 2/22/2019 with ***    Hospital Course    ***      Interval Problem Update  ***    Consultants/Specialty  ***    Code Status  ***    Disposition  ***    Review of Systems  ROS     Physical Exam  Temp:  [36.2 °C (97.2 °F)-37.7 °C (99.9 °F)] 36.5 °C (97.7 °F)  Pulse:  [] 66  Resp:  [12-18] 16  BP: (135-174)/(69-88) 137/80  SpO2:  [90 %-98 %] 90 %    Physical Exam    Fluids    Intake/Output Summary (Last 24 hours) at 02/23/19 0836  Last data filed at 02/23/19 0600   Gross per 24 hour   Intake              825 ml   Output                0 ml   Net              825 ml       Laboratory  Recent Labs      02/22/19   1810   WBC  5.2   RBC  4.43*   HEMOGLOBIN  14.6   HEMATOCRIT  42.5   MCV  95.9   MCH  33.0   MCHC  34.4   RDW  42.3   PLATELETCT  87*   MPV  14.2*     Recent Labs      02/22/19   1810   SODIUM  139   POTASSIUM  3.9   CHLORIDE  105   CO2  26   GLUCOSE  120*   BUN  14   CREATININE  0.56   CALCIUM  9.0     Recent Labs      02/20/19   1334   INR  1.07               Imaging  {Wetread or Wildcard:[id number]}     Assessment/Plan  Atrial fibrillation with RVR (HCC)- (present on admission)   Assessment & Plan    Patient received IV metoprolol 5 mg x2 with control of his heart rate  He will be monitored overnight on telemetry  I have started the patient on metoprolol 25 mg twice daily  Repeat EKG reveals the patient is in sinus rhythm  Chads 2 Vas score of 1 Positive DM type 2, patient denies history of hypertension and is not on antihypertensives.  We will hold off on starting the patient on aspirin at this time given his recent G-tube placement.   Check thyroid studies and 2D echo       Tonsil cancer (HCC)- (present on admission)   Assessment & Plan    Stage II right tonsillar squamous cell cancer currently treated with ongoing chemotherapy and radiation therapy  Patient is followed by Dr. Looney and    Prophylactic G-tube placed     Type 2 diabetes mellitus without complication, without long-term current use of insulin ([facility])- (present on admission)   Assessment & Plan    Patient is currently prediabetic with a hemoglobin A1c of 6.4  Diabetic diet     Essential hypertension- (present on admission)   Assessment & Plan    Started on metoprolol 25 mg twice daily     Tobacco abuse- (present on admission)   Assessment & Plan    Tobacco cessation education provided for more than 3 minutes, discussed options of nicotine patch, medical treatment with wellbutrin and chantix. Discussed the risks of smoking including increased risk of heart disease, stroke, cancer and COPD. Discussed the benefits of quitting smoking. Nicotine replacement protocol will be provided to the patient.       Obesity (BMI 30-39.9)- (present on admission)   Assessment & Plan    Body mass index is 35.46 kg/m².            VTE prophylaxis: ***

## 2019-02-23 NOTE — H&P
Hospital Medicine History & Physical Note    Date of Service  2/22/2019    Primary Care Physician  Surjit Looney M.D.    Consultants  None    Code Status  Full code    Chief Complaint  Atrial fibrillation    History of Presenting Illness  58 y.o. male with a past medical history of type 2 diabetes mellitus, tobacco abuse, obesity who presented 2/22/2019 with elective placement of G-tube.  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 Looney and Dr. Harris.  The patient is currently undergoing chemotherapy and radiation therapy.  He was admitted today for placement of G-tube and while in the PACU the patient was noted to be in atrial fibrillation with RVR.  Patient denies any previous history of atrial fibrillation, congestive heart failure, CAD or stroke.  He denies any active chest pain, shortness of breath, fever, chills, nausea or vomiting.  He reports mild discomfort at the G-tube site.  The G-tube insertion was prophylactic, the patient states he is still able to tolerate a regular diet.    EKG interpreted by me reveals atrial fibrillation with RVR with a rate of 160      Review of Systems  Review of Systems   Constitutional: Negative for chills, diaphoresis and fever.   HENT: Negative for hearing loss and sore throat.    Eyes: Negative for blurred vision.   Respiratory: Negative for cough, sputum production, shortness of breath and wheezing.    Cardiovascular: Positive for palpitations. Negative for chest pain and leg swelling.   Gastrointestinal: Negative for abdominal pain, blood in stool, diarrhea, nausea and vomiting.   Genitourinary: Negative for dysuria, flank pain and urgency.   Musculoskeletal: Negative for back pain, joint pain, myalgias and neck pain.   Skin: Negative for rash.   Neurological: Negative for dizziness, focal weakness, seizures and headaches.   Endo/Heme/Allergies: Does not bruise/bleed easily.    Psychiatric/Behavioral: Negative for suicidal ideas.   All other systems reviewed and are negative.      Past Medical History   has a past medical history of Alcoholic (HCC); Cancer (HCC) (2019); Dental disorder; Diabetes (HCC); Hypertension; and Tonsil cancer (HCC).    Surgical History   has a past surgical history that includes cholecystectomy (2003) and other (12/27/2018).     Family History  family history includes Alcohol abuse in his mother; Cancer in his mother and sister; GI in his sister; Heart Disease in his father; Respiratory Disease in his mother.     Social History   reports that he has been smoking.  He has a 3.50 pack-year smoking history. He has never used smokeless tobacco. He reports that he does not drink alcohol or use drugs.    Allergies  No Known Allergies    Medications  None       Physical Exam  Temp:  [36.2 °C (97.2 °F)-37.1 °C (98.7 °F)] 37.1 °C (98.7 °F)  Pulse:  [] 61  Resp:  [12-18] 14  BP: (141-174)/(69-88) 143/72  SpO2:  [91 %-98 %] 95 %    Physical Exam   Constitutional: He is oriented to person, place, and time. He appears well-developed and well-nourished. No distress.   Obese   HENT:   Head: Normocephalic and atraumatic.   Mouth/Throat: Oropharynx is clear and moist.   Eyes: Pupils are equal, round, and reactive to light. Conjunctivae are normal. No scleral icterus.   Neck: Normal range of motion. Neck supple.   Cardiovascular: Normal rate, regular rhythm and normal heart sounds.    Pulmonary/Chest: Effort normal and breath sounds normal. No respiratory distress. He has no wheezes. He has no rales.   Abdominal: Soft. Bowel sounds are normal. He exhibits no distension. There is no tenderness. There is no rebound.   Musculoskeletal: Normal range of motion. He exhibits no edema or tenderness.   Lymphadenopathy:     He has no cervical adenopathy.   Neurological: He is alert and oriented to person, place, and time. No cranial nerve deficit. Coordination normal.   Skin: Skin is  warm. No rash noted.   Psychiatric: He has a normal mood and affect. His behavior is normal.   Nursing note and vitals reviewed.      Laboratory:  Recent Labs      02/22/19   1810   WBC  5.2   RBC  4.43*   HEMOGLOBIN  14.6   HEMATOCRIT  42.5   MCV  95.9   MCH  33.0   MCHC  34.4   RDW  42.3   PLATELETCT  87*   MPV  14.2*         No results for input(s): ALTSGPT, ASTSGOT, ALKPHOSPHAT, TBILIRUBIN, DBILIRUBIN, GAMMAGT, AMYLASE, LIPASE, ALB, PREALBUMIN, GLUCOSE in the last 72 hours.  Recent Labs      02/20/19   1334   INR  1.07             No results for input(s): TROPONINI in the last 72 hours.    Urinalysis:    No results found     Imaging:  IR-GASTROSTOMY PLACEMENT   Final Result         Technically successful percutaneous placement of 18-[place]ian gastrostomy tube in the antrum of the stomach.      EC-ECHOCARDIOGRAM COMPLETE W/O CONT    (Results Pending)         Assessment/Plan:  I anticipate this patient is appropriate for observation status at this time.    Atrial fibrillation with RVR (HCC)- (present on admission)   Assessment & Plan    Patient received IV metoprolol 5 mg x2 with control of his heart rate  He will be monitored overnight on telemetry  I have started the patient on metoprolol 25 mg twice daily  Repeat EKG reveals the patient is in sinus rhythm  Chads 2 Vas score of 1 Positive DM type 2, patient denies history of hypertension and is not on antihypertensives.  We will hold off on starting the patient on aspirin at this time given his recent G-tube placement.   Check thyroid studies and 2D echo       Tonsil cancer (HCC)- (present on admission)   Assessment & Plan    Stage II right tonsillar squamous cell cancer currently treated with ongoing chemotherapy and radiation therapy  Patient is followed by Dr. Looney and   Prophylactic G-tube placed     Type 2 diabetes mellitus without complication, without long-term current use of insulin (HCC)- (present on admission)   Assessment & Plan    Patient is  currently prediabetic with a hemoglobin A1c of 6.4  Diabetic diet     Essential hypertension- (present on admission)   Assessment & Plan    Started on metoprolol 25 mg twice daily     Tobacco abuse- (present on admission)   Assessment & Plan    Tobacco cessation education provided for more than 3 minutes, discussed options of nicotine patch, medical treatment with wellbutrin and chantix. Discussed the risks of smoking including increased risk of heart disease, stroke, cancer and COPD. Discussed the benefits of quitting smoking. Nicotine replacement protocol will be provided to the patient.       Obesity (BMI 30-39.9)- (present on admission)   Assessment & Plan    Body mass index is 35.46 kg/m².           VTE prophylaxis: scd

## 2019-02-23 NOTE — PROGRESS NOTES
Received report. Pt arrived to unit at 1915 via gurney escorted by post-op RN. Assessment complete. VSS. No signs of distress noted at this time. Tele monitor in place. Monitor room notified. Pt denies any pain at this time. Fall precautions and appropriate signs in place. Pt oriented to unit routine, call light/phone system within reach. Personal belongings within reach. RN extension number provided. Pt educated regarding fall precautions. Bed alarm is on.  Pt denies any further needs at this time.

## 2019-02-23 NOTE — CARE PLAN
Problem: Safety  Goal: Will remain free from falls  Outcome: PROGRESSING AS EXPECTED  Patient educated to use call light for assistance. Fall precautions in place. Staff will assist with mobilization. Hourly rounding in place.    Problem: Knowledge Deficit  Goal: Knowledge of disease process/condition, treatment plan, diagnostic tests, and medications will improve  Outcome: PROGRESSING AS EXPECTED  Provided patient with orientation to unit routine and plan of care. Discussed Echocardiogram diagnostic tool and current medication regimen. Patient verbalized understanding.

## 2019-02-23 NOTE — DISCHARGE SUMMARY
Discharge Summary    CHIEF COMPLAINT ON ADMISSION  Atrial fibrillation    Reason for Admission  Gastrostomy tube in place (HCC) [Z*     Admission Date  2/22/2019    CODE STATUS  Full Code    HPI & HOSPITAL COURSE  This is a 58 y.o. male here with history of SCC of tonsil on chemo and radiation therapy, here for elective G tube placement. During the procedure he developed to afib with RVR and subsequently admitted for further evaluation.  He has been in sinus rhythm here overnight. Thyroid function test was within normal limit. His [id number] is 1.   He was started on metoprolol 25 mg bid. Echo was done and showed no acute findings.  He said he is ready to be discharged home today. He was instructed to go see his pcp if he starts to have palpitation or sob.  He was seen and examined by me today.        Therefore, he is discharged in fair and stable condition to home with close outpatient follow-up.        Discharge Date  2/23/19    FOLLOW UP ITEMS POST DISCHARGE      DISCHARGE DIAGNOSES  Active Problems:    Atrial fibrillation with RVR (HCC) POA: Yes    Obesity (BMI 30-39.9) POA: Yes    Tobacco abuse POA: Yes    Essential hypertension POA: Yes    Type 2 diabetes mellitus without complication, without long-term current use of insulin (HCC) POA: Yes    Tonsil cancer (HCC) POA: Yes  Resolved Problems:    * No resolved hospital problems. *      FOLLOW UP  Future Appointments  Date Time Provider Department Center   2/25/2019 7:30 AM OLYA Rees None   2/25/2019 8:30 AM RADIATION THERAPY RADT None   2/25/2019 9:30 AM RN 8 [facility]   2/26/2019 2:15 PM RADIATION THERAPY RADT None   2/27/2019 2:15 PM RADIATION THERAPY RADT None   2/28/2019 2:15 PM RADIATION THERAPY RADT None   3/1/2019 2:15 PM RADIATION THERAPY RADT None   3/4/2019 7:30 AM OLYA Rees None   3/4/2019 8:30 AM RN 2 [facility]   3/4/2019 8:30 AM RADIATION THERAPY RADT None   3/5/2019 2:15 PM RADIATION THERAPY RADT None    3/6/2019 2:15 PM RADIATION THERAPY RADT None   3/7/2019 2:15 PM RADIATION THERAPY RADT None   3/8/2019 2:15 PM RADIATION THERAPY RADT None   3/11/2019 7:30 AM RADIATION THERAPY RADT None   3/11/2019 8:00 AM EDEL Olmedo None   [facility]   3/12/2019 2:15 PM RADIATION THERAPY RADT None   3/13/2019 2:15 PM RADIATION THERAPY RADT None   3/14/2019 2:15 PM RADIATION THERAPY RADT None   3/15/2019 2:15 PM RADIATION THERAPY RADT None   3/18/2019 7:30 AM OLYA Rees OMG None   3/18/2019 8:00 AM RADIATION THERAPY RADT None   3[facility]18[facility]2019 8:45 [facility]   3/19/2019 2:15 PM RADIATION THERAPY RADT None   3/20/2019 2:15 PM RADIATION THERAPY RADT None   3/21/2019 2:15 PM RADIATION THERAPY RADT None   3/22/2019 2:15 PM RADIATION THERAPY RADT None   3/25/2019 8:00 AM EDEL Olmedo None   3/25/2019 9:00 AM RADIATION THERAPY RADT None   3/25/2019 9:45 AM [facility]   3/26/2019 2:15 PM RADIATION THERAPY RADT None   3/27/2019 2:15 PM RADIATION THERAPY RADT None   3/28/2019 2:15 PM RADIATION THERAPY RADT None     Surjit Looney M.D.  [address]  [address] [address]  [phone]    In 1 week        MEDICATIONS ON DISCHARGE     Medication List      START taking these medications      Instructions   metoprolol 25 MG Tabs  Commonly known as:  LOPRESSOR   Take 1 Tab by mouth 2 Times a Day.  Dose:  25 mg            Allergies  No Known Allergies    DIET  Orders Placed This Encounter   Procedures   • Diet Order Cardiac     Standing Status:   Standing     Number of Occurrences:   1     Order Specific Question:   Diet:     Answer:   Cardiac [6]     Order Specific Question:   Calorie modifications:     Answer:   High Calorie [1]     Order Specific Question:   Macronutrient modifications:     Answer:   High Protein [4]     Order Specific Question:   Texture/Fiber modifications:     Answer:   Dysphagia 3(Mechanical  Soft)specify fluid consistency(question 6) [3]       ACTIVITY  As tolerated.  Weight bearing as tolerated    CONSULTATIONS      PROCEDURES      LABORATORY  Lab Results   Component Value Date    SODIUM 139 02/22/2019    POTASSIUM 3.9 02/22/2019    CHLORIDE 105 02/22/2019    CO2 26 02/22/2019    GLUCOSE 120 (H) 02/22/2019    BUN 14 02/22/2019    CREATININE 0.56 02/22/2019        Lab Results   Component Value Date    WBC 5.2 02/22/2019    HEMOGLOBIN 14.6 02/22/2019    HEMATOCRIT 42.5 02/22/2019    PLATELETCT 87 (L) 02/22/2019        Total time of the discharge process exceeds 38 minutes.

## 2019-02-23 NOTE — PROGRESS NOTES
2 RN skin check complete with DARRIAN Bennett.  · Noted heels pink, dry and blanching. Otherwise all skin surfaces intact.   · No devices in place currently; patient is able to move himself independently in bed.  · No pressure ulcers found.

## 2019-02-23 NOTE — OR NURSING
1835  Report rec'd from Teressa.  Pt awake, tolerating oral sips well.  No c/o pain, nausea.  HR in SR 60s.  Pt asymptomatic.   1900  Pt transported to [facility] 7 via myself and DARRIAN Lamar.  Pt maintained HR in SR 60s throughout transfer.  Verbal report given to primary RN.

## 2019-02-23 NOTE — ASSESSMENT & PLAN NOTE
Patient received IV metoprolol 5 mg x2 with control of his heart rate  He will be monitored overnight on telemetry  I have started the patient on metoprolol 25 mg twice daily  Repeat EKG reveals the patient is in sinus rhythm  Chads 2 Vas score of 1 Positive DM type 2, patient denies history of hypertension and is not on antihypertensives.  We will hold off on starting the patient on aspirin at this time given his recent G-tube placement.   Check thyroid studies and 2D echo

## 2019-02-23 NOTE — ASSESSMENT & PLAN NOTE
Stage II right tonsillar squamous cell cancer currently treated with ongoing chemotherapy and radiation therapy  Patient is followed by Dr. Looney and   Prophylactic G-tube placed

## 2019-02-24 NOTE — PROGRESS NOTES
ECHO was negative for valvular disease; EF is 65%. DC instructions went over with pt. He was given 25mg of metoprolol right before DC around 1700. Pt had family take him home.

## 2019-02-24 NOTE — PROGRESS NOTES
Pharmacy Chemotherapy Calculation Note:    Patient Name: Maximino Green  Dx:  T3N2M0 squamous cell carcinoma of the right tonsil, P16+      Cycle 3  Previous Treatment: 02/18/19    Protocol: Cisplatin + XRT        *Dosing Reference*  Cisplatin (CDDP) 30-40mg/m2 IV on day 1  Every 7 days for 6 weeks with radiation  NCCN Guidelines for Head and Neck Cancers.V.1.2017  Samantha PATTERSON, et al. Int J Radiat Oncol Biol Phys. 2011 Mar 15;79(4):1073-80.   Winsome MORGAN et al. Radiother Oncol. 2006 Apr;79(1):34-8.     Allergies: Patient has no known allergies.    /66   Pulse 66   Temp 36.6 °C (97.8 °F) (Temporal)   Resp 18   Ht 1.829 m (6')   Wt 122.4 kg (269 lb 13.5 oz)   SpO2 93%   BMI 36.60 kg/m²  Body surface area is 2.49 meters squared.    Labs 02/25/19  ANC~ 4600 Plt = 97k (ok to treat per FABIANO Hare, APRN/Dr. Looney) Hgb = 14.2 SCr = 0.43 mg/dL CrCl >125 mL/min (minimum SCr of 0.7)  K = 3.6  Mg = 1.7     Labs 02/22/19  Phos = 3.5 LFT's = 70/80/70 TBili = 1.1        Cisplatin (CDDP) 40 mg/m2  X 2.49 m2 = 99.6 mg   <5% difference, ok to treat with final dose = 99.6 mg IV       Lea Velez, PharmD, BCOP

## 2019-02-24 NOTE — DISCHARGE INSTRUCTIONS
Discharge Instructions    Discharged to home by car with relative. Discharged via wheelchair, hospital escort: Yes.  Special equipment needed: Not Applicable    Be sure to schedule a follow-up appointment with your primary care doctor or any specialists as instructed.     Discharge Plan:   Smoking Cessation Offered: Patient Refused  Influenza Vaccine Indication: Indicated: 9 to 64 years of age    I understand that a diet low in cholesterol, fat, and sodium is recommended for good health. Unless I have been given specific instructions below for another diet, I accept this instruction as my diet prescription.   Other diet:     Special Instructions: None    · Is patient discharged on Warfarin / Coumadin?   No       Atrial Fibrillation  Introduction  Atrial fibrillation is a type of heartbeat that is irregular or fast (rapid). If you have this condition, your heart keeps quivering in a weird (chaotic) way. This condition can make it so your heart cannot pump blood normally. Having this condition gives a person more risk for stroke, heart failure, and other heart problems. There are different types of atrial fibrillation. Talk with your doctor to learn about the type that you have.  Follow these instructions at home:  · Take over-the-counter and prescription medicines only as told by your doctor.  · If your doctor prescribed a blood-thinning medicine, take it exactly as told. Taking too much of it can cause bleeding. If you do not take enough of it, you will not have the protection that you need against stroke and other problems.  · Do not use any tobacco products. These include cigarettes, chewing tobacco, and e-cigarettes. If you need help quitting, ask your doctor.  · If you have apnea (obstructive sleep apnea), manage it as told by your doctor.  · Do not drink alcohol.  · Do not drink beverages that have caffeine. These include coffee, soda, and tea.  · Maintain a healthy weight. Do not use diet pills unless your  doctor says they are safe for you. Diet pills may make heart problems worse.  · Follow diet instructions as told by your doctor.  · Exercise regularly as told by your doctor.  · Keep all follow-up visits as told by your doctor. This is important.  Contact a doctor if:  · You notice a change in the speed, rhythm, or strength of your heartbeat.  · You are taking a blood-thinning medicine and you notice more bruising.  · You get tired more easily when you move or exercise.  Get help right away if:  · You have pain in your chest or your belly (abdomen).  · You have sweating or weakness.  · You feel sick to your stomach (nauseous).  · You notice blood in your throw up (vomit), poop (stool), or pee (urine).  · You are short of breath.  · You suddenly have swollen feet and ankles.  · You feel dizzy.  · Your suddenly get weak or numb in your face, arms, or legs, especially if it happens on one side of your body.  · You have trouble talking, trouble understanding, or both.  · Your face or your eyelid droops on one side.  These symptoms may be an emergency. Do not wait to see if the symptoms will go away. Get medical help right away. Call your local emergency services (911 in the [place] Do not drive yourself to the hospital.   This information is not intended to replace advice given to you by your health care provider. Make sure you discuss any questions you have with your health care provider.  Document Released: 09/26/2009 Document Revised: 05/25/2017 Document Reviewed: 04/13/2016  © 2017 Elsevier      Depression / Suicide Risk    As you are discharged from this [facility] facility, it is important to learn how to keep safe from harming yourself.    Recognize the warning signs:  · Abrupt changes in personality, positive or negative- including increase in energy   · Giving away possessions  · Change in eating patterns- significant weight changes-  positive or negative  · Change in sleeping patterns- unable to sleep or  sleeping all the time   · Unwillingness or inability to communicate  · Depression  · Unusual sadness, discouragement and loneliness  · Talk of wanting to die  · Neglect of personal appearance   · Rebelliousness- reckless behavior  · Withdrawal from people/activities they love  · Confusion- inability to concentrate     If you or a loved one observes any of these behaviors or has concerns about self-harm, here's what you can do:  · Talk about it- your feelings and reasons for harming yourself  · Remove any means that you might use to hurt yourself (examples: pills, rope, extension cords, firearm)  · Get professional help from the community (Mental Health, Substance Abuse, psychological counseling)  · Do not be alone:Call your Safe Contact- someone whom you trust who will be there for you.  · Call your local CRISIS HOTLINE 061-1627 or 296-395-9564  · Call your local Children's Mobile Crisis Response Team [place] (913) 426-4450 or www.[employer]  · Call the toll free National Suicide Prevention Hotlines   · National Suicide Prevention Lifeline 244-935-UILQ (3571)  · National Hope Line Network 800-SUICIDE (847-9127)

## 2019-02-25 ENCOUNTER — TELEPHONE (OUTPATIENT)
Dept: HEMATOLOGY ONCOLOGY | Facility: MEDICAL CENTER | Age: 59
End: 2019-02-25

## 2019-02-25 ENCOUNTER — DOCUMENTATION (OUTPATIENT)
Dept: HEMATOLOGY ONCOLOGY | Facility: MEDICAL CENTER | Age: 59
End: 2019-02-25

## 2019-02-25 ENCOUNTER — OUTPATIENT INFUSION SERVICES (OUTPATIENT)
Dept: ONCOLOGY | Facility: MEDICAL CENTER | Age: 59
End: 2019-02-25
Attending: INTERNAL MEDICINE
Payer: COMMERCIAL

## 2019-02-25 ENCOUNTER — HOSPITAL ENCOUNTER (OUTPATIENT)
Dept: RADIATION ONCOLOGY | Facility: MEDICAL CENTER | Age: 59
End: 2019-02-25

## 2019-02-25 ENCOUNTER — PATIENT OUTREACH (OUTPATIENT)
Dept: OTHER | Facility: MEDICAL CENTER | Age: 59
End: 2019-02-25

## 2019-02-25 ENCOUNTER — OFFICE VISIT (OUTPATIENT)
Dept: HEMATOLOGY ONCOLOGY | Facility: MEDICAL CENTER | Age: 59
End: 2019-02-25
Payer: COMMERCIAL

## 2019-02-25 VITALS
DIASTOLIC BLOOD PRESSURE: 66 MMHG | WEIGHT: 269.84 LBS | HEIGHT: 72 IN | RESPIRATION RATE: 18 BRPM | SYSTOLIC BLOOD PRESSURE: 142 MMHG | TEMPERATURE: 97.8 F | BODY MASS INDEX: 36.55 KG/M2 | OXYGEN SATURATION: 93 % | HEART RATE: 66 BPM

## 2019-02-25 VITALS
HEART RATE: 74 BPM | TEMPERATURE: 97.7 F | RESPIRATION RATE: 16 BRPM | WEIGHT: 271.28 LBS | HEIGHT: 72 IN | DIASTOLIC BLOOD PRESSURE: 52 MMHG | BODY MASS INDEX: 36.74 KG/M2 | OXYGEN SATURATION: 95 % | SYSTOLIC BLOOD PRESSURE: 96 MMHG

## 2019-02-25 DIAGNOSIS — C09.9 TONSIL CANCER (HCC): ICD-10-CM

## 2019-02-25 DIAGNOSIS — B37.0 THRUSH: ICD-10-CM

## 2019-02-25 DIAGNOSIS — I48.91 ATRIAL FIBRILLATION WITH RVR (HCC): ICD-10-CM

## 2019-02-25 LAB
ANION GAP SERPL CALC-SCNC: 7 MMOL/L (ref 0–11.9)
BASOPHILS # BLD AUTO: 0.9 % (ref 0–1.8)
BASOPHILS # BLD: 0.06 K/UL (ref 0–0.12)
BUN SERPL-MCNC: 12 MG/DL (ref 8–22)
CALCIUM SERPL-MCNC: 9.1 MG/DL (ref 8.5–10.5)
CHLORIDE SERPL-SCNC: 108 MMOL/L (ref 96–112)
CO2 SERPL-SCNC: 23 MMOL/L (ref 20–33)
CREAT SERPL-MCNC: 0.43 MG/DL (ref 0.5–1.4)
EOSINOPHIL # BLD AUTO: 0.32 K/UL (ref 0–0.51)
EOSINOPHIL NFR BLD: 4.7 % (ref 0–6.9)
ERYTHROCYTE [DISTWIDTH] IN BLOOD BY AUTOMATED COUNT: 40.8 FL (ref 35.9–50)
GLUCOSE SERPL-MCNC: 106 MG/DL (ref 65–99)
HCT VFR BLD AUTO: 40.6 % (ref 42–52)
HGB BLD-MCNC: 14.2 G/DL (ref 14–18)
IMM GRANULOCYTES # BLD AUTO: 0.03 K/UL (ref 0–0.11)
IMM GRANULOCYTES NFR BLD AUTO: 0.4 % (ref 0–0.9)
LYMPHOCYTES # BLD AUTO: 0.94 K/UL (ref 1–4.8)
LYMPHOCYTES NFR BLD: 13.9 % (ref 22–41)
MAGNESIUM SERPL-MCNC: 1.7 MG/DL (ref 1.5–2.5)
MCH RBC QN AUTO: 32.6 PG (ref 27–33)
MCHC RBC AUTO-ENTMCNC: 35 G/DL (ref 33.7–35.3)
MCV RBC AUTO: 93.3 FL (ref 81.4–97.8)
MONOCYTES # BLD AUTO: 0.83 K/UL (ref 0–0.85)
MONOCYTES NFR BLD AUTO: 12.3 % (ref 0–13.4)
NEUTROPHILS # BLD AUTO: 4.59 K/UL (ref 1.82–7.42)
NEUTROPHILS NFR BLD: 67.8 % (ref 44–72)
NRBC # BLD AUTO: 0 K/UL
NRBC BLD-RTO: 0 /100 WBC
PLATELET # BLD AUTO: 97 K/UL (ref 164–446)
PMV BLD AUTO: 14.2 FL (ref 9–12.9)
POTASSIUM SERPL-SCNC: 3.6 MMOL/L (ref 3.6–5.5)
RBC # BLD AUTO: 4.35 M/UL (ref 4.7–6.1)
SODIUM SERPL-SCNC: 138 MMOL/L (ref 135–145)
WBC # BLD AUTO: 6.8 K/UL (ref 4.8–10.8)

## 2019-02-25 PROCEDURE — 96367 TX/PROPH/DG ADDL SEQ IV INF: CPT

## 2019-02-25 PROCEDURE — 99214 OFFICE O/P EST MOD 30 MIN: CPT | Performed by: NURSE PRACTITIONER

## 2019-02-25 PROCEDURE — 83735 ASSAY OF MAGNESIUM: CPT

## 2019-02-25 PROCEDURE — 96413 CHEMO IV INFUSION 1 HR: CPT

## 2019-02-25 PROCEDURE — 80048 BASIC METABOLIC PNL TOTAL CA: CPT

## 2019-02-25 PROCEDURE — 96375 TX/PRO/DX INJ NEW DRUG ADDON: CPT

## 2019-02-25 PROCEDURE — 96361 HYDRATE IV INFUSION ADD-ON: CPT

## 2019-02-25 PROCEDURE — 77427 RADIATION TX MANAGEMENT X5: CPT | Performed by: RADIOLOGY

## 2019-02-25 PROCEDURE — 85025 COMPLETE CBC W/AUTO DIFF WBC: CPT

## 2019-02-25 PROCEDURE — 77386 HCHG IMRT DELIVERY COMPLEX: CPT | Performed by: RADIOLOGY

## 2019-02-25 PROCEDURE — 77014 PR CT GUIDANCE PLACEMENT RAD THERAPY FIELDS: CPT | Mod: 26 | Performed by: RADIOLOGY

## 2019-02-25 PROCEDURE — 700111 HCHG RX REV CODE 636 W/ 250 OVERRIDE (IP): Performed by: NURSE PRACTITIONER

## 2019-02-25 PROCEDURE — 700105 HCHG RX REV CODE 258: Performed by: NURSE PRACTITIONER

## 2019-02-25 RX ORDER — ONDANSETRON 2 MG/ML
4 INJECTION INTRAMUSCULAR; INTRAVENOUS
Status: CANCELLED | OUTPATIENT
Start: 2019-02-25

## 2019-02-25 RX ORDER — SODIUM CHLORIDE 9 MG/ML
500 INJECTION, SOLUTION INTRAVENOUS ONCE
Status: CANCELLED | OUTPATIENT
Start: 2019-02-25

## 2019-02-25 RX ORDER — SODIUM CHLORIDE 9 MG/ML
500 INJECTION, SOLUTION INTRAVENOUS ONCE
Status: COMPLETED | OUTPATIENT
Start: 2019-02-25 | End: 2019-02-25

## 2019-02-25 RX ORDER — 0.9 % SODIUM CHLORIDE 0.9 %
VIAL (ML) INJECTION PRN
Status: CANCELLED | OUTPATIENT
Start: 2019-02-25

## 2019-02-25 RX ORDER — ONDANSETRON 8 MG/1
8 TABLET, ORALLY DISINTEGRATING ORAL
Status: CANCELLED | OUTPATIENT
Start: 2019-02-25

## 2019-02-25 RX ORDER — MAGNESIUM SULFATE 1 G/100ML
1 INJECTION INTRAVENOUS ONCE
Status: COMPLETED | OUTPATIENT
Start: 2019-02-25 | End: 2019-02-25

## 2019-02-25 RX ORDER — 0.9 % SODIUM CHLORIDE 0.9 %
5 VIAL (ML) INJECTION PRN
Status: CANCELLED | OUTPATIENT
Start: 2019-02-25

## 2019-02-25 RX ORDER — PROCHLORPERAZINE MALEATE 10 MG
10 TABLET ORAL EVERY 6 HOURS PRN
Status: CANCELLED | OUTPATIENT
Start: 2019-02-25

## 2019-02-25 RX ORDER — SODIUM CHLORIDE 9 MG/ML
INJECTION, SOLUTION INTRAVENOUS CONTINUOUS
Status: CANCELLED | OUTPATIENT
Start: 2019-02-25

## 2019-02-25 RX ADMIN — MAGNESIUM SULFATE IN DEXTROSE 1 G: 10 INJECTION, SOLUTION INTRAVENOUS at 13:35

## 2019-02-25 RX ADMIN — SODIUM CHLORIDE 500 ML: 9 INJECTION, SOLUTION INTRAVENOUS at 09:50

## 2019-02-25 RX ADMIN — POTASSIUM CHLORIDE: 2 INJECTION, SOLUTION, CONCENTRATE INTRAVENOUS at 13:35

## 2019-02-25 RX ADMIN — ONDANSETRON HYDROCHLORIDE 16 MG: 2 SOLUTION INTRAMUSCULAR; INTRAVENOUS at 11:25

## 2019-02-25 RX ADMIN — DEXAMETHASONE SODIUM PHOSPHATE 12 MG: 4 INJECTION, SOLUTION INTRAMUSCULAR; INTRAVENOUS at 11:13

## 2019-02-25 RX ADMIN — CISPLATIN 99.6 MG: 1 INJECTION, SOLUTION INTRAVENOUS at 12:23

## 2019-02-25 RX ADMIN — SODIUM CHLORIDE 150 MG: 9 INJECTION, SOLUTION INTRAVENOUS at 11:42

## 2019-02-25 ASSESSMENT — ENCOUNTER SYMPTOMS
PALPITATIONS: 0
TINGLING: 0
WEIGHT LOSS: 0
MYALGIAS: 0
CONSTIPATION: 0
SHORTNESS OF BREATH: 0
VOMITING: 0
DIZZINESS: 0
NAUSEA: 0
HEADACHES: 0
COUGH: 0
FEVER: 0
CHILLS: 0
DIARRHEA: 0
WHEEZING: 0

## 2019-02-25 ASSESSMENT — PAIN SCALES - GENERAL: PAINLEVEL: 6=MODERATE PAIN

## 2019-02-25 NOTE — PROGRESS NOTES
Chemotherapy Verification - SECONDARY RN       Height = 182.9 cm  Weight = 122.4 kg  BSA = 2.49 m2       Medication: Cisplatin  Dose: 40 mg/m2  Calculated Dose: 99.7 mg                             (In mg/m2, AUC, mg/kg)       I confirm that this process was performed independently.

## 2019-02-25 NOTE — PROGRESS NOTES
Nutrition Services: Update  Weight: 278#, stable/ increased.     Met with pt following radiation therapy to follow-up on current nutrition status. Pt reports continual adequate intake. He denied any painful or difficulty swallowing. He denied any GI distress. PEG placement scheduled for Friday, discussed what to expect and provided brief education. Pt reports feeling good with proceeding with PEG placement Friday. PO intake adequate at this time. Provided samples of Boost Glucose Control and advised intake 1x/day. RD following

## 2019-02-25 NOTE — PROGRESS NOTES
On 2/25/2019 this ONN met with patient in Infusion Services. Patient states he is doing well. Patient states he has started flushing his g-tube. Patient states there is some slight tenderness when patient moves. Patient denies issues with g-tube. Patient states treatment is going well. Patient denies questions or concerns for this ONN at this time. Patient states he will call this ONN as needed.

## 2019-02-25 NOTE — PROGRESS NOTES
Subjective:      Maximino Green is a 58 y.o. male who presents with Follow-Up (prechemo (Dr. Looney)) for tonsillar carcinoma.        HPI    Patient seen today in follow-up for tonsillar carcinoma.  He presents unaccompanied for today's visit.  Patient is scheduled to receive cycle #3 of weekly cisplatin with concurrent radiation therapy.    Patient was recently hospitalized for atrial fibrillation following G-tube placement.  He was discharged on Saturday, February 23.  He was started on metoprolol 25 mg twice daily, however he has not been able to start the medication just yet.  According to the hospitalization discharge summary an echocardiogram was completed which did not show any acute findings.    Patient overall is tolerating treatment very well.  He states he does not have too much significant fatigue, denies any fevers or chills.  He is eating well orally at this time and has not had any weight loss.  He does state that food does not taste good at this time but he is forcing himself to eat to keep up his nutrition.  PEG tube is working after placement last Friday.  He stated he is flushing it and flushed it well.  He does complain of dry mouth but is relieved with hydration.  Denies any mouth sores and is using Biotene.  Overall patient denies any nausea, vomiting, diarrhea or constipation.  He is having a bowel movement at least once or twice per day.  He is increasing his hydration and therefore has increased in frequency.  Denies any dysuria, myalgias, dizziness, headaches or peripheral neuropathy.        No Known Allergies  Current Outpatient Prescriptions on File Prior to Visit   Medication Sig Dispense Refill   • metoprolol (LOPRESSOR) 25 MG Tab Take 1 Tab by mouth 2 Times a Day. 60 Tab 0     No current facility-administered medications on file prior to visit.        Review of Systems   Constitutional: Negative for chills, fever, malaise/fatigue (no too bad per patient) and weight loss.  "  Respiratory: Negative for cough, shortness of breath and wheezing.    Cardiovascular: Negative for chest pain and palpitations.   Gastrointestinal: Negative for constipation, diarrhea, nausea and vomiting.   Genitourinary: Positive for frequency. Negative for dysuria.   Musculoskeletal: Negative for myalgias.   Neurological: Negative for dizziness, tingling and headaches.          Objective:     BP (!) 96/52 (BP Location: Right arm, Patient Position: Sitting, BP Cuff Size: Adult)   Pulse 74   Temp 36.5 °C (97.7 °F) (Temporal)   Resp 16   Ht 1.84 m (6' 0.44\")   Wt 123.1 kg (271 lb 4.4 oz)   SpO2 95%   BMI 36.35 kg/m²      Physical Exam   Constitutional: He is oriented to person, place, and time. He appears well-developed and well-nourished. No distress.   HENT:   Head: Normocephalic and atraumatic.   Mouth/Throat: Oropharynx is clear and moist. No oropharyngeal exudate.   Cardiovascular: Normal rate, regular rhythm, normal heart sounds and intact distal pulses.  Exam reveals no gallop and no friction rub.    No murmur heard.  Pulmonary/Chest: Effort normal and breath sounds normal. No respiratory distress. He has no wheezes.   Abdominal: Soft. Bowel sounds are normal. He exhibits no distension. There is no tenderness.   Musculoskeletal: Normal range of motion. He exhibits no edema or tenderness.   Neurological: He is alert and oriented to person, place, and time.   Skin: Skin is warm and dry. No rash noted. He is not diaphoretic. No erythema. No pallor.   Psychiatric: He has a normal mood and affect. His behavior is normal.   Vitals reviewed.            Assessment/Plan:     1. Tonsil cancer (HCC)  REFERRAL TO FOLLOW-UP WITH PRIMARY CARE    nystatin (MYCOSTATIN) 209675 UNIT/ML Suspension   2. Atrial fibrillation with RVR (HCC)  REFERRAL TO FOLLOW-UP WITH PRIMARY CARE   3. Thrush  nystatin (MYCOSTATIN) 501097 UNIT/ML Suspension     Plan  1.  Sheet with tonsillar carcinoma currently undergoing weekly cisplatin " with concurrent radiation therapy.  He is scheduled for week #3 today.  He is status post PEG tube placement with episode of atrial fibrillation.  Status post hospitalization recently prescribed metoprolol 25 mg p.o. twice daily but has not started medication just yet.  Clinically patient is stable and doing well, and if labs meet parameters he is okay to proceed with cycle #3 today.    2.  Patient newly diagnosed with atrial fibrillation.  Discussed with patient cardio oncology referral however he has refused to be referred to cardiologist.  He is requesting primary care referral which is been placed today.  He is requesting specific physician in [place] which has been done.  Will defer any further management with regards to his atrial fibrillation and metoprolol prescription to PCP, however I did discuss with patient the possibility need for cardiologist involvement.  He did verbalize understanding.    3.  Patient with oral thrush, minimal throughout the oral mucosa.  I will start patient on nystatin swish and swallow 4 times daily.  She is to follow-up with Dr. Harris later this week and will have further evaluation.  If nystatin swish and swallow was not effective patient may require Diflucan in the future.    4.  Patient to follow-up in the clinic in 1 week or sooner if needed.

## 2019-02-25 NOTE — PROGRESS NOTES
Chemotherapy Verification - PRIMARY RN      Height = 182.9cm Weight = 122.4kg  BSA = 2.49m2      Medication: Cisplatin  Dose: 40mg/m2  Calculated Dose: 99.6mg                             (In mg/m2, AUC, mg/kg)       I confirm this process was performed independently with the BSA and all final chemotherapy dosing calculations congruent.  Any discrepancies of 5% or greater have been addressed with the chemotherapy pharmacist. The resolution of the discrepancy has been documented in the EPIC progress notes.

## 2019-02-25 NOTE — PROGRESS NOTES
Patient here for weekly Cisplatin.  Patient seen by MARI Grimaldo prior to today's treatment.  24g PIV established to L- with positive blood return noted.  Labs drawn via PIV and reviewed.  Platelets 97K today and magnesium is 1.7.   MARI Grimaldo was notified of labs results via phone call to DARRIAN Hammer who relayed the lab results.  Per MARI Grimaldo, ok to proceed with treatment and replace magnesium per electrolyte protocol.  Pre-hydration given.  Pre-meds given as ordered.  Patient tolerated Cisplatin without adverse effects.  Post hydration given with additional IV magnesium. Patient is scheduled for next infusion.  PIV removed and wrapped with gauze/coban.  Patient dc home to self care.

## 2019-02-25 NOTE — PROGRESS NOTES
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 Int J Radiat Oncol Biol Phys. 2011 Mar 15;79(4):1073-80.   Winsome MORGAN, et al. Radiother Oncol. 2006 Apr;79(1):34-8.      Allergies: Patient has no known allergies.    /66   Pulse 66   Temp 36.6 °C (97.8 °F) (Temporal)   Resp 18   Ht 1.829 m (6')   Wt 122.4 kg (269 lb 13.5 oz)   SpO2 93%   BMI 36.60 kg/m²  Body surface area is 2.49 meters squared.    All lab results within treatment parameters, except plt.  MD aware of all current lab results. Orders received to proceed with treatment.     Drug Order   (Drug name, dose, route, IV Fluid & volume, frequency, number of doses) Cycle 3      Previous treatment: 2/18/19      Medication = cisplatin (Platinol)  Base Dose= 40mg/m2  Calc Dose: Base Dose x 2.49m2 = 99.6mg  Final Dose = 99.6mg  Route = IV  Fluid & Volume =  mL  Admin Duration = Over 1 hour           <5% difference, ok to treat with final dose       By my signature below, I confirm this process was performed independently with the BSA and all final chemotherapy dosing calculations congruent. I have reviewed the above chemotherapy order and that my calculation of the final dose and BSA (when applicable) corroborate those calculations of the  pharmacist. Discrepancies of 5% or greater in the written dose have been addressed and documented within the [facility] Progress notes.    Rose LindaD.

## 2019-02-26 ENCOUNTER — HOSPITAL ENCOUNTER (OUTPATIENT)
Dept: RADIATION ONCOLOGY | Facility: MEDICAL CENTER | Age: 59
End: 2019-02-26

## 2019-02-26 PROCEDURE — 77386 HCHG IMRT DELIVERY COMPLEX: CPT | Performed by: RADIOLOGY

## 2019-02-26 PROCEDURE — 77014 PR CT GUIDANCE PLACEMENT RAD THERAPY FIELDS: CPT | Mod: 26 | Performed by: RADIOLOGY

## 2019-02-26 NOTE — PROGRESS NOTES
Nutrition Services: Update  Weight: 270#, note pt's weight via OPIC scale is stable, and weight via radiation scale is stable, though there is a ~10lb difference between scaled weight for this patient. RD following weight trends.      Met with pt during chemo infusion to follow-up on current nutrition status. Patient reports frustration with delayed chemo from platelets, relayed to pt's ONN. Nutritionally, pt has PEG placed on 2/22, he reports he has been flushing it well with 30 mL free water BID. Pt repots he has not taken bandage off yet, advised him to do it tonight. He repots he continues to eat well orally, though he reports he does not taste foods much anymore. We discussed nutrition tips to help pt expedience taste. Pt reports he has been drinking Boost Glucose Control daily and has plenty at home. He denied any GI distress. Pt did not express any further questions or concerns, advised him to increase Boost to BID.     RD following.

## 2019-02-26 NOTE — TELEPHONE ENCOUNTER
Received a fax from [facility] (see media tab), requesting clarification on prescription they received for :     Nystatin suspension 100,000U, quantity 250, Take 5 ML by mouth four times a day.     Per Pharmacy Nystatin suspension is not available so please consider changing it.

## 2019-02-26 NOTE — TELEPHONE ENCOUNTER
RN called [facility] in [place] to discuss RX. Spoke with pharmaist and he states that the nystatin is back ordered and can not get the medication.    [facility] who stated they are also out as well and is back ordered they looked at the surrounding [facility] locations as well and they did not have it.    RN notified APRN no pharmacy can provide they nystatin suspension

## 2019-02-27 DIAGNOSIS — I48.91 ATRIAL FIBRILLATION WITH RVR (HCC): ICD-10-CM

## 2019-02-27 DIAGNOSIS — B37.9 CANDIDA ALBICANS INFECTION: ICD-10-CM

## 2019-02-27 PROCEDURE — 77386 HCHG IMRT DELIVERY COMPLEX: CPT | Performed by: RADIOLOGY

## 2019-02-27 PROCEDURE — 77014 PR CT GUIDANCE PLACEMENT RAD THERAPY FIELDS: CPT | Mod: 26 | Performed by: RADIOLOGY

## 2019-02-27 RX ORDER — FLUCONAZOLE 200 MG/1
200 TABLET ORAL DAILY
Qty: 22 TAB | Refills: 1 | Status: SHIPPED | OUTPATIENT
Start: 2019-02-27 | End: 2019-03-27

## 2019-02-28 ENCOUNTER — HOSPITAL ENCOUNTER (OUTPATIENT)
Dept: RADIATION ONCOLOGY | Facility: MEDICAL CENTER | Age: 59
End: 2019-02-28

## 2019-02-28 PROCEDURE — 77336 RADIATION PHYSICS CONSULT: CPT | Performed by: RADIOLOGY

## 2019-02-28 PROCEDURE — 77386 HCHG IMRT DELIVERY COMPLEX: CPT | Performed by: RADIOLOGY

## 2019-02-28 PROCEDURE — 77014 PR CT GUIDANCE PLACEMENT RAD THERAPY FIELDS: CPT | Mod: 26 | Performed by: RADIOLOGY

## 2019-03-01 ENCOUNTER — HOSPITAL ENCOUNTER (OUTPATIENT)
Dept: RADIATION ONCOLOGY | Facility: MEDICAL CENTER | Age: 59
End: 2019-03-31
Attending: RADIOLOGY
Payer: COMMERCIAL

## 2019-03-01 ENCOUNTER — HOSPITAL ENCOUNTER (OUTPATIENT)
Dept: RADIATION ONCOLOGY | Facility: MEDICAL CENTER | Age: 59
End: 2019-03-01

## 2019-03-01 PROCEDURE — 77386 HCHG IMRT DELIVERY COMPLEX: CPT | Performed by: RADIOLOGY

## 2019-03-01 PROCEDURE — 77014 PR CT GUIDANCE PLACEMENT RAD THERAPY FIELDS: CPT | Mod: 26 | Performed by: RADIOLOGY

## 2019-03-03 NOTE — PROGRESS NOTES
"Pharmacy Chemotherapy Calculation Note:    Patient Name: Maximino Green  Dx:  T3N2M0 squamous cell carcinoma of the right tonsil, P16+      Cycle 4    Previous Treatment: 2/25/19    Protocol: Cisplatin + XRT        *Dosing Reference*  Cisplatin (CDDP) 30-40mg/m2 IV on day 1  Every 7 days for 6 weeks with radiation  NCCN Guidelines for Head and Neck Cancers.V.1.2017  Samantha PATTERSON, et al. Int J Radiat Oncol Biol Phys. 2011 Mar 15;79(4):1073-80.   Winsome MORGAN et al. Radiother Oncol. 2006 Apr;79(1):34-8.     Allergies: Patient has no known allergies.    /47   Pulse 78   Temp 37.3 °C (99.2 °F) (Temporal)   Resp 18   Ht 1.82 m (5' 11.65\")   Wt 113.1 kg (249 lb 5.4 oz)   SpO2 93%   BMI 34.14 kg/m²  Body surface area is 2.39 meters squared.    All lab results within treatment parameters. Magnesium replaced.         Cisplatin (CDDP) 40 mg/m2  X 2.39m2 = 95.6mg   <5% difference, ok to treat with final dose = 95.6mg IV       NORA Carter Pharm.D.            "

## 2019-03-04 ENCOUNTER — DOCUMENTATION (OUTPATIENT)
Dept: HEMATOLOGY ONCOLOGY | Facility: MEDICAL CENTER | Age: 59
End: 2019-03-04

## 2019-03-04 ENCOUNTER — OFFICE VISIT (OUTPATIENT)
Dept: HEMATOLOGY ONCOLOGY | Facility: MEDICAL CENTER | Age: 59
End: 2019-03-04
Payer: COMMERCIAL

## 2019-03-04 ENCOUNTER — HOSPITAL ENCOUNTER (OUTPATIENT)
Dept: RADIATION ONCOLOGY | Facility: MEDICAL CENTER | Age: 59
End: 2019-03-04

## 2019-03-04 ENCOUNTER — OUTPATIENT INFUSION SERVICES (OUTPATIENT)
Dept: ONCOLOGY | Facility: MEDICAL CENTER | Age: 59
End: 2019-03-04
Attending: INTERNAL MEDICINE
Payer: COMMERCIAL

## 2019-03-04 VITALS
BODY MASS INDEX: 33.77 KG/M2 | OXYGEN SATURATION: 93 % | HEIGHT: 72 IN | RESPIRATION RATE: 18 BRPM | TEMPERATURE: 99.2 F | HEART RATE: 78 BPM | WEIGHT: 249.34 LBS | DIASTOLIC BLOOD PRESSURE: 47 MMHG | SYSTOLIC BLOOD PRESSURE: 101 MMHG

## 2019-03-04 VITALS
DIASTOLIC BLOOD PRESSURE: 72 MMHG | SYSTOLIC BLOOD PRESSURE: 110 MMHG | BODY MASS INDEX: 33.53 KG/M2 | RESPIRATION RATE: 16 BRPM | HEIGHT: 73 IN | TEMPERATURE: 97.2 F | WEIGHT: 252.98 LBS | HEART RATE: 69 BPM | OXYGEN SATURATION: 94 %

## 2019-03-04 DIAGNOSIS — C09.9 TONSIL CANCER (HCC): ICD-10-CM

## 2019-03-04 DIAGNOSIS — R63.4 WEIGHT LOSS: ICD-10-CM

## 2019-03-04 LAB
ANION GAP SERPL CALC-SCNC: 9 MMOL/L (ref 0–11.9)
BASOPHILS # BLD AUTO: 1.5 % (ref 0–1.8)
BASOPHILS # BLD: 0.07 K/UL (ref 0–0.12)
BUN SERPL-MCNC: 13 MG/DL (ref 8–22)
CALCIUM SERPL-MCNC: 9.4 MG/DL (ref 8.5–10.5)
CHLORIDE SERPL-SCNC: 104 MMOL/L (ref 96–112)
CO2 SERPL-SCNC: 26 MMOL/L (ref 20–33)
CREAT SERPL-MCNC: 0.57 MG/DL (ref 0.5–1.4)
EOSINOPHIL # BLD AUTO: 0.08 K/UL (ref 0–0.51)
EOSINOPHIL NFR BLD: 1.7 % (ref 0–6.9)
ERYTHROCYTE [DISTWIDTH] IN BLOOD BY AUTOMATED COUNT: 39.6 FL (ref 35.9–50)
GLUCOSE SERPL-MCNC: 102 MG/DL (ref 65–99)
HCT VFR BLD AUTO: 40.9 % (ref 42–52)
HGB BLD-MCNC: 14.6 G/DL (ref 14–18)
IMM GRANULOCYTES # BLD AUTO: 0.01 K/UL (ref 0–0.11)
IMM GRANULOCYTES NFR BLD AUTO: 0.2 % (ref 0–0.9)
LYMPHOCYTES # BLD AUTO: 0.68 K/UL (ref 1–4.8)
LYMPHOCYTES NFR BLD: 14.4 % (ref 22–41)
MAGNESIUM SERPL-MCNC: 1.7 MG/DL (ref 1.5–2.5)
MCH RBC QN AUTO: 33 PG (ref 27–33)
MCHC RBC AUTO-ENTMCNC: 35.7 G/DL (ref 33.7–35.3)
MCV RBC AUTO: 92.3 FL (ref 81.4–97.8)
MONOCYTES # BLD AUTO: 0.56 K/UL (ref 0–0.85)
MONOCYTES NFR BLD AUTO: 11.8 % (ref 0–13.4)
NEUTROPHILS # BLD AUTO: 3.33 K/UL (ref 1.82–7.42)
NEUTROPHILS NFR BLD: 70.4 % (ref 44–72)
NRBC # BLD AUTO: 0 K/UL
NRBC BLD-RTO: 0 /100 WBC
PLATELET # BLD AUTO: 138 K/UL (ref 164–446)
PMV BLD AUTO: 13.8 FL (ref 9–12.9)
POTASSIUM SERPL-SCNC: 3.6 MMOL/L (ref 3.6–5.5)
RBC # BLD AUTO: 4.43 M/UL (ref 4.7–6.1)
SODIUM SERPL-SCNC: 139 MMOL/L (ref 135–145)
WBC # BLD AUTO: 4.7 K/UL (ref 4.8–10.8)

## 2019-03-04 PROCEDURE — 700111 HCHG RX REV CODE 636 W/ 250 OVERRIDE (IP): Performed by: NURSE PRACTITIONER

## 2019-03-04 PROCEDURE — 80048 BASIC METABOLIC PNL TOTAL CA: CPT

## 2019-03-04 PROCEDURE — 96375 TX/PRO/DX INJ NEW DRUG ADDON: CPT

## 2019-03-04 PROCEDURE — 77386 HCHG IMRT DELIVERY COMPLEX: CPT | Performed by: RADIOLOGY

## 2019-03-04 PROCEDURE — 77014 PR CT GUIDANCE PLACEMENT RAD THERAPY FIELDS: CPT | Mod: 26 | Performed by: RADIOLOGY

## 2019-03-04 PROCEDURE — 96368 THER/DIAG CONCURRENT INF: CPT

## 2019-03-04 PROCEDURE — 83735 ASSAY OF MAGNESIUM: CPT

## 2019-03-04 PROCEDURE — 700105 HCHG RX REV CODE 258: Performed by: NURSE PRACTITIONER

## 2019-03-04 PROCEDURE — 96361 HYDRATE IV INFUSION ADD-ON: CPT

## 2019-03-04 PROCEDURE — 96413 CHEMO IV INFUSION 1 HR: CPT

## 2019-03-04 PROCEDURE — 96367 TX/PROPH/DG ADDL SEQ IV INF: CPT

## 2019-03-04 PROCEDURE — 77427 RADIATION TX MANAGEMENT X5: CPT | Performed by: RADIOLOGY

## 2019-03-04 PROCEDURE — 85025 COMPLETE CBC W/AUTO DIFF WBC: CPT

## 2019-03-04 PROCEDURE — 99214 OFFICE O/P EST MOD 30 MIN: CPT | Performed by: NURSE PRACTITIONER

## 2019-03-04 RX ORDER — MAGNESIUM SULFATE 1 G/100ML
1 INJECTION INTRAVENOUS ONCE
Status: COMPLETED | OUTPATIENT
Start: 2019-03-04 | End: 2019-03-04

## 2019-03-04 RX ORDER — 0.9 % SODIUM CHLORIDE 0.9 %
VIAL (ML) INJECTION PRN
Status: CANCELLED | OUTPATIENT
Start: 2019-03-04

## 2019-03-04 RX ORDER — ONDANSETRON 2 MG/ML
4 INJECTION INTRAMUSCULAR; INTRAVENOUS
Status: CANCELLED | OUTPATIENT
Start: 2019-03-04

## 2019-03-04 RX ORDER — ONDANSETRON 8 MG/1
8 TABLET, ORALLY DISINTEGRATING ORAL
Status: CANCELLED | OUTPATIENT
Start: 2019-03-04

## 2019-03-04 RX ORDER — SODIUM CHLORIDE 9 MG/ML
500 INJECTION, SOLUTION INTRAVENOUS ONCE
Status: COMPLETED | OUTPATIENT
Start: 2019-03-04 | End: 2019-03-04

## 2019-03-04 RX ORDER — SODIUM CHLORIDE 9 MG/ML
INJECTION, SOLUTION INTRAVENOUS CONTINUOUS
Status: CANCELLED | OUTPATIENT
Start: 2019-03-04

## 2019-03-04 RX ORDER — SODIUM CHLORIDE 9 MG/ML
500 INJECTION, SOLUTION INTRAVENOUS ONCE
Status: CANCELLED | OUTPATIENT
Start: 2019-03-04

## 2019-03-04 RX ORDER — PROCHLORPERAZINE MALEATE 10 MG
10 TABLET ORAL EVERY 6 HOURS PRN
Status: CANCELLED | OUTPATIENT
Start: 2019-03-04

## 2019-03-04 RX ORDER — 0.9 % SODIUM CHLORIDE 0.9 %
5 VIAL (ML) INJECTION PRN
Status: CANCELLED | OUTPATIENT
Start: 2019-03-04

## 2019-03-04 RX ADMIN — ONDANSETRON HYDROCHLORIDE 16 MG: 2 SOLUTION INTRAMUSCULAR; INTRAVENOUS at 10:18

## 2019-03-04 RX ADMIN — MAGNESIUM SULFATE IN DEXTROSE 1 G: 10 INJECTION, SOLUTION INTRAVENOUS at 11:33

## 2019-03-04 RX ADMIN — DEXAMETHASONE SODIUM PHOSPHATE 12 MG: 4 INJECTION, SOLUTION INTRAMUSCULAR; INTRAVENOUS at 10:06

## 2019-03-04 RX ADMIN — SODIUM CHLORIDE 150 MG: 9 INJECTION, SOLUTION INTRAVENOUS at 10:35

## 2019-03-04 RX ADMIN — POTASSIUM CHLORIDE: 2 INJECTION, SOLUTION, CONCENTRATE INTRAVENOUS at 12:31

## 2019-03-04 RX ADMIN — SODIUM CHLORIDE 500 ML: 9 INJECTION, SOLUTION INTRAVENOUS at 09:40

## 2019-03-04 RX ADMIN — CISPLATIN 95.6 MG: 1 INJECTION, SOLUTION INTRAVENOUS at 11:25

## 2019-03-04 ASSESSMENT — ENCOUNTER SYMPTOMS
NAUSEA: 0
HEADACHES: 0
DIZZINESS: 0
CHILLS: 0
WEIGHT LOSS: 1
PALPITATIONS: 0
MYALGIAS: 0
SHORTNESS OF BREATH: 0
TINGLING: 0
FEVER: 0
SPUTUM PRODUCTION: 1
CONSTIPATION: 0
COUGH: 0
DIARRHEA: 1
HEMOPTYSIS: 0
VOMITING: 0
SORE THROAT: 1

## 2019-03-04 ASSESSMENT — PAIN SCALES - GENERAL: PAINLEVEL: NO PAIN

## 2019-03-04 NOTE — PROGRESS NOTES
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 Int J Radiat Oncol Biol Phys. 2011 Mar 15;79(4):1073-80.   Winsome MORGAN, et al. Radiother Oncol. 2006 Apr;79(1):34-8.     Allergies: Patient has no known allergies.    /47   Pulse 78   Temp 37.3 °C (99.2 °F) (Temporal)   Resp 18   Ht 1.82 m (5' 11.65\")   Wt 113.1 kg (249 lb 5.4 oz)   SpO2 93%   BMI 34.14 kg/m²  Body surface area is 2.39 meters squared.    Labs 03/04/19:  ANC~ 3300     Plt = 138k         Hgb = 14.6      SCr = 0.57 mg/dL  CrCl >125 mL/min (minimum SCr of 0.7)       K = 3.6             Mg = 1.7               Labs 02/22/19  Phos = 3.5       LFT's = 70/80/70         TBili = 1.1     Drug Order   (Drug name, dose, route, IV Fluid & volume, frequency, number of doses) Cycle 4     Previous treatment: 2/25/19      Medication = cisplatin (Platinol)  Base Dose= 40mg/m2  Calc Dose: Base Dose x 2.39m2 = 95.6 mg  Final Dose = 95.6 mg  Route = IV  Fluid & Volume =  mL  Admin Duration = Over 1 hour           <5% difference, ok to treat with final dose       By my signature below, I confirm this process was performed independently with the BSA and all final chemotherapy dosing calculations congruent. I have reviewed the above chemotherapy order and that my calculation of the final dose and BSA (when applicable) corroborate those calculations of the  pharmacist. Discrepancies of 5% or greater in the written dose have been addressed and documented within the [facility] Progress notes.    Lea Velez, PharmD, BCOP          "

## 2019-03-04 NOTE — PROGRESS NOTES
Nutrition Services: Nutrition Support Weekly Update  Met with pt during chemo infusion to follow-up on current nutrition status. Patient reports that as of Friday of last week, he has developed an aversion to food and whenever he eats anything orally, he develops severe nausea and is no longer able to tolerate foods orally. He reports he started administering TF Boluses of samples of Diabetisource AC provided as well as Boost Plus. Called [facility] RD today and she reports his insurance covered Enteral and he is having his supply of TwoCal HN delivered to him in chemo infusion today. Patient reports he jaden been tolerating 5 containers/day over the past day or so, but has a hard time finding time to do more than that. Enteral nutrition to be delivered is more nutrient dense and a lower volume is needed of TwoCal versus Diabetisource or Boost Plus to meet nutritional needs. Patient reports he continues to swallow water orally without difficulty to meet hydration needs and to help manage mucous secretions. He denied any GI distress when he is not eating orally. Pt reports he is having 2 liquid BM's/day. Discussed liquid BM is normal, but asked pt to notify MD if he has more than 3-4/day.     Assessment:  Weight: 249# (113 kg)  BMI: 34.1 (Obese Class I)  Recent Weight Change: Pt has experienced 21# / 7% Severe unintentional weight loss x 1 week.   Labs: Glucose= 102, other labs WNL    Estimated Nutritional Needs:  Total Calorie Needs per HBE x.1.15 2550 2950   Total Protein Needs (1.2 - 1.5g/kg of CBW) 136 170   Daily Fluids (25 ml/kg of CBW) 2850       Plan/Recommendations:   1. Patient to feed via gravity Bolus to meet nutritional needs 100% with enteral nutrition support  2. TF Goal: TwoCal HN @ 6 containers/day, to provide 2850 kcals, 120 gm protein, and 996 mL free water per day. Pt to flush with 50 mL free water before and after each bolus for a additional 600 mL free water (Total 1600 mL water from PEG)  3. Pt  to orally consume 1250 mL fluids to meed fluid needs or provide FWB.   4. Encouraged pt to follow-up with speech therapy  5. Pt to  Take anti-emetics PRN as prescribed.   6. Pt to increase oral care with salt water rinses and medications prescribed by MD for thrush.   7. Encouraged pt to check blood sugars at home    Pt reports understanding and was receptive. RD following and to make further recommendations as indicated.

## 2019-03-04 NOTE — PROGRESS NOTES
Patient is here for weekly Cisplatin.  Patient seen at MD office prior to today's treatment.  PIV established to L-AC and labs drawn.  Pre-hydration started.  Labs were reviewed and meets parameters for treatment today.  Magnesium is 1.7 today.  Notified MARI Grimaldo.  Per MARI Grimaldo Ok to replace magnesium per protocol.  Pre-meds given as ordered.  Cisplatin infused over 1 hour without adverse effects.  Post hydration given as ordered.  PIV removed and wrapped with gauze/coban.  Dietician saw patient at chairside today.  Patient was delivered supply of canned tube feedings while here for treatment.  Patient is scheduled for next treatment.  Patient dc home in stable condition.

## 2019-03-04 NOTE — PROGRESS NOTES
Chemotherapy Verification - SECONDARY RN       Height = 71.65 inches  Weight = 113.1 kg  BSA = 2.39 m2       Medication: Cisplatin  Dose: 40 mg/m2  Calculated Dose: 95.6 mg                             (In mg/m2, AUC, mg/kg)         I confirm that this process was performed independently.

## 2019-03-04 NOTE — PROGRESS NOTES
Subjective:      Maximino Green is a 58 y.o. male who presents with Follow-Up (prechemo (Dr. Looney)) for tonsillar carcinoma.         HPI    Patient seen today in follow-up for tonsillar carcinoma.  Patient is unaccompanied for today's visit.  Patient is currently scheduled to receive cycle #4 of weekly cisplatin with concurrent radiation therapy.    Patient seems to be tolerating treatment fairly well.  Patient did state that anything he is doing orally now makes him gag and vomit.  He does not experience any nausea or vomiting but with putting in any food or fluid the thick secretions and taste of the food make him sick.  Therefore, he is slowly using his feeding tube at this time.  He has had approximately 17 pound weight loss in the last week.  This is likely related to not eating or drinking anything the first few days of last week until he started taking in nutrition via his feeding tube only.  He started that last Thursday night.  He stated he is feeding himself every 1-2 hours and he got in 5 cans and of feedings yesterday.  He also met with the outpatient dietitian who did inform his insurance has approved his feeding formula and will be delivered to him next week.  Patient does have thrush and was started on Diflucan by Dr. Harris last week as well.    He denies any significant fatigue, fevers or chills.  He does have a slightly worsening sore throat with radiation.  He does complain of the thick sputum production from the radiation.  He has discontinued smoking over this last week and doing well.  He denies any chest pain, heart palpitations or swelling in his legs.  He was newly diagnosed with atrial fibrillation and referral to primary care provider was made last week.  He does have some diarrhea likely related to liquid diet only.  He is voiding without difficulty denies any generalized pain, dizziness, peripheral neuropathy or headaches per    No Known Allergies  Current Outpatient  "Prescriptions on File Prior to Visit   Medication Sig Dispense Refill   • metoprolol (LOPRESSOR) 25 MG Tab Take 1 Tab by mouth 2 Times a Day. 60 Tab 0   • fluconazole (DIFLUCAN) 200 MG Tab Take 1 Tab by mouth every day. Take 2 on day #1, then one daily till all gone 22 Tab 1     No current facility-administered medications on file prior to visit.        Review of Systems   Constitutional: Positive for weight loss (17 pound weight loss - started orally only on Thursday ). Negative for chills, fever and malaise/fatigue.   HENT: Positive for sore throat (slightly worsening).    Respiratory: Positive for sputum production (thick sputum production from XRT). Negative for cough, hemoptysis and shortness of breath.         No smoking for 1 week   Cardiovascular: Negative for chest pain, palpitations and leg swelling.   Gastrointestinal: Positive for diarrhea (a few times per day - liquid diet). Negative for constipation, nausea and vomiting.   Genitourinary: Negative for dysuria.   Musculoskeletal: Negative for myalgias.   Neurological: Negative for dizziness, tingling and headaches.          Objective:     /72 (BP Location: Right arm, Patient Position: Sitting, BP Cuff Size: Adult)   Pulse 69   Temp 36.2 °C (97.2 °F) (Temporal)   Resp 16   Ht 1.854 m (6' 1\")   Wt 114.8 kg (252 lb 15.7 oz)   SpO2 94%   BMI 33.38 kg/m²      Physical Exam   Constitutional: He is oriented to person, place, and time. He appears well-developed and well-nourished. No distress.   HENT:   Head: Normocephalic and atraumatic.   Mouth/Throat: Oropharyngeal exudate (thrush noted and on Fluconazole) present.   Eyes: Pupils are equal, round, and reactive to light. Conjunctivae and EOM are normal. Right eye exhibits no discharge. Left eye exhibits no discharge. No scleral icterus.   Neck: Normal range of motion.   Cardiovascular: Normal rate, regular rhythm, normal heart sounds and intact distal pulses.  Exam reveals no gallop and no " friction rub.    No murmur heard.  Pulmonary/Chest: Effort normal and breath sounds normal. No respiratory distress. He has no wheezes.   Abdominal: Soft. Bowel sounds are normal. He exhibits no distension. There is no tenderness.   Musculoskeletal: Normal range of motion. He exhibits no edema or tenderness.   Neurological: He is alert and oriented to person, place, and time.   Skin: Skin is warm and dry. No rash noted. He is not diaphoretic. No erythema. No pallor.   Psychiatric: He has a normal mood and affect. His behavior is normal.   Vitals reviewed.              Assessment/Plan:     1. Tonsil cancer (HCC)     2. Weight loss       Plan  1.  Patient with tonsillar carcinoma currently undergoing weekly cisplatin with concurrent radiation therapy.  He is scheduled to receive cycle #4 today.  Patient overall has been tolerating treatment fairly well.  Clinically he is stable, and if labs meet parameters he is okay to proceed with treatment today as planned.    2.  Patient has experienced weight loss likely related to inability to take in food or fluid orally.  He is slowly doing all feedings through his feeding tube at this time.  I have personally spoken with the dietitian today who will reinforce education and discuss his plan of care with regards to oral food.  Patient did confirm that his insurance has approved his feedings and will be delivered to him today.  Dietitian will see patient today as well and discuss further.    3.  Patient will continue to follow-up in the clinic in 1 week or sooner if needed.    4.  Referral to primary care was placed last week and will check and to patient getting established with new PCP as patient will require his atrial fibrillation to be monitored by primary care provider or cardiologist.  At this time patient has refused cardiology referral and requesting PCP.

## 2019-03-04 NOTE — PROGRESS NOTES
Chemotherapy Verification - PRIMARY RN      Height = 182.0cm  Weight = 113.1kg  BSA = 2.39m2       Medication: Cisplatin  Dose: 40mg/m2  Calculated Dose: 95.6mg                             (In mg/m2, AUC, mg/kg)       I confirm this process was performed independently with the BSA and all final chemotherapy dosing calculations congruent.  Any discrepancies of 5% or greater have been addressed with the chemotherapy pharmacist. The resolution of the discrepancy has been documented in the EPIC progress notes.

## 2019-03-05 ENCOUNTER — HOSPITAL ENCOUNTER (OUTPATIENT)
Dept: RADIATION ONCOLOGY | Facility: MEDICAL CENTER | Age: 59
End: 2019-03-05

## 2019-03-05 PROCEDURE — 77014 PR CT GUIDANCE PLACEMENT RAD THERAPY FIELDS: CPT | Mod: 26 | Performed by: RADIOLOGY

## 2019-03-05 PROCEDURE — 77386 HCHG IMRT DELIVERY COMPLEX: CPT | Performed by: RADIOLOGY

## 2019-03-06 PROCEDURE — 77386 HCHG IMRT DELIVERY COMPLEX: CPT | Performed by: RADIOLOGY

## 2019-03-06 PROCEDURE — 77014 PR CT GUIDANCE PLACEMENT RAD THERAPY FIELDS: CPT | Mod: 26 | Performed by: RADIOLOGY

## 2019-03-07 ENCOUNTER — HOSPITAL ENCOUNTER (OUTPATIENT)
Dept: RADIATION ONCOLOGY | Facility: MEDICAL CENTER | Age: 59
End: 2019-03-07

## 2019-03-07 PROCEDURE — 77386 HCHG IMRT DELIVERY COMPLEX: CPT | Performed by: RADIOLOGY

## 2019-03-07 PROCEDURE — 77336 RADIATION PHYSICS CONSULT: CPT | Performed by: RADIOLOGY

## 2019-03-07 PROCEDURE — 77014 PR CT GUIDANCE PLACEMENT RAD THERAPY FIELDS: CPT | Mod: 26 | Performed by: RADIOLOGY

## 2019-03-08 ENCOUNTER — HOSPITAL ENCOUNTER (OUTPATIENT)
Dept: RADIATION ONCOLOGY | Facility: MEDICAL CENTER | Age: 59
End: 2019-03-08

## 2019-03-08 PROCEDURE — 77014 PR CT GUIDANCE PLACEMENT RAD THERAPY FIELDS: CPT | Mod: 26 | Performed by: RADIOLOGY

## 2019-03-08 PROCEDURE — 77386 HCHG IMRT DELIVERY COMPLEX: CPT | Performed by: RADIOLOGY

## 2019-03-11 ENCOUNTER — HOSPITAL ENCOUNTER (OUTPATIENT)
Dept: RADIATION ONCOLOGY | Facility: MEDICAL CENTER | Age: 59
End: 2019-03-11

## 2019-03-11 ENCOUNTER — OFFICE VISIT (OUTPATIENT)
Dept: HEMATOLOGY ONCOLOGY | Facility: MEDICAL CENTER | Age: 59
End: 2019-03-11
Payer: COMMERCIAL

## 2019-03-11 ENCOUNTER — PATIENT OUTREACH (OUTPATIENT)
Dept: OTHER | Facility: MEDICAL CENTER | Age: 59
End: 2019-03-11

## 2019-03-11 ENCOUNTER — OUTPATIENT INFUSION SERVICES (OUTPATIENT)
Dept: ONCOLOGY | Facility: MEDICAL CENTER | Age: 59
End: 2019-03-11
Attending: INTERNAL MEDICINE
Payer: COMMERCIAL

## 2019-03-11 VITALS
TEMPERATURE: 98.6 F | SYSTOLIC BLOOD PRESSURE: 98 MMHG | BODY MASS INDEX: 33.59 KG/M2 | DIASTOLIC BLOOD PRESSURE: 60 MMHG | HEART RATE: 100 BPM | HEIGHT: 72 IN | RESPIRATION RATE: 14 BRPM | OXYGEN SATURATION: 97 % | WEIGHT: 248.02 LBS

## 2019-03-11 VITALS
BODY MASS INDEX: 33.95 KG/M2 | HEART RATE: 72 BPM | SYSTOLIC BLOOD PRESSURE: 105 MMHG | OXYGEN SATURATION: 95 % | HEIGHT: 72 IN | TEMPERATURE: 98.9 F | RESPIRATION RATE: 18 BRPM | DIASTOLIC BLOOD PRESSURE: 57 MMHG | WEIGHT: 250.66 LBS

## 2019-03-11 DIAGNOSIS — C09.9 TONSIL CANCER (HCC): ICD-10-CM

## 2019-03-11 LAB
ANION GAP SERPL CALC-SCNC: 9 MMOL/L (ref 0–11.9)
BASOPHILS # BLD AUTO: 1.6 % (ref 0–1.8)
BASOPHILS # BLD: 0.06 K/UL (ref 0–0.12)
BUN SERPL-MCNC: 21 MG/DL (ref 8–22)
CALCIUM SERPL-MCNC: 9.4 MG/DL (ref 8.5–10.5)
CHLORIDE SERPL-SCNC: 110 MMOL/L (ref 96–112)
CO2 SERPL-SCNC: 25 MMOL/L (ref 20–33)
COMMENT 1642: NORMAL
CREAT SERPL-MCNC: 0.51 MG/DL (ref 0.5–1.4)
EOSINOPHIL # BLD AUTO: 0.05 K/UL (ref 0–0.51)
EOSINOPHIL NFR BLD: 1.3 % (ref 0–6.9)
ERYTHROCYTE [DISTWIDTH] IN BLOOD BY AUTOMATED COUNT: 40.9 FL (ref 35.9–50)
GLUCOSE SERPL-MCNC: 117 MG/DL (ref 65–99)
HCT VFR BLD AUTO: 37 % (ref 42–52)
HGB BLD-MCNC: 12.9 G/DL (ref 14–18)
IMM GRANULOCYTES # BLD AUTO: 0.01 K/UL (ref 0–0.11)
IMM GRANULOCYTES NFR BLD AUTO: 0.3 % (ref 0–0.9)
LG PLATELETS BLD QL SMEAR: NORMAL
LYMPHOCYTES # BLD AUTO: 0.57 K/UL (ref 1–4.8)
LYMPHOCYTES NFR BLD: 15.2 % (ref 22–41)
MACROCYTES BLD QL SMEAR: ABNORMAL
MAGNESIUM SERPL-MCNC: 1.8 MG/DL (ref 1.5–2.5)
MCH RBC QN AUTO: 33.2 PG (ref 27–33)
MCHC RBC AUTO-ENTMCNC: 34.9 G/DL (ref 33.7–35.3)
MCV RBC AUTO: 95.1 FL (ref 81.4–97.8)
MONOCYTES # BLD AUTO: 0.55 K/UL (ref 0–0.85)
MONOCYTES NFR BLD AUTO: 14.7 % (ref 0–13.4)
MORPHOLOGY BLD-IMP: NORMAL
NEUTROPHILS # BLD AUTO: 2.5 K/UL (ref 1.82–7.42)
NEUTROPHILS NFR BLD: 66.9 % (ref 44–72)
NRBC # BLD AUTO: 0 K/UL
NRBC BLD-RTO: 0 /100 WBC
OVALOCYTES BLD QL SMEAR: NORMAL
PLATELET # BLD AUTO: 87 K/UL (ref 164–446)
PLATELET BLD QL SMEAR: NORMAL
PMV BLD AUTO: 14.6 FL (ref 9–12.9)
POIKILOCYTOSIS BLD QL SMEAR: NORMAL
POTASSIUM SERPL-SCNC: 4.1 MMOL/L (ref 3.6–5.5)
RBC # BLD AUTO: 3.89 M/UL (ref 4.7–6.1)
RBC BLD AUTO: PRESENT
SODIUM SERPL-SCNC: 144 MMOL/L (ref 135–145)
WBC # BLD AUTO: 3.7 K/UL (ref 4.8–10.8)

## 2019-03-11 PROCEDURE — 77386 HCHG IMRT DELIVERY COMPLEX: CPT | Performed by: RADIOLOGY

## 2019-03-11 PROCEDURE — 96361 HYDRATE IV INFUSION ADD-ON: CPT

## 2019-03-11 PROCEDURE — 700105 HCHG RX REV CODE 258: Performed by: INTERNAL MEDICINE

## 2019-03-11 PROCEDURE — 83735 ASSAY OF MAGNESIUM: CPT

## 2019-03-11 PROCEDURE — 85025 COMPLETE CBC W/AUTO DIFF WBC: CPT

## 2019-03-11 PROCEDURE — 77427 RADIATION TX MANAGEMENT X5: CPT | Performed by: RADIOLOGY

## 2019-03-11 PROCEDURE — 96375 TX/PRO/DX INJ NEW DRUG ADDON: CPT

## 2019-03-11 PROCEDURE — 700111 HCHG RX REV CODE 636 W/ 250 OVERRIDE (IP): Performed by: INTERNAL MEDICINE

## 2019-03-11 PROCEDURE — 96367 TX/PROPH/DG ADDL SEQ IV INF: CPT

## 2019-03-11 PROCEDURE — 80048 BASIC METABOLIC PNL TOTAL CA: CPT

## 2019-03-11 PROCEDURE — 96413 CHEMO IV INFUSION 1 HR: CPT

## 2019-03-11 PROCEDURE — 99214 OFFICE O/P EST MOD 30 MIN: CPT | Performed by: INTERNAL MEDICINE

## 2019-03-11 PROCEDURE — 96366 THER/PROPH/DIAG IV INF ADDON: CPT

## 2019-03-11 PROCEDURE — 77014 PR CT GUIDANCE PLACEMENT RAD THERAPY FIELDS: CPT | Mod: 26 | Performed by: RADIOLOGY

## 2019-03-11 RX ORDER — 0.9 % SODIUM CHLORIDE 0.9 %
5 VIAL (ML) INJECTION PRN
Status: CANCELLED | OUTPATIENT
Start: 2019-03-11

## 2019-03-11 RX ORDER — 0.9 % SODIUM CHLORIDE 0.9 %
VIAL (ML) INJECTION PRN
Status: CANCELLED | OUTPATIENT
Start: 2019-03-11

## 2019-03-11 RX ORDER — SODIUM CHLORIDE 9 MG/ML
INJECTION, SOLUTION INTRAVENOUS CONTINUOUS
Status: CANCELLED | OUTPATIENT
Start: 2019-03-11

## 2019-03-11 RX ORDER — SODIUM CHLORIDE 9 MG/ML
500 INJECTION, SOLUTION INTRAVENOUS ONCE
Status: CANCELLED | OUTPATIENT
Start: 2019-03-11

## 2019-03-11 RX ORDER — ONDANSETRON 2 MG/ML
4 INJECTION INTRAMUSCULAR; INTRAVENOUS
Status: CANCELLED | OUTPATIENT
Start: 2019-03-11

## 2019-03-11 RX ORDER — ONDANSETRON 8 MG/1
8 TABLET, ORALLY DISINTEGRATING ORAL
Status: CANCELLED | OUTPATIENT
Start: 2019-03-11

## 2019-03-11 RX ORDER — PROCHLORPERAZINE MALEATE 10 MG
10 TABLET ORAL EVERY 6 HOURS PRN
Status: CANCELLED | OUTPATIENT
Start: 2019-03-11

## 2019-03-11 RX ORDER — SODIUM CHLORIDE 9 MG/ML
500 INJECTION, SOLUTION INTRAVENOUS ONCE
Status: COMPLETED | OUTPATIENT
Start: 2019-03-11 | End: 2019-03-11

## 2019-03-11 RX ADMIN — POTASSIUM CHLORIDE: 2 INJECTION, SOLUTION, CONCENTRATE INTRAVENOUS at 13:20

## 2019-03-11 RX ADMIN — SODIUM CHLORIDE 150 MG: 9 INJECTION, SOLUTION INTRAVENOUS at 11:10

## 2019-03-11 RX ADMIN — SODIUM CHLORIDE 500 ML: 9 INJECTION, SOLUTION INTRAVENOUS at 09:45

## 2019-03-11 RX ADMIN — ONDANSETRON HYDROCHLORIDE 16 MG: 2 SOLUTION INTRAMUSCULAR; INTRAVENOUS at 10:47

## 2019-03-11 RX ADMIN — DEXAMETHASONE SODIUM PHOSPHATE 12 MG: 4 INJECTION, SOLUTION INTRAMUSCULAR; INTRAVENOUS at 11:30

## 2019-03-11 RX ADMIN — CISPLATIN 96 MG: 1 INJECTION, SOLUTION INTRAVENOUS at 12:12

## 2019-03-11 ASSESSMENT — PAIN SCALES - GENERAL: PAINLEVEL: 8=MODERATE-SEVERE PAIN

## 2019-03-11 NOTE — PROGRESS NOTES
On 3/11/2019 this ONN met with patient in Infusion Services. Patient states he is doing well. Patient states he is happy that NP Taryn Ramon changed platelet parameters for holding treatment. Patient states he is not currently working. Patient states he has applied for unemployment. Patient states at this time he is able to pay bills. Patient denies need for financial assistance at this time. Patient denied questions or concerns for this ONN. This ONN encouraged patient to call this ONN as needed.

## 2019-03-11 NOTE — PROGRESS NOTES
Patient arrived for Cycle 5 Cisplatin, reports some difficulty swallowing and eating.  Pt states he has implemented more tube feedings and is tolerating them ok.  Throat still has pain, reports thick sputum.  None observed during visit.  PIV started, labs drawn.  Results within parameters.  Treatment completed without issue.  PIV flushed, removed. Next weeks appt confirmed.  Discharged home in good spirits under no apparent distress.

## 2019-03-11 NOTE — PROGRESS NOTES
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 Int J Radiat Oncol Biol Phys. 2011 Mar 15;79(4):1073-80.   Winsome MORGAN et al. Radiother Oncol. 2006 Apr;79(1):34-8.     Allergies: Patient has no known allergies.    /57   Pulse 72   Temp 37.2 °C (98.9 °F) (Temporal)   Resp 18   Ht 1.829 m (6')   Wt 113.7 kg (250 lb 10.6 oz)   SpO2 95%   BMI 34.00 kg/m²  Body surface area is 2.4 meters squared.    Labs 03/11/19:  ANC~ 2500     Plt = 87k (ok if >75k)         Hgb = 12.9      SCr = 0.51 mg/dL  CrCl >125 mL/min (minimum SCr of 0.7)       K = 4.1          Mg = 1.8              Labs 02/22/19  LFT's = 70/80/70         TBili = 1.1        Cisplatin (CDDP) 40 mg/m2  X 2.4 m2 = 96 mg   <5% difference, ok to treat with final dose = 96 mg IV       Lea Velez, PharmD, BCOP

## 2019-03-11 NOTE — PROGRESS NOTES
Chemotherapy Verification - SECONDARY RN       Height = 72in  Weight = 113.7kg  BSA = 2.40m2     Medication: Cisplatin  Dose: 40mg/m2  Calculated Dose: 96mg                             (In mg/m2, AUC, mg/kg)         I confirm that this process was performed independently.

## 2019-03-11 NOTE — PROGRESS NOTES
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 Int J Radiat Oncol Biol Phys. 2011 Mar 15;79(4):1073-80.   Winsome MORGAN, et al. Radiother Oncol. 2006 Apr;79(1):34-8.     Allergies: Patient has no known allergies.    /57   Pulse 72   Temp 37.2 °C (98.9 °F) (Temporal)   Resp 18   Ht 1.829 m (6')   Wt 113.7 kg (250 lb 10.6 oz)   SpO2 95%   BMI 34.00 kg/m²  Body surface area is 2.4 meters squared.    All lab results within treatment parameters.      Drug Order   (Drug name, dose, route, IV Fluid & volume, frequency, number of doses) Cycle 5     Previous treatment: 3/4/19     Medication = cisplatin (Platinol)  Base Dose= 40mg/m2  Calc Dose: Base Dose x 2.4m2 = 96mg  Final Dose = 96 mg  Route = IV  Fluid & Volume =  mL  Admin Duration = Over 1 hour           <5% difference, ok to treat with final dose       By my signature below, I confirm this process was performed independently with the BSA and all final chemotherapy dosing calculations congruent. I have reviewed the above chemotherapy order and that my calculation of the final dose and BSA (when applicable) corroborate those calculations of the  pharmacist. Discrepancies of 5% or greater in the written dose have been addressed and documented within the [facility] Progress notes.    Rose LindaD.

## 2019-03-11 NOTE — PROGRESS NOTES
Chemotherapy Verification - PRIMARY RN      Height = 182.9cm  Weight = 113.7kg  BSA = 2.4m2     Cycle 5  Medication: Cisplatin  Dose: 40mg/m2  Calculated Dose: 96mg                             (In mg/m2, AUC, mg/kg)       I confirm this process was performed independently with the BSA and all final chemotherapy dosing calculations congruent.  Any discrepancies of 5% or greater have been addressed with the chemotherapy pharmacist. The resolution of the discrepancy has been documented in the EPIC progress notes.

## 2019-03-11 NOTE — PROGRESS NOTES
"Date of visit: 3/11/2019  8:18 AM      Chief Complaint- T3 N2 M0 squamous cell carcinoma of the right tonsil, P 16+, in the setting of chronic tobacco use      Identification/Prior relevant history:More than 40-pack-year history of smoking  Significant dental issues for which he is following up with a dentist who noted abnormal tonsillar mass  -Seen by ENT-large right tonsillar mass extending onto the right soft palate and towards the base of tongue involving a small amount of retromolar trigone region.  In addition he noted bilateral neck nodes approximately 2 nodes in the right neck and one on the left neck level 2 region.  -Patient had a lot of concerns about cost of care and inability to work and delayed care  -12/27/18-tonsillar biopsy-squamous cell carcinoma, P 16+  -Seen by Dr. Harris.  -1/70/19-MRI-RIGHT base of tongue mass  2.  Masslike enlargement of RIGHT palatine tonsil  3.  Enlarged RIGHT level Ib, 2 and 3 neck lymph nodes  4.  Enlarged necrotic appearing LEFT level 2 neck lymph node  -PET scan-uptake within the known right-sided tonsillar cancer.  2.  There is uptake within right-sided level IB, II, and III lymph nodes consistent with metastatic disease.  3.  There is uptake within a left level II node consistent with metastases    Interim historyStart chemoradiation with weekly cisplatin.  Due for cycle #5. Has developed mucositis.  He is using 2 feeds exclusively.  He has lost some weight.  Pain is manageable according to him.  He is on Diflucan    Past Medical History:      Past Medical History:   Diagnosis Date   • Alcoholic (HCC)     stopped drinking in 2009   • Cancer (HCC) 2019    throat   • Dental disorder     uppers/ waiting for lower dentures t ocome in   • Diabetes (HCC)     diet controlled; pt states that he was told he was \"pre-diabetic\" and has never been on any medication   • Hypertension     stopped taking blood pressure medication on his own   • Tonsil cancer (HCC)      right tonsil - " SCC       Past surgical history:       Past Surgical History:   Procedure Laterality Date   • OTHER  12/27/2018    rt tonsil biopsy    • CHOLECYSTECTOMY  2003       Allergies:       Patient has no known allergies.    Medications:         Current Outpatient Prescriptions   Medication Sig Dispense Refill   • metoprolol (LOPRESSOR) 25 MG Tab Take 1 Tab by mouth 2 Times a Day. 60 Tab 0   • fluconazole (DIFLUCAN) 200 MG Tab Take 1 Tab by mouth every day. Take 2 on day #1, then one daily till all gone 22 Tab 1     No current facility-administered medications for this visit.          Social History:     Social History     Social History   • Marital status:      Spouse name: N/A   • Number of children: N/A   • Years of education: N/A     Occupational History   •       Social History Main Topics   • Smoking status: Current Every Day Smoker     Packs/day: 0.10     Years: 35.00   • Smokeless tobacco: Never Used      Comment: pt is trying to cut down currently 3/4 ppd   • Alcohol use No      Comment: alcoholism  last 2009   • Drug use: No   • Sexual activity: No     Other Topics Concern   • Not on file     Social History Narrative   • No narrative on file       Family History:      Family History   Problem Relation Age of Onset   • Cancer Mother         colon   • Respiratory Disease Mother         emphysema- smoker   • Alcohol abuse Mother    • Heart Disease Father    • Cancer Sister         brain tumor   • GI Sister        Review of Systems:  All other review of systems are negative except what was mentioned above in the HPI.    Constitutional: Negative for fever, chills, positive weight loss and malaise/fatigue.    HEENT: No new auditory or visual complaints.  Positive sore throat and neck pain.     Respiratory: Negative for cough, sputum production, shortness of breath and wheezing.    Cardiovascular: Negative for chest pain, palpitations, orthopnea and leg swelling.    Gastrointestinal: Negative for  "heartburn, nausea, vomiting and abdominal pain.    Genitourinary: Negative for dysuria, hematuria    Musculoskeletal: No new arthralgias or myalgias   Skin: Negative for rash and itching.    Neurological: Negative for focal weakness and headaches.    Endo/Heme/Allergies: No abnormal bleed/bruise.    Psychiatric/Behavioral: No new depression/anxiety.    Physical Exam:  Vitals: BP (!) 98/60 (BP Location: Right arm, Patient Position: Sitting, BP Cuff Size: Adult)   Pulse 100   Temp 37 °C (98.6 °F)   Resp 14   Ht 1.83 m (6' 0.05\")   Wt 112.5 kg (248 lb 0.3 oz)   SpO2 97%   BMI 33.59 kg/m²     General: Not in acute distress, alert and oriented x 3  HEENT: No pallor, icterus. Oropharynx shows some mucositis with increased secretions  Neck: Supple, no palpable masses.  Lymph nodes: No palpable cervical, supraclavicular, axillary or inguinal lymphadenopathy.    CVS: regular rate and rhythm, no rubs or gallops  RESP: Clear to auscultate bilaterally, no wheezing or crackles.   ABD: Soft, non tender, non distended, positive bowel sounds, no palpable organomegaly.  PEG tube site looks okay  EXT: No edema or cyanosis  CNS: Alert and oriented x3, No focal deficits.  Skin- No rash      Labs:   No visits with results within 1 Week(s) from this visit.   Latest known visit with results is:   [facility] on 03/04/2019   Component Date Value Ref Range Status   • WBC 03/04/2019 4.7* 4.8 - 10.8 K/uL Final   • RBC 03/04/2019 4.43* 4.70 - 6.10 M/uL Final   • Hemoglobin 03/04/2019 14.6  14.0 - 18.0 g/dL Final   • Hematocrit 03/04/2019 40.9* 42.0 - 52.0 % Final   • MCV 03/04/2019 92.3  81.4 - 97.8 fL Final   • MCH 03/04/2019 33.0  27.0 - 33.0 pg Final   • MCHC 03/04/2019 35.7* 33.7 - 35.3 g/dL Final   • RDW 03/04/2019 39.6  35.9 - 50.0 fL Final   • Platelet Count 03/04/2019 138* 164 - 446 K/uL Final   • MPV 03/04/2019 13.8* 9.0 - 12.9 fL Final   • Neutrophils-Polys 03/04/2019 70.40  44.00 - 72.00 % Final   • " Lymphocytes 03/04/2019 14.40* 22.00 - 41.00 % Final   • Monocytes 03/04/2019 11.80  0.00 - 13.40 % Final   • Eosinophils 03/04/2019 1.70  0.00 - 6.90 % Final   • Basophils 03/04/2019 1.50  0.00 - 1.80 % Final   • Immature Granulocytes 03/04/2019 0.20  0.00 - 0.90 % Final   • Nucleated RBC 03/04/2019 0.00  /100 WBC Final   • Neutrophils (Absolute) 03/04/2019 3.33  1.82 - 7.42 K/uL Final    Includes immature neutrophils, if present.   • Lymphs (Absolute) 03/04/2019 0.68* 1.00 - 4.80 K/uL Final   • Monos (Absolute) 03/04/2019 0.56  0.00 - 0.85 K/uL Final   • Eos (Absolute) 03/04/2019 0.08  0.00 - 0.51 K/uL Final   • Baso (Absolute) 03/04/2019 0.07  0.00 - 0.12 K/uL Final   • Immature Granulocytes (abs) 03/04/2019 0.01  0.00 - 0.11 K/uL Final   • NRBC (Absolute) 03/04/2019 0.00  K/uL Final   • Sodium 03/04/2019 139  135 - 145 mmol/L Final   • Potassium 03/04/2019 3.6  3.6 - 5.5 mmol/L Final   • Chloride 03/04/2019 104  96 - 112 mmol/L Final   • Co2 03/04/2019 26  20 - 33 mmol/L Final   • Glucose 03/04/2019 102* 65 - 99 mg/dL Final   • Bun 03/04/2019 13  8 - 22 mg/dL Final   • Creatinine 03/04/2019 0.57  0.50 - 1.40 mg/dL Final   • Calcium 03/04/2019 9.4  8.5 - 10.5 mg/dL Final   • Anion Gap 03/04/2019 9.0  0.0 - 11.9 Final   • Magnesium 03/04/2019 1.7  1.5 - 2.5 mg/dL Final   • GFR If  03/04/2019 >60  >60 mL/min/1.73 m 2 Final   • GFR If Non  03/04/2019 >60  >60 mL/min/1.73 m 2 Final             Assessment and Plan:  ECOG PS=0  T3 N2 M0 squamous cell carcinoma of the right tonsil, P 16+, in the setting of chronic tobacco use     -He has a large asymptomatic right tonsillar mass with a significant right cervical adenopathy.  PET scan also showed involvement of a small lymph node in the contralateral side.    He is currently undergoing chemoradiation employing weekly cisplatin.  So far tolerated okay except for anticipated dysphagia from mucositis.  His labs are pending assuming that his  labs are okay he will proceed with treatment today.  He has 2 more weeks of radiation to go and will see whether he can get chemo during the next 2 weeks depending on his blood counts and performance status.    Mucositis-he is following up with Dr. Harris.  So far manageable according to him.  He is on Diflucan for thrush which appears to have improved.    Malnutrition/weight loss-he is on tube feeds.  We will monitor this closely.  He may need extra hydration towards the end of treatment.    He agreed and verbalized  agreement and understanding with the current plan.  I answered all questions and concerns at this time         Please note that this dictation was created using voice recognition software. I have made every reasonable attempt to correct obvious errors, but I expect that there are errors of grammar and possibly content that I did not discover before finalizing the note.      SIGNATURES:  Surjit Looney    CC:  Pcp Pt States None  No ref. provider found

## 2019-03-12 ENCOUNTER — HOSPITAL ENCOUNTER (OUTPATIENT)
Dept: RADIATION ONCOLOGY | Facility: MEDICAL CENTER | Age: 59
End: 2019-03-12

## 2019-03-12 PROCEDURE — 77014 PR CT GUIDANCE PLACEMENT RAD THERAPY FIELDS: CPT | Mod: 26 | Performed by: RADIOLOGY

## 2019-03-12 PROCEDURE — 77386 HCHG IMRT DELIVERY COMPLEX: CPT | Performed by: RADIOLOGY

## 2019-03-13 ENCOUNTER — DOCUMENTATION (OUTPATIENT)
Dept: HEMATOLOGY ONCOLOGY | Facility: MEDICAL CENTER | Age: 59
End: 2019-03-13

## 2019-03-13 PROCEDURE — 77386 HCHG IMRT DELIVERY COMPLEX: CPT | Performed by: RADIOLOGY

## 2019-03-13 PROCEDURE — 77014 PR CT GUIDANCE PLACEMENT RAD THERAPY FIELDS: CPT | Mod: 26 | Performed by: RADIOLOGY

## 2019-03-13 NOTE — PROGRESS NOTES
"Brief Nutrition Support Update:  PEG  Weight: 252 lb = stable     Met with pt following radiation therapy to follow-up on current nutrition status. He states that he's doing “Ok” with the TF bolus - 4-6 cans/day of TwoCal HN.  Wt is stable, but he reports worsening dysphagia d/t soft foods are hitting his gag reflex and it makes him very nauseas (pt reports that he is unable to tolerate mashed potato consistency and applesauce consistency at this point).  Briefly reviewed remedies for changes in taste to trial (pt states \"everything tastes like cardboard\") - he is trying to add sweeteners to PO items, which is helping somewhat, he states.      RD encouraged continued PO swallowing for muscle patency, as well as to meet 100% TF goal via PEG: TwoCal HN x6 cans/day + free water flushes 50 ml before and after each can + 1250 ml PO liquids/day.  He verbalizes understanding.      RD available PRN [id number].   "

## 2019-03-14 ENCOUNTER — HOSPITAL ENCOUNTER (OUTPATIENT)
Dept: RADIATION ONCOLOGY | Facility: MEDICAL CENTER | Age: 59
End: 2019-03-14

## 2019-03-14 PROCEDURE — 77014 PR CT GUIDANCE PLACEMENT RAD THERAPY FIELDS: CPT | Mod: 26 | Performed by: RADIOLOGY

## 2019-03-14 PROCEDURE — 77336 RADIATION PHYSICS CONSULT: CPT | Performed by: RADIOLOGY

## 2019-03-14 PROCEDURE — 77386 HCHG IMRT DELIVERY COMPLEX: CPT | Performed by: RADIOLOGY

## 2019-03-15 ENCOUNTER — HOSPITAL ENCOUNTER (OUTPATIENT)
Dept: RADIATION ONCOLOGY | Facility: MEDICAL CENTER | Age: 59
End: 2019-03-15

## 2019-03-15 PROCEDURE — 77386 HCHG IMRT DELIVERY COMPLEX: CPT | Performed by: RADIOLOGY

## 2019-03-15 PROCEDURE — 77014 PR CT GUIDANCE PLACEMENT RAD THERAPY FIELDS: CPT | Mod: 26 | Performed by: RADIOLOGY

## 2019-03-18 ENCOUNTER — OFFICE VISIT (OUTPATIENT)
Dept: HEMATOLOGY ONCOLOGY | Facility: MEDICAL CENTER | Age: 59
End: 2019-03-18
Payer: COMMERCIAL

## 2019-03-18 ENCOUNTER — HOSPITAL ENCOUNTER (OUTPATIENT)
Dept: RADIATION ONCOLOGY | Facility: MEDICAL CENTER | Age: 59
End: 2019-03-18

## 2019-03-18 ENCOUNTER — OUTPATIENT INFUSION SERVICES (OUTPATIENT)
Dept: ONCOLOGY | Facility: MEDICAL CENTER | Age: 59
End: 2019-03-18
Attending: INTERNAL MEDICINE
Payer: COMMERCIAL

## 2019-03-18 VITALS
TEMPERATURE: 97 F | BODY MASS INDEX: 36.55 KG/M2 | HEIGHT: 72 IN | DIASTOLIC BLOOD PRESSURE: 53 MMHG | WEIGHT: 269.84 LBS | SYSTOLIC BLOOD PRESSURE: 137 MMHG | OXYGEN SATURATION: 95 % | RESPIRATION RATE: 18 BRPM | HEART RATE: 54 BPM

## 2019-03-18 VITALS
HEART RATE: 72 BPM | BODY MASS INDEX: 33.56 KG/M2 | RESPIRATION RATE: 16 BRPM | TEMPERATURE: 97.6 F | OXYGEN SATURATION: 95 % | WEIGHT: 247.8 LBS | DIASTOLIC BLOOD PRESSURE: 64 MMHG | HEIGHT: 72 IN | SYSTOLIC BLOOD PRESSURE: 112 MMHG

## 2019-03-18 DIAGNOSIS — C09.9 TONSIL CANCER (HCC): ICD-10-CM

## 2019-03-18 DIAGNOSIS — K12.30 MUCOSITIS: ICD-10-CM

## 2019-03-18 LAB
ANION GAP SERPL CALC-SCNC: 9 MMOL/L (ref 0–11.9)
BASOPHILS # BLD AUTO: 1.7 % (ref 0–1.8)
BASOPHILS # BLD: 0.04 K/UL (ref 0–0.12)
BUN SERPL-MCNC: 26 MG/DL (ref 8–22)
CALCIUM SERPL-MCNC: 9.2 MG/DL (ref 8.5–10.5)
CHEMOTHERAPY INFUSION START DATE: NORMAL
CHEMOTHERAPY RECORDS: 2.12
CHEMOTHERAPY RECORDS: 6996
CHEMOTHERAPY RECORDS: NORMAL
CHEMOTHERAPY RX CANCER: NORMAL
CHLORIDE SERPL-SCNC: 107 MMOL/L (ref 96–112)
CO2 SERPL-SCNC: 27 MMOL/L (ref 20–33)
CREAT SERPL-MCNC: 0.58 MG/DL (ref 0.5–1.4)
EOSINOPHIL # BLD AUTO: 0.02 K/UL (ref 0–0.51)
EOSINOPHIL NFR BLD: 0.9 % (ref 0–6.9)
ERYTHROCYTE [DISTWIDTH] IN BLOOD BY AUTOMATED COUNT: 41.9 FL (ref 35.9–50)
GLUCOSE SERPL-MCNC: 127 MG/DL (ref 65–99)
HCT VFR BLD AUTO: 37.4 % (ref 42–52)
HGB BLD-MCNC: 13 G/DL (ref 14–18)
IMM GRANULOCYTES # BLD AUTO: 0 K/UL (ref 0–0.11)
IMM GRANULOCYTES NFR BLD AUTO: 0 % (ref 0–0.9)
LYMPHOCYTES # BLD AUTO: 0.37 K/UL (ref 1–4.8)
LYMPHOCYTES NFR BLD: 15.9 % (ref 22–41)
MAGNESIUM SERPL-MCNC: 1.4 MG/DL (ref 1.5–2.5)
MCH RBC QN AUTO: 33.2 PG (ref 27–33)
MCHC RBC AUTO-ENTMCNC: 34.8 G/DL (ref 33.7–35.3)
MCV RBC AUTO: 95.7 FL (ref 81.4–97.8)
MONOCYTES # BLD AUTO: 0.35 K/UL (ref 0–0.85)
MONOCYTES NFR BLD AUTO: 15.1 % (ref 0–13.4)
NEUTROPHILS # BLD AUTO: 1.54 K/UL (ref 1.82–7.42)
NEUTROPHILS NFR BLD: 66.4 % (ref 44–72)
NRBC # BLD AUTO: 0 K/UL
NRBC BLD-RTO: 0 /100 WBC
PLATELET # BLD AUTO: 57 K/UL (ref 164–446)
PMV BLD AUTO: 14.6 FL (ref 9–12.9)
POTASSIUM SERPL-SCNC: 3.6 MMOL/L (ref 3.6–5.5)
RAD ONC ARIA COURSE TREATMENT ELAPSED DAYS: NORMAL
RAD ONC ARIA PLAN TREATMENT DATES: NORMAL
RAD ONC ARIA REFERENCE POINT DOSAGE GIVEN TO DATE: 53
RAD ONC ARIA REFERENCE POINT DOSAGE GIVEN TO DATE: 54.5
RAD ONC ARIA REFERENCE POINT ID: NORMAL
RAD ONC ARIA REFERENCE POINT ID: NORMAL
RAD ONC ARIA REFERENCE POINT SESSION DOSAGE GIVEN: 2.12
RAD ONC ARIA REFERENCE POINT SESSION DOSAGE GIVEN: 2.18
RBC # BLD AUTO: 3.91 M/UL (ref 4.7–6.1)
SODIUM SERPL-SCNC: 143 MMOL/L (ref 135–145)
WBC # BLD AUTO: 2.6 K/UL (ref 4.8–10.8)

## 2019-03-18 PROCEDURE — 77014 PR CT GUIDANCE PLACEMENT RAD THERAPY FIELDS: CPT | Mod: 26 | Performed by: RADIOLOGY

## 2019-03-18 PROCEDURE — 77386 HCHG IMRT DELIVERY COMPLEX: CPT | Performed by: RADIOLOGY

## 2019-03-18 PROCEDURE — 96365 THER/PROPH/DIAG IV INF INIT: CPT

## 2019-03-18 PROCEDURE — 700111 HCHG RX REV CODE 636 W/ 250 OVERRIDE (IP): Performed by: NURSE PRACTITIONER

## 2019-03-18 PROCEDURE — 99214 OFFICE O/P EST MOD 30 MIN: CPT | Performed by: NURSE PRACTITIONER

## 2019-03-18 PROCEDURE — 85025 COMPLETE CBC W/AUTO DIFF WBC: CPT

## 2019-03-18 PROCEDURE — 77427 RADIATION TX MANAGEMENT X5: CPT | Performed by: RADIOLOGY

## 2019-03-18 PROCEDURE — 83735 ASSAY OF MAGNESIUM: CPT

## 2019-03-18 PROCEDURE — 700105 HCHG RX REV CODE 258: Performed by: NURSE PRACTITIONER

## 2019-03-18 PROCEDURE — 80048 BASIC METABOLIC PNL TOTAL CA: CPT

## 2019-03-18 PROCEDURE — 96361 HYDRATE IV INFUSION ADD-ON: CPT

## 2019-03-18 RX ORDER — MAGNESIUM SULFATE HEPTAHYDRATE 40 MG/ML
2 INJECTION, SOLUTION INTRAVENOUS ONCE
Status: COMPLETED | OUTPATIENT
Start: 2019-03-18 | End: 2019-03-18

## 2019-03-18 RX ORDER — 0.9 % SODIUM CHLORIDE 0.9 %
VIAL (ML) INJECTION PRN
Status: CANCELLED | OUTPATIENT
Start: 2019-04-01

## 2019-03-18 RX ORDER — SODIUM CHLORIDE 9 MG/ML
500 INJECTION, SOLUTION INTRAVENOUS ONCE
Status: CANCELLED | OUTPATIENT
Start: 2019-04-01

## 2019-03-18 RX ORDER — 0.9 % SODIUM CHLORIDE 0.9 %
VIAL (ML) INJECTION PRN
Status: CANCELLED | OUTPATIENT
Start: 2019-03-31

## 2019-03-18 RX ORDER — HEPARIN SODIUM (PORCINE) LOCK FLUSH IV SOLN 100 UNIT/ML 100 UNIT/ML
500 SOLUTION INTRAVENOUS PRN
Status: CANCELLED | OUTPATIENT
Start: 2019-03-31

## 2019-03-18 RX ORDER — 0.9 % SODIUM CHLORIDE 0.9 %
VIAL (ML) INJECTION PRN
Status: CANCELLED | OUTPATIENT
Start: 2019-03-25

## 2019-03-18 RX ORDER — 0.9 % SODIUM CHLORIDE 0.9 %
VIAL (ML) INJECTION PRN
Status: CANCELLED | OUTPATIENT
Start: 2019-03-18

## 2019-03-18 RX ORDER — HEPARIN SODIUM (PORCINE) LOCK FLUSH IV SOLN 100 UNIT/ML 100 UNIT/ML
500 SOLUTION INTRAVENOUS PRN
Status: CANCELLED | OUTPATIENT
Start: 2019-04-01

## 2019-03-18 RX ORDER — ONDANSETRON 2 MG/ML
4 INJECTION INTRAMUSCULAR; INTRAVENOUS
Status: CANCELLED | OUTPATIENT
Start: 2019-03-25

## 2019-03-18 RX ORDER — SODIUM CHLORIDE 9 MG/ML
500 INJECTION, SOLUTION INTRAVENOUS ONCE
Status: CANCELLED | OUTPATIENT
Start: 2019-03-25

## 2019-03-18 RX ORDER — SODIUM CHLORIDE 9 MG/ML
INJECTION, SOLUTION INTRAVENOUS CONTINUOUS
Status: CANCELLED | OUTPATIENT
Start: 2019-04-01

## 2019-03-18 RX ORDER — SODIUM CHLORIDE 9 MG/ML
500 INJECTION, SOLUTION INTRAVENOUS ONCE
Status: COMPLETED | OUTPATIENT
Start: 2019-03-18 | End: 2019-03-18

## 2019-03-18 RX ORDER — PROCHLORPERAZINE MALEATE 10 MG
10 TABLET ORAL EVERY 6 HOURS PRN
Status: CANCELLED | OUTPATIENT
Start: 2019-04-01

## 2019-03-18 RX ORDER — SODIUM CHLORIDE 9 MG/ML
INJECTION, SOLUTION INTRAVENOUS CONTINUOUS
Status: CANCELLED | OUTPATIENT
Start: 2019-03-25

## 2019-03-18 RX ORDER — ONDANSETRON 8 MG/1
8 TABLET, ORALLY DISINTEGRATING ORAL
Status: CANCELLED | OUTPATIENT
Start: 2019-03-25

## 2019-03-18 RX ORDER — 0.9 % SODIUM CHLORIDE 0.9 %
5 VIAL (ML) INJECTION PRN
Status: CANCELLED | OUTPATIENT
Start: 2019-03-31

## 2019-03-18 RX ORDER — ONDANSETRON 2 MG/ML
4 INJECTION INTRAMUSCULAR; INTRAVENOUS
Status: CANCELLED | OUTPATIENT
Start: 2019-04-01

## 2019-03-18 RX ORDER — PROCHLORPERAZINE MALEATE 10 MG
10 TABLET ORAL EVERY 6 HOURS PRN
Status: CANCELLED | OUTPATIENT
Start: 2019-03-25

## 2019-03-18 RX ORDER — ONDANSETRON 8 MG/1
8 TABLET, ORALLY DISINTEGRATING ORAL
Status: CANCELLED | OUTPATIENT
Start: 2019-04-01

## 2019-03-18 RX ORDER — 0.9 % SODIUM CHLORIDE 0.9 %
5 VIAL (ML) INJECTION PRN
Status: CANCELLED | OUTPATIENT
Start: 2019-03-25

## 2019-03-18 RX ORDER — 0.9 % SODIUM CHLORIDE 0.9 %
5 VIAL (ML) INJECTION PRN
Status: CANCELLED | OUTPATIENT
Start: 2019-03-18

## 2019-03-18 RX ORDER — 0.9 % SODIUM CHLORIDE 0.9 %
5 VIAL (ML) INJECTION PRN
Status: CANCELLED | OUTPATIENT
Start: 2019-04-01

## 2019-03-18 RX ADMIN — SODIUM CHLORIDE 500 ML: 9 INJECTION, SOLUTION INTRAVENOUS at 09:30

## 2019-03-18 RX ADMIN — MAGNESIUM SULFATE IN WATER 2 G: 40 INJECTION, SOLUTION INTRAVENOUS at 10:31

## 2019-03-18 ASSESSMENT — ENCOUNTER SYMPTOMS
WEIGHT LOSS: 1
SHORTNESS OF BREATH: 0
VOMITING: 0
DIARRHEA: 0
DIZZINESS: 0
HEADACHES: 0
PALPITATIONS: 0
FEVER: 0
CHILLS: 0
INSOMNIA: 1
MYALGIAS: 0
CONSTIPATION: 0
SORE THROAT: 1
COUGH: 0
NAUSEA: 1

## 2019-03-18 ASSESSMENT — PAIN SCALES - GENERAL: PAINLEVEL: 6=MODERATE PAIN

## 2019-03-18 NOTE — PROGRESS NOTES
Subjective:      Maximino Green is a 58 y.o. male who presents with Follow-Up (prechemo (Dr. Looney)) for tonsillar carcinoma.         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  He presents unaccompanied for today's visit.  Patient is currently receiving concurrent chemotherapy and weekly cisplatin.  He is due to receive cycle #6 of cisplatin today.    Patient's platelets have started to trend down with treatment.  His platelets last week were at 87,000.  Okay to proceed with treatment per treatment planned if greater than 75,000 per treatment plan orders.  Patient denies any signs of thrombocytopenia including nosebleeds or easily bruising.    He is having increased fatigue does continue through treatment.  He has had weight loss throughout the beginning of treatment however that has stapled off and he is down just 1 pound from last week.  He denies any fevers or chills.  He does have a sore throat from radiation and some pain with swallowing.  He is receiving his nutrition through his feeding tube approximately 4-6 cans/day.  He is also noting increased and thick saliva but more so at night which does keep him up as he is having to spit out his thick secretions almost every hour.  He also has some tenderness to the skin from radiation burn.  He is using Aquaphor at the site per radiation recommendation.  He does get some nausea only related to thick sputum secretions none related to chemotherapy.  He is having normal bowel movements daily.  He is voiding without difficulty.  He denies any coughing or shortness of breath.    Patient was diagnosed with atrial fibrillation during week 2 of treatment and hospitalized.  He has been placed on metoprolol.  He has been scheduled to establish care with primary care provider next month.    No Known Allergies  Current Outpatient Prescriptions on File Prior to Visit   Medication Sig Dispense Refill   •  "metoprolol (LOPRESSOR) 25 MG Tab Take 1 Tab by mouth 2 Times a Day. 60 Tab 0   • fluconazole (DIFLUCAN) 200 MG Tab Take 1 Tab by mouth every day. Take 2 on day #1, then one daily till all gone 22 Tab 1     No current facility-administered medications on file prior to visit.          Review of Systems   Constitutional: Positive for malaise/fatigue and weight loss (very mild weight loss this week). Negative for chills and fever.   HENT: Positive for sore throat.    Respiratory: Negative for cough and shortness of breath.    Cardiovascular: Negative for chest pain and palpitations.   Gastrointestinal: Positive for nausea (at times with thick secretions). Negative for constipation, diarrhea and vomiting.   Genitourinary: Negative for dysuria.   Musculoskeletal: Negative for myalgias.   Skin: Negative for itching and rash.        Radiation burn to the right neck - using Aquafor to neck   Neurological: Negative for dizziness and headaches.   Psychiatric/Behavioral: The patient has insomnia (d/t thick secretions).           Objective:     /64 (BP Location: Right arm, Patient Position: Sitting, BP Cuff Size: Adult)   Pulse 72   Temp 36.4 °C (97.6 °F) (Temporal)   Resp 16   Ht 1.829 m (6' 0.01\")   Wt 112.4 kg (247 lb 12.8 oz)   SpO2 95%   BMI 33.60 kg/m²      Physical Exam   Constitutional: He is oriented to person, place, and time. He appears well-developed and well-nourished. No distress.   HENT:   Head: Normocephalic and atraumatic.   Mucositis improving - on Diflucan   Neck:   XRT in process - radiation burn noted   Cardiovascular: Normal rate, regular rhythm and normal heart sounds.    Pulmonary/Chest: Effort normal and breath sounds normal. No respiratory distress. He has no wheezes.   Abdominal: Soft. Bowel sounds are normal. He exhibits no distension. There is no tenderness.   Musculoskeletal: Normal range of motion. He exhibits no edema or tenderness.   Neurological: He is alert and oriented to person, " place, and time.   Skin: Skin is warm and dry. He is not diaphoretic. There is erythema (right side of neck from XRT burn).   Psychiatric: He has a normal mood and affect. His behavior is normal.   Vitals reviewed.             Assessment/Plan:     1. Tonsil cancer (HCC)     2. Mucositis       Plan  1.  Patient with tonsillar carcinoma currently undergoing concurrent chemotherapy and radiation therapy.  Clinically he has done very well and appears to be stable.  If his labs meet parameters patient is okay to proceed with treatment as planned.    Patient has been having slight trend down of his platelet count.  His treatment parameters are to hold if platelets are less than 75,000.  I did discuss with patient the possibility of holding treatment this week due to thrombocytopenia.  Will await results of CBC.    2.  Patient with mucositis which does appear to be improving.  He is continued on Diflucan per Dr. Harris.    3.  Patient is due to complete radiation therapy on Thursday, March 28.  He will return in 1 week prior to his next chemo for further evaluation.  He is to call office sooner if needed.

## 2019-03-18 NOTE — PROGRESS NOTES
Pt scheduled for cisplatin C6. Arrived ambulatory, independent; endorsed fatigue, on-going pain to neck/throat, dysphagia; skin irritation secondary radiation treatment to neck, pt stated he treats w/ aquaphor. Pt denied s/sx infection or other health changes. PIV placed to L-hand after multiple attempts; easily flushed, brisk blood return. Labs drawn per orders; PLT 57, Mg 1.4. Call placed to [facility], on behalf of Dr. Looney to report critical labs; TORB to defer chemo x1 week, ok to give IVF & electrolyte replacement per protocol. Pt updated on POC, verbalized understanding & agreement. Infusions completed w/o adverse events. PIV flushed, brisk blood return; removed, cath intact. Treatment plan reviewed; pt verbalized knowledge of next appt; denied any unmet needs. Pt discharged ambulatory, independent, NAD.

## 2019-03-18 NOTE — PROGRESS NOTES
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 Int J Radiat Oncol Biol Phys. 2011 Mar 15;79(4):1073-80.   Winsome MORGAN, et al. Radiother Oncol. 2006 Apr;79(1):34-8.     Allergies: Patient has no known allergies.    /53   Pulse (!) 54   Temp 36.1 °C (97 °F) (Temporal)   Resp 18   Ht 1.82 m (5' 11.65\")   Wt 122.4 kg (269 lb 13.5 oz)   SpO2 95%   BMI 36.95 kg/m²  Body surface area is 2.49 meters squared.    Labs 03/18/19:  ANC~ 1500     Plt = 57k (ok if >75k)         Hgb = 13      SCr = 0.58 mg/dL  CrCl >125 mL/min (minimum SCr of 0.7)       K = 3.6          Mg = 1.4 (Mag 2 mg IV)              Labs 02/22/19  LFT's = 70/80/70         TBili = 1.1        Cisplatin (CDDP) 40 mg/m2  X 2.49 m2 = 99.6 mg   <5% difference, ok to treat with final dose = 0 mg IV       Lea Velez, PharmD, BCOP              "

## 2019-03-19 ENCOUNTER — HOSPITAL ENCOUNTER (OUTPATIENT)
Dept: RADIATION ONCOLOGY | Facility: MEDICAL CENTER | Age: 59
End: 2019-03-19

## 2019-03-19 LAB
CHEMOTHERAPY INFUSION START DATE: NORMAL
CHEMOTHERAPY RECORDS: 2.12
CHEMOTHERAPY RECORDS: 6996
CHEMOTHERAPY RECORDS: NORMAL
CHEMOTHERAPY RX CANCER: NORMAL
RAD ONC ARIA COURSE TREATMENT ELAPSED DAYS: NORMAL
RAD ONC ARIA PLAN TREATMENT DATES: NORMAL
RAD ONC ARIA REFERENCE POINT DOSAGE GIVEN TO DATE: 55.12
RAD ONC ARIA REFERENCE POINT DOSAGE GIVEN TO DATE: 56.68
RAD ONC ARIA REFERENCE POINT ID: NORMAL
RAD ONC ARIA REFERENCE POINT ID: NORMAL
RAD ONC ARIA REFERENCE POINT SESSION DOSAGE GIVEN: 2.12
RAD ONC ARIA REFERENCE POINT SESSION DOSAGE GIVEN: 2.18

## 2019-03-19 PROCEDURE — 77014 PR CT GUIDANCE PLACEMENT RAD THERAPY FIELDS: CPT | Mod: 26 | Performed by: RADIOLOGY

## 2019-03-19 PROCEDURE — 77386 HCHG IMRT DELIVERY COMPLEX: CPT | Performed by: RADIOLOGY

## 2019-03-20 ENCOUNTER — DOCUMENTATION (OUTPATIENT)
Dept: HEMATOLOGY ONCOLOGY | Facility: MEDICAL CENTER | Age: 59
End: 2019-03-20

## 2019-03-20 VITALS
SYSTOLIC BLOOD PRESSURE: 113 MMHG | HEART RATE: 95 BPM | WEIGHT: 248.2 LBS | OXYGEN SATURATION: 98 % | BODY MASS INDEX: 33.99 KG/M2 | DIASTOLIC BLOOD PRESSURE: 54 MMHG | TEMPERATURE: 97.8 F

## 2019-03-20 LAB
CHEMOTHERAPY INFUSION START DATE: NORMAL
CHEMOTHERAPY RECORDS: 2.12
CHEMOTHERAPY RECORDS: 6996
CHEMOTHERAPY RECORDS: NORMAL
CHEMOTHERAPY RX CANCER: NORMAL
RAD ONC ARIA COURSE TREATMENT ELAPSED DAYS: NORMAL
RAD ONC ARIA PLAN TREATMENT DATES: NORMAL
RAD ONC ARIA REFERENCE POINT DOSAGE GIVEN TO DATE: 57.24
RAD ONC ARIA REFERENCE POINT DOSAGE GIVEN TO DATE: 58.86
RAD ONC ARIA REFERENCE POINT ID: NORMAL
RAD ONC ARIA REFERENCE POINT ID: NORMAL
RAD ONC ARIA REFERENCE POINT SESSION DOSAGE GIVEN: 2.12
RAD ONC ARIA REFERENCE POINT SESSION DOSAGE GIVEN: 2.18

## 2019-03-20 PROCEDURE — 77386 HCHG IMRT DELIVERY COMPLEX: CPT | Performed by: RADIOLOGY

## 2019-03-20 PROCEDURE — 77014 PR CT GUIDANCE PLACEMENT RAD THERAPY FIELDS: CPT | Mod: 26 | Performed by: RADIOLOGY

## 2019-03-20 ASSESSMENT — PAIN SCALES - GENERAL: PAINLEVEL: 2=MINIMAL-SLIGHT

## 2019-03-20 NOTE — PROGRESS NOTES
On Treatment Note  Radiation Oncology Department    Patient name:  Maximino Green    Primary Physician:  Giancarlo Lomeli M.D. MRN: [id number]  CSN: [id number]   Referring physician:  Andre Wolf M.D. : 1960, 58 y.o.     Encounter Date:   19    Diagnosis:   Cancer Staging  Tonsil cancer (HCC)  Staging form: Pharynx - HPV-Mediated Oropharynx, AJCC 8th Edition  - Clinical stage from 1/15/2019: Stage II (cT3, cN2, cM0, p16: Positive) - Unsigned      Treatment Summary:  Radiation Treatments     Active   Plans   Oropharynx   Most recent treatment: Dose planned: 212 cGy (fraction 27 of 33 on 3/20/2019)   Total: Dose planned: 6,996 cGy   Elapsed Course Treatment Days: 37 @       Reference Points   Oropharynx   Most recent treatment: Dose given: 212 cGy (on 3/20/2019)   Total: Dose given: 5,724 cGy   Elapsed Course Treatment Days: 37 @       Oropharynx CP   Most recent treatment: Dose given: 218 cGy (on 3/20/2019)   Total: Dose given: 5,886 cGy   Elapsed Course Treatment Days: 37 @          Historical   Patient's record has no historical radiation treatments documented.             Vital Signs:   Encounter Vitals  Temperature: 36.6 °C (97.8 °F)  Blood Pressure: 113/54  Pulse: 95  Pulse Oximetry: 98 %  Weight: 112.6 kg (248 lb 3.2 oz)  Pain Score: 2=Minimal-Slight  Pain Assessment 3/20/2019 3/18/2019 3/11/2019   Pain Assessment Chronic Pain - -   Pain Score 2 6 8   Pain Loc Throat - -   What increases pain? Swallowing increases pain to 8/10 - -   Some recent data might be hidden       Physical Exam   Constitutional: He appears well-developed and well-nourished.   HENT:   Mouth/Throat: Oropharyngeal exudate present.   Skin: Skin is warm and dry. There is erythema.   Patchy dry deqsqumation bilateral neck.        ECOG Performance Review 3/20/2019   ECOG Performance Status Ambulatory and capable of all selfcare but unable to carry out any work activities.  Up and about  more than 50% of waking hours   Some recent data might be hidden        Toxicity Assessment 3/20/2019   Toxicity Assessment Head/Neck   Fatigue (lethargy, malaise, asthenia) Moderate (e.g., decrease in performance status by 1 ECOG level or 20% Karnofsky) or causing difficulty performing some activities   Radiation Dermatitis Moderate to brisk erythema or a patchy moist desquamation, mostly confined to skin folds and creases, with/without moderate edema   Rash/desquamation Macular or papular eruption or erythema without associated symptoms   Constipation None   Mouth Dryness Moderate   RT Dysphagia-Pharyngeal Dysphagia, requiring feeding tube, IV hydration or hyperlimentation   Mucositis None   Salivary Gland Changes Thick, ropy, sticky saliva and/or markedly altered taste and alteration in diet required   Stomatitis/Pharyngitis None   Taste Disturbance (dysgeusia) Markedly altered   RT - Pain due to RT None   Cough Mild, relieved by non-prescription medication   Voice changes/stridor/larynx (hoarseness, loss of voice, laryngitis) Mild or intermittent hoarseness       CURRENT MEDICATIONS:    Current Outpatient Prescriptions:   •  silver sulfADIAZINE (SILVADENE) 1 % Cream, Apply  to affected area(s) every day., Disp: , Rfl:   •  metoprolol (LOPRESSOR) 25 MG Tab, Take 1 Tab by mouth 2 Times a Day., Disp: 60 Tab, Rfl: 0  •  fluconazole (DIFLUCAN) 200 MG Tab, Take 1 Tab by mouth every day. Take 2 on day #1, then one daily till all gone, Disp: 22 Tab, Rfl: 1    LABORATORY DATA:   Lab Results   Component Value Date/Time    SODIUM 143 03/18/2019 09:21 AM    POTASSIUM 3.6 03/18/2019 09:21 AM    CHLORIDE 107 03/18/2019 09:21 AM    CO2 27 03/18/2019 09:21 AM    GLUCOSE 127 (H) 03/18/2019 09:21 AM    BUN 26 (H) 03/18/2019 09:21 AM    CREATININE 0.58 03/18/2019 09:21 AM     Lab Results   Component Value Date/Time    ALKPHOSPHAT 70 02/22/2019 06:10 PM    ASTSGOT 74 (H) 02/22/2019 06:10 PM    ALTSGPT 80 (H) 02/22/2019 06:10 PM     TBILIRUBIN 1.1 02/22/2019 06:10 PM      Recent Labs      03/18/19   0921   WBC  2.6*   RBC  3.91*   HEMOGLOBIN  13.0*   HEMATOCRIT  37.4*   MCV  95.7   MCH  33.2*   RDW  41.9   PLATELETCT  57*   MPV  14.6*   NEUTSPOLYS  66.40   LYMPHOCYTES  15.90*   MONOCYTES  15.10*   EOSINOPHILS  0.90   BASOPHILS  1.70       Impression:  Cancer Staging  Tonsil cancer (HCC)  Staging form: Pharynx - HPV-Mediated Oropharynx, AJCC 8th Edition  - Clinical stage from 1/15/2019: Stage II (cT3, cN2, cM0, p16: Positive) - Unsigned      Plan:  Chemo on hold this week seconday to plts. Reassess Monday. Continue with XRT.  Using G-tube for nutrition. Taking water by mouth. Rx Silvadene for dry desquamation.    Disposition:  Continue present management.  The patient’s dosimetry treatment plan and portal images have been reviewed and approved without modification in treatment plan.    Adriana Xie, Med Ass't

## 2019-03-21 ENCOUNTER — HOSPITAL ENCOUNTER (OUTPATIENT)
Dept: RADIATION ONCOLOGY | Facility: MEDICAL CENTER | Age: 59
End: 2019-03-21

## 2019-03-21 LAB
CHEMOTHERAPY INFUSION START DATE: NORMAL
CHEMOTHERAPY RECORDS: 2.12
CHEMOTHERAPY RECORDS: 6996
CHEMOTHERAPY RECORDS: NORMAL
CHEMOTHERAPY RX CANCER: NORMAL
RAD ONC ARIA COURSE TREATMENT ELAPSED DAYS: NORMAL
RAD ONC ARIA PLAN TREATMENT DATES: NORMAL
RAD ONC ARIA REFERENCE POINT DOSAGE GIVEN TO DATE: 59.36
RAD ONC ARIA REFERENCE POINT DOSAGE GIVEN TO DATE: 61.04
RAD ONC ARIA REFERENCE POINT ID: NORMAL
RAD ONC ARIA REFERENCE POINT ID: NORMAL
RAD ONC ARIA REFERENCE POINT SESSION DOSAGE GIVEN: 2.12
RAD ONC ARIA REFERENCE POINT SESSION DOSAGE GIVEN: 2.18

## 2019-03-21 PROCEDURE — 77014 PR CT GUIDANCE PLACEMENT RAD THERAPY FIELDS: CPT | Mod: 26 | Performed by: RADIOLOGY

## 2019-03-21 PROCEDURE — 77336 RADIATION PHYSICS CONSULT: CPT | Performed by: RADIOLOGY

## 2019-03-21 PROCEDURE — 77386 HCHG IMRT DELIVERY COMPLEX: CPT | Performed by: RADIOLOGY

## 2019-03-22 ENCOUNTER — HOSPITAL ENCOUNTER (OUTPATIENT)
Dept: RADIATION ONCOLOGY | Facility: MEDICAL CENTER | Age: 59
End: 2019-03-22

## 2019-03-22 LAB
CHEMOTHERAPY INFUSION START DATE: NORMAL
CHEMOTHERAPY RECORDS: 2.12
CHEMOTHERAPY RECORDS: 6996
CHEMOTHERAPY RECORDS: NORMAL
CHEMOTHERAPY RX CANCER: NORMAL
RAD ONC ARIA COURSE TREATMENT ELAPSED DAYS: NORMAL
RAD ONC ARIA PLAN TREATMENT DATES: NORMAL
RAD ONC ARIA REFERENCE POINT DOSAGE GIVEN TO DATE: 61.48
RAD ONC ARIA REFERENCE POINT DOSAGE GIVEN TO DATE: 63.22
RAD ONC ARIA REFERENCE POINT ID: NORMAL
RAD ONC ARIA REFERENCE POINT ID: NORMAL
RAD ONC ARIA REFERENCE POINT SESSION DOSAGE GIVEN: 2.12
RAD ONC ARIA REFERENCE POINT SESSION DOSAGE GIVEN: 2.18

## 2019-03-22 PROCEDURE — 77014 PR CT GUIDANCE PLACEMENT RAD THERAPY FIELDS: CPT | Mod: 26 | Performed by: RADIOLOGY

## 2019-03-22 PROCEDURE — 77386 HCHG IMRT DELIVERY COMPLEX: CPT | Performed by: RADIOLOGY

## 2019-03-24 NOTE — PROGRESS NOTES
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 Looney   Every 7 days for 6 weeks with radiation  NCCN Guidelines for Head and Neck Cancers.V.1.2017  Samantha PATTERSON, et al. Int J Radiat Oncol Biol Phys. 2011 Mar 15;79(4):1073-80.   Winsome MORGAN, et al. Radiother Oncol. 2006 Apr;79(1):34-8.     Allergies: Patient has no known allergies.    /52   Pulse 80   Temp 36.6 °C (97.9 °F) (Temporal)   Resp 18   Ht 1.816 m (5' 11.5\")   Wt 113 kg (249 lb 1.9 oz)   SpO2 97%   BMI 34.26 kg/m²  Body surface area is 2.39 meters squared.    Labs 03/25/19:  ANC~ 1150     Plt = 75k (ok if >75k)         Hgb = 11.9      SCr = 0.58 mg/dL  CrCl >125 mL/min (minimum SCr of 0.7)     LFT's = 36/30/64         TBili = 1.2    K = 4          Mg = 1.6  (Mg 1 gm IV)   MD is aware of labs, ok to proceed with chemo today C Alsop APRN/Dr. Looney           Cisplatin (CDDP) 30 mg/m2  X 2.39 m2 = 71.7 mg   <5% difference, ok to treat with final dose = 71.7 mg IV       Lea Velez, PharmD, BCOP              "

## 2019-03-24 NOTE — PROGRESS NOTES
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 Int J Radiat Oncol Biol Phys. 2011 Mar 15;79(4):1073-80.   Winsome MORGAN et al. Radiother Oncol. 2006 Apr;79(1):34-8.     Allergies: Patient has no known allergies.    /52   Pulse 80   Temp 36.6 °C (97.9 °F) (Temporal)   Resp 18   Ht 1.816 m (5' 11.5\")   Wt 113 kg (249 lb 1.9 oz)   SpO2 97%   BMI 34.26 kg/m²  Body surface area is 2.39 meters squared.     3/25/19:  ANC 1150  Plt = 75 k  Hbg = 11.9  Labs reviewed 3/25/19: OK to treat as ordered per Alsop MARI/ Aure BAILEY  Cr 0.58 CrCl >125 ml/min (min Cr 0.7)  LFTs/TBili = WNL/0.1.2     Drug Order   (Drug name, dose, route, IV Fluid & volume, frequency, number of doses) Cycle 6 delayed due to thrombocytopenia    Previous treatment: 3/11/19     Medication = cisplatin (Platinol)  Base Dose= 30 mg/m2  Calc Dose: Base Dose x 2.39 m2 = 72 mg  Final Dose = 71.7 mg  Route = IV  Fluid & Volume =  mL  Admin Duration = Over 1 hour           <5% difference, ok to treat with final dose       By my signature below, I confirm this process was performed independently with the BSA and all final chemotherapy dosing calculations congruent. I have reviewed the above chemotherapy order and that my calculation of the final dose and BSA (when applicable) corroborate those calculations of the  pharmacist. Discrepancies of 5% or greater in the written dose have been addressed and documented within the [facility] Progress notes.    Yamil Ordonez, PharmD    "

## 2019-03-25 ENCOUNTER — OFFICE VISIT (OUTPATIENT)
Dept: HEMATOLOGY ONCOLOGY | Facility: MEDICAL CENTER | Age: 59
End: 2019-03-25
Payer: COMMERCIAL

## 2019-03-25 ENCOUNTER — OUTPATIENT INFUSION SERVICES (OUTPATIENT)
Dept: ONCOLOGY | Facility: MEDICAL CENTER | Age: 59
End: 2019-03-25
Attending: INTERNAL MEDICINE
Payer: COMMERCIAL

## 2019-03-25 ENCOUNTER — HOSPITAL ENCOUNTER (OUTPATIENT)
Dept: RADIATION ONCOLOGY | Facility: MEDICAL CENTER | Age: 59
End: 2019-03-25

## 2019-03-25 VITALS
WEIGHT: 249.12 LBS | BODY MASS INDEX: 33.74 KG/M2 | HEIGHT: 72 IN | DIASTOLIC BLOOD PRESSURE: 52 MMHG | SYSTOLIC BLOOD PRESSURE: 118 MMHG | RESPIRATION RATE: 18 BRPM | TEMPERATURE: 97.9 F | HEART RATE: 80 BPM | OXYGEN SATURATION: 97 %

## 2019-03-25 VITALS
HEART RATE: 76 BPM | SYSTOLIC BLOOD PRESSURE: 118 MMHG | HEIGHT: 72 IN | TEMPERATURE: 97.6 F | WEIGHT: 249.23 LBS | RESPIRATION RATE: 16 BRPM | OXYGEN SATURATION: 97 % | DIASTOLIC BLOOD PRESSURE: 62 MMHG | BODY MASS INDEX: 33.76 KG/M2

## 2019-03-25 DIAGNOSIS — R13.11 ORAL PHASE DYSPHAGIA: ICD-10-CM

## 2019-03-25 DIAGNOSIS — R53.0 NEOPLASTIC MALIGNANT RELATED FATIGUE: ICD-10-CM

## 2019-03-25 DIAGNOSIS — C09.9 TONSIL CANCER (HCC): ICD-10-CM

## 2019-03-25 DIAGNOSIS — J02.9 SORE THROAT: ICD-10-CM

## 2019-03-25 DIAGNOSIS — E83.42 HYPOMAGNESEMIA: ICD-10-CM

## 2019-03-25 LAB
ALBUMIN SERPL BCP-MCNC: 3.5 G/DL (ref 3.2–4.9)
ALBUMIN/GLOB SERPL: 1.2 G/DL
ALP SERPL-CCNC: 64 U/L (ref 30–99)
ALT SERPL-CCNC: 30 U/L (ref 2–50)
ANION GAP SERPL CALC-SCNC: 8 MMOL/L (ref 0–11.9)
AST SERPL-CCNC: 36 U/L (ref 12–45)
BASOPHILS # BLD AUTO: 1.1 % (ref 0–1.8)
BASOPHILS # BLD: 0.02 K/UL (ref 0–0.12)
BILIRUB SERPL-MCNC: 1.2 MG/DL (ref 0.1–1.5)
BUN SERPL-MCNC: 20 MG/DL (ref 8–22)
CALCIUM SERPL-MCNC: 9.1 MG/DL (ref 8.5–10.5)
CHEMOTHERAPY INFUSION START DATE: NORMAL
CHEMOTHERAPY RECORDS: 2.12
CHEMOTHERAPY RECORDS: 6996
CHEMOTHERAPY RECORDS: NORMAL
CHEMOTHERAPY RX CANCER: NORMAL
CHLORIDE SERPL-SCNC: 107 MMOL/L (ref 96–112)
CO2 SERPL-SCNC: 26 MMOL/L (ref 20–33)
CREAT SERPL-MCNC: 0.58 MG/DL (ref 0.5–1.4)
EOSINOPHIL # BLD AUTO: 0.05 K/UL (ref 0–0.51)
EOSINOPHIL NFR BLD: 2.7 % (ref 0–6.9)
ERYTHROCYTE [DISTWIDTH] IN BLOOD BY AUTOMATED COUNT: 56.4 FL (ref 35.9–50)
GLOBULIN SER CALC-MCNC: 3 G/DL (ref 1.9–3.5)
GLUCOSE SERPL-MCNC: 119 MG/DL (ref 65–99)
HCT VFR BLD AUTO: 37.2 % (ref 42–52)
HGB BLD-MCNC: 11.9 G/DL (ref 14–18)
IMM GRANULOCYTES # BLD AUTO: 0 K/UL (ref 0–0.11)
IMM GRANULOCYTES NFR BLD AUTO: 0 % (ref 0–0.9)
LYMPHOCYTES # BLD AUTO: 0.28 K/UL (ref 1–4.8)
LYMPHOCYTES NFR BLD: 15.1 % (ref 22–41)
MAGNESIUM SERPL-MCNC: 1.6 MG/DL (ref 1.5–2.5)
MCH RBC QN AUTO: 33.6 PG (ref 27–33)
MCHC RBC AUTO-ENTMCNC: 32 G/DL (ref 33.7–35.3)
MCV RBC AUTO: 105.1 FL (ref 81.4–97.8)
MONOCYTES # BLD AUTO: 0.35 K/UL (ref 0–0.85)
MONOCYTES NFR BLD AUTO: 18.9 % (ref 0–13.4)
NEUTROPHILS # BLD AUTO: 1.15 K/UL (ref 1.82–7.42)
NEUTROPHILS NFR BLD: 62.2 % (ref 44–72)
NRBC # BLD AUTO: 0 K/UL
NRBC BLD-RTO: 0 /100 WBC
PLATELET # BLD AUTO: 75 K/UL (ref 164–446)
PMV BLD AUTO: 13.5 FL (ref 9–12.9)
POTASSIUM SERPL-SCNC: 4 MMOL/L (ref 3.6–5.5)
PROT SERPL-MCNC: 6.5 G/DL (ref 6–8.2)
RAD ONC ARIA COURSE TREATMENT ELAPSED DAYS: NORMAL
RAD ONC ARIA PLAN TREATMENT DATES: NORMAL
RAD ONC ARIA REFERENCE POINT DOSAGE GIVEN TO DATE: 63.6
RAD ONC ARIA REFERENCE POINT DOSAGE GIVEN TO DATE: 65.4
RAD ONC ARIA REFERENCE POINT ID: NORMAL
RAD ONC ARIA REFERENCE POINT ID: NORMAL
RAD ONC ARIA REFERENCE POINT SESSION DOSAGE GIVEN: 2.12
RAD ONC ARIA REFERENCE POINT SESSION DOSAGE GIVEN: 2.18
RBC # BLD AUTO: 3.54 M/UL (ref 4.7–6.1)
SODIUM SERPL-SCNC: 141 MMOL/L (ref 135–145)
WBC # BLD AUTO: 2.1 K/UL (ref 4.8–10.8)

## 2019-03-25 PROCEDURE — 77014 PR CT GUIDANCE PLACEMENT RAD THERAPY FIELDS: CPT | Mod: 26 | Performed by: RADIOLOGY

## 2019-03-25 PROCEDURE — 96413 CHEMO IV INFUSION 1 HR: CPT

## 2019-03-25 PROCEDURE — 96368 THER/DIAG CONCURRENT INF: CPT

## 2019-03-25 PROCEDURE — 96367 TX/PROPH/DG ADDL SEQ IV INF: CPT

## 2019-03-25 PROCEDURE — 96361 HYDRATE IV INFUSION ADD-ON: CPT

## 2019-03-25 PROCEDURE — 700111 HCHG RX REV CODE 636 W/ 250 OVERRIDE (IP): Performed by: INTERNAL MEDICINE

## 2019-03-25 PROCEDURE — 77427 RADIATION TX MANAGEMENT X5: CPT | Performed by: RADIOLOGY

## 2019-03-25 PROCEDURE — 77386 HCHG IMRT DELIVERY COMPLEX: CPT | Performed by: RADIOLOGY

## 2019-03-25 PROCEDURE — 700105 HCHG RX REV CODE 258: Performed by: NURSE PRACTITIONER

## 2019-03-25 PROCEDURE — 83735 ASSAY OF MAGNESIUM: CPT

## 2019-03-25 PROCEDURE — 700111 HCHG RX REV CODE 636 W/ 250 OVERRIDE (IP): Performed by: NURSE PRACTITIONER

## 2019-03-25 PROCEDURE — 96375 TX/PRO/DX INJ NEW DRUG ADDON: CPT

## 2019-03-25 PROCEDURE — 700105 HCHG RX REV CODE 258: Performed by: INTERNAL MEDICINE

## 2019-03-25 PROCEDURE — 99214 OFFICE O/P EST MOD 30 MIN: CPT | Performed by: NURSE PRACTITIONER

## 2019-03-25 PROCEDURE — 80053 COMPREHEN METABOLIC PANEL: CPT

## 2019-03-25 PROCEDURE — 85025 COMPLETE CBC W/AUTO DIFF WBC: CPT

## 2019-03-25 RX ORDER — SODIUM CHLORIDE 9 MG/ML
1000 INJECTION, SOLUTION INTRAVENOUS ONCE
Status: COMPLETED | OUTPATIENT
Start: 2019-03-25 | End: 2019-03-25

## 2019-03-25 RX ORDER — MAGNESIUM SULFATE 1 G/100ML
1 INJECTION INTRAVENOUS ONCE
Status: COMPLETED | OUTPATIENT
Start: 2019-03-25 | End: 2019-03-25

## 2019-03-25 RX ORDER — SODIUM CHLORIDE 9 MG/ML
1000 INJECTION, SOLUTION INTRAVENOUS ONCE
Status: CANCELLED | OUTPATIENT
Start: 2019-03-25

## 2019-03-25 RX ORDER — 0.9 % SODIUM CHLORIDE 0.9 %
20 VIAL (ML) INJECTION ONCE
Status: CANCELLED | OUTPATIENT
Start: 2019-04-01 | End: 2019-04-01

## 2019-03-25 RX ADMIN — SODIUM CHLORIDE 150 MG: 9 INJECTION, SOLUTION INTRAVENOUS at 12:00

## 2019-03-25 RX ADMIN — SODIUM CHLORIDE 1000 ML: 9 INJECTION, SOLUTION INTRAVENOUS at 09:50

## 2019-03-25 RX ADMIN — DEXAMETHASONE SODIUM PHOSPHATE 12 MG: 4 INJECTION, SOLUTION INTRAMUSCULAR; INTRAVENOUS at 11:31

## 2019-03-25 RX ADMIN — MAGNESIUM SULFATE HEPTAHYDRATE 1 G: 1 INJECTION, SOLUTION INTRAVENOUS at 13:56

## 2019-03-25 RX ADMIN — CISPLATIN 71.7 MG: 1 INJECTION, SOLUTION INTRAVENOUS at 12:39

## 2019-03-25 RX ADMIN — POTASSIUM CHLORIDE: 2 INJECTION, SOLUTION, CONCENTRATE INTRAVENOUS at 13:56

## 2019-03-25 RX ADMIN — ONDANSETRON HYDROCHLORIDE 16 MG: 2 SOLUTION INTRAMUSCULAR; INTRAVENOUS at 11:45

## 2019-03-25 ASSESSMENT — ENCOUNTER SYMPTOMS
PALPITATIONS: 0
SHORTNESS OF BREATH: 1
FEVER: 0
COUGH: 0
VOMITING: 0
CHILLS: 0
NAUSEA: 0
MYALGIAS: 0
SORE THROAT: 1
HEADACHES: 0
WEIGHT LOSS: 0
CONSTIPATION: 0
DIZZINESS: 0
DIARRHEA: 0

## 2019-03-25 ASSESSMENT — PAIN SCALES - GENERAL: PAINLEVEL: 10=SEVERE PAIN

## 2019-03-25 NOTE — PROGRESS NOTES
Chemotherapy Verification - PRIMARY RN      Height = 181.6cm  Weight = 113kg  BSA = 2.39m2       Medication: Cisplatin  Dose: 30mg/m2  Calculated Dose: 71.7mg                             (In mg/m2, AUC, mg/kg)       I confirm this process was performed independently with the BSA and all final chemotherapy dosing calculations congruent.  Any discrepancies of 5% or greater have been addressed with the chemotherapy pharmacist. The resolution of the discrepancy has been documented in the EPIC progress notes.

## 2019-03-25 NOTE — PROGRESS NOTES
"Subjective:      Maximino Green is a 58 y.o. male who presents with Follow-Up (prechemo (Dr. Looney)) for tonsillar carcinoma.        HPI    Patient seen today in follow-up for tonsillar carcinoma.  He presents unaccompanied for today's visit.  Patient is scheduled to receive cycle #6 of weekly cisplatin with concurrent radiation therapy.  Unfortunately we had to hold cycle 6 last week due to thrombocytopenia of a platelet count of 57,000.  Patient is due to complete radiation therapy this Thursday, March 28.    Clinically patient is experiencing significant fatigue at the end of his treatment.  He denies any fevers or chills.  His weight is actually stable and he is taking in 5 cans/day.  He stated he is tolerating his feeding via his PEG tube well denies any nausea or vomiting.  He is having normal formed bowel movements.  He does have a \"killer sore throat\" which is causing significant pain.  He is rinsing his mouth out with baking soda and salt water.  He denies coughing however he does have thick sputum production that causes him to gag every once in a while.  Patient does have erythema and dryness noted to the right side of his skin due to radiation therapy and was given Silvadene last week which has helped tremendously.  He also had oral thrush and was on Diflucan which has controlled and it does appear to have resolved.    During patient's hospitalization for his PEG tube a few weeks ago he was diagnosed with atrial fibrillation and was placed on metoprolol.  He did confirm that he has 1 another refill left and is continued on this medication.  He is due to establish care with primary care provider, April.    No Known Allergies  Current Outpatient Prescriptions on File Prior to Visit   Medication Sig Dispense Refill   • silver sulfADIAZINE (SILVADENE) 1 % Cream Apply  to affected area(s) every day.     • metoprolol (LOPRESSOR) 25 MG Tab Take 1 Tab by mouth 2 Times a Day. 60 Tab 0   • fluconazole " "(DIFLUCAN) 200 MG Tab Take 1 Tab by mouth every day. Take 2 on day #1, then one daily till all gone 22 Tab 1     No current facility-administered medications on file prior to visit.          Review of Systems   Constitutional: Positive for malaise/fatigue. Negative for chills, fever and weight loss (5 cans per day).   HENT: Positive for sore throat.    Respiratory: Positive for shortness of breath (thick sputum production that causes him to gag). Negative for cough.    Cardiovascular: Negative for chest pain and palpitations.   Gastrointestinal: Negative for constipation, diarrhea, nausea and vomiting.   Genitourinary: Negative for dysuria.   Musculoskeletal: Negative for myalgias.   Skin:        Erythema and dryness to the skin on the right side - given Silvadene which has helped   Neurological: Negative for dizziness and headaches.          Objective:     /62 (BP Location: Right arm, Patient Position: Sitting, BP Cuff Size: Adult)   Pulse 76   Temp 36.4 °C (97.6 °F) (Temporal)   Resp 16   Ht 1.82 m (5' 11.65\")   Wt 113 kg (249 lb 3.7 oz)   SpO2 97%   BMI 34.13 kg/m²      Physical Exam   Constitutional: He is oriented to person, place, and time. No distress.   HENT:   Head: Normocephalic and atraumatic.   Mouth/Throat: Oropharynx is clear and moist. No oropharyngeal exudate.   Cardiovascular: Normal rate, regular rhythm and normal heart sounds.  Exam reveals no gallop and no friction rub.    No murmur heard.  Pulmonary/Chest: Effort normal and breath sounds normal. No respiratory distress. He has no wheezes.   Abdominal: Soft. Bowel sounds are normal. He exhibits no distension. There is no tenderness.   Musculoskeletal: Normal range of motion. He exhibits no edema or tenderness.   Neurological: He is alert and oriented to person, place, and time.   Skin: Skin is warm and dry. No rash noted. He is not diaphoretic. No erythema. No pallor.   Psychiatric: He has a normal mood and affect. His behavior is " normal.   Vitals reviewed.            Assessment/Plan:     1. Tonsil cancer (HCC)  REFERRAL TO SPEECH THERAPY Reason for Therapy: Eval/Treat/Report    lidocaine viscous 2% (XYLOCAINE) 2 % Solution   2. Oral phase dysphagia  REFERRAL TO SPEECH THERAPY Reason for Therapy: Eval/Treat/Report   3. Sore throat  lidocaine viscous 2% (XYLOCAINE) 2 % Solution   4. Neoplastic malignant related fatigue       Plan  1.  Patient with tonsillar carcinoma currently on concurrent chemotherapy and radiation therapy.  He is receiving weekly cisplatin.  He is due for cycle #6, week #7 total of treatment.  Week 6 his treatment was held due to thrombocytopenia with a platelet count of 57,000.  Patient is experiencing increased fatigue and sore throat however clinically overall tolerating his chemotherapy.  If labs meet parameters we are okay to proceed with his last dose of treatment.  He will be due to complete radiation therapy this Thursday, March 28.    I will give patient a little extra fluid in his IV today.  I will pre-hydrate him with 1000 mL and continue with the 500 post.    2.  Patient with dysphasia currently using PEG tube for feeding.  I have ordered a speech therapy evaluation.    3.  Patient with severe sore throat related to radiation therapy.  I have started patient on viscous lidocaine for him to use up to 4 times per day as needed.    4.  Patient to follow-up in the clinic in 2 weeks or sooner if needed for a follow-up visit.    5.  Patient newly diagnosed with atrial fibrillation while getting his PEG tube placed.  He currently is on metoprolol per cardiology.  He is due to establish care with new primary care provider in April.  He did confirm he has 1 refill available for his metoprolol.  He will follow-up with PCP for any further management with regards to his atrial fibrillation once established.      Addendum: Discussed with Dr. Looney and patient with a platelet count of 75,000.  His ANC is 1.15.  We will go  ahead and treat patient at current dose for cycle #6 at 30 mg/m².  We will have patient return to the infusion center next Monday for repeat CBC to check to see if patient becomes neutropenic.  May need to give patient Neupogen depending upon counts.

## 2019-03-25 NOTE — PROGRESS NOTES
Chemotherapy Verification - SECONDARY RN       Height = 71.5 inches  Weight = 113 kg  BSA = 2.38 m2       Medication: Cisplatin (Platinol)  Dose: 30 mg/m2  Calculated Dose: 71.4 mg                             (In mg/m2, AUC, mg/kg)         I confirm that this process was performed independently.

## 2019-03-26 ENCOUNTER — HOSPITAL ENCOUNTER (OUTPATIENT)
Dept: RADIATION ONCOLOGY | Facility: MEDICAL CENTER | Age: 59
End: 2019-03-26

## 2019-03-26 LAB
CHEMOTHERAPY INFUSION START DATE: NORMAL
CHEMOTHERAPY RECORDS: 2.12
CHEMOTHERAPY RECORDS: 6996
CHEMOTHERAPY RECORDS: NORMAL
CHEMOTHERAPY RX CANCER: NORMAL
RAD ONC ARIA COURSE TREATMENT ELAPSED DAYS: NORMAL
RAD ONC ARIA PLAN TREATMENT DATES: NORMAL
RAD ONC ARIA REFERENCE POINT DOSAGE GIVEN TO DATE: 65.72
RAD ONC ARIA REFERENCE POINT DOSAGE GIVEN TO DATE: 67.58
RAD ONC ARIA REFERENCE POINT ID: NORMAL
RAD ONC ARIA REFERENCE POINT ID: NORMAL
RAD ONC ARIA REFERENCE POINT SESSION DOSAGE GIVEN: 2.12
RAD ONC ARIA REFERENCE POINT SESSION DOSAGE GIVEN: 2.18

## 2019-03-26 PROCEDURE — 77014 PR CT GUIDANCE PLACEMENT RAD THERAPY FIELDS: CPT | Mod: 26 | Performed by: RADIOLOGY

## 2019-03-26 PROCEDURE — 77386 HCHG IMRT DELIVERY COMPLEX: CPT | Performed by: RADIOLOGY

## 2019-03-26 NOTE — PROGRESS NOTES
Patient arrived for Cycle 6 Cisplatin; reports fatigue, pain with swallowing and overall fatigue.  PIV started, labs drawn. Results reviewed with APN Alsop, ok to treat, additional hydration ordered.  Would like patient to return Friday for repeat lab draw, possible neupogen.  Discussed with patient, new appt scheduled.  Treatment completed without issue; additional magnesium infused as well.  Pt tolerated well; print out of appts given to patient.  Reports completing radiation Thursday, anxious to be finished.  Emotional support provided, discharged home in no apparent distress.

## 2019-03-27 ENCOUNTER — DOCUMENTATION (OUTPATIENT)
Dept: HEMATOLOGY ONCOLOGY | Facility: MEDICAL CENTER | Age: 59
End: 2019-03-27

## 2019-03-27 VITALS
DIASTOLIC BLOOD PRESSURE: 45 MMHG | SYSTOLIC BLOOD PRESSURE: 122 MMHG | TEMPERATURE: 98.3 F | OXYGEN SATURATION: 96 % | BODY MASS INDEX: 34.88 KG/M2 | WEIGHT: 253.6 LBS | HEART RATE: 56 BPM

## 2019-03-27 DIAGNOSIS — C10.9 OROPHARYNX CANCER (HCC): ICD-10-CM

## 2019-03-27 LAB
CHEMOTHERAPY INFUSION START DATE: NORMAL
CHEMOTHERAPY RECORDS: 2.12
CHEMOTHERAPY RECORDS: 6996
CHEMOTHERAPY RECORDS: NORMAL
CHEMOTHERAPY RX CANCER: NORMAL
RAD ONC ARIA COURSE TREATMENT ELAPSED DAYS: NORMAL
RAD ONC ARIA PLAN TREATMENT DATES: NORMAL
RAD ONC ARIA REFERENCE POINT DOSAGE GIVEN TO DATE: 67.84
RAD ONC ARIA REFERENCE POINT DOSAGE GIVEN TO DATE: 69.76
RAD ONC ARIA REFERENCE POINT ID: NORMAL
RAD ONC ARIA REFERENCE POINT ID: NORMAL
RAD ONC ARIA REFERENCE POINT SESSION DOSAGE GIVEN: 2.12
RAD ONC ARIA REFERENCE POINT SESSION DOSAGE GIVEN: 2.18

## 2019-03-27 PROCEDURE — 77014 PR CT GUIDANCE PLACEMENT RAD THERAPY FIELDS: CPT | Mod: 26 | Performed by: RADIOLOGY

## 2019-03-27 PROCEDURE — 77386 HCHG IMRT DELIVERY COMPLEX: CPT | Performed by: RADIOLOGY

## 2019-03-27 ASSESSMENT — PAIN SCALES - GENERAL: PAINLEVEL: 8=MODERATE-SEVERE PAIN

## 2019-03-27 NOTE — PROGRESS NOTES
ON TREATMENT  NOTE  RADIATION ONCOLOGY DEPARTMENT    Patient name:  Maximino Green    Primary Physician:  Giancarlo Lomeli M.D. MRN: [id number]  CSN: [id number]   Referring physician:  Andre Wolf M.D. : 1960, 58 y.o.     ENCOUNTER DATE:  19    DIAGNOSIS:  Cancer Staging  Tonsil cancer (HCC)  Staging form: Pharynx - HPV-Mediated Oropharynx, AJCC 8th Edition  - Clinical stage from 1/15/2019: Stage II (cT3, cN2, cM0, p16: Positive) - Signed by Edward SKELTON M.D. on 3/27/2019        TREATMENT SUMMARY:  Radiation Treatments     Active   Plans   Oropharynx   Most recent treatment: Dose planned: 212 cGy (fraction 32 of 33 on 3/27/2019)   Total: Dose planned: 6,996 cGy   Elapsed Course Treatment Days: 44 @ [id number]      Reference Points   Oropharynx   Most recent treatment: Dose given: 212 cGy (on 3/27/2019)   Total: Dose given: 6,784 cGy   Elapsed Course Treatment Days: 44 @ [id number]      Oropharynx CP   Most recent treatment: Dose given: 218 cGy (on 3/27/2019)   Total: Dose given: 6,976 cGy   Elapsed Course Treatment Days: 44 @ [id number]         Historical   Patient's record has no historical radiation treatments documented.             SUBJECTIVE:  Pharyngitis 6-8 with swallowing. Xerostomia, altered taste, thick saliva    VITAL SIGNS:  Encounter Vitals  Temperature: 36.8 °C (98.3 °F)  Blood Pressure: 122/45  Pulse: (!) 56  Pulse Oximetry: 96 %  Weight: 115 kg (253 lb 9.6 oz)  Pain Score: 8=Moderate-Severe Pain  Pain Assessment 3/27/2019 3/25/2019 3/20/2019   Pain Assessment - - Chronic Pain   Pain Score 8 10 2   Pain Loc Throat - Throat   What increases pain? no pain unless swallowing - Swallowing increases pain to 8/10   Some recent data might be hidden       PHYSICAL EXAM:  Physical Exam     ECOG Performance Review 3/27/2019 3/20/2019   ECOG Performance Status Ambulatory and capable of all selfcare but unable to carry out any work activities.  Up and about more than 50% of  waking hours Ambulatory and capable of all selfcare but unable to carry out any work activities.  Up and about more than 50% of waking hours   Some recent data might be hidden        Toxicity Assessment 3/27/2019 3/20/2019   Toxicity Assessment Head/Neck Head/Neck   Fatigue (lethargy, malaise, asthenia) Moderate (e.g., decrease in performance status by 1 ECOG level or 20% Karnofsky) or causing difficulty performing some activities Moderate (e.g., decrease in performance status by 1 ECOG level or 20% Karnofsky) or causing difficulty performing some activities   Radiation Dermatitis Moderate to brisk erythema or a patchy moist desquamation, mostly confined to skin folds and creases, with/without moderate edema Moderate to brisk erythema or a patchy moist desquamation, mostly confined to skin folds and creases, with/without moderate edema   Rash/desquamation Macular or papular eruption or erythema with pruritus or other associated symptoms covering <50% of body surface or localized desquamation or other lesions covering <50% of body surface area Macular or papular eruption or erythema without associated symptoms   Constipation None None   Mouth Dryness Moderate Moderate   RT Dysphagia-Pharyngeal Dysphagia, requiring feeding tube, IV hydration or hyperlimentation Dysphagia, requiring feeding tube, IV hydration or hyperlimentation   Mucositis None None   Salivary Gland Changes Thick, ropy, sticky saliva and/or markedly altered taste and alteration in diet required Thick, ropy, sticky saliva and/or markedly altered taste and alteration in diet required   Stomatitis/Pharyngitis None None   Taste Disturbance (dysgeusia) Markedly altered Markedly altered   RT - Pain due to RT - None   Cough Absent Mild, relieved by non-prescription medication   Voice changes/stridor/larynx (hoarseness, loss of voice, laryngitis) Mild or intermittent hoarseness Mild or intermittent hoarseness       CURRENT MEDICATIONS:    Current Outpatient  Prescriptions:   •  lidocaine viscous 2% (XYLOCAINE) 2 % Solution, Take 15 mL by mouth 4 times a day as needed for Throat/Mouth Pain., Disp: 250 mL, Rfl: 0  •  silver sulfADIAZINE (SILVADENE) 1 % Cream, Apply  to affected area(s) every day., Disp: , Rfl:   •  metoprolol (LOPRESSOR) 25 MG Tab, Take 1 Tab by mouth 2 Times a Day., Disp: 60 Tab, Rfl: 0  •  fluconazole (DIFLUCAN) 200 MG Tab, Take 1 Tab by mouth every day. Take 2 on day #1, then one daily till all gone (Patient not taking: Reported on 3/25/2019), Disp: 22 Tab, Rfl: 1    LABORATORY DATA:   Recent Labs      03/25/19   0947   WBC  2.1*   RBC  3.54*   HEMOGLOBIN  11.9*   HEMATOCRIT  37.2*   MCV  105.1*   MCH  33.6*   RDW  56.4*   PLATELETCT  75*   MPV  13.5*   NEUTSPOLYS  62.20   LYMPHOCYTES  15.10*   MONOCYTES  18.90*   EOSINOPHILS  2.70   BASOPHILS  1.10     Recent Labs      03/25/19   0947   SODIUM  141   POTASSIUM  4.0   CHLORIDE  107   CO2  26   GLUCOSE  119*   BUN  20       RADIOLOGY DATA:  No results found.    IMPRESSION:  Cancer Staging  Tonsil cancer (HCC)  Staging form: Pharynx - HPV-Mediated Oropharynx, AJCC 8th Edition  - Clinical stage from 1/15/2019: Stage II (cT3, cN2, cM0, p16: Positive) - Signed by Edward SKELTON M.D. on 3/27/2019        PLAN:  Compelte chemoxrt tomorrow. Rtc 6 week    Disposition:  Continue present management.  The patient’s dosimetry treatment plan and portal images have been reviewed and approved without modification in treatment plan.

## 2019-03-28 ENCOUNTER — HOSPITAL ENCOUNTER (OUTPATIENT)
Dept: RADIATION ONCOLOGY | Facility: MEDICAL CENTER | Age: 59
End: 2019-03-28

## 2019-03-28 LAB
CHEMOTHERAPY INFUSION START DATE: NORMAL
CHEMOTHERAPY RECORDS: 2.12
CHEMOTHERAPY RECORDS: 6996
CHEMOTHERAPY RECORDS: NORMAL
CHEMOTHERAPY RX CANCER: NORMAL
RAD ONC ARIA COURSE TREATMENT ELAPSED DAYS: NORMAL
RAD ONC ARIA PLAN TREATMENT DATES: NORMAL
RAD ONC ARIA REFERENCE POINT DOSAGE GIVEN TO DATE: 69.96
RAD ONC ARIA REFERENCE POINT DOSAGE GIVEN TO DATE: 71.94
RAD ONC ARIA REFERENCE POINT ID: NORMAL
RAD ONC ARIA REFERENCE POINT ID: NORMAL
RAD ONC ARIA REFERENCE POINT SESSION DOSAGE GIVEN: 2.12
RAD ONC ARIA REFERENCE POINT SESSION DOSAGE GIVEN: 2.18

## 2019-03-28 PROCEDURE — 77014 PR CT GUIDANCE PLACEMENT RAD THERAPY FIELDS: CPT | Mod: 26 | Performed by: RADIOLOGY

## 2019-03-28 PROCEDURE — 77427 RADIATION TX MANAGEMENT X5: CPT | Performed by: RADIOLOGY

## 2019-03-28 PROCEDURE — 77386 HCHG IMRT DELIVERY COMPLEX: CPT | Performed by: RADIOLOGY

## 2019-03-28 PROCEDURE — 77336 RADIATION PHYSICS CONSULT: CPT | Performed by: RADIOLOGY

## 2019-03-31 NOTE — PROGRESS NOTES
Nutrition Services: Nutrition Support Update  Met with pt to follow-up on current nutrition status. Pt feeling very fatigued, though he continues to administer TF at goal with good tolerance. Current TF TwoCal HN @ 6 containers/day, to provide 2850 kcals, 120 gm protein, and 996 mL free water per day. Pt to flush with 50 mL free water before and after each bolus for a additional 600 mL free water (Total 1600 mL water from PEG). Pt denied any GI distress.     Assessment:  Weight: 248 lb - stable  Current estimated nutrition needs remains appropriate    Plan/Recommendations:   1. Continue TF at goal: TwoCal HN @ 6 containers/day, to provide 2850 kcals, 120 gm protein, and 996 mL free water per day. Pt to flush with 50 mL free water before and after each bolus for a additional 600 mL free water (Total 1600 mL water from PEG)  2. Pt to orally consume 1250 mL fluids to meed fluid needs or provide FWB.   3. Encouraged pt to follow-up with speech therapy asap, new order placed  4. Pt to  Take anti-emetics PRN as prescribed.   5. Pt to increase oral care with salt water rinses and medications prescribed by MD for thrush.   6. Encouraged pt to check blood sugars at home     Pt reports understanding and was receptive. RD following and to make further recommendations as indicated.

## 2019-04-01 ENCOUNTER — TELEPHONE (OUTPATIENT)
Dept: HEMATOLOGY ONCOLOGY | Facility: MEDICAL CENTER | Age: 59
End: 2019-04-01

## 2019-04-01 ENCOUNTER — OUTPATIENT INFUSION SERVICES (OUTPATIENT)
Dept: ONCOLOGY | Facility: MEDICAL CENTER | Age: 59
End: 2019-04-01
Attending: INTERNAL MEDICINE
Payer: COMMERCIAL

## 2019-04-01 VITALS
WEIGHT: 246.47 LBS | RESPIRATION RATE: 18 BRPM | OXYGEN SATURATION: 95 % | SYSTOLIC BLOOD PRESSURE: 116 MMHG | DIASTOLIC BLOOD PRESSURE: 47 MMHG | HEIGHT: 72 IN | TEMPERATURE: 97.8 F | HEART RATE: 74 BPM | BODY MASS INDEX: 33.38 KG/M2

## 2019-04-01 DIAGNOSIS — C09.9 TONSIL CANCER (HCC): ICD-10-CM

## 2019-04-01 LAB
BASOPHILS # BLD AUTO: 1.4 % (ref 0–1.8)
BASOPHILS # BLD: 0.03 K/UL (ref 0–0.12)
CHEMOTHERAPY INFUSION START DATE: NORMAL
CHEMOTHERAPY RECORDS: 2.12
CHEMOTHERAPY RECORDS: 6996
CHEMOTHERAPY RECORDS: NORMAL
CHEMOTHERAPY RX CANCER: NORMAL
EOSINOPHIL # BLD AUTO: 0.05 K/UL (ref 0–0.51)
EOSINOPHIL NFR BLD: 2.3 % (ref 0–6.9)
ERYTHROCYTE [DISTWIDTH] IN BLOOD BY AUTOMATED COUNT: 53.6 FL (ref 35.9–50)
HCT VFR BLD AUTO: 28 % (ref 42–52)
HGB BLD-MCNC: 9.7 G/DL (ref 14–18)
IMM GRANULOCYTES # BLD AUTO: 0 K/UL (ref 0–0.11)
IMM GRANULOCYTES NFR BLD AUTO: 0 % (ref 0–0.9)
LYMPHOCYTES # BLD AUTO: 0.28 K/UL (ref 1–4.8)
LYMPHOCYTES NFR BLD: 12.7 % (ref 22–41)
MCH RBC QN AUTO: 33.1 PG (ref 27–33)
MCHC RBC AUTO-ENTMCNC: 34.6 G/DL (ref 33.7–35.3)
MCV RBC AUTO: 95.6 FL (ref 81.4–97.8)
MONOCYTES # BLD AUTO: 0.31 K/UL (ref 0–0.85)
MONOCYTES NFR BLD AUTO: 14 % (ref 0–13.4)
NEUTROPHILS # BLD AUTO: 1.54 K/UL (ref 1.82–7.42)
NEUTROPHILS NFR BLD: 69.6 % (ref 44–72)
NRBC # BLD AUTO: 0 K/UL
NRBC BLD-RTO: 0 /100 WBC
PLATELET # BLD AUTO: 90 K/UL (ref 164–446)
PMV BLD AUTO: 13.3 FL (ref 9–12.9)
RAD ONC ARIA COURSE TREATMENT ELAPSED DAYS: NORMAL
RAD ONC ARIA PLAN TREATMENT DATES: NORMAL
RAD ONC ARIA REFERENCE POINT DOSAGE GIVEN TO DATE: 69.96
RAD ONC ARIA REFERENCE POINT DOSAGE GIVEN TO DATE: 71.94
RAD ONC ARIA REFERENCE POINT ID: NORMAL
RAD ONC ARIA REFERENCE POINT ID: NORMAL
RBC # BLD AUTO: 2.93 M/UL (ref 4.7–6.1)
WBC # BLD AUTO: 2.2 K/UL (ref 4.8–10.8)

## 2019-04-01 PROCEDURE — 85025 COMPLETE CBC W/AUTO DIFF WBC: CPT

## 2019-04-01 PROCEDURE — 36415 COLL VENOUS BLD VENIPUNCTURE: CPT

## 2019-04-01 NOTE — TELEPHONE ENCOUNTER
Received a call from Deidre in infusion at 3130 wishing to speak with MARI Rees, regarding patient's labs. Warm transferred call to MARI Rees.

## 2019-04-01 NOTE — PROGRESS NOTES
Patient arrives to [facility] for lab draw.  Labs drawn via 25g butterfly needle.  Received call from lab with critical WBC of 2.2, ANC is 1540 and Platelets are 90K today.  Notified MARI Rees of CBC results.  Patient does not need Neupogen injection today per MARI Grimaldo.  Patient is scheduled to see MARI Grimaldo on 4/08/19 for follow up visit.  Patient dc home to self care.

## 2019-04-02 NOTE — TELEPHONE ENCOUNTER
1st attempt    Left message for patient to return call. Please let him know Re would like to have a CBC drawn prior to his appointment on 4/8/19. Patient is scheduled for a lab draw in our office on 4/8/19 at 10:15 am. Please give patient this information when he returns our call.

## 2019-04-02 NOTE — TELEPHONE ENCOUNTER
Patient returned ANITA Montes's, call from 04/01/19. Advised patient that MARI Rees, would like patient to have lab work done prior to seeing her. Further advised that patient is scheduled for 04/08/19 at 10:15 for lab work, prior to appointment with MARI Rees. Patient confirmed and advised he would be here.

## 2019-04-08 ENCOUNTER — OFFICE VISIT (OUTPATIENT)
Dept: HEMATOLOGY ONCOLOGY | Facility: MEDICAL CENTER | Age: 59
End: 2019-04-08
Payer: COMMERCIAL

## 2019-04-08 ENCOUNTER — TELEPHONE (OUTPATIENT)
Dept: HEMATOLOGY ONCOLOGY | Facility: MEDICAL CENTER | Age: 59
End: 2019-04-08

## 2019-04-08 ENCOUNTER — NON-PROVIDER VISIT (OUTPATIENT)
Dept: HEMATOLOGY ONCOLOGY | Facility: MEDICAL CENTER | Age: 59
End: 2019-04-08
Payer: COMMERCIAL

## 2019-04-08 ENCOUNTER — HOSPITAL ENCOUNTER (OUTPATIENT)
Facility: MEDICAL CENTER | Age: 59
End: 2019-04-08
Attending: NURSE PRACTITIONER
Payer: COMMERCIAL

## 2019-04-08 VITALS
BODY MASS INDEX: 33.23 KG/M2 | DIASTOLIC BLOOD PRESSURE: 48 MMHG | SYSTOLIC BLOOD PRESSURE: 112 MMHG | TEMPERATURE: 98.2 F | HEART RATE: 70 BPM | WEIGHT: 245.37 LBS | OXYGEN SATURATION: 97 % | RESPIRATION RATE: 16 BRPM | HEIGHT: 72 IN

## 2019-04-08 VITALS
BODY MASS INDEX: 33.23 KG/M2 | RESPIRATION RATE: 16 BRPM | TEMPERATURE: 98.2 F | SYSTOLIC BLOOD PRESSURE: 112 MMHG | HEIGHT: 72 IN | HEART RATE: 70 BPM | DIASTOLIC BLOOD PRESSURE: 48 MMHG | WEIGHT: 245.37 LBS | OXYGEN SATURATION: 97 %

## 2019-04-08 DIAGNOSIS — Z79.899 LONG TERM USE OF DRUG: ICD-10-CM

## 2019-04-08 DIAGNOSIS — C09.9 TONSIL CANCER (HCC): ICD-10-CM

## 2019-04-08 LAB
ALBUMIN SERPL BCP-MCNC: 3.4 G/DL (ref 3.2–4.9)
ALBUMIN/GLOB SERPL: 1.2 G/DL
ALP SERPL-CCNC: 64 U/L (ref 30–99)
ALT SERPL-CCNC: 24 U/L (ref 2–50)
ANION GAP SERPL CALC-SCNC: 6 MMOL/L (ref 0–11.9)
AST SERPL-CCNC: 29 U/L (ref 12–45)
BASOPHILS # BLD AUTO: 1.3 % (ref 0–1.8)
BASOPHILS # BLD: 0.02 K/UL (ref 0–0.12)
BILIRUB SERPL-MCNC: 0.6 MG/DL (ref 0.1–1.5)
BUN SERPL-MCNC: 19 MG/DL (ref 8–22)
CALCIUM SERPL-MCNC: 9 MG/DL (ref 8.5–10.5)
CHLORIDE SERPL-SCNC: 107 MMOL/L (ref 96–112)
CO2 SERPL-SCNC: 28 MMOL/L (ref 20–33)
CREAT SERPL-MCNC: 0.6 MG/DL (ref 0.5–1.4)
EOSINOPHIL # BLD AUTO: 0.04 K/UL (ref 0–0.51)
EOSINOPHIL NFR BLD: 2.6 % (ref 0–6.9)
ERYTHROCYTE [DISTWIDTH] IN BLOOD BY AUTOMATED COUNT: 62.8 FL (ref 35.9–50)
GLOBULIN SER CALC-MCNC: 2.8 G/DL (ref 1.9–3.5)
GLUCOSE SERPL-MCNC: 246 MG/DL (ref 65–99)
HCT VFR BLD AUTO: 29.6 % (ref 42–52)
HGB BLD-MCNC: 9.9 G/DL (ref 14–18)
IMM GRANULOCYTES # BLD AUTO: 0 K/UL (ref 0–0.11)
IMM GRANULOCYTES NFR BLD AUTO: 0 % (ref 0–0.9)
LYMPHOCYTES # BLD AUTO: 0.25 K/UL (ref 1–4.8)
LYMPHOCYTES NFR BLD: 16 % (ref 22–41)
MAGNESIUM SERPL-MCNC: 1.6 MG/DL (ref 1.5–2.5)
MCH RBC QN AUTO: 34.3 PG (ref 27–33)
MCHC RBC AUTO-ENTMCNC: 33.4 G/DL (ref 33.7–35.3)
MCV RBC AUTO: 102.4 FL (ref 81.4–97.8)
MONOCYTES # BLD AUTO: 0.26 K/UL (ref 0–0.85)
MONOCYTES NFR BLD AUTO: 16.7 % (ref 0–13.4)
NEUTROPHILS # BLD AUTO: 0.99 K/UL (ref 1.82–7.42)
NEUTROPHILS NFR BLD: 63.4 % (ref 44–72)
NRBC # BLD AUTO: 0 K/UL
NRBC BLD-RTO: 0 /100 WBC
PLATELET # BLD AUTO: 132 K/UL (ref 164–446)
PMV BLD AUTO: 13.4 FL (ref 9–12.9)
POTASSIUM SERPL-SCNC: 3.6 MMOL/L (ref 3.6–5.5)
PROT SERPL-MCNC: 6.2 G/DL (ref 6–8.2)
RBC # BLD AUTO: 2.89 M/UL (ref 4.7–6.1)
SODIUM SERPL-SCNC: 141 MMOL/L (ref 135–145)
WBC # BLD AUTO: 1.6 K/UL (ref 4.8–10.8)

## 2019-04-08 PROCEDURE — 80053 COMPREHEN METABOLIC PANEL: CPT

## 2019-04-08 PROCEDURE — 36415 COLL VENOUS BLD VENIPUNCTURE: CPT | Performed by: NURSE PRACTITIONER

## 2019-04-08 PROCEDURE — 99213 OFFICE O/P EST LOW 20 MIN: CPT | Performed by: NURSE PRACTITIONER

## 2019-04-08 PROCEDURE — 85025 COMPLETE CBC W/AUTO DIFF WBC: CPT

## 2019-04-08 PROCEDURE — 83735 ASSAY OF MAGNESIUM: CPT

## 2019-04-08 ASSESSMENT — PAIN SCALES - GENERAL
PAINLEVEL: 6=MODERATE PAIN
PAINLEVEL: 6=MODERATE PAIN

## 2019-04-08 ASSESSMENT — ENCOUNTER SYMPTOMS
DIZZINESS: 0
SORE THROAT: 1
NAUSEA: 0
COUGH: 0
WEIGHT LOSS: 1
SHORTNESS OF BREATH: 0
HEADACHES: 0
SPUTUM PRODUCTION: 1
PALPITATIONS: 0
DIARRHEA: 0
CONSTIPATION: 0
CHILLS: 0
VOMITING: 0
FEVER: 0

## 2019-04-08 NOTE — TELEPHONE ENCOUNTER
Claudette from [facility] lab called with critical WBC value at 1.6. Patients ANC.99 Result read back to claudette and reported to Re GUERRA with no new orderes received.

## 2019-04-08 NOTE — PROGRESS NOTES
Subjective:      Maximino Green is a 58 y.o. male who presents with Follow-Up (post treatment fv) for tonsillar carcinoma.         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 Patient present unaccompanied for today's visit.  Patient concluded radiation therapy on Thursday, March 28 with last chemotherapy was on March 26.  Patient did receive 7 weeks of treatment, total of 44 fractions of radiation, and cycle 6 was held due to thrombocytopenia.    Overall patient has been undergoing a slow healing from completion of treatment.  He does still have fatigue and is experience approximate 4 pound weight loss.  He is feeding himself anywhere from 4-6 cans per his PEG tube.  He is not taking anything in orally at this time.  He does have a sore throat still noted when he swallows his saliva.  He does have thick sputum production still noted.  He denies any coughing or wheezing or shortness of breath.  He denies any nausea or vomiting, diarrhea or constipation.  Patient is very anxious to get back to work as he is concerned for continued unemployment as he is a .    No Known Allergies  Current Outpatient Prescriptions on File Prior to Visit   Medication Sig Dispense Refill   • metoprolol (LOPRESSOR) 25 MG Tab Take 1 Tab by mouth 2 Times a Day. 60 Tab 0   • lidocaine viscous 2% (XYLOCAINE) 2 % Solution Take 15 mL by mouth 4 times a day as needed for Throat/Mouth Pain. (Patient not taking: Reported on 4/8/2019) 250 mL 0   • silver sulfADIAZINE (SILVADENE) 1 % Cream Apply  to affected area(s) every day.       No current facility-administered medications on file prior to visit.        Review of Systems   Constitutional: Positive for malaise/fatigue and weight loss. Negative for chills and fever.   HENT: Positive for sore throat (noted when swallowing).    Respiratory: Positive for sputum production (thick sputum production). Negative for cough and  "shortness of breath.    Cardiovascular: Negative for chest pain and palpitations.   Gastrointestinal: Negative for constipation, diarrhea, nausea and vomiting.   Genitourinary: Negative for dysuria.   Neurological: Negative for dizziness and headaches.          Objective:     /48 (BP Location: Right arm, Patient Position: Sitting, BP Cuff Size: Adult)   Pulse 70   Temp 36.8 °C (98.2 °F) (Temporal)   Resp 16   Ht 1.825 m (5' 11.85\")   Wt 111.3 kg (245 lb 6 oz)   SpO2 97%   BMI 33.42 kg/m²      Physical Exam   Constitutional: He is oriented to person, place, and time. He appears well-developed and well-nourished. No distress.   HENT:   Head: Normocephalic and atraumatic.   Mouth/Throat: Oropharynx is clear and moist. No oropharyngeal exudate.   Thick secretions noted   Cardiovascular: Normal rate, regular rhythm and normal heart sounds.  Exam reveals no gallop and no friction rub.    No murmur heard.  Pulmonary/Chest: Effort normal. No respiratory distress. He has no wheezes.   Clear to auscultation - diminished throughout   Abdominal: Soft. Bowel sounds are normal. He exhibits no distension. There is no tenderness.   Musculoskeletal: Normal range of motion. He exhibits no edema or tenderness.   Neurological: He is alert and oriented to person, place, and time.   Skin: Skin is warm and dry. No rash noted. He is not diaphoretic. No erythema. No pallor.   Psychiatric: He has a normal mood and affect. His behavior is normal.   Vitals reviewed.      Hospital Outpatient Visit on 04/08/2019   Component Date Value Ref Range Status   • WBC 04/08/2019 1.6* 4.8 - 10.8 K/uL Final   • RBC 04/08/2019 2.89* 4.70 - 6.10 M/uL Final   • Hemoglobin 04/08/2019 9.9* 14.0 - 18.0 g/dL Final   • Hematocrit 04/08/2019 29.6* 42.0 - 52.0 % Final   • MCV 04/08/2019 102.4* 81.4 - 97.8 fL Final   • MCH 04/08/2019 34.3* 27.0 - 33.0 pg Final   • MCHC 04/08/2019 33.4* 33.7 - 35.3 g/dL Final   • RDW 04/08/2019 62.8* 35.9 - 50.0 fL Final "   • Platelet Count 04/08/2019 132* 164 - 446 K/uL Final   • MPV 04/08/2019 13.4* 9.0 - 12.9 fL Final   • Neutrophils-Polys 04/08/2019 63.40  44.00 - 72.00 % Final   • Lymphocytes 04/08/2019 16.00* 22.00 - 41.00 % Final   • Monocytes 04/08/2019 16.70* 0.00 - 13.40 % Final   • Eosinophils 04/08/2019 2.60  0.00 - 6.90 % Final   • Basophils 04/08/2019 1.30  0.00 - 1.80 % Final   • Immature Granulocytes 04/08/2019 0.00  0.00 - 0.90 % Final   • Nucleated RBC 04/08/2019 0.00  /100 WBC Final   • Neutrophils (Absolute) 04/08/2019 0.99* 1.82 - 7.42 K/uL Final    Includes immature neutrophils, if present.   • Lymphs (Absolute) 04/08/2019 0.25* 1.00 - 4.80 K/uL Final   • Monos (Absolute) 04/08/2019 0.26  0.00 - 0.85 K/uL Final   • Eos (Absolute) 04/08/2019 0.04  0.00 - 0.51 K/uL Final   • Baso (Absolute) 04/08/2019 0.02  0.00 - 0.12 K/uL Final   • Immature Granulocytes (abs) 04/08/2019 0.00  0.00 - 0.11 K/uL Final   • NRBC (Absolute) 04/08/2019 0.00  K/uL Final   • Sodium 04/08/2019 141  135 - 145 mmol/L Final   • Potassium 04/08/2019 3.6  3.6 - 5.5 mmol/L Final   • Chloride 04/08/2019 107  96 - 112 mmol/L Final   • Co2 04/08/2019 28  20 - 33 mmol/L Final   • Anion Gap 04/08/2019 6.0  0.0 - 11.9 Final   • Glucose 04/08/2019 246* 65 - 99 mg/dL Final   • Bun 04/08/2019 19  8 - 22 mg/dL Final   • Creatinine 04/08/2019 0.60  0.50 - 1.40 mg/dL Final   • Calcium 04/08/2019 9.0  8.5 - 10.5 mg/dL Final   • AST(SGOT) 04/08/2019 29  12 - 45 U/L Final   • ALT(SGPT) 04/08/2019 24  2 - 50 U/L Final   • Alkaline Phosphatase 04/08/2019 64  30 - 99 U/L Final   • Total Bilirubin 04/08/2019 0.6  0.1 - 1.5 mg/dL Final   • Albumin 04/08/2019 3.4  3.2 - 4.9 g/dL Final   • Total Protein 04/08/2019 6.2  6.0 - 8.2 g/dL Final   • Globulin 04/08/2019 2.8  1.9 - 3.5 g/dL Final   • A-G Ratio 04/08/2019 1.2  g/dL Final   • Magnesium 04/08/2019 1.6  1.5 - 2.5 mg/dL Final   • GFR If  04/08/2019 >60  >60 mL/min/1.73 m 2 Final   • GFR If Non   04/08/2019 >60  >60 mL/min/1.73 m 2 Final          Assessment/Plan:     1. Tonsil cancer (HCC)       Plan  1.  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  He recently completed concurrent chemotherapy with weekly cisplatin along with concurrent radiation therapy on March 28, 2019.  Cycle #6 was held due to some cytopenia however he did receive cycle #7, total of 6 doses while undergoing treatment.  We have been monitoring his blood counts closely which shows slight neutropenia today with an ANC of 990, however his platelets have trended up to 132 at this time.  There is noted to have elevated glucose level at this time likely related to his nutrition feedings.    2.  At this time patient is scheduled for an MRI ordered by Dr. Harris on May 13 and is due to follow-up with Dr. Harris on May 16.  He also scheduled to follow-up with Dr. Looney on May 20 for further evaluation following completion of treatment.    3.  Patient is due to establish care with primary care provider the next few weeks.  Patient was diagnosed with atrial fibrillation during his PEG tube placement.  Discussed with patient the importance of continuing medication prescribed to him During that hospital stay however patient stated he has not been taking it regularly as he believed it was only due to the procedure and no longer needs it.  Will defer any non-oncological management to PCP.  I did encourage him to take the medication as prescribed which she did confirm he has plenty of medication left.    4.  Patient to follow-up in clinic as scheduled, or sooner if needed.

## 2019-04-08 NOTE — PROGRESS NOTES
Venipuncture was performed on pt for ordered labs per protocol using aseptic technique.  Specimen was obtained from right hand using 25 ga butterfly needle.  Pt tolerated procedure well and ambulated from clinic without s/s of distress.

## 2019-04-11 NOTE — PROGRESS NOTES
Nutrition Services: Nutrition Support Update  Met with pt to follow-up on current nutrition status. Pt with increased fatigued, though he continues to administer TF at goal with good tolerance. Current TF TwoCal HN @ 6 containers/day, to provide 2850 kcals, 120 gm protein, and 996 mL free water per day. Pt to flush with 50 mL free water before and after each bolus for a additional 600 mL free water (Total 1600 mL water from PEG). Pt denied any GI distress.      Assessment:  Weight: 254 lb - stable/ increased  Current estimated nutrition needs remains appropriate     Plan/Recommendations:   1. Continue TF at goal: TwoCal HN @ 6 containers/day, to provide 2850 kcals, 120 gm protein, and 996 mL free water per day. Pt to flush with 50 mL free water before and after each bolus for a additional 600 mL free water (Total 1600 mL water from PEG)  2. Pt to orally consume 1250 mL fluids to meed fluid needs or provide FWB.   3. Encouraged pt to follow-up with speech therapy asap, new order placed  4. Pt to  Take anti-emetics PRN as prescribed.   5. Pt to increase oral care with salt water rinses and medications prescribed by MD for thrush.   6. Encouraged pt to check blood sugars at home     Pt reports understanding and was receptive. RD following and to make further recommendations as indicated.

## 2019-04-17 ENCOUNTER — PATIENT OUTREACH (OUTPATIENT)
Dept: OTHER | Facility: MEDICAL CENTER | Age: 59
End: 2019-04-17

## 2019-04-17 NOTE — PROGRESS NOTES
On 4/16/2019 at 1406 patient called this ONN and left voicemail. Patient stated he was in need of helping adjusting his speech therapy appointment times.     On 4/17/2019 at 0912 this ONN called patient back. Patient states he is still very fatigued from treatment but that patient has been told he is recovering well from treatment. Patient states he is trying to work and that some of his speech therapy appointments conflict with his work schedule. This ONN explained that this ONN cannot change those appointments but can transfer patient to the right department. Patient denies other questions or concerns at this time, this ONN then transferred patient to [facility] [place].

## 2019-04-18 ENCOUNTER — OFFICE VISIT (OUTPATIENT)
Dept: MEDICAL GROUP | Facility: PHYSICIAN GROUP | Age: 59
End: 2019-04-18
Payer: COMMERCIAL

## 2019-04-18 VITALS
WEIGHT: 236 LBS | TEMPERATURE: 98 F | DIASTOLIC BLOOD PRESSURE: 70 MMHG | BODY MASS INDEX: 31.97 KG/M2 | OXYGEN SATURATION: 98 % | RESPIRATION RATE: 16 BRPM | HEIGHT: 72 IN | SYSTOLIC BLOOD PRESSURE: 128 MMHG | HEART RATE: 60 BPM

## 2019-04-18 DIAGNOSIS — I10 ESSENTIAL HYPERTENSION: ICD-10-CM

## 2019-04-18 DIAGNOSIS — E11.9 TYPE 2 DIABETES MELLITUS WITHOUT COMPLICATION, WITHOUT LONG-TERM CURRENT USE OF INSULIN (HCC): ICD-10-CM

## 2019-04-18 DIAGNOSIS — I48.91 ATRIAL FIBRILLATION WITH RVR (HCC): ICD-10-CM

## 2019-04-18 DIAGNOSIS — Z12.11 SCREENING FOR COLON CANCER: ICD-10-CM

## 2019-04-18 DIAGNOSIS — C09.9 TONSIL CANCER (HCC): ICD-10-CM

## 2019-04-18 PROCEDURE — 99214 OFFICE O/P EST MOD 30 MIN: CPT | Performed by: NURSE PRACTITIONER

## 2019-04-18 NOTE — PROGRESS NOTES
Chief Complaint   Patient presents with   • Establish Care         This is a 58 y.o.male patient that presents today with the following: Establish care with new PCP    Atrial fibrillation with RVR ([facility])  Patient recently diagnosed with atrial fibrillation.  Patient was given IV metoprolol and obtained good control of his heart rate.  He was discharged on metoprolol 25 mg twice daily but he tells me today that he has not been taking it he was not sure that he was to continue on this medication.  Aspirin has been withheld because he is on tube feeding for now.  He reports to me that he was not referred to cardiology as an outpatient, this referral has been placed and he was strongly encouraged to continue back on the metoprolol twice a day.  Upon physical examination he was noted to have irregular heart rhythm.  He does deny any symptoms associated with this.    Essential hypertension  This is a chronic condition, stable and well controlled now with lifestyle modifications.  Not currently on blood pressure medication.  He does deny symptoms of hypertension.  He was advised to restart metoprolol for A. Fib (see additional notes).    Tonsil cancer (HCC)  Patient recently has been diagnosed with tonsillar cancer and is undergoing treatment via oncology.  He states that thus far he is tolerating well.  He does have feeding tube in place.    Type 2 diabetes mellitus without complication, without long-term current use of insulin (HCC)  Patient has been diagnosed with type 2 diabetes, his most recent A1c was 6.4%, not currently on treatment.  He is due for labs, these have been ordered.      Hospital Outpatient Visit on 04/08/2019   Component Date Value   • WBC 04/08/2019 1.6*   • RBC 04/08/2019 2.89*   • Hemoglobin 04/08/2019 9.9*   • Hematocrit 04/08/2019 29.6*   • MCV 04/08/2019 102.4*   • MCH 04/08/2019 34.3*   • MCHC 04/08/2019 33.4*   • RDW 04/08/2019 62.8*   • Platelet Count 04/08/2019 132*   • MPV 04/08/2019 13.4*    • Neutrophils-Polys 04/08/2019 63.40    • Lymphocytes 04/08/2019 16.00*   • Monocytes 04/08/2019 16.70*   • Eosinophils 04/08/2019 2.60    • Basophils 04/08/2019 1.30    • Immature Granulocytes 04/08/2019 0.00    • Nucleated RBC 04/08/2019 0.00    • Neutrophils (Absolute) 04/08/2019 0.99*   • Lymphs (Absolute) 04/08/2019 0.25*   • Monos (Absolute) 04/08/2019 0.26    • Eos (Absolute) 04/08/2019 0.04    • Baso (Absolute) 04/08/2019 0.02    • Immature Granulocytes (a* 04/08/2019 0.00    • NRBC (Absolute) 04/08/2019 0.00    • Sodium 04/08/2019 141    • Potassium 04/08/2019 3.6    • Chloride 04/08/2019 107    • Co2 04/08/2019 28    • Anion Gap 04/08/2019 6.0    • Glucose 04/08/2019 246*   • Bun 04/08/2019 19    • Creatinine 04/08/2019 0.60    • Calcium 04/08/2019 9.0    • AST(SGOT) 04/08/2019 29    • ALT(SGPT) 04/08/2019 24    • Alkaline Phosphatase 04/08/2019 64    • Total Bilirubin 04/08/2019 0.6    • Albumin 04/08/2019 3.4    • Total Protein 04/08/2019 6.2    • Globulin 04/08/2019 2.8    • A-G Ratio 04/08/2019 1.2    • Magnesium 04/08/2019 1.6    • GFR If  04/08/2019 >60    • GFR If Non  Ameri* 04/08/2019 >60    Outpatient Infusion Services on 04/01/2019   Component Date Value   • WBC 04/01/2019 2.2*   • RBC 04/01/2019 2.93*   • Hemoglobin 04/01/2019 9.7*   • Hematocrit 04/01/2019 28.0*   • MCV 04/01/2019 95.6    • MCH 04/01/2019 33.1*   • MCHC 04/01/2019 34.6    • RDW 04/01/2019 53.6*   • Platelet Count 04/01/2019 90*   • MPV 04/01/2019 13.3*   • Neutrophils-Polys 04/01/2019 69.60    • Lymphocytes 04/01/2019 12.70*   • Monocytes 04/01/2019 14.00*   • Eosinophils 04/01/2019 2.30    • Basophils 04/01/2019 1.40    • Immature Granulocytes 04/01/2019 0.00    • Nucleated RBC 04/01/2019 0.00    • Neutrophils (Absolute) 04/01/2019 1.54*   • Lymphs (Absolute) 04/01/2019 0.28*   • Monos (Absolute) 04/01/2019 0.31    • Eos (Absolute) 04/01/2019 0.05    • Baso (Absolute) 04/01/2019 0.03    • Immature  Granulocytes (a* 04/01/2019 0.00    • NRBC (Absolute) 04/01/2019 0.00    Orders Only on 04/01/2019   Component Date Value   • Course ID 04/01/2019 C1_Oropharynx    • Course Start Date 04/01/2019 2/4/2019  3:22 PM    • Course Elapsed Days 04/01/2019 45 @ [id number]    • Course Intent 04/01/2019 Curative    • Plan Treatment Dates 04/01/2019                      Value:First Treatment Date: [id number]  Last Treatment Date: [id number]     • RP ID 04/01/2019 Oropharynx    • RP Dosage Given to Date * 04/01/2019 69.96    • RP ID 04/01/2019 Oropharynx CP    • RP Dosage Given to Date * 04/01/2019 71.2888093269210    • Plan ID 04/01/2019 Oropharynx    • Plan Name 04/01/2019 Oropharynx    • Plan Fractions Treated t* 04/01/2019 33 of 33    • Plan Prescribed Dose Per* 04/01/2019 2.12    • Plan Total Prescribed Do* 04/01/2019 6996    Hospital Outpatient Visit on 03/27/2019   Component Date Value   • Course ID 03/27/2019 C1_Oropharynx    • Course Start Date 03/27/2019 2/4/2019  3:22 PM    • Course Elapsed Days 03/27/2019 44 @ [id number]    • Course Intent 03/27/2019 Curative    • Plan Treatment Dates 03/27/2019                      Value:First Treatment Date: [id number]  Last Treatment Date: [id number]     • RP ID 03/27/2019 Oropharynx    • RP Dosage Given to Date * 03/27/2019 67.84    • RP Session Dosage Given * 03/27/2019 2.12    • RP ID 03/27/2019 Oropharynx CP    • RP Dosage Given to Date * 03/27/2019 69.8152740384033    • RP Session Dosage Given * 03/27/2019 2.90876838    • Plan ID 03/27/2019 Oropharynx    • Plan Name 03/27/2019 Oropharynx    • Plan Fractions Treated t* 03/27/2019 32 of 33    • Plan Prescribed Dose Per* 03/27/2019 2.12    • Plan Total Prescribed Do* 03/27/2019 6996    Hospital Outpatient Visit on 03/20/2019   Component Date Value   • Course ID 03/20/2019 C1_Oropharynx    • Course Start Date 03/20/2019 2/4/2019  3:22 PM    • Course Elapsed Days 03/20/2019 37 @ [id number]    • Course Intent  03/20/2019 Curative    • Plan Treatment Dates 03/20/2019                      Value:First Treatment Date: [id number]  Last Treatment Date: [id number]     • RP ID 03/20/2019 Oropharynx    • RP Dosage Given to Date * 03/20/2019 57.24    • RP Session Dosage Given * 03/20/2019 2.12    • RP ID 03/20/2019 Oropharynx CP    • RP Dosage Given to Date * 03/20/2019 58.41795616    • RP Session Dosage Given * 03/20/2019 2.79406554    • Plan ID 03/20/2019 Oropharynx    • Plan Name 03/20/2019 Oropharynx    • Plan Fractions Treated t* 03/20/2019 27 of 33    • Plan Prescribed Dose Per* 03/20/2019 2.12    • Plan Total Prescribed Do* 03/20/2019 6996    Hospital Outpatient Visit on 03/28/2019   Component Date Value   • Course ID 03/28/2019 C1_Oropharynx    • Course Start Date 03/28/2019 2/4/2019  3:22 PM    • Course Elapsed Days 03/28/2019 45 @ [id number]    • Course Intent 03/28/2019 Curative    • Plan Treatment Dates 03/28/2019                      Value:First Treatment Date: [id number]  Last Treatment Date: [id number]     • RP ID 03/28/2019 Oropharynx    • RP Dosage Given to Date * 03/28/2019 69.96    • RP Session Dosage Given * 03/28/2019 2.12    • RP ID 03/28/2019 Oropharynx CP    • RP Dosage Given to Date * 03/28/2019 71.2582500931470    • RP Session Dosage Given * 03/28/2019 2.91986968    • Plan ID 03/28/2019 Oropharynx    • Plan Name 03/28/2019 Oropharynx    • Plan Fractions Treated t* 03/28/2019 33 of 33    • Plan Prescribed Dose Per* 03/28/2019 2.12    • Plan Total Prescribed Do* 03/28/2019 6996    Hospital Outpatient Visit on 03/26/2019   Component Date Value   • Course ID 03/26/2019 C1_Oropharynx    • Course Start Date 03/26/2019 2/4/2019  3:22 PM    • Course Elapsed Days 03/26/2019 43 @ [id number]    • Course Intent 03/26/2019 Curative    • Plan Treatment Dates 03/26/2019                      Value:First Treatment Date: [id number]  Last Treatment Date: [id number]     • RP ID 03/26/2019 Oropharynx    • RP  Dosage Given to Date * 03/26/2019 65.72    • RP Session Dosage Given * 03/26/2019 2.12    • RP ID 03/26/2019 Oropharynx CP    • RP Dosage Given to Date * 03/26/2019 67.9490222130720    • RP Session Dosage Given * 03/26/2019 2.22426403    • Plan ID 03/26/2019 Oropharynx    • Plan Name 03/26/2019 Oropharynx    • Plan Fractions Treated t* 03/26/2019 31 of 33    • Plan Prescribed Dose Per* 03/26/2019 2.12    • Plan Total Prescribed Do* 03/26/2019 6996    Hospital Outpatient Visit on 03/25/2019   Component Date Value   • Course ID 03/25/2019 C1_Oropharynx    • Course Start Date 03/25/2019 2/4/2019  3:22 PM    • Course Elapsed Days 03/25/2019 42 @ [id number]    • Course Intent 03/25/2019 Curative    • Plan Treatment Dates 03/25/2019                      Value:First Treatment Date: [id number]  Last Treatment Date: [id number]     • RP ID 03/25/2019 Oropharynx    • RP Dosage Given to Date * 03/25/2019 63.8505942718897    • RP Session Dosage Given * 03/25/2019 2.12    • RP ID 03/25/2019 Oropharynx CP    • RP Dosage Given to Date * 03/25/2019 65.1599303049056    • RP Session Dosage Given * 03/25/2019 2.07996650    • Plan ID 03/25/2019 Oropharynx    • Plan Name 03/25/2019 Oropharynx    • Plan Fractions Treated t* 03/25/2019 30 of 33    • Plan Prescribed Dose Per* 03/25/2019 2.12    • Plan Total Prescribed Do* 03/25/2019 6996    Outpatient Infusion Services on 03/25/2019   Component Date Value   • WBC 03/25/2019 2.1*   • RBC 03/25/2019 3.54*   • Hemoglobin 03/25/2019 11.9*   • Hematocrit 03/25/2019 37.2*   • MCV 03/25/2019 105.1*   • MCH 03/25/2019 33.6*   • MCHC 03/25/2019 32.0*   • RDW 03/25/2019 56.4*   • Platelet Count 03/25/2019 75*   • MPV 03/25/2019 13.5*   • Neutrophils-Polys 03/25/2019 62.20    • Lymphocytes 03/25/2019 15.10*   • Monocytes 03/25/2019 18.90*   • Eosinophils 03/25/2019 2.70    • Basophils 03/25/2019 1.10    • Immature Granulocytes 03/25/2019 0.00    • Nucleated RBC 03/25/2019 0.00    • Neutrophils  (Absolute) 03/25/2019 1.15*   • Lymphs (Absolute) 03/25/2019 0.28*   • Monos (Absolute) 03/25/2019 0.35    • Eos (Absolute) 03/25/2019 0.05    • Baso (Absolute) 03/25/2019 0.02    • Immature Granulocytes (a* 03/25/2019 0.00    • NRBC (Absolute) 03/25/2019 0.00    • Sodium 03/25/2019 141    • Potassium 03/25/2019 4.0    • Chloride 03/25/2019 107    • Co2 03/25/2019 26    • Anion Gap 03/25/2019 8.0    • Glucose 03/25/2019 119*   • Bun 03/25/2019 20    • Creatinine 03/25/2019 0.58    • Calcium 03/25/2019 9.1    • AST(SGOT) 03/25/2019 36    • ALT(SGPT) 03/25/2019 30    • Alkaline Phosphatase 03/25/2019 64    • Total Bilirubin 03/25/2019 1.2    • Albumin 03/25/2019 3.5    • Total Protein 03/25/2019 6.5    • Globulin 03/25/2019 3.0    • A-G Ratio 03/25/2019 1.2    • Magnesium 03/25/2019 1.6    • GFR If  03/25/2019 >60    • GFR If Non  Ameri* 03/25/2019 >60    Hospital Outpatient Visit on 03/22/2019   Component Date Value   • Course ID 03/22/2019 C1_Oropharynx    • Course Start Date 03/22/2019 2/4/2019  3:22 PM    • Course Elapsed Days 03/22/2019 39 @ [id number]    • Course Intent 03/22/2019 Curative    • Plan Treatment Dates 03/22/2019                      Value:First Treatment Date: [id number]  Last Treatment Date: [id number]     • RP ID 03/22/2019 Oropharynx    • RP Dosage Given to Date * 03/22/2019 61.2437640683287    • RP Session Dosage Given * 03/22/2019 2.12    • RP ID 03/22/2019 Oropharynx CP    • RP Dosage Given to Date * 03/22/2019 63.0984116129249    • RP Session Dosage Given * 03/22/2019 2.54014679    • Plan ID 03/22/2019 Oropharynx    • Plan Name 03/22/2019 Oropharynx    • Plan Fractions Treated t* 03/22/2019 29 of 33    • Plan Prescribed Dose Per* 03/22/2019 2.12    • Plan Total Prescribed Do* 03/22/2019 6996    There may be more visits with results that are not included.         clinical course has been stable    Past Medical History:   Diagnosis Date   • Alcoholic (HCC)      "stopped drinking in 2009   • Cancer (HCC) 2019    throat   • Dental disorder     uppers/ waiting for lower dentures t ocome in   • Diabetes (HCC)     diet controlled; pt states that he was told he was \"pre-diabetic\" and has never been on any medication   • Hypertension     stopped taking blood pressure medication on his own   • Tonsil cancer (HCC)      right tonsil - SCC       Past Surgical History:   Procedure Laterality Date   • OTHER  12/27/2018    rt tonsil biopsy    • CHOLECYSTECTOMY  2003       Family History   Problem Relation Age of Onset   • Cancer Mother         colon   • Respiratory Disease Mother         emphysema- smoker   • Alcohol abuse Mother    • Heart Disease Father    • Cancer Sister         brain tumor   • GI Sister        Patient has no known allergies.    Current Outpatient Prescriptions Ordered in [facility]   Medication Sig Dispense Refill   • metoprolol (LOPRESSOR) 25 MG Tab Take 1 Tab by mouth 2 Times a Day. 60 Tab 0     No current Epic-ordered facility-administered medications on file.        Constitutional ROS: No unexpected change in weight, No weakness, No unexplained fevers, sweats, or chills  Mouth/Throat ROS: Positive per HPI  Pulmonary ROS: No chronic cough, sputum, or hemoptysis, No shortness of breath, No recent change in breathing, Positive for recent former smoker  Cardiovascular ROS: No chest pain  Gastrointestinal ROS: No abdominal pain, No nausea, vomiting, diarrhea, or constipation  Musculoskeletal/Extremities ROS: No clubbing, No peripheral edema, No pain, redness or swelling on the joints  Neurologic ROS: Normal development, No seizures, No weakness  Endocrine ROS: Positive per HPI    Physical exam:  /70 (BP Location: Right arm, Patient Position: Sitting, BP Cuff Size: Adult)   Pulse 60   Temp 36.7 °C (98 °F) (Temporal)   Resp 16   Ht 1.829 m (6')   Wt 107 kg (236 lb)   SpO2 98%   BMI 32.01 kg/m²   General Appearance: Pleasant middle-aged male, alert, no distress, " obese, well-groomed  Skin: Skin color, texture, turgor normal. No rashes or lesions.  Lungs: negative findings: normal respiratory rate and rhythm, lungs clear to auscultation  Heart: positive findings: irregular rhythm  Abdomen: Abdomen soft, non-tender. BS normal. No masses,  No organomegaly  Musculoskeletal: negative findings: no evidence of joint instability, no evidence of muscle atrophy, no deformities present  Neurologic: intact    Medical decision making/discussion: Patient has been referred to cardiology for new onset A. fib, previously diagnosed during hospitalization.  He was advised to resume metoprolol as prescribed.  He is to keep all upcoming appointment with specialists including oncology.  He is to follow-up with me in 6-8 weeks with labs done before visit.  He is due for colon cancer screening, he is going to do fit test.    Maximino was seen today for establish care.    Diagnoses and all orders for this visit:    Tonsil cancer (HCC)    Atrial fibrillation with RVR (HCC)  -     REFERRAL TO CARDIOLOGY    Type 2 diabetes mellitus without complication, without long-term current use of insulin (HCC)  -     HEMOGLOBIN A1C; Future  -     MICROALBUMIN CREAT RATIO URINE; Future  -     Lipid Profile; Future    Essential hypertension  -     Lipid Profile; Future    Screening for colon cancer  -     OCCULT BLOOD FECES IMMUNOASSAY (FIT); Future        Return in about 6 weeks (around 5/30/2019) for Discuss Labs, Follow-up.        Please note that this dictation was created using voice recognition software. I have made every reasonable attempt to correct obvious errors, but I expect that there are errors of grammar and possibly content that I did not discover before finalizing the note.

## 2019-04-19 NOTE — ASSESSMENT & PLAN NOTE
Patient recently has been diagnosed with tonsillar cancer and is undergoing treatment via oncology.  He states that thus far he is tolerating well.  He does have feeding tube in place.

## 2019-04-19 NOTE — ASSESSMENT & PLAN NOTE
This is a chronic condition, stable and well controlled now with lifestyle modifications.  Not currently on blood pressure medication.  He does deny symptoms of hypertension.  He was advised to restart metoprolol for A. Fib (see additional notes).

## 2019-04-19 NOTE — ASSESSMENT & PLAN NOTE
Patient recently diagnosed with atrial fibrillation.  Patient was given IV metoprolol and obtained good control of his heart rate.  He was discharged on metoprolol 25 mg twice daily but he tells me today that he has not been taking it he was not sure that he was to continue on this medication.  Aspirin has been withheld because he is on tube feeding for now.  He reports to me that he was not referred to cardiology as an outpatient, this referral has been placed and he was strongly encouraged to continue back on the metoprolol twice a day.  Upon physical examination he was noted to have irregular heart rhythm.  He does deny any symptoms associated with this.

## 2019-04-19 NOTE — ASSESSMENT & PLAN NOTE
Patient has been diagnosed with type 2 diabetes, his most recent A1c was 6.4%, not currently on treatment.  He is due for labs, these have been ordered.

## 2019-04-25 ENCOUNTER — SPEECH THERAPY (OUTPATIENT)
Dept: SPEECH THERAPY | Facility: REHABILITATION | Age: 59
End: 2019-04-25
Attending: NURSE PRACTITIONER
Payer: COMMERCIAL

## 2019-04-25 DIAGNOSIS — R13.11 ORAL PHASE DYSPHAGIA: ICD-10-CM

## 2019-04-25 DIAGNOSIS — C09.9 TONSIL CANCER (HCC): ICD-10-CM

## 2019-04-25 PROCEDURE — 92610 EVALUATE SWALLOWING FUNCTION: CPT

## 2019-04-25 ASSESSMENT — ENCOUNTER SYMPTOMS
PAIN SCALE: 1
PAIN SCALE AT HIGHEST: 3

## 2019-04-25 NOTE — OP THERAPY EVALUATION
"  Outpatient Speech Therapy  INITIAL EVALUATION    [address]  [address]  [place] [address]  Phone:  [phone]  Fax:  [phone]    Date of Evaluation: 2019    Patient: Maximino Green  YOB: 1960  MRN: [id number]     Referring Provider: [id number]  [place] [address]   Referring Diagnosis Tonsil cancer (HCC) [C09.9];Oral phase dysphagia [R13.11]     Time Calculation  Start time: 1335  Stop time: 1430 Time Calculation (min): 55 minutes     Speech Therapy Occurrence Codes    Date of Onset of Impairment:  19   Date speech therapy care plan established or reviewed:  19   Date speech therapy treatment started:  19          Chief Complaint: Difficulty Swallowing (\"I'm having a hard time swallowing after chemoradation therapy\")    Visit Diagnoses     ICD-10-CM   1. Tonsil cancer (HCC) C09.9   2. Oral phase dysphagia R13.11     Subjective:   Reason for Therapy:     Reason For Evaluation:  Dysphagia    Onset Date:  2019    Onset Description:  Patient 58 year old male s/p chemoradiation therapy for large right tonsillar mass extending onto the right soft palate and towards the base of tongue involving a small amount of retromolar trigone region.  Patient reports finishing CRT 2019. Patient continues to use PEG tube for primary nutrition.   Social Support:     Patient Mental Status:  Alert and Responsive  Progress Factors:     Progression:  Staying the same  Pain:     Current pain ratin    At worst pain rating:  3    Location:  Reports pain \"right by johnnie's apple when I swallow\"  Therapy History:     Current Diet:  NPO and Peg-Tube    Nutritional Concerns Restrictions and Allergies:  Patient currently followed by dietician given patient meeting all nutrition/ hydration needs via PEG tube.  Patient reports changes to taste that impact appetite stating, \"nothing tastes good.\"      Hearing: " " Hard of hearing    Dentition:  Edentulous  Additional Subjective Comments:      Patient reports that he is unable to demonstrate complete closure of gums following removal of teeth prior to CRT therapy.  Patient reports that he often feels \"paranoid\" during swallowing since completion of CRT stating that swallowing \"freaks me out right now because I'm scared it will go down the wrong pipe.\"  Patient states that food re-introduction has been limited to thin liquids (water, powerade) and limited pureed textures. Patient with thick saliva that impacts swallow and ability to eat solid foods at this time.  Patient states that carbonation and citrus result in increased discomfort and burning.     Past Medical History:   Diagnosis Date   • Alcoholic (HCC)     stopped drinking in 2009   • Cancer (HCC) 2019    throat   • Dental disorder     uppers/ waiting for lower dentures t ocome in   • Diabetes (HCC)     diet controlled; pt states that he was told he was \"pre-diabetic\" and has never been on any medication   • Hypertension     stopped taking blood pressure medication on his own   • Tonsil cancer ([facility])      right tonsil - SCC     Past Surgical History:   Procedure Laterality Date   • OTHER  12/27/2018    rt tonsil biopsy    • CHOLECYSTECTOMY  2003     Objective:   Treatments/Interventions Performed:  Home program and Patient/Caregiver education  Other Treatment Interventions:  Portions of the Swallowing Ability and Function Evaluation (SAFE)  Treatment Intervention tool(s) used:  The Physical Examination subtest of the SAFE was administered to provide a formal, evaluation of oral peripheral status and function with regards to swallow.  Patient received a raw score of 51 with a resulting %ile rank of 26 and a Stanine of 4 representing MODERATE deficits.    Objective Details:  Deficits in oral motor coordination/ strength through completion of formal, oral peripheral examination of oral motor musculature related to swallow " "revealed patient presented with severe deficits in tongue coordination and strength resulting in reduced articulatory precision and speech patterns sounding slurred, mumbled.  Deficits were suggested in palate as evidenced by patient slight mixed nasal resonance (hyper-hypo nasal) suggesting reduced accuracy/ coordination of velopharyngeal movement. Patient was noted to be edentulous with patient unable to achieve complete jaw closure suggesting a malocclusion of jaw. Patient with fair vocal quality/ tone with sustained phonation /ah/ maintained to 10 seconds with minimal hoarseness to vocal quality.  Patient participated in PO trials of thin liquid water via cup swallows only given patient report \"I can't swallow anything else with this mucous.\"  Patient demonstrated need for multiple swallows with minimal throat clear present following third swallow attempt. Patient reported that \"after the first swallow it felt like it was going the wrong way, but then after the next two it feels ok.\"      Speech Therapy Assessment:     Speech Mechanism Assessment:     Patient voice description: Hoarse (Slight hoarse/ strained vocal quality intermittent; adequate intensity of voice demonstrated)    Patient exhibits articulatory precision: Moderate    Patient exhibits single word intelligibility: WFL    Patient exhibits sentence level intelligibility: WFL    Patient exhibits conversational intelligibility: Supervision Required    Patient uses adequate breath support: Yes    Patient breath rate: Normal    Oral Motor Status:      ST Oral Motor Status Assessment Used: Swallowing Ability and Function Evaluation (SAFE)    Labial strength and control for patient: Good    Lingual strength and control for patient: Poor    Patient saliva management: Fair (patient with noted oral pooling of saliva )Patient oral sensation and awareness: Poor    Patient awareness of swallow problem: Good    Previous modified barium swallow: No    Oral motor " "status comments: Participation in formal assessment of swallow through MBSS recommended given patient presentation based on results of oral-peripheral examination and patient reports of increased concern and feeling of \"panic\" during swallow.     Speech Therapy Plan :   Prognosis & Recommendations  Impression Summary:  Results of formal assessment of swallow through administration of portion of the SAFE revealed that patient has demonstrated changes to oral, pharyngeal musculature and phases of swallow that place patient at HIGH RISK FOR ASPIRATION, including silent aspiration at this time.  Patient would benefit from participation in formal evaluation of swallow through MBSS with speech pathology to rule out aspiration and determine least restrictive, safest diet level in which to be PO at this time.  It is recommended patient remain NPO with alternative means of nutrition/ hydration pending results of MBSS given significant risk for silent aspiration at this time.  Results of evaluation discussed at length with patient, including recommendations to address patient safety in swallow at this time.  Patient provided with direct instruction regarding exercise to implement as part of home program addressing swallow function following chemoradiation therapy.  Patient was receptive and agreeable to all recommendations discussed.   Prognosis:  Good  Goals  Short Term Goals:  1.  Patient will participate in MBSS to determine least restrictive, safest diet to begin PO intake.  2.  Patient will improve oral phase of swallow improving tongue coordination/ strength through participation in structured exercise including Danielle completing to \"good\" to independent level.  3.  Patient will improve pharyngeal phase of swallow improving laryngeal elevation/ strength and airway protection prior to, during and post swallow through completion of structured exercise (e.g. Mendelsohn maneuver, Shaker) with 88% or greater accuracy " modified independent.  4.  Patient will state and implement use of compensatory swallow strategies during therapist directed trials of PO intake completing with 88% or greater accuracy to independent level.   Short Term Goal Duration (Weeks):  4-6 weeks  Long Term Goals:  1. Patient will improve swallowing function to progress to PO intake for primary nutrition needs demonstrating no overt signs, symptoms of aspiration and implementing compensatory swallow strategies 88% of the time or greater to independent level.   Long Term Goal Duration (Weeks):  1-2 months  Therapy Recommendations  Recommendation:  Individual Speech Therapy and Modified Barium Swallow Study,  Planned Therapy Interventions:  Home Program, Patient/Caregiver Education, Compensatory Strategy Training, Dysphagia treatment and NMES (VitalStim),   Plan Details:  16 units (09909) and then reassess  Frequency:  2x week  Duration (in weeks):  8        Referring provider co-signature:  I have reviewed this plan of care and my co-signature certifies the need for services.  Certification Dates:   From 4/25.2019     To 6/20.2019    Physician Signature: ________________________________ Date: ______________

## 2019-04-26 NOTE — PROGRESS NOTES
"Patient recently seen by speech therapy for swallowing difficulties.  Per speech therapist, YESI Reece, recommendation to proceed with a modified barium swallow study.      Per YESI Reece, \"Based on the results of the initial assessment, patient would likely benefit from participation in Modified Barium Swallow Study with Speech Pathology before beginning PO intake to rule out silent aspiration. \"    Order has been placed per recommendations.      1. Tonsil cancer (HCC)  DX-ESOPHAGUS - [id number]       "

## 2019-04-29 ENCOUNTER — SPEECH THERAPY (OUTPATIENT)
Dept: SPEECH THERAPY | Facility: REHABILITATION | Age: 59
End: 2019-04-29
Attending: NURSE PRACTITIONER
Payer: COMMERCIAL

## 2019-04-29 DIAGNOSIS — R13.12 OROPHARYNGEAL DYSPHAGIA: ICD-10-CM

## 2019-04-29 PROCEDURE — 92526 ORAL FUNCTION THERAPY: CPT

## 2019-04-29 NOTE — OP THERAPY DAILY TREATMENT
"  Outpatient Speech Therapy  DAILY TREATMENT     [address]  [address]  [place] [address]  Phone:  [phone]  Fax:  [phone]    Date: 04/29/2019    Patient: Maximino Green  YOB: 1960  MRN: [id number]     Time Calculation  Start time: 1100  Stop time: 1130 Time Calculation (min): 30 minutes     Chief Complaint: Difficulty Swallowing    Visit #: 2    Subjective:   Reason for Therapy:     Reason For Evaluation:  Dysphagia  Social Support:     Patient Mental Status:  Alert and Responsive  Additional Subjective Comments:      Patient reports feeling of \"thick mucous build up\" which patient reports collects to roof of mouth/ behind the tongue. Patient states this \"snot\" is the biggest impact on PO intake at this time \"when that is there I can't swallow.\" Patient reports independence and motivation to home exercise swallow program \"everyday all the time.\"  Patient states his goal right now is to return to PO to make doing his job easier.       Objective:   Treatments/Interventions Performed:  Patient/Caregiver education, Home program, Dysphagia treatment and Compensatory strategy training  Treatment Intervention tool(s) used:  Traditional dysphagia therapy targeted through completion of structured oral motor exercises addressing oral, pharyngeal phases of swallow following chemoradiation therapy.     Patient arrives to speech therapy with noted improvement in swallow demonstrating improved safety in single sips of thin liquid water; despite progress participation in MBSS recommended to rule out silent aspiration.     Speech Therapy Assessment:     Oral Motor Status:     Labial strength and control for patient: Good    Lingual strength and control for patient: Poor    Patient saliva management: Poor    Oral motor status comments: Structured exercise completed addressing oral, pharyngeal phases of swallow with focus on accuracy in the completion:  Isiswn, " gargle, tongue base retraction/ Danielle, Shaker, Mendelsohn maneuver, jaw stretch and rotary jaw movement. Patient completed all to modified independent level.  Focus on accuracy in completion of Shaker: patient maintained head lift to 30 seconds during exercise.     Pharyngeal Phase Assessment:     Pharyngeal phase comments: Patient participated in therapist directed trial:  Thin liquid water via single sip, cup swallows.  Patient with improvements in swallow characterized by single sips cleared with double swallow only, patient with no changes in vocal quality and no reports of feeling like liquid went the wrong way as noted during initial evaluation.  NPO with alternative means of nutrition/ hydration recommended to continue until results of MBSS to rule out silent aspiration.       Speech Therapy Plan :   Therapy Recommendations  Recommendation: Individual Speech Therapy,  Planned Therapy Interventions:  Home Program, Patient/Caregiver Education, Compensatory Strategy Training and Dysphagia treatment,   Plan Details:  Continue with POC. Continue with home program addressing swallow. Reinforced NPO status pending results of MBSS.

## 2019-04-30 ENCOUNTER — PATIENT OUTREACH (OUTPATIENT)
Dept: OTHER | Facility: MEDICAL CENTER | Age: 59
End: 2019-04-30

## 2019-04-30 NOTE — LETTER
[facility]   [address]  [place] [address]  Phone: [phone] - Fax: [phone]              Date: 04/30/19    Address:   [address] [place] [address]    Re: Treatment Summary and Survivorship Care Plan      Dear Maximino Green,      Please find enclosed your cancer Treatment Summary and Follow Up Care Plan, also known as a survivorship care plan. It is a best practice in cancer care for you to receive this document for your records. It contains important information for you including: a contact list of your treatment team, a summary of your diagnosis and treatment, follow up care and potential late effects of treatment, and resources you may be interested in.    Why is a survivorship care plan important? This is a record for you to keep on file for future reference, but it is also a communication tool that you can share with your non-cancer medical providers to inform them of potential long-term or late effects of your cancer treatment and recommended follow up screening needed to maintain optimal health.     We will be calling you to confirm you received this document, to answer any questions you might have, and to assist you in identifying resources if necessary. Be sure to bring it with you to your next scheduled follow up with your provider to review.     You can contact me at [phone] if you have questions.     Kind Regards,     tSephany Bhatti RN, OCN  Cancer Nurse Navigator

## 2019-04-30 NOTE — PROGRESS NOTES
On 4/30/2019 this ONN placed in mail for patient Introductory letter to Survivorship Care Plan and SCP.

## 2019-05-01 ENCOUNTER — SPEECH THERAPY (OUTPATIENT)
Dept: SPEECH THERAPY | Facility: REHABILITATION | Age: 59
End: 2019-05-01
Attending: NURSE PRACTITIONER
Payer: COMMERCIAL

## 2019-05-01 DIAGNOSIS — R13.12 OROPHARYNGEAL DYSPHAGIA: ICD-10-CM

## 2019-05-01 PROCEDURE — 92526 ORAL FUNCTION THERAPY: CPT

## 2019-05-01 NOTE — OP THERAPY DAILY TREATMENT
"  Outpatient Speech Therapy  DAILY TREATMENT     [facility] [address].  [address]  [place] [address]  Phone:  [phone]  Fax:  [phone]    Date: 05/01/2019    Patient: Maximino Green  YOB: 1960  MRN: [id number]     Time Calculation  Start time: 0930  Stop time: 1000 Time Calculation (min): 30 minutes     Chief Complaint: Difficulty Swallowing    Visit #: 3    Subjective:   Reason for Therapy:     Reason For Evaluation:  Dysphagia  Social Support:     Patient Mental Status:  Alert and Responsive  Additional Subjective Comments:      Patient reports that presence of thick mucous to back of throat \"makes me want to gag.\"  Patient reports daily compliance with home exercise program addressing swallow.        Speech Therapy Objective  Patient with increased presence of thick mucous resulting in patient demonstrating frequent gaging and periods where he needed to \"go spit\" in order to continue with swallow therapy.  Patient states that presence of thick mucous \"is most concerning\" regarding swallow at this time with patient reporting \"I think I could swallow if this mucous were gone.\"  Patient encouraged to continue with mouth wash/ gargle to address.     Speech Therapy Assessment:     Oral Motor Status:     Oral motor status comments: Structured exercise addressing oral motor coordination/ function for oral phase of swallow addressed through completion of:  Tongue exercises including tongue base retraction x10/ Danielle with patient demonstrating noted difficulty in completion secondary to presence of malocclusion of jaw.  Laryngeal elevation/ strengthening exercises completed through Effortful swallow/ Mendelsohn maneuver with thin liquid water swallows with improved laryngeal elevation/ lift suggested.     Pharyngeal Phase Assessment:     Pharyngeal phase comments: Therapist directed trial:  Thin liquid water via cup swallows. Patient with episodes of anterior " loss from lips with large swallows, patient demonstrating improved timing/ trigger of pharyngeal swallow clearing bolus in 2 swallows.  No episodes of throat clear/ cough present 3/3 single sip cup swallows. Patient encouraged to continue with NPO status pending results of scheduled MBSS given increased risk for aspiration/ penetration at this time.       Speech Therapy Plan :   Therapy Recommendations  Recommendation: Individual Speech Therapy,  Planned Therapy Interventions:  Home Program, Patient/Caregiver Education, Compensatory Strategy Training, Dysphagia treatment and Thermal/Tactile Stimulation,   Plan Details:  Continue with POC pending results of scheduled MBSS. Reinforced completion of home program addressing swallow following CRT daily.

## 2019-05-07 ENCOUNTER — HOSPITAL ENCOUNTER (OUTPATIENT)
Dept: RADIOLOGY | Facility: MEDICAL CENTER | Age: 59
End: 2019-05-07
Attending: NURSE PRACTITIONER
Payer: COMMERCIAL

## 2019-05-07 DIAGNOSIS — C09.9 TONSIL CANCER (HCC): ICD-10-CM

## 2019-05-07 DIAGNOSIS — R13.12 OROPHARYNGEAL DYSPHAGIA: ICD-10-CM

## 2019-05-07 DIAGNOSIS — I48.91 ATRIAL FIBRILLATION WITH RVR (HCC): ICD-10-CM

## 2019-05-07 PROCEDURE — 92611 MOTION FLUOROSCOPY/SWALLOW: CPT

## 2019-05-07 PROCEDURE — 74230 X-RAY XM SWLNG FUNCJ C+: CPT

## 2019-05-07 NOTE — OP THERAPY EVALUATION
"[facility]  Speech-Language Pathology Department  Modified Barium Swallow Study Summary    Patient: Maximino Green     Date of Evaluation: 5/7/19    Referring Provider: ASH Rees; Fax: [phone]    Radiologist:  Dr. Madrigal    Patient History/Reason for Referral: Pt was referred for MBS due to 'tonsillar carcinoma - r/o aspiration.' Pt with PMHx notable for: HTN, Tonsillar CA - SCC, Diabetes, Dental Disorder, and hx of Alcoholism. Pt has worked with YESI Reece to address dysphagia eval/tx. Per initial swallow evaluation note, Yuni described Pt's current status as: \"Patient 58 year old male s/p chemoradiation therapy for large right tonsillar mass extending onto the right soft palate and towards the base of tongue involving a small amount of retromolar trigone region.  Patient reports finishing CRT March 28, 2019. Patient continues to use PEG tube for primary nutrition.\"                   Pt arrived unaccompanied to today's appointment. Pt reported that he receives primary nutrition/hydration via PEG tube; however, he has started to consume pureed solids (applesauce) and thin liquids (grape juice, ice pops) occasionally. Pt reports reduced odynophagia and improved sense of taste. Pt reported 50lb unintentional weight loss over the last few months. Pt reported primary difficulties with thick/rope-like mucous/phlegm which interferes with his ability to swallow. Pt previously consumed a Regular/thin diet.    Oral Mechanism Exam:   -Dentition: Inadequate; edentulous (Pt reported that he will receive his dentures tomorrow)  -Labial: WFL  -Lingual: WFL  -Palatal Elevation: WFL  -Laryngeal Elevation: Complete, reduced  -Cough: Adequate  -Vocal Quality: Normal  - Circumlaryngeal Palpation: No c/o pain    Procedure Results:  The examination was conducted with videofluoroscopy from a   Lateral and Anterior view.  The following textures were presented:   Pudding and Thin Liquid        " **NOTE**: Solid PO trials not administered secondary to Pt's request (as he reports inability to chew/masticate secondary to edentulism)     Structural Abnormalities:  Muscle spasm vs. CP bar noted during exam    The following observations were made:   (WFL unless otherwise noted)    Oral Phase Thin Liquids  Puree   Lip Closure     Tongue Control During Bolus Hold     Premature spillage into the valleculae     Premature spillage into the pyriform sinus     Bolus Preparation/ Mastication     Bolus Transport/ Lingual Motion  X  slowed   Oral Residue after swallow X  Collection (FOM, tongue) X  Collection (FOM, palate, tongue)   Initiation of Pharyngeal Swallow X  Delay to valleculae       Other Observations:     Note: FOM = floor of mouth       Pharyngeal Phase Thin Liquids  Puree   Soft Palate Elevation     Laryngeal Elevation     Anterior Hyoid Excursion     Epiglottic Inversion  X  partial X  partial   Laryngeal Vestibular Closure     Pharyngeal Stripping Wave X  diminished X  diminished   Pharyngoesophageal Segment Opening (PES)     Tongue Base Retraction (BOT) and/or BOT Residue X  reduced X  reduced   Residue in valleculae X  collection X  collection   Residue in piriform sinus(es) X  trace    Residue on posterior pharyngeal wall (PPW)     Penetration X  Trace (large sip only)    Aspiration       Other Observations:  - Pt was able to clear majority of residue with secondary swallow and/or liquid wash      Penetration Aspiration Scale:   Score of 1: Neither penetration nor aspiration  Score of 2-5: Penetration  Score of 6-8: Aspiration    1: Material does not enter airway   2: Material enters airway, but remains above vocal folds; Ejected from airway; no stasis  3: Material remains above vocal folds; visible stasis remains  4: Material contacts vocal folds, but is ejected; no stasis  5: Material contacts vocal folds, and is not ejected; visible stasis remains  6: Material passes glottis, but is ejected from  airway; No visible subglottic stasis  7: Material passes glottis, but is not ejected from airway; visible subglottic stasis despite patient’s response  8: Material passes glottis, and is not ejected; visible subglottic stasis; Absent patient response                          Esophageal Phase:  Complete esophageal clearance                                Impressions:   Pt with trace penetration for large liquid bolus of thins only; no other penetration and/or aspiration event appreciated across remainder of today's study. Oral dysphagia characterized by slowed lingual motion/A-P transit for puree, collection oral residue after the swallow, and delayed swallow initiation to the valleculae for thin liquids. Pharyngeal dysphagia characterized by partial epiglottic inversion, diminished pharyngeal stripping wave, and reduced base of tongue retraction, resulting in collection vallecular residue, trace residue in piriform sinuses for thins and trace penetration for large liquid bolus of thins only. Esophageal swallow phase appeared WFL.    Pt was able to clear majority of residue to trace amounts with secondary swallow and/or liquid wash. Pt with intact sensation throughout session with good awareness of need to take secondary swallow. Suspect Pt's thick mucus/phlegm is likely impacting Pt's ability to swallow; however, this is not the only contributing factor to Pt's dysphagia.        Recommendations: Diet:     Dysphagia 1 (Puree)       Liquid:     Thin liquids                                        Medications:     Crushed in puree or via gastric tube        Compensatory Strategies:   Upright 90 degrees, Small bites/sips, Alternate bites/sips, Secondary swallows, Slow feeding rate, Concentrate on swallowing, Avoid problematic foods        Your patient may benefit from a referral for:     1.) Continued skilled SLP services to address dysphagia tx/management (including swallow strengthening exercises, stretching and ROM  exercises, dysphagia education and compensatory strategy training)            Thank you for the referral.    For further questions, please call [phone].       Tita Corley, SLP

## 2019-05-13 ENCOUNTER — HOSPITAL ENCOUNTER (OUTPATIENT)
Dept: RADIOLOGY | Facility: MEDICAL CENTER | Age: 59
End: 2019-05-13
Attending: RADIOLOGY
Payer: COMMERCIAL

## 2019-05-13 DIAGNOSIS — C10.9 OROPHARYNX CANCER (HCC): ICD-10-CM

## 2019-05-13 PROCEDURE — 700117 HCHG RX CONTRAST REV CODE 255: Performed by: RADIOLOGY

## 2019-05-13 PROCEDURE — A9585 GADOBUTROL INJECTION: HCPCS | Performed by: RADIOLOGY

## 2019-05-13 PROCEDURE — 70543 MRI ORBT/FAC/NCK W/O &W/DYE: CPT

## 2019-05-13 RX ORDER — GADOBUTROL 604.72 MG/ML
10 INJECTION INTRAVENOUS ONCE
Status: COMPLETED | OUTPATIENT
Start: 2019-05-13 | End: 2019-05-13

## 2019-05-13 RX ADMIN — GADOBUTROL 10 ML: 604.72 INJECTION INTRAVENOUS at 12:14

## 2019-05-14 ENCOUNTER — APPOINTMENT (OUTPATIENT)
Dept: SPEECH THERAPY | Facility: REHABILITATION | Age: 59
End: 2019-05-14
Payer: COMMERCIAL

## 2019-05-17 ENCOUNTER — TELEPHONE (OUTPATIENT)
Dept: RADIATION ONCOLOGY | Facility: MEDICAL CENTER | Age: 59
End: 2019-05-17

## 2019-05-17 ENCOUNTER — HOSPITAL ENCOUNTER (OUTPATIENT)
Dept: RADIATION ONCOLOGY | Facility: MEDICAL CENTER | Age: 59
End: 2019-05-31
Attending: RADIOLOGY
Payer: COMMERCIAL

## 2019-05-17 ENCOUNTER — APPOINTMENT (OUTPATIENT)
Dept: SPEECH THERAPY | Facility: REHABILITATION | Age: 59
End: 2019-05-17
Attending: NURSE PRACTITIONER
Payer: COMMERCIAL

## 2019-05-17 ENCOUNTER — DOCUMENTATION (OUTPATIENT)
Dept: HEMATOLOGY ONCOLOGY | Facility: MEDICAL CENTER | Age: 59
End: 2019-05-17

## 2019-05-17 VITALS
HEART RATE: 88 BPM | BODY MASS INDEX: 30.43 KG/M2 | OXYGEN SATURATION: 96 % | TEMPERATURE: 97.5 F | WEIGHT: 224.4 LBS | DIASTOLIC BLOOD PRESSURE: 66 MMHG | SYSTOLIC BLOOD PRESSURE: 116 MMHG

## 2019-05-17 DIAGNOSIS — C09.9 TONSIL CANCER (HCC): ICD-10-CM

## 2019-05-17 DIAGNOSIS — C09.8 MALIGNANT NEOPLASM OF OVERLAPPING SITES OF TONSIL (HCC): ICD-10-CM

## 2019-05-17 PROCEDURE — 31575 DIAGNOSTIC LARYNGOSCOPY: CPT | Performed by: RADIOLOGY

## 2019-05-17 PROCEDURE — 99213 OFFICE O/P EST LOW 20 MIN: CPT | Mod: 25 | Performed by: RADIOLOGY

## 2019-05-17 PROCEDURE — 99212 OFFICE O/P EST SF 10 MIN: CPT | Mod: 25 | Performed by: RADIOLOGY

## 2019-05-17 NOTE — PROCEDURES
DATE OF SERVICE: 05/17/19        Fiberoptic Naso-pharyngoscopy Note      Flexible fiberoptic exam performed after anesthesia of left nostril and oropharynx.    Nasopharynx:  Eustachian canal opening, torus, fossa of Rosenmuller normal     Oropharynx:   Base of tongue, vallecula, epiglottis, PE folds normal     Larynx:  AE folds, false vocal folds, arytenoids normal    Cords meet at midline on the phonation of vowel E.      Piriform sinuses showed no masses.      Edward SKELTON M.D.  Electronically signed by: Edward Harris V, 5/17/2019 4:15 PM  [phone]

## 2019-05-17 NOTE — PROGRESS NOTES
"Nutrition Progress Note:  Nutrition support + PO diet update    Patient's weight per stand up scale in RTX = 224 lbs (% wt change x5 weeks = 11.8, which is classified as severe loss, per guidelines).      Labs (4/8): Reviewed  GI: pt denies changes in bowel habits, abdominal pain after eating.     Current Interventions:   Per interview with dietitian, patient states that he has not used PEG tube for nutrition support since chemoradiation ended in late March (3/28 was last RTX; 3/25 was last chemo treatment, pt reports).  He is trying very hard to eat PO soft foods and beverages, but does c/o early feelings of satiety after eating ~4 oz COW, mashed potatoes.  Pt is adding high kcal/high fat gravies and sauces where applicable, he states (mashed potatoes with extra butter and gravy, potted meat spreads).  Pt also reports that he is better able to tolerate spoonfuls of thick foods, like mashed potatoes vs milk products and pudding d/t \"I can place the entire spoonful almost to the back of my throat and it doesn't coat my entire mouth like pudding or milk do\" related to thick oral secretions.   · He is currently waiting for dentures to get fixed, which is pending for next week, he reports today.  · Thick oral secretions are subsiding, he reports  · He is s/p SLP evaluation 5/7; cleared for Dysphagia 1/thin liquids.  Per SLP documentation, patient has increased success with double swallow/ drinking water to further clear PO secretions.  RD also encouraged pt to do this with each bite.   · Plan is for PEG tube to be removed within 1-2 weeks d/t patient is not using it and prefers to have it out as soon as possible, he reports.      Recommendations:   1) RD reviewed specific foods and snacks containing high protein for weight maintenance and preservation of LBM, optimal nutrition status.  -encouraged butter, gravies, coconut oil, olive oil, thinned peanut butter added to snacks and meals  -discussed option for PO Boost or " Ensure shakes followed by adequate water/double swallow to clear residue out of patient's mouth, now that thick oral secretions are subsiding (thin liquids approved by SLP on 5/7) - pt states he will trial milkshake today on way home.   -recommended ice cream and minced meats with extra gravies added to daily meal regimen  -calorie and protein recommendations were provided to patient: 4459-2913 calories, 100-125 grams protein/day (HBE x1.1)    RD to follow-up with patient x1 week.  RD available at [phone] prn.

## 2019-05-17 NOTE — PROGRESS NOTES
RADIATION ONCOLOGY FOLLOW-UP    DATE OF SERVICE:   5/17/2019    IDENTIFICATION:   A 58 y.o. male with Malignant neoplasm of overlapping sites of tonsil, Stg II,     PRESCRIPTION:  Course ID Plan ID Rx Dose (cGy) Fraction Status   C1_Oropharynx Oropharynx 6,996 33 / 33 Treatment Approved       TREATMENT SUMMARY:    Course: C1_Oropharynx    Treatment Site Ref. 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    HISTORY OF PRESENT ILLNESS:   Returns today for his initial follow-up visit.  Overall feels well.  Reports mild xerostomia and alteration in taste.  Denies dysphagia or odynophagia.  He is taking all nutrition by mouth.  Is not using G-tube.  Would like to have it removed.  Xerostomia 6-7 ( 0-10, 0=completely dry), Taste 2 ( 0-10, 0=no taste), Dysphagia 0, Odynophagia 0    CURRENT MEDICATIONS:  Current Outpatient Prescriptions   Medication Sig Dispense Refill   • metoprolol (LOPRESSOR) 25 MG Tab Take 1 Tab by mouth 2 Times a Day. (Patient not taking: Reported on 5/17/2019) 60 Tab 0     No current facility-administered medications for this encounter.        ALLERGIES:  Patient has no known allergies.    REVIEW OF SYSTEMS:  A review of systems for today's date of service was reviewed and uploaded into the electronic medical record.    PHYSICAL EXAM:   /66   Pulse 88   Temp 36.4 °C (97.5 °F)   Wt 101.8 kg (224 lb 6.4 oz)   SpO2 96%   BMI 30.43 kg/m²    GENERAL: Well-developed, well-nourished male alert oriented no acute distress  HEENT:  Pupils are equal, round, and reactive to light.  Extraocular muscles   are intact. Sclerae nonicteric.  Conjunctivae pink.  Oral cavity, tongue   protrudes midline.  Edentulous.  Right tonsillar region no visible tumor no palpable abnormality in this area, no tenderness to  palpation.  NODES:  No peripheral adenopathy of the neck, supraclavicular fossa or axillae   bilaterally.    FIBEROPTIC EXAM:  See separate report.  Normal exam.    LABORATORY DATA:   Lab Results   Component Value Date/Time    SODIUM 141 04/08/2019 10:43 AM    POTASSIUM 3.6 04/08/2019 10:43 AM    CHLORIDE 107 04/08/2019 10:43 AM    CO2 28 04/08/2019 10:43 AM    GLUCOSE 246 (H) 04/08/2019 10:43 AM    BUN 19 04/08/2019 10:43 AM    CREATININE 0.60 04/08/2019 10:43 AM     Lab Results   Component Value Date/Time    ALKPHOSPHAT 64 04/08/2019 10:43 AM    ASTSGOT 29 04/08/2019 10:43 AM    ALTSGPT 24 04/08/2019 10:43 AM    TBILIRUBIN 0.6 04/08/2019 10:43 AM      Lab Results   Component Value Date/Time    WBC 1.6 (LL) 04/08/2019 10:43 AM    RBC 2.89 (L) 04/08/2019 10:43 AM    HEMOGLOBIN 9.9 (L) 04/08/2019 10:43 AM    HEMATOCRIT 29.6 (L) 04/08/2019 10:43 AM    .4 (H) 04/08/2019 10:43 AM    MCH 34.3 (H) 04/08/2019 10:43 AM    MCHC 33.4 (L) 04/08/2019 10:43 AM    MPV 13.4 (H) 04/08/2019 10:43 AM    NEUTSPOLYS 63.40 04/08/2019 10:43 AM    LYMPHOCYTES 16.00 (L) 04/08/2019 10:43 AM    MONOCYTES 16.70 (H) 04/08/2019 10:43 AM    EOSINOPHILS 2.60 04/08/2019 10:43 AM    BASOPHILS 1.30 04/08/2019 10:43 AM        RADIOLOGY DATA:  Dx-esophagus - Tooj-yksig-um    Result Date: 5/7/2019  HISTORY/REASON FOR EXAM:  Dysphagia. TECHNIQUE/EXAM DESCRIPTION:  Speech pathology video fluoroscopic assessment FINDINGS:  Actual fluoroscopy time was 1.7 minutes.     Speech pathology video fluoroscopic swallow assessment. For details please see that report.     Mr-soft Tissue Neck-with & W/o    Result Date: 5/13/2019 5/13/2019 10:56 AM HISTORY/REASON FOR EXAM:  The base of tongue cancer TECHNIQUE/EXAM DESCRIPTION: MRI neck soft tissues without and with contrast. The study was performed on a Marcelino 3.0 Dyan MRI scanner. T1 axial, T2 fast spin-echo fat suppressed axial, T1 coronal, and T1 sagittal images were obtained of the neck from the skull  base to the superior mediastinum. T1 postcontrast fat suppressed axial images were obtained. 10 mL Gadavist contrast was administered intravenously. COMPARISON:  1/17/2019 MRI FINDINGS: No RIGHT tonsillar lesion is seen. No LEFT tonsillar lesion is seen. There is subtle residual nodular enhancement in the adjacent RIGHT tongue base 1.1 x 1.9 cm, previously 2.7 x 2 cm. There is no longer exerts mass effect on the upper aerodigestive tract. No enlarged RIGHT neck lymph nodes are seen. Enlarged mildly peripherally enhancing LEFT level 2 neck lymph node measures 12 mm in short axis. This is unchanged. The posterior fossa and intracranial structures in the field of view are unremarkable. The skull base, mandible and bony structures show no particular abnormality. There is fluid in the RIGHT mastoid air cells. The paranasal sinuses and mastoids in the field of view are otherwise clear. The parotid and submandibular glands are unremarkable. The thyroid gland is unremarkable. The superior mediastinal structures in the field of view show no mass or adenopathy. The great vessels appear intact.     1.  Decreased size of RIGHT base of tongue mass 2.  No residual RIGHT tonsillar lesion visible 3.  No residual enlarged RIGHT level Ib, 2 and 3 neck lymph nodes visible 4.  Unchanged size of enlarged necrotic LEFT level 2 neck lymph node with decreased peripheral enhancement 5.  No new lesions 6. These findings are compatible with therapeutic response      IMPRESSION:    A 58 y.o. with right tonsillar cancer with extension to base of tongue status post chemoradiotherapy clinically without evidence of disease    RECOMMENDATIONS:   Reviewed MRI findings with patient.  Did recommend follow-up PET/CT which will schedule mid June 12 weeks post completion of therapy.  He will return for follow-up post imaging for review.  We will have G-tube removed in radiology.    Thank you for the opportunity to participate in his care.  If any  questions or comments, please do not hesitate in calling.    Edward SKELTON M.D.  Electronically signed by: Edward Harris V, 5/17/2019 4:13 PM  [phone]

## 2019-05-17 NOTE — NON-PROVIDER
Patient was seen today in clinic with Dr. Harris for follow up.  Vitals signs and weight were obtained and pain assessment was completed.  Allergies and medications were reviewed with the patient.  Review of systems completed.     Vitals/Pain:  Vitals:    05/17/19 1518   BP: 116/66   Pulse: 88   Temp: 36.4 °C (97.5 °F)   SpO2: 96%   Weight: 101.8 kg (224 lb 6.4 oz)            Allergies:   Patient has no known allergies.    Current Medications:  Current Outpatient Prescriptions   Medication Sig Dispense Refill   • metoprolol (LOPRESSOR) 25 MG Tab Take 1 Tab by mouth 2 Times a Day. (Patient not taking: Reported on 5/17/2019) 60 Tab 0     No current facility-administered medications for this encounter.          PCP:  Issa Xie, Med Ass't

## 2019-05-20 ENCOUNTER — OFFICE VISIT (OUTPATIENT)
Dept: HEMATOLOGY ONCOLOGY | Facility: MEDICAL CENTER | Age: 59
End: 2019-05-20
Payer: COMMERCIAL

## 2019-05-20 ENCOUNTER — SPEECH THERAPY (OUTPATIENT)
Dept: SPEECH THERAPY | Facility: REHABILITATION | Age: 59
End: 2019-05-20
Attending: NURSE PRACTITIONER
Payer: COMMERCIAL

## 2019-05-20 VITALS
SYSTOLIC BLOOD PRESSURE: 130 MMHG | DIASTOLIC BLOOD PRESSURE: 70 MMHG | HEART RATE: 80 BPM | OXYGEN SATURATION: 98 % | BODY MASS INDEX: 30.29 KG/M2 | WEIGHT: 223.33 LBS | RESPIRATION RATE: 16 BRPM | TEMPERATURE: 97.3 F

## 2019-05-20 DIAGNOSIS — R13.12 OROPHARYNGEAL DYSPHAGIA: ICD-10-CM

## 2019-05-20 DIAGNOSIS — C09.9 TONSIL CANCER (HCC): ICD-10-CM

## 2019-05-20 DIAGNOSIS — T45.1X5A CHEMOTHERAPY-INDUCED NEUTROPENIA (HCC): ICD-10-CM

## 2019-05-20 DIAGNOSIS — D70.1 CHEMOTHERAPY-INDUCED NEUTROPENIA (HCC): ICD-10-CM

## 2019-05-20 PROCEDURE — 92526 ORAL FUNCTION THERAPY: CPT

## 2019-05-20 PROCEDURE — 99214 OFFICE O/P EST MOD 30 MIN: CPT | Performed by: INTERNAL MEDICINE

## 2019-05-20 ASSESSMENT — PAIN SCALES - GENERAL: PAINLEVEL: NO PAIN

## 2019-05-20 NOTE — PROGRESS NOTES
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  In addition he noted bilateral neck nodes approximately 2 nodes in the right neck and one on the left neck level 2 region.  -Patient had a lot of concerns about cost of care and inability to work and delayed care  -12/27/18-tonsillar biopsy-squamous cell carcinoma, P 16+  -Seen by Dr. Harris.  -1/70/19-MRI-RIGHT base of tongue mass  2.  Masslike enlargement of RIGHT palatine tonsil  3.  Enlarged RIGHT level Ib, 2 and 3 neck lymph nodes  4.  Enlarged necrotic appearing LEFT level 2 neck lymph node  -PET scan-uptake within the known right-sided tonsillar cancer.  2.  There is uptake within right-sided level IB, II, and III lymph nodes consistent with metastatic disease.  3.  There is uptake within a left level II node consistent with metastases    Interim history-he finished chemoradiation employing weekly cisplatin, last dose given on March 26.  He did develop anticipated dysphagia which has overall improved.  MRI neck-.  Decreased size of RIGHT base of tongue mass 2.  No residual RIGHT tonsillar lesion visible 3.  No residual enlarged RIGHT level Ib, 2 and 3 neck lymph nodes visible 4.  Unchanged size of enlarged necrotic LEFT level 2 neck lymph node with decreased peripheral enhancement 5.  No new lesions 6. These findings are compatible with therapeutic response.  He is overall swallowing better.  He is scheduled for a PET scan next month.  Past Medical History:      Past Medical History:   Diagnosis Date   •  "Alcoholic (HCC)     stopped drinking in 2009   • Cancer (HCC) 2019    throat   • Dental disorder     uppers/ waiting for lower dentures t ocome in   • Diabetes (HCC)     diet controlled; pt states that he was told he was \"pre-diabetic\" and has never been on any medication   • Hypertension     stopped taking blood pressure medication on his own   • Tonsil cancer (HCC)      right tonsil - SCC       Past surgical history:       Past Surgical History:   Procedure Laterality Date   • OTHER  12/27/2018    rt tonsil biopsy    • CHOLECYSTECTOMY  2003       Allergies:       Patient has no known allergies.    Medications:         No current outpatient prescriptions on file.     No current facility-administered medications for this visit.          Social History:     Social History     Social History   • Marital status:      Spouse name: N/A   • Number of children: N/A   • Years of education: N/A     Occupational History   •       Social History Main Topics   • Smoking status: Former Smoker     Packs/day: 0.10     Years: 40.00     Types: Cigarettes, Cigars     Quit date: 2/11/2019   • Smokeless tobacco: Never Used      Comment: pt is trying to cut down currently 3/4 ppd   • Alcohol use No      Comment: alcoholism  last 2009   • Drug use: No   • Sexual activity: Yes     Partners: Female     Other Topics Concern   • Not on file     Social History Narrative   • No narrative on file       Family History:      Family History   Problem Relation Age of Onset   • Cancer Mother         colon   • Respiratory Disease Mother         emphysema- smoker   • Alcohol abuse Mother    • Heart Disease Father    • Cancer Sister         brain tumor   • GI Sister        Review of Systems:  All other review of systems are negative except what was mentioned above in the HPI.    Constitutional: Negative for fever, chills, weight loss and malaise/fatigue.    HEENT: No new auditory or visual complaints. No sore throat and neck pain.   "   Respiratory: Negative for cough, sputum production, shortness of breath and wheezing.    Cardiovascular: Negative for chest pain, palpitations, orthopnea and leg swelling.    Gastrointestinal: Negative for heartburn, nausea, vomiting and abdominal pain.    Genitourinary: Negative for dysuria, hematuria    Musculoskeletal: No new arthralgias or myalgias   Skin: Negative for rash and itching.    Neurological: Negative for focal weakness and headaches.    Endo/Heme/Allergies: No abnormal bleed/bruise.    Psychiatric/Behavioral: No new depression/anxiety.    Physical Exam:  Vitals: There were no vitals taken for this visit.    General: Not in acute distress, alert and oriented x 3  HEENT: No pallor, icterus. Oropharynx clear.   Neck: Supple, no palpable masses.  Lymph nodes: No palpable cervical, supraclavicular, axillary or inguinal lymphadenopathy.    CVS: regular rate and rhythm, no rubs or gallops  RESP: Clear to auscultate bilaterally, no wheezing or crackles.   ABD: Soft, non tender, non distended, positive bowel sounds, no palpable organomegaly  EXT: No edema or cyanosis  CNS: Alert and oriented x3, No focal deficits.  Skin- No rash      Labs:   No visits with results within 1 Week(s) from this visit.   Latest known visit with results is:   Hospital Outpatient Visit on 04/08/2019   Component Date Value Ref Range Status   • WBC 04/08/2019 1.6* 4.8 - 10.8 K/uL Final   • RBC 04/08/2019 2.89* 4.70 - 6.10 M/uL Final   • Hemoglobin 04/08/2019 9.9* 14.0 - 18.0 g/dL Final   • Hematocrit 04/08/2019 29.6* 42.0 - 52.0 % Final   • MCV 04/08/2019 102.4* 81.4 - 97.8 fL Final   • MCH 04/08/2019 34.3* 27.0 - 33.0 pg Final   • MCHC 04/08/2019 33.4* 33.7 - 35.3 g/dL Final   • RDW 04/08/2019 62.8* 35.9 - 50.0 fL Final   • Platelet Count 04/08/2019 132* 164 - 446 K/uL Final   • MPV 04/08/2019 13.4* 9.0 - 12.9 fL Final   • Neutrophils-Polys 04/08/2019 63.40  44.00 - 72.00 % Final   • Lymphocytes 04/08/2019 16.00* 22.00 - 41.00 %  Final   • Monocytes 04/08/2019 16.70* 0.00 - 13.40 % Final   • Eosinophils 04/08/2019 2.60  0.00 - 6.90 % Final   • Basophils 04/08/2019 1.30  0.00 - 1.80 % Final   • Immature Granulocytes 04/08/2019 0.00  0.00 - 0.90 % Final   • Nucleated RBC 04/08/2019 0.00  /100 WBC Final   • Neutrophils (Absolute) 04/08/2019 0.99* 1.82 - 7.42 K/uL Final    Includes immature neutrophils, if present.   • Lymphs (Absolute) 04/08/2019 0.25* 1.00 - 4.80 K/uL Final   • Monos (Absolute) 04/08/2019 0.26  0.00 - 0.85 K/uL Final   • Eos (Absolute) 04/08/2019 0.04  0.00 - 0.51 K/uL Final   • Baso (Absolute) 04/08/2019 0.02  0.00 - 0.12 K/uL Final   • Immature Granulocytes (abs) 04/08/2019 0.00  0.00 - 0.11 K/uL Final   • NRBC (Absolute) 04/08/2019 0.00  K/uL Final   • Sodium 04/08/2019 141  135 - 145 mmol/L Final   • Potassium 04/08/2019 3.6  3.6 - 5.5 mmol/L Final   • Chloride 04/08/2019 107  96 - 112 mmol/L Final   • Co2 04/08/2019 28  20 - 33 mmol/L Final   • Anion Gap 04/08/2019 6.0  0.0 - 11.9 Final   • Glucose 04/08/2019 246* 65 - 99 mg/dL Final   • Bun 04/08/2019 19  8 - 22 mg/dL Final   • Creatinine 04/08/2019 0.60  0.50 - 1.40 mg/dL Final   • Calcium 04/08/2019 9.0  8.5 - 10.5 mg/dL Final   • AST(SGOT) 04/08/2019 29  12 - 45 U/L Final   • ALT(SGPT) 04/08/2019 24  2 - 50 U/L Final   • Alkaline Phosphatase 04/08/2019 64  30 - 99 U/L Final   • Total Bilirubin 04/08/2019 0.6  0.1 - 1.5 mg/dL Final   • Albumin 04/08/2019 3.4  3.2 - 4.9 g/dL Final   • Total Protein 04/08/2019 6.2  6.0 - 8.2 g/dL Final   • Globulin 04/08/2019 2.8  1.9 - 3.5 g/dL Final   • A-G Ratio 04/08/2019 1.2  g/dL Final   • Magnesium 04/08/2019 1.6  1.5 - 2.5 mg/dL Final   • GFR If  04/08/2019 >60  >60 mL/min/1.73 m 2 Final   • GFR If Non  04/08/2019 >60  >60 mL/min/1.73 m 2 Final             Assessment and Plan:    ECOG PS=0  T3 N2 M0 squamous cell carcinoma of the right tonsil, P 16+, in the setting of chronic tobacco use     -He  has a large asymptomatic right tonsillar mass with a significant right cervical adenopathy.  PET scan also showed involvement of a small lymph node in the contralateral side.    He finished chemoradiation employing weekly cisplatin , end  of March.  He recovered from dysphagia.  Tube remomval planned.  He is scheduled for a restaging PET scan next month, will see him here after that.  He will follow-up closely with ENT and radiation oncology and will do six-month follow-up after that  Cytopenia secondary to chemotherapy-will repeat labs.  Dysphagia-overall improved significantly, tube feed removal planned.   He agreed and verbalized  agreement and understanding with the current plan.  I answered all questions and concerns at this time         Please note that this dictation was created using voice recognition software. I have made every reasonable attempt to correct obvious errors, but I expect that there are errors of grammar and possibly content that I did not discover before finalizing the note.      SIGNATURES:  Surjit Looney    CC:  Keisha Parsons, REAGAN.P.R.N.  No ref. provider found

## 2019-05-20 NOTE — OP THERAPY DAILY TREATMENT
"  Outpatient Speech Therapy  DAILY TREATMENT     [facility] Speech [address]  [address]  [place] [address]  Phone:  [phone]  Fax:  [phone]    Date: 05/20/2019    Patient: Maximino Green  YOB: 1960  MRN: [id number]     Time Calculation  Start time: 1530  Stop time: 1600 Time Calculation (min): 30 minutes     Chief Complaint: Dysphagia    Visit #: 4    Subjective:   Reason for Therapy:     Reason For Evaluation:  Dysphagia  Social Support:     Patient Mental Status:  Alert and Responsive  Additional Subjective Comments:      Patient reports changes to taste and improvements in saliva.       Objective:   Treatments/Interventions Performed:  Dysphagia treatment, Patient/Caregiver education, Home program and Compensatory strategy training  Treatment Intervention tool(s) used:  Traditional dysphagia therapy targeted improving oral, pharyngeal phases of swallow following CRT.  Extensive education/ consultation completed regarding patient current level of swallow function.     Patient reports eating softer foods such as eggs, pasta following Dysphagia 2 solids secondary to edentulous with patient reporting he is scheduled to get new dentures \"anyday\". Patient notes need to avoid spicy foods and other flavors such as salt at this time.     Speech Therapy Assessment:     Oral Motor Status:     Labial strength and control for patient: Good    Lingual strength and control for patient: Good    Patient saliva management: GoodPatient oral sensation and awareness: Good    Patient awareness of swallow problem: Good    Previous modified barium swallow: Yes    Date of previous modified barium swallow: 5/7/2019    Study outcome: puree solids (edentulous), thin liquids    Oral motor status comments: Patient was noted to demonstrate improvement from second swallow per results of MBSS to which patient states, I use that all the time.  Patient reports no longer using PEG tube to meet " "nutritional needs reporting \"I even gained a couple of pounds since my previous visit.\"  Swallow exercises to complete addressing oral, pharyngeal phases of swallow following CRT reviewed/ discussed with patient reporting independence in completion of all exercise.       Speech Therapy Plan :   Prognosis & Recommendations  Impression Summary: Following results of MBSS, patient has demonstrated marked progress in initiation of PO intake to meet nutritional needs independent of alternative means of nutrition/ hydration. Swallow exercises reviewed with patient demonstrating independence in completion.  Patient encouraged to continue with exercises as part of daily life activities to reduce effects of CRT in future.   Prognosis:  Excellent  Therapy Recommendations  Recommendation:  Individual Speech Therapy, Patient to return to outpatient speech therapy in 2 weeks, when patient has teeth, to assess for safety in swallow of solids textures.   Planned Therapy Interventions:  Home Program, Patient/Caregiver Education, Compensatory Strategy Training and Dysphagia treatment,   Plan Details:  Continue with POC.  Reinforced daily completion of home program addressing swallow secondary to participation in CRT for head/ neck cancer.               "

## 2019-05-21 ENCOUNTER — HOSPITAL ENCOUNTER (OUTPATIENT)
Dept: RADIOLOGY | Facility: MEDICAL CENTER | Age: 59
End: 2019-05-21
Attending: RADIOLOGY
Payer: COMMERCIAL

## 2019-05-21 DIAGNOSIS — C09.8 MALIGNANT NEOPLASM OF OVERLAPPING SITES OF TONSIL (HCC): ICD-10-CM

## 2019-05-21 NOTE — PROGRESS NOTES
"Patient given [facility] \"Preventing the Spread of Infection\" Brochure upon being checked in.     G Tube Removal done by Dr. Tatum; NON-SEDATION  (no H&P required as this is a NON SEDATION procedure); procedural RN: [place]; time out completed by all staff @ 0805; pt tolerated the procedure well; pt remained stable pre/intra/post procedure; all questions and concerns answered prior to being d/c; patient provided with appropriate education for procedure; pt d/c home.     "

## 2019-05-22 ENCOUNTER — APPOINTMENT (OUTPATIENT)
Dept: SPEECH THERAPY | Facility: REHABILITATION | Age: 59
End: 2019-05-22
Attending: NURSE PRACTITIONER
Payer: COMMERCIAL

## 2019-05-24 ENCOUNTER — TELEPHONE (OUTPATIENT)
Dept: HEMATOLOGY ONCOLOGY | Facility: MEDICAL CENTER | Age: 59
End: 2019-05-24

## 2019-05-24 NOTE — TELEPHONE ENCOUNTER
"Nutrition Progress Note:  Patient's weight per stand up scale at home (pt stated) = 223 lbs (wt currently maintained at 1 week f/u).   Labs (4/8): glucose 246  GI: pt denies N/V, diarrhea, abdominal pain after eating.     Current Interventions:   Per phone interview with dietitian, patient states that PEG tube was removed this week and is already healing, which he is very pleased about.  PO-wise, he was able to trial milkshake, Boost shake, pudding and milk, which were all cleared easily out of his mouth, he reported.  He continues with double swallow after bites and thick oral secretions continue to decline, he states.  Overall, mouth and throat are \"still pretty raw\", but healing.  Pt reports dry mouth and foods either have \"no taste at all or they are extremely salty\".  Dentures are scheduled to arrive this Tuesday, pt reports.    -RD encouraged plain water as best hydrator to address dry mouth: goal is 2-3 L/day  -RD encouraged patient to avoid sugary drinks to avoid exacerbating dry mouth  -RD encouraged patient to trial 1-2 drops/ 1/4 teaspoon lemon or lime juice to combat salty taste of foods- stop if this causes pain in mouth or throat.      Pt verbalizes excellent understanding.  RD to f/u with phone call in 2 weeks.  Phone number has also been provided to patient, should questions arise during interim period.      RD available at [phone] prn.     "

## 2019-05-24 NOTE — TELEPHONE ENCOUNTER
Nutrition Services:  RD attempted to call patient via telephone to set up nutrition f/u from last Friday 5/17.  Patient is currently unavailable.  RD left voicemail with contact information.  Please have patient call dietitian at [phone].

## 2019-05-25 ENCOUNTER — HOSPITAL ENCOUNTER (OUTPATIENT)
Dept: LAB | Facility: MEDICAL CENTER | Age: 59
End: 2019-05-25
Attending: NURSE PRACTITIONER
Payer: COMMERCIAL

## 2019-05-25 DIAGNOSIS — I10 ESSENTIAL HYPERTENSION: ICD-10-CM

## 2019-05-25 DIAGNOSIS — E11.9 TYPE 2 DIABETES MELLITUS WITHOUT COMPLICATION, WITHOUT LONG-TERM CURRENT USE OF INSULIN (HCC): ICD-10-CM

## 2019-05-25 LAB
CHOLEST SERPL-MCNC: 131 MG/DL (ref 100–199)
CREAT UR-MCNC: 585.8 MG/DL
EST. AVERAGE GLUCOSE BLD GHB EST-MCNC: 74 MG/DL
HBA1C MFR BLD: 4.2 % (ref 0–5.6)
HDLC SERPL-MCNC: 34 MG/DL
LDLC SERPL CALC-MCNC: 81 MG/DL
MICROALBUMIN UR-MCNC: 3.3 MG/DL
MICROALBUMIN/CREAT UR: 6 MG/G (ref 0–30)
TRIGL SERPL-MCNC: 80 MG/DL (ref 0–149)

## 2019-05-25 PROCEDURE — 83036 HEMOGLOBIN GLYCOSYLATED A1C: CPT

## 2019-05-25 PROCEDURE — 82570 ASSAY OF URINE CREATININE: CPT

## 2019-05-25 PROCEDURE — 36415 COLL VENOUS BLD VENIPUNCTURE: CPT

## 2019-05-25 PROCEDURE — 80061 LIPID PANEL: CPT

## 2019-05-25 PROCEDURE — 82043 UR ALBUMIN QUANTITATIVE: CPT

## 2019-05-28 ENCOUNTER — NON-PROVIDER VISIT (OUTPATIENT)
Dept: MEDICAL GROUP | Facility: PHYSICIAN GROUP | Age: 59
End: 2019-05-28
Payer: COMMERCIAL

## 2019-05-28 DIAGNOSIS — I48.91 ATRIAL FIBRILLATION WITH RVR (HCC): ICD-10-CM

## 2019-05-28 PROCEDURE — 99999 EKG: CPT | Performed by: NURSE PRACTITIONER

## 2019-05-28 PROCEDURE — 93000 ELECTROCARDIOGRAM COMPLETE: CPT | Performed by: NURSE PRACTITIONER

## 2019-05-29 ENCOUNTER — APPOINTMENT (OUTPATIENT)
Dept: SPEECH THERAPY | Facility: REHABILITATION | Age: 59
End: 2019-05-29
Attending: NURSE PRACTITIONER
Payer: COMMERCIAL

## 2019-05-30 ENCOUNTER — TELEMEDICINE2 (OUTPATIENT)
Dept: CARDIOLOGY | Facility: MEDICAL CENTER | Age: 59
End: 2019-05-30
Payer: COMMERCIAL

## 2019-05-30 VITALS
SYSTOLIC BLOOD PRESSURE: 102 MMHG | HEART RATE: 64 BPM | HEIGHT: 73 IN | DIASTOLIC BLOOD PRESSURE: 58 MMHG | BODY MASS INDEX: 28.89 KG/M2 | TEMPERATURE: 97.5 F | OXYGEN SATURATION: 97 % | WEIGHT: 218 LBS

## 2019-05-30 DIAGNOSIS — Z72.0 TOBACCO ABUSE: ICD-10-CM

## 2019-05-30 DIAGNOSIS — I48.91 ATRIAL FIBRILLATION WITH RVR (HCC): ICD-10-CM

## 2019-05-30 DIAGNOSIS — E11.9 TYPE 2 DIABETES MELLITUS WITHOUT COMPLICATION, WITHOUT LONG-TERM CURRENT USE OF INSULIN (HCC): ICD-10-CM

## 2019-05-30 DIAGNOSIS — E66.9 OBESITY (BMI 30-39.9): ICD-10-CM

## 2019-05-30 PROCEDURE — 99203 OFFICE O/P NEW LOW 30 MIN: CPT | Performed by: INTERNAL MEDICINE

## 2019-05-30 NOTE — LETTER
[facility]   [address], [address]  [place] [address]  Phone: [phone]  Fax: [phone]              Maximino Green  1960    Encounter Date: 5/30/2019    Jaimie Negron M.D.          PROGRESS NOTE:  Subjective:   Chief Complaint:   Chief Complaint   Patient presents with   • Atrial Fibrillation     NP       Maximino Green is a 58 y.o. male who is referred by ASH Kennedy for afib.  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 Done with chemo and radiation.  Only having dry mouth. In feb 2019, having elective G tube placement. During the procedure he developed to afib with RVR and subsequently admitted for further evaluation, monitored overnight, echo normal, TSH normal, started on metoprolol 25 mg BID.  He took it for a few days then stopped it on his own.     Has low HDL.  Type 2 diabetes per hx but not on meds.  Prior hx of Hypertension, blood pressure controlled, not on meds.  Chemotherapy-induced neutropenia with anemia and low platelets.  LVH report on echo but I did not agree. LA normal size.  RVSP 45 mmHg.    He is not limited by chest pain, pressure or tightness.   No significant dyspnea on exertion but is gaining strength and stamina.  No significant palpitations, dizziness, or presyncope/syncope.   No stroke/TIA like symptoms.    No FH of afib, father's family hx not known.      DATA REVIEWED by me:  ECG 5/28/2019  Sinus bradycardia, rate 54, early R wave progression    Echo 2/23/2018  Normal chamber sizes. LA normal size. Normal LV and RV systolic   function. LVEF 65% visually.   No signfiicant valvular disease. RVSP estimated at 45 mmHg. No prior   study is available for comparison.     Most recent labs:     5/25/2019 LDL 81, HDL 34, triglyceride 80, total cholesterol 131    4/8/2019 hemoglobin 9.9, , with 132, sodium 141, potassium 3.6, creatinine 0.6, LFTs  "normal      Past Medical History:   Diagnosis Date   • Alcoholic (HCC)     stopped drinking in 2009   • Cancer (HCC) 2019    throat   • Dental disorder     uppers/ waiting for lower dentures t ocome in   • Diabetes (HCC)     diet controlled; pt states that he was told he was \"pre-diabetic\" and has never been on any medication   • Hypertension     stopped taking blood pressure medication on his own   • Tonsil cancer (HCC)      right tonsil - SCC     Past Surgical History:   Procedure Laterality Date   • OTHER  12/27/2018    rt tonsil biopsy    • CHOLECYSTECTOMY  2003     Family History   Problem Relation Age of Onset   • Cancer Mother         colon   • Respiratory Disease Mother         emphysema- smoker   • Alcohol abuse Mother    • Heart Disease Father         Detials not known   • Cancer Sister         brain tumor   • GI Sister      Social History     Social History   • Marital status:      Spouse name: N/A   • Number of children: N/A   • Years of education: N/A     Occupational History   •       Social History Main Topics   • Smoking status: Former Smoker     Packs/day: 0.10     Years: 40.00     Types: Cigarettes, Cigars     Quit date: 2/11/2019   • Smokeless tobacco: Never Used      Comment: pt is trying to cut down currently 3/4 ppd   • Alcohol use No      Comment: alcoholism  last 2009   • Drug use: No   • Sexual activity: Yes     Partners: Female     Other Topics Concern   • Not on file     Social History Narrative   • No narrative on file     No Known Allergies    No current outpatient prescriptions on file.     No current facility-administered medications for this visit.        ROS  All others systems reviewed and negative.     Objective:     /58 (BP Location: Left arm, Patient Position: Sitting, BP Cuff Size: Adult)   Pulse 64   Temp 36.4 °C (97.5 °F) (Temporal)   Ht 1.854 m (6' 1\")   Wt 98.9 kg (218 lb)   SpO2 97%  Body mass index is 28.76 kg/m².    Physical Exam   General: " No acute distress. Well nourished.  HEENT: EOM grossly intact, no scleral icterus, no pharyngeal erythema.   Neck:  No JVD, no bruits, trachea midline  CVS: RRR. Normal S1, S2. No M/R/G. No LE edema.  2+ radial pulses, 2+ DP pulses  Resp: CTAB. No wheezing or crackles/rhonchi. Normal respiratory effort.  Abdomen: Soft, NT, no balta hepatomegaly.  MSK/Ext: No clubbing or cyanosis.  Skin: Warm and dry, no rashes.  Neurological: CN III-XII grossly intact. No focal deficits.   Psych: A&O x 3, appropriate affect, good judgement      Assessment:     1. Atrial fibrillation with RVR (HCC)     2. Obesity (BMI 30-39.9)     3. Tobacco abuse     4. Type 2 diabetes mellitus without complication, without long-term current use of insulin (HCC)         Medical Decision Making:  Today's Assessment / Status / Plan:     -Afib with RVR in the setting illness, no apparent recurrence  -He is not interested in meds at this time  -We did discuss today that he is at risk for it coming back, at this time [id number] is zero or one, depending on status of DM.  No HTN at this time.  -In his lifetime he will probably need anticoagulation  -He would prefer no further evaluation, no meds at this time.  Seemed grateful for the visit today.      Return if symptoms worsen or fail to improve.    It is my pleasure to participate in the care of Mr. Green.  Please do not hesitate to contact me with questions or concerns.    Jaimie Negron MD, [facility]  Cardiologist [facility]    Please note that this dictation was created using voice recognition software. I have made every reasonable attempt to correct obvious errors, but it is possible there are errors of grammar and possibly content that I did not discover before finalizing the note.    This consultation was conducted utilizing secure and cryptic video conferencing equipment with the assistance of a trained tele-presenter at the originating site.        Keisha FRAGA  KARIE ParsonsN.  [address] [place] [address]  VIA In Basket

## 2019-05-30 NOTE — PROGRESS NOTES
Subjective:   Chief Complaint:   Chief Complaint   Patient presents with   • Atrial Fibrillation     NP       Maximino Green is a 58 y.o. male who is referred by ASH Kennedy for afib.  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 Done with chemo and radiation.  Only having dry mouth. In feb 2019, having elective G tube placement. During the procedure he developed to afib with RVR and subsequently admitted for further evaluation, monitored overnight, echo normal, TSH normal, started on metoprolol 25 mg BID.  He took it for a few days then stopped it on his own.     Has low HDL.  Type 2 diabetes per hx but not on meds.  Prior hx of Hypertension, blood pressure controlled, not on meds.  Chemotherapy-induced neutropenia with anemia and low platelets.  LVH report on echo but I did not agree. LA normal size.  RVSP 45 mmHg.    He is not limited by chest pain, pressure or tightness.   No significant dyspnea on exertion but is gaining strength and stamina.  No significant palpitations, dizziness, or presyncope/syncope.   No stroke/TIA like symptoms.    No FH of afib, father's family hx not known.      DATA REVIEWED by me:  ECG 5/28/2019  Sinus bradycardia, rate 54, early R wave progression    Echo 2/23/2018  Normal chamber sizes. LA normal size. Normal LV and RV systolic   function. LVEF 65% visually.   No signfiicant valvular disease. RVSP estimated at 45 mmHg. No prior   study is available for comparison.     Most recent labs:     5/25/2019 LDL 81, HDL 34, triglyceride 80, total cholesterol 131    4/8/2019 hemoglobin 9.9, , with 132, sodium 141, potassium 3.6, creatinine 0.6, LFTs normal      Past Medical History:   Diagnosis Date   • Alcoholic (HCC)     stopped drinking in 2009   • Cancer (HCC) 2019    throat   • Dental disorder     uppers/ waiting for lower dentures t ocome in   • Diabetes (HCC)     diet controlled; pt states that he was told he  "was \"pre-diabetic\" and has never been on any medication   • Hypertension     stopped taking blood pressure medication on his own   • Tonsil cancer (HCC)      right tonsil - SCC     Past Surgical History:   Procedure Laterality Date   • OTHER  12/27/2018    rt tonsil biopsy    • CHOLECYSTECTOMY  2003     Family History   Problem Relation Age of Onset   • Cancer Mother         colon   • Respiratory Disease Mother         emphysema- smoker   • Alcohol abuse Mother    • Heart Disease Father         Detials not known   • Cancer Sister         brain tumor   • GI Sister      Social History     Social History   • Marital status:      Spouse name: N/A   • Number of children: N/A   • Years of education: N/A     Occupational History   •       Social History Main Topics   • Smoking status: Former Smoker     Packs/day: 0.10     Years: 40.00     Types: Cigarettes, Cigars     Quit date: 2/11/2019   • Smokeless tobacco: Never Used      Comment: pt is trying to cut down currently 3/4 ppd   • Alcohol use No      Comment: alcoholism  last 2009   • Drug use: No   • Sexual activity: Yes     Partners: Female     Other Topics Concern   • Not on file     Social History Narrative   • No narrative on file     No Known Allergies    No current outpatient prescriptions on file.     No current facility-administered medications for this visit.        ROS  All others systems reviewed and negative.     Objective:     /58 (BP Location: Left arm, Patient Position: Sitting, BP Cuff Size: Adult)   Pulse 64   Temp 36.4 °C (97.5 °F) (Temporal)   Ht 1.854 m (6' 1\")   Wt 98.9 kg (218 lb)   SpO2 97%  Body mass index is 28.76 kg/m².    Physical Exam   General: No acute distress. Well nourished.  HEENT: EOM grossly intact, no scleral icterus, no pharyngeal erythema.   Neck:  No JVD, no bruits, trachea midline  CVS: RRR. Normal S1, S2. No M/R/G. No LE edema.  2+ radial pulses, 2+ DP pulses  Resp: CTAB. No wheezing or " crackles/rhonchi. Normal respiratory effort.  Abdomen: Soft, NT, no balta hepatomegaly.  MSK/Ext: No clubbing or cyanosis.  Skin: Warm and dry, no rashes.  Neurological: CN III-XII grossly intact. No focal deficits.   Psych: A&O x 3, appropriate affect, good judgement      Assessment:     1. Atrial fibrillation with RVR (HCC)     2. Obesity (BMI 30-39.9)     3. Tobacco abuse     4. Type 2 diabetes mellitus without complication, without long-term current use of insulin (HCC)         Medical Decision Making:  Today's Assessment / Status / Plan:     -Afib with RVR in the setting illness, no apparent recurrence  -He is not interested in meds at this time  -We did discuss today that he is at risk for it coming back, at this time [id number] is zero or one, depending on status of DM.  No HTN at this time.  -In his lifetime he will probably need anticoagulation  -He would prefer no further evaluation, no meds at this time.  Seemed grateful for the visit today.      Return if symptoms worsen or fail to improve.    It is my pleasure to participate in the care of Mr. Green.  Please do not hesitate to contact me with questions or concerns.    Jaimie Negron MD, [facility]  Cardiologist [facility]    Please note that this dictation was created using voice recognition software. I have made every reasonable attempt to correct obvious errors, but it is possible there are errors of grammar and possibly content that I did not discover before finalizing the note.    This consultation was conducted utilizing secure and cryptic video conferencing equipment with the assistance of a trained tele-presenter at the originating site.

## 2019-05-31 ENCOUNTER — APPOINTMENT (OUTPATIENT)
Dept: SPEECH THERAPY | Facility: REHABILITATION | Age: 59
End: 2019-05-31
Attending: NURSE PRACTITIONER
Payer: COMMERCIAL

## 2019-06-06 ENCOUNTER — OFFICE VISIT (OUTPATIENT)
Dept: MEDICAL GROUP | Facility: PHYSICIAN GROUP | Age: 59
End: 2019-06-06
Payer: COMMERCIAL

## 2019-06-06 VITALS
OXYGEN SATURATION: 98 % | RESPIRATION RATE: 16 BRPM | WEIGHT: 222 LBS | DIASTOLIC BLOOD PRESSURE: 58 MMHG | HEIGHT: 73 IN | BODY MASS INDEX: 29.42 KG/M2 | TEMPERATURE: 99 F | HEART RATE: 72 BPM | SYSTOLIC BLOOD PRESSURE: 112 MMHG

## 2019-06-06 DIAGNOSIS — D70.1 CHEMOTHERAPY-INDUCED NEUTROPENIA (HCC): ICD-10-CM

## 2019-06-06 DIAGNOSIS — C09.9 TONSIL CANCER (HCC): ICD-10-CM

## 2019-06-06 DIAGNOSIS — I10 ESSENTIAL HYPERTENSION: ICD-10-CM

## 2019-06-06 DIAGNOSIS — T45.1X5A CHEMOTHERAPY-INDUCED NEUTROPENIA (HCC): ICD-10-CM

## 2019-06-06 PROBLEM — I48.91 ATRIAL FIBRILLATION WITH RVR (HCC): Status: RESOLVED | Noted: 2019-02-23 | Resolved: 2019-06-06

## 2019-06-06 PROCEDURE — 99214 OFFICE O/P EST MOD 30 MIN: CPT | Performed by: FAMILY MEDICINE

## 2019-06-06 ASSESSMENT — PATIENT HEALTH QUESTIONNAIRE - PHQ9: CLINICAL INTERPRETATION OF PHQ2 SCORE: 0

## 2019-06-06 NOTE — ASSESSMENT & PLAN NOTE
Patient continues to recover well after treatment for tonsillar cancer.  He has completed his chemotherapy and radiation.  He will continue to follow annually with oncology.  His next appointment is on the 17th.  Recent labs show severe depression of white cells and neutropenia due to the chemotherapy.  I will reorder his CBC and CMP.

## 2019-06-06 NOTE — ASSESSMENT & PLAN NOTE
Recheck labs. He has no fever or sore throat.   Scheduled for PET scan and has follow up with his oncologist.   Results for VALENTIN YADAV (MRN [id number]) as of 6/11/2019 13:29   Ref. Range 3/25/2019 09:47 4/1/2019 10:50 4/8/2019 10:43   WBC Latest Ref Range: 4.8 - 10.8 K/uL 2.1 (LL) 2.2 (LL) 1.6 (LL)   RBC Latest Ref Range: 4.70 - 6.10 M/uL 3.54 (L) 2.93 (L) 2.89 (L)   Hemoglobin Latest Ref Range: 14.0 - 18.0 g/dL 11.9 (L) 9.7 (L) 9.9 (L)   Hematocrit Latest Ref Range: 42.0 - 52.0 % 37.2 (L) 28.0 (L) 29.6 (L)   MCV Latest Ref Range: 81.4 - 97.8 fL 105.1 (H) 95.6 102.4 (H)   MCH Latest Ref Range: 27.0 - 33.0 pg 33.6 (H) 33.1 (H) 34.3 (H)   MCHC Latest Ref Range: 33.7 - 35.3 g/dL 32.0 (L) 34.6 33.4 (L)   RDW Latest Ref Range: 35.9 - 50.0 fL 56.4 (H) 53.6 (H) 62.8 (H)   Platelet Count Latest Ref Range: 164 - 446 K/uL 75 (L) 90 (L) 132 (L)   MPV Latest Ref Range: 9.0 - 12.9 fL 13.5 (H) 13.3 (H) 13.4 (H)

## 2019-06-10 ENCOUNTER — TELEPHONE (OUTPATIENT)
Dept: SPEECH THERAPY | Facility: REHABILITATION | Age: 59
End: 2019-06-10

## 2019-06-10 NOTE — OP THERAPY DISCHARGE SUMMARY
"Outpatient Speech Therapy  DISCHARGE SUMMARY NOTE      [place] [address]  [address]  [place] [address]  Phone:  [phone]  Fax:  [phone]        Patient Name:  Maximino Green  :  1960  MR#:  [id number]    HICN:       Visits:  4       Cancel/No-Show: 0    Diagnosis/ICD-10: Dysphagia, Oropharyngeal    Referring Provider: OLYA Rees    SOC Date: 2019   Onset Date: 2019      Your patient is being discharged from Speech therapy with the following comments:        Comments:  Patient participated in evaluation and 3 follow up sessions of outpatient speech therapy addressing changes to swallow following completion of chemoradiation therapy addressing tonsil cancer.  Patient demonstrated marked progress in improvement in swallow through completion of structured exercise addressing oral, pharyngeal phases of swallow.  Patient improvements in swallow resulted in advancement to PO intake following results of MBSS 2019.  During last seen visit at outpatient therapy on 2019, patient was consuming a pureed diet, thin liquids as patient was edentulous.  Patient returned a phone call 2019 requesting discharge from outpatient speech therapy services reporting \"he is eating great\" stating that outpatient ST is no longer necessary.         Limitations/Remaining:  Patient encouraged to continue with exercises addressing oral, pharyngeal phases of swallow daily as educated/ trained in outpatient speech therapy sessions to promote maintenance of swallow function to current level given potential for long term side effects of chemoradiation therapy on swallow function.         Recommendations:  Continue with exercise as outlined in outpatient speech therapy addressing oral, pharyngeal phases of swallow.  Return to outpatient speech therapy for re-assessment of swallow in 6-12 months or should changes to swallow arise.          Yuni Rothman, " SLP

## 2019-06-11 NOTE — PROGRESS NOTES
Subjective:   Maximino Green is a 58 y.o. male here today for evaluation and management of:     Chemotherapy-induced neutropenia (HCC)  Recheck labs. He has no fever or sore throat.   Scheduled for PET scan and has follow up with his oncologist.   Results for MAXIMINO GREEN (MRN [id number]) as of 6/11/2019 13:29   Ref. Range 3/25/2019 09:47 4/1/2019 10:50 4/8/2019 10:43   WBC Latest Ref Range: 4.8 - 10.8 K/uL 2.1 (LL) 2.2 (LL) 1.6 (LL)   RBC Latest Ref Range: 4.70 - 6.10 M/uL 3.54 (L) 2.93 (L) 2.89 (L)   Hemoglobin Latest Ref Range: 14.0 - 18.0 g/dL 11.9 (L) 9.7 (L) 9.9 (L)   Hematocrit Latest Ref Range: 42.0 - 52.0 % 37.2 (L) 28.0 (L) 29.6 (L)   MCV Latest Ref Range: 81.4 - 97.8 fL 105.1 (H) 95.6 102.4 (H)   MCH Latest Ref Range: 27.0 - 33.0 pg 33.6 (H) 33.1 (H) 34.3 (H)   MCHC Latest Ref Range: 33.7 - 35.3 g/dL 32.0 (L) 34.6 33.4 (L)   RDW Latest Ref Range: 35.9 - 50.0 fL 56.4 (H) 53.6 (H) 62.8 (H)   Platelet Count Latest Ref Range: 164 - 446 K/uL 75 (L) 90 (L) 132 (L)   MPV Latest Ref Range: 9.0 - 12.9 fL 13.5 (H) 13.3 (H) 13.4 (H)       Essential hypertension  Chronic condition, well controlled .    Tonsil cancer (HCC)  Patient continues to recover well after treatment for tonsillar cancer.  He has completed his chemotherapy and radiation.  He will continue to follow annually with oncology.  His next appointment is on the 17th.  Recent labs show severe depression of white cells and neutropenia due to the chemotherapy.  I will reorder his CBC and CMP.         Current medicines (including changes today)  No current outpatient prescriptions on file.     No current facility-administered medications for this visit.      He  has a past medical history of Alcoholic (HCC); Cancer (HCC) (2019); Dental disorder; Diabetes (HCC); Hypertension; and Tonsil cancer (HCC).    ROS  No chest pain, no shortness of breath, no abdominal pain       Objective:     /58 (BP Location: Left arm, Patient Position: Sitting, BP  "Cuff Size: Adult)   Pulse 72   Temp 37.2 °C (99 °F) (Temporal)   Resp 16   Ht 1.854 m (6' 1\")   Wt 100.7 kg (222 lb)   SpO2 98%  Body mass index is 29.29 kg/m².   Physical Exam:  Constitutional: Alert, no distress.  Skin: Warm, dry, good turgor, no rashes in visible areas.  Eye: Equal, round and reactive, conjunctiva clear, lids normal.  ENMT: Lips without lesions, good dentition, oropharynx clear.  Neck: Trachea midline, no masses, no thyromegaly. No cervical or supraclavicular lymphadenopathy  Respiratory: Unlabored respiratory effort, lungs clear to auscultation, no wheezes, no ronchi.  Cardiovascular: Normal S1, S2, no murmur, no edema.  Abdomen: Soft, non-tender, no masses, no hepatosplenomegaly.  Psych: Alert and oriented x3, normal affect and mood.        Assessment and Plan:   The following treatment plan was discussed    1. Chemotherapy-induced neutropenia (HCC)  - CBC WITH DIFFERENTIAL; Future  - Comp Metabolic Panel; Future    2. Essential hypertension  Controlled.   - Comp Metabolic Panel; Future    3. Tonsil cancer (HCC)  Continue follow up with oncology.       Followup: Return in about 3 months (around 9/6/2019) for History of tonsillar cancer, history of neutropenia, diabetes.         "

## 2019-06-15 ENCOUNTER — HOSPITAL ENCOUNTER (OUTPATIENT)
Dept: LAB | Facility: MEDICAL CENTER | Age: 59
End: 2019-06-15
Attending: FAMILY MEDICINE
Payer: COMMERCIAL

## 2019-06-15 ENCOUNTER — TELEPHONE (OUTPATIENT)
Dept: MEDICAL GROUP | Facility: PHYSICIAN GROUP | Age: 59
End: 2019-06-15

## 2019-06-15 DIAGNOSIS — T45.1X5A CHEMOTHERAPY-INDUCED NEUTROPENIA (HCC): ICD-10-CM

## 2019-06-15 DIAGNOSIS — I10 ESSENTIAL HYPERTENSION: ICD-10-CM

## 2019-06-15 DIAGNOSIS — D70.1 CHEMOTHERAPY-INDUCED NEUTROPENIA (HCC): ICD-10-CM

## 2019-06-15 LAB
ALBUMIN SERPL BCP-MCNC: 3.3 G/DL (ref 3.2–4.9)
ALBUMIN/GLOB SERPL: 1.2 G/DL
ALP SERPL-CCNC: 44 U/L (ref 30–99)
ALT SERPL-CCNC: 61 U/L (ref 2–50)
ANION GAP SERPL CALC-SCNC: 6 MMOL/L (ref 0–11.9)
ANISOCYTOSIS BLD QL SMEAR: ABNORMAL
AST SERPL-CCNC: 86 U/L (ref 12–45)
BASOPHILS # BLD AUTO: 2.7 % (ref 0–1.8)
BASOPHILS # BLD: 0.05 K/UL (ref 0–0.12)
BILIRUB SERPL-MCNC: 1 MG/DL (ref 0.1–1.5)
BUN SERPL-MCNC: 12 MG/DL (ref 8–22)
BURR CELLS BLD QL SMEAR: NORMAL
CALCIUM SERPL-MCNC: 9.1 MG/DL (ref 8.5–10.5)
CHLORIDE SERPL-SCNC: 111 MMOL/L (ref 96–112)
CO2 SERPL-SCNC: 29 MMOL/L (ref 20–33)
CREAT SERPL-MCNC: 0.67 MG/DL (ref 0.5–1.4)
EOSINOPHIL # BLD AUTO: 0.07 K/UL (ref 0–0.51)
EOSINOPHIL NFR BLD: 4.4 % (ref 0–6.9)
ERYTHROCYTE [DISTWIDTH] IN BLOOD BY AUTOMATED COUNT: 46.2 FL (ref 35.9–50)
GLOBULIN SER CALC-MCNC: 2.7 G/DL (ref 1.9–3.5)
GLUCOSE SERPL-MCNC: 92 MG/DL (ref 65–99)
HCT VFR BLD AUTO: 35.1 % (ref 42–52)
HGB BLD-MCNC: 12.1 G/DL (ref 14–18)
LYMPHOCYTES # BLD AUTO: 0.41 K/UL (ref 1–4.8)
LYMPHOCYTES NFR BLD: 23.9 % (ref 22–41)
MACROCYTES BLD QL SMEAR: ABNORMAL
MANUAL DIFF BLD: NORMAL
MCH RBC QN AUTO: 34 PG (ref 27–33)
MCHC RBC AUTO-ENTMCNC: 34.5 G/DL (ref 33.7–35.3)
MCV RBC AUTO: 98.6 FL (ref 81.4–97.8)
MONOCYTES # BLD AUTO: 0.2 K/UL (ref 0–0.85)
MONOCYTES NFR BLD AUTO: 11.5 % (ref 0–13.4)
MORPHOLOGY BLD-IMP: NORMAL
NEUTROPHILS # BLD AUTO: 0.98 K/UL (ref 1.82–7.42)
NEUTROPHILS NFR BLD: 57.5 % (ref 44–72)
NRBC # BLD AUTO: 0 K/UL
NRBC BLD-RTO: 0 /100 WBC
PLATELET # BLD AUTO: 51 K/UL (ref 164–446)
PLATELET BLD QL SMEAR: NORMAL
POIKILOCYTOSIS BLD QL SMEAR: NORMAL
POTASSIUM SERPL-SCNC: 3.6 MMOL/L (ref 3.6–5.5)
PROT SERPL-MCNC: 6 G/DL (ref 6–8.2)
RBC # BLD AUTO: 3.56 M/UL (ref 4.7–6.1)
RBC BLD AUTO: PRESENT
SODIUM SERPL-SCNC: 146 MMOL/L (ref 135–145)
WBC # BLD AUTO: 1.7 K/UL (ref 4.8–10.8)

## 2019-06-15 PROCEDURE — 85027 COMPLETE CBC AUTOMATED: CPT

## 2019-06-15 PROCEDURE — 85007 BL SMEAR W/DIFF WBC COUNT: CPT

## 2019-06-15 PROCEDURE — 36415 COLL VENOUS BLD VENIPUNCTURE: CPT

## 2019-06-15 PROCEDURE — 80053 COMPREHEN METABOLIC PANEL: CPT

## 2019-06-16 NOTE — TELEPHONE ENCOUNTER
I was paged by the lab for a critical lab value.    Lab Results   Component Value Date/Time    WBC 1.7 (LL) 06/15/2019 07:36 AM    RBC 3.56 (L) 06/15/2019 07:36 AM    HEMOGLOBIN 12.1 (L) 06/15/2019 07:36 AM    HEMATOCRIT 35.1 (L) 06/15/2019 07:36 AM    MCV 98.6 (H) 06/15/2019 07:36 AM    MCH 34.0 (H) 06/15/2019 07:36 AM    MCHC 34.5 06/15/2019 07:36 AM    MPV 13.4 (H) 04/08/2019 10:43 AM    NEUTSPOLYS 57.50 06/15/2019 07:36 AM    LYMPHOCYTES 23.90 06/15/2019 07:36 AM    MONOCYTES 11.50 06/15/2019 07:36 AM    EOSINOPHILS 4.40 06/15/2019 07:36 AM    BASOPHILS 2.70 (H) 06/15/2019 07:36 AM    ANISOCYTOSIS 1+ 06/15/2019 07:36 AM    ANC 0.98 k/ul    This has been stable since 4/8/2019.  I spoke with the patient on the phone and confirmed with that he was feeling well and has not had any fevers.  He reports he has been feeling great and has follow up appointments with radiation oncology and oncology 6/18/2019.

## 2019-06-17 ENCOUNTER — HOSPITAL ENCOUNTER (OUTPATIENT)
Dept: RADIOLOGY | Facility: MEDICAL CENTER | Age: 59
End: 2019-06-17
Attending: RADIOLOGY
Payer: COMMERCIAL

## 2019-06-17 DIAGNOSIS — C09.9 TONSIL CANCER (HCC): ICD-10-CM

## 2019-06-17 PROCEDURE — A9552 F18 FDG: HCPCS

## 2019-06-18 ENCOUNTER — APPOINTMENT (OUTPATIENT)
Dept: HEMATOLOGY ONCOLOGY | Facility: MEDICAL CENTER | Age: 59
End: 2019-06-18
Payer: COMMERCIAL

## 2019-06-18 ENCOUNTER — HOSPITAL ENCOUNTER (OUTPATIENT)
Dept: RADIATION ONCOLOGY | Facility: MEDICAL CENTER | Age: 59
End: 2019-06-30
Attending: RADIOLOGY
Payer: COMMERCIAL

## 2019-06-18 ENCOUNTER — TELEPHONE (OUTPATIENT)
Dept: HEMATOLOGY ONCOLOGY | Facility: MEDICAL CENTER | Age: 59
End: 2019-06-18

## 2019-06-18 ENCOUNTER — DOCUMENTATION (OUTPATIENT)
Dept: HEMATOLOGY ONCOLOGY | Facility: MEDICAL CENTER | Age: 59
End: 2019-06-18

## 2019-06-18 VITALS
TEMPERATURE: 99 F | BODY MASS INDEX: 29.61 KG/M2 | OXYGEN SATURATION: 97 % | HEART RATE: 76 BPM | WEIGHT: 224.4 LBS | DIASTOLIC BLOOD PRESSURE: 66 MMHG | SYSTOLIC BLOOD PRESSURE: 142 MMHG

## 2019-06-18 DIAGNOSIS — C09.9 TONSIL CANCER (HCC): ICD-10-CM

## 2019-06-18 DIAGNOSIS — K12.33 ORAL MUCOSITIS (ULCERATIVE) DUE TO RADIATION: ICD-10-CM

## 2019-06-18 PROCEDURE — 31575 DIAGNOSTIC LARYNGOSCOPY: CPT | Performed by: RADIOLOGY

## 2019-06-18 PROCEDURE — 99213 OFFICE O/P EST LOW 20 MIN: CPT | Mod: 25 | Performed by: RADIOLOGY

## 2019-06-18 PROCEDURE — 99212 OFFICE O/P EST SF 10 MIN: CPT | Mod: 25 | Performed by: RADIOLOGY

## 2019-06-18 RX ORDER — PENTOXIFYLLINE 400 MG/1
400 TABLET, EXTENDED RELEASE ORAL
Qty: 90 TAB | Refills: 3 | Status: SHIPPED | OUTPATIENT
Start: 2019-06-18 | End: 2019-07-18

## 2019-06-18 RX ORDER — VITAMIN E 268 MG
400 CAPSULE ORAL DAILY
Qty: 30 CAP | Refills: 3 | Status: SHIPPED | OUTPATIENT
Start: 2019-06-18 | End: 2019-07-18

## 2019-06-18 NOTE — PROGRESS NOTES
DATE OF SERVICE: 06/18/19        Fiberoptic Naso-pharyngoscopy Note      Flexible fiberoptic exam was performed after anesthesia of left nostril and oropharynx.  Scope had to be passed under the middle turbinate to reach nasopharynx.    Nasopharynx:  R. Eustachian canal opening, torus, fossa of Rosenmuller appear normal    L. Eustachian canal opening, torus, fossa of Rosenmuller appear normal    Oropharynx:   Base of tongue normal appearing.    Vallecula normal appearing.    Epiglottis normal appearing.    PE folds normal appearing.    Larynx: R. AE folds, false vocal folds, arytenoids appear normal    L. AE folds, false vocal folds, arytenoids appear normal    R. Cord mobile    L. Cord mobile     R. Piriform sinus appears normal    L. Piriform sinus appears normal      Edward SKELTON M.D.  Electronically signed by: Edward Harris V, 6/18/2019 3:08 PM  [phone]

## 2019-06-18 NOTE — NON-PROVIDER
Patient was seen today in clinic with Dr. Harris for follow up.  Vitals signs and weight were obtained and pain assessment was completed.  Allergies and medications were reviewed with the patient.  Review of systems completed.     Vitals/Pain:  Vitals:    06/18/19 1426   BP: 142/66   Pulse: 76   Temp: 37.2 °C (99 °F)   SpO2: 97%   Weight: 101.8 kg (224 lb 6.4 oz)            Allergies:   Patient has no known allergies.    Current Medications:  No current outpatient prescriptions on file.     No current facility-administered medications for this encounter.          PCP:  Armani Xie, Med Ass't

## 2019-06-18 NOTE — TELEPHONE ENCOUNTER
Adriana from Dr. Harris's office (Radiation/Oncology) contacted our office to schedule an appointment for the patient per Dr. Harris's request. She states the patient was scheduled to see Dr. Pastrana but since she is not coming to [facility] the patient did not have a follow up scheduled with an oncologists. Dr. Harris ask that Dr. Craft seen the patient. I offered an appointment for tomorrow but the patient requested Thursday Juney 27th between 2-3pm. I will relay the message to Dr. Craft.

## 2019-06-18 NOTE — PROGRESS NOTES
Nutrition Services: Brief Update  Weight: 224 lbs (101.8 kg) - stable  BMI: 29.6 (overweight classification)    Met with pt during follow-up appt in radiation oncology to follow-up on current nutrition status s/p completion of tx. Pt continues to maintain his weight s/p removal of PEG. Pt reports he continues to do swallow exercises recommended by SLP and continues to improve. He reports no issues swallowing any foods aside from dry breads at this point. He denied pain with swallowing unless food is spicy. He denied any GI distress.     As pt is nutritionally stable on PO diet, Oncology RD signing off. Pt has RD contact information and encouraged to call should further questions/concerns arise, available PRN [phone].

## 2019-06-18 NOTE — PROGRESS NOTES
RADIATION ONCOLOGY FOLLOW-UP    DATE OF SERVICE:   6/18/2019    IDENTIFICATION:   A 58 y.o. 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     PRESCRIPTION:  Course ID Plan ID Rx Dose (cGy) Fraction Status   C1_Oropharynx Oropharynx 6,996 33 / 33 Treatment Approved         TREATMENT SUMMARY:     Course: C1_Oropharynx     Treatment Site Ref. ID Energy Dose/Fx (cGy) #Fx Dose Correction (cGy) Total Dose (cGy) Start Date End Date Elapsed Days   Oropharynx Oropharynx 6X 212 33 / 33 0 6,996 2/11/2019 3/28/2019 45        HISTORY OF PRESENT ILLNESS:   Scheduled follow-up visit after completion of restaging PET CT scan.  Overall he states that he feels better than he is felt in a long time.  Denies pain.  Denies dysphagia or odynophagia.  Xerostomia 5 ( 0-10, 0=completely dry), Taste 5 ( 0-10, 0=no taste), Dysphagia only when he eats bread, Odynophagia none    CURRENT MEDICATIONS:  No current outpatient prescriptions on file.     No current facility-administered medications for this encounter.        ALLERGIES:  Patient has no known allergies.    REVIEW OF SYSTEMS:  A review of systems for today's date of service was reviewed and uploaded into the electronic medical record.    PHYSICAL EXAM:   ECOG PERFORMANCE STATUS:  0= Fully active, able to carry on all pre-disease performance without restriction.  /66   Pulse 76   Temp 37.2 °C (99 °F)   Wt 101.8 kg (224 lb 6.4 oz)   SpO2 97%   BMI 29.61 kg/m²   GENERAL: Well-developed, well-nourished male no acute distress  HEENT:  Pupils are equal, round, and reactive to light.  Extraocular muscles   are intact. Sclerae nonicteric.  Conjunctivae pink.  Oral cavity, tongue   protrudes midline.  Edentulous.  Shallow ulcer noted right tonsillar fossa.  Mildly tender to palpation.  NECK:  Supple without evidence of thyromegaly.  Lymphedema.  NODES:  No peripheral adenopathy of the  neck, supraclavicular fossa or axillae   bilaterally.  LUNGS:  Clear to ascultation and resonant to percussion.  HEART:  Regular rate and rhythm.  No murmur appreciated    FIBEROPTIC EXAM:  Normal exam.    LABORATORY DATA:   Hospital Outpatient Visit on 06/15/2019   Component Date Value Ref Range Status   • WBC 06/15/2019 1.7* 4.8 - 10.8 K/uL Final   • RBC 06/15/2019 3.56* 4.70 - 6.10 M/uL Final   • Hemoglobin 06/15/2019 12.1* 14.0 - 18.0 g/dL Final   • Hematocrit 06/15/2019 35.1* 42.0 - 52.0 % Final   • MCV 06/15/2019 98.6* 81.4 - 97.8 fL Final   • MCH 06/15/2019 34.0* 27.0 - 33.0 pg Final   • MCHC 06/15/2019 34.5  33.7 - 35.3 g/dL Final   • RDW 06/15/2019 46.2  35.9 - 50.0 fL Final   • Platelet Count 06/15/2019 51* 164 - 446 K/uL Final   • Neutrophils-Polys 06/15/2019 57.50  44.00 - 72.00 % Final   • Lymphocytes 06/15/2019 23.90  22.00 - 41.00 % Final   • Monocytes 06/15/2019 11.50  0.00 - 13.40 % Final   • Eosinophils 06/15/2019 4.40  0.00 - 6.90 % Final   • Basophils 06/15/2019 2.70* 0.00 - 1.80 % Final   • Nucleated RBC 06/15/2019 0.00  /100 WBC Final   • Neutrophils (Absolute) 06/15/2019 0.98* 1.82 - 7.42 K/uL Final   • Lymphs (Absolute) 06/15/2019 0.41* 1.00 - 4.80 K/uL Final   • Monos (Absolute) 06/15/2019 0.20  0.00 - 0.85 K/uL Final   • Eos (Absolute) 06/15/2019 0.07  0.00 - 0.51 K/uL Final   • Baso (Absolute) 06/15/2019 0.05  0.00 - 0.12 K/uL Final   • NRBC (Absolute) 06/15/2019 0.00  K/uL Final   • Anisocytosis 06/15/2019 1+   Final   • Macrocytosis 06/15/2019 1+   Final   • Sodium 06/15/2019 146* 135 - 145 mmol/L Final   • Potassium 06/15/2019 3.6  3.6 - 5.5 mmol/L Final   • Chloride 06/15/2019 111  96 - 112 mmol/L Final   • Co2 06/15/2019 29  20 - 33 mmol/L Final   • Anion Gap 06/15/2019 6.0  0.0 - 11.9 Final   • Glucose 06/15/2019 92  65 - 99 mg/dL Final   • Bun 06/15/2019 12  8 - 22 mg/dL Final   • Creatinine 06/15/2019 0.67  0.50 - 1.40 mg/dL Final   • Calcium 06/15/2019 9.1  8.5 - 10.5 mg/dL Final    • AST(SGOT) 06/15/2019 86* 12 - 45 U/L Final   • ALT(SGPT) 06/15/2019 61* 2 - 50 U/L Final   • Alkaline Phosphatase 06/15/2019 44  30 - 99 U/L Final   • Total Bilirubin 06/15/2019 1.0  0.1 - 1.5 mg/dL Final   • Albumin 06/15/2019 3.3  3.2 - 4.9 g/dL Final   • Total Protein 06/15/2019 6.0  6.0 - 8.2 g/dL Final   • Globulin 06/15/2019 2.7  1.9 - 3.5 g/dL Final   • A-G Ratio 06/15/2019 1.2  g/dL Final   • Manual Diff Status 06/15/2019 PERFORMED   Final   • Peripheral Smear Review 06/15/2019 see below   Final   • Plt Estimation 06/15/2019 Decreased   Final   • RBC Morphology 06/15/2019 Present   Final   • Poikilocytosis 06/15/2019 1+   Final   • Echinocytes 06/15/2019 1+   Final   • GFR If  06/15/2019 >60  >60 mL/min/1.73 m 2 Final   • GFR If Non  06/15/2019 >60  >60 mL/min/1.73 m 2 Final        RADIOLOGY DATA:  [id number] Base To Mid-thigh    Result Date: 6/17/2019 6/17/2019 10:21 AM HISTORY/REASON FOR EXAM:  Tonsil cancer restaging Squamous cell carcinoma right tonsil. Status post chemoradiation TECHNIQUE/EXAM DESCRIPTION AND NUMBER OF VIEWS: PET body imaging. Initially, 17.98 mCi F-18 FDG was administered intravenously under standardized conditions. Approximately 45 minutes after FDG administration, the patient was placed in the supine position on the PET CT table. Blood glucose level was 79 mg/dL. Low dose spiral CT imaging was performed from the skull base to the mid thighs. PET imaging was then performed from the skull base to the mid thighs. CT images, PET images, and PET/CT fused images were reviewed on a PACS 3D workstation. The limited non-contrast CT data are used primarily for attenuation correction and anatomic correlation.  Evaluation of solid organs and bowel are especially limited utilizing this technique. A low dose CT was obtained of the same area without IV contrast for attenuation correction and coregistration, not for a diagnostic scan. COMPARISON: 1/18/19. MRI  5/13/2019 FINDINGS: VISUALIZED BRAIN: Normal metabolic activity. HEAD AND NECK: Mild residual activity within the right tonsillar/base of the tongue region (SUV max 6.1, previously 32). Mild uptake within the right level 2 lymph node (SUV max 4.5, previously 25.9). Minimal residual uptake within the left level 2 lymph node (SUV max 3.3, previously 6.4) CHEST: Background blood pool activity shows average SUV of 1.9. Lungs show no hypermetabolic pulmonary nodules. There is no hypermetabolic hilar, mediastinal, or axillary lymphadenopathy. ABDOMEN AND PELVIS: Background liver activity shows average SUV of 2.2. There is normal, uniform metabolic activity in the liver, spleen, adrenal glands, kidneys. There is no hypermetabolic mesenteric, retroperitoneal, iliac, or inguinal lymphadenopathy. Persistent focal activity within the sigmoid colon (SUV max 5.2) VISUALIZED MUSCULOSKELETAL SYSTEM: No hypermetabolic activity to suggest osteolytic metastases or sclerosis to suggest osteoblastic metastases.     1. Mild residual activity within the right tonsillar/base of the tongue region, in keeping with residual disease. Mild residual uptake within the nonenlarged right level 2 lymph node, likely residual disease. Minimal residual uptake within the nonenlarged left level 2 lymph node as well. 2. Persistent focal activity within the sigmoid colon could relate to a polyp or malignancy. Further evaluation with colonoscopy is recommended.      IMPRESSION:    A 58 y.o. 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  He status post chemoradiotherapy.  Clinically without evidence of disease.    Radiation ulcer noted right tonsillar region.    RECOMMENDATIONS:   Reviewed imaging with patient.  The mild uptake noted on PET CT scan is consistent with radiation ulcer and not recurrent or persistent disease.  We will put him on a course of pentoxifylline and vitamin E for total of 3 months.  This should expedite  resolution of ulcer.  Have asked him to alternate head neck follow-up visits between myself and Dr. Wolf.  We will also send him to Dr. Craft to evaluate his pancytopenia.  Patient states that this was present even prior to start of radiotherapy.  He will return for follow-up evaluation in August.    Thank you for the opportunity to participate in his care.  If any questions or comments, please do not hesitate in calling.    Orders Placed This Encounter   • vitamin e (VITAMIN E) 400 UNIT Cap   • pentoxifylline CR (TRENTAL) 400 MG CR tablet     Edward SKELTON M.D.  Electronically signed by: Edward Harris V, 6/18/2019 3:06 PM  [phone]

## 2019-06-27 ENCOUNTER — OFFICE VISIT (OUTPATIENT)
Dept: HEMATOLOGY ONCOLOGY | Facility: MEDICAL CENTER | Age: 59
End: 2019-06-27
Payer: COMMERCIAL

## 2019-06-27 VITALS
OXYGEN SATURATION: 96 % | RESPIRATION RATE: 16 BRPM | BODY MASS INDEX: 28.94 KG/M2 | HEART RATE: 78 BPM | DIASTOLIC BLOOD PRESSURE: 64 MMHG | WEIGHT: 219.36 LBS | SYSTOLIC BLOOD PRESSURE: 110 MMHG | TEMPERATURE: 98.8 F

## 2019-06-27 DIAGNOSIS — D70.1 CHEMOTHERAPY-INDUCED NEUTROPENIA (HCC): ICD-10-CM

## 2019-06-27 DIAGNOSIS — T45.1X5A CHEMOTHERAPY-INDUCED NEUTROPENIA (HCC): ICD-10-CM

## 2019-06-27 DIAGNOSIS — D69.6 THROMBOCYTOPENIA (HCC): ICD-10-CM

## 2019-06-27 DIAGNOSIS — C09.9 TONSIL CANCER (HCC): ICD-10-CM

## 2019-06-27 PROCEDURE — 99214 OFFICE O/P EST MOD 30 MIN: CPT | Performed by: INTERNAL MEDICINE

## 2019-06-27 ASSESSMENT — PAIN SCALES - GENERAL: PAINLEVEL: NO PAIN

## 2019-06-27 NOTE — PROGRESS NOTES
06/27/19    Subjective    Chief Complaint:  F/u P16(+) squamous cell carcinoma right tonsil metastatic to both necks s/p cis-DDP/XRT.T3N2M0  stage II    HPI:  59 y.o. male with 40 pack year smoking hx. dx'd 12/27/2018 with sq. Cell carcinoma right tonsil extending into soft palate and base of tongue. PET scan and MRI showed disease in bilateral neck nodes. He was treated with 6996 cGy plus weekly cis platinum chemotherapy. Restaging PET CT probably shows only XRT effect and recent endoscopy by Dr. Harris on 6/18/2019 was negative for recurrence. In addition he has persistent pancytopenia with recent WBC 1.7, platelet count 51K. Of note he has had platelet counts < 100K dating back to at least 2017 on the EPIC flow sheet. His WBC and H/H however were normal prior to the institution of chemoXRT. He has not had any testing of B12, folate or evaluation for splenomegaly as far as I can tell from the medical records. He does not drink ETOH.    ROS: Recovering from the difficult chemoXRT toxicities. Eating well. No cough, SOB, cardiac, GI or  c/o's.    PMH:    No Known Allergies    Medications:    Current Outpatient Prescriptions on File Prior to Visit   Medication Sig Dispense Refill   • vitamin e (VITAMIN E) 400 UNIT Cap Take 1 Cap by mouth every day for 30 days. 30 Cap 3   • pentoxifylline CR (TRENTAL) 400 MG CR tablet Take 1 Tab by mouth 3 times a day, with meals for 30 days. 90 Tab 3     No current facility-administered medications on file prior to visit.          Objective    Vitals:    /64 (BP Location: Right arm, Patient Position: Sitting, BP Cuff Size: Adult)   Pulse 78   Temp 37.1 °C (98.8 °F) (Temporal)   Resp 16   Wt 99.5 kg (219 lb 5.7 oz)   SpO2 96%   BMI 28.94 kg/m²     Physical Exam: WD WN NAD    HEENT - PERRL Edentulous. No lesion seen.  Neck - somewhat hard and woody on left side  Node - none  Chest - clear  Breasts - nl male  COR - RSR no murmur  Abd - No palpable organomegaly. No  tenderness.   Ext - No edema  Neuro - Vibratory sense nl. KJ's 2+=.   Skin - no rash or jaundice    Labs:  Results for VALENTIN YADAV (MRN [id number]) as of 6/27/2019 09:30   Ref. Range 4/1/2019 10:50 4/8/2019 10:43 6/15/2019 07:36   WBC Latest Ref Range: 4.8 - 10.8 K/uL 2.2 (LL) 1.6 (LL) 1.7 (LL)   RBC Latest Ref Range: 4.70 - 6.10 M/uL 2.93 (L) 2.89 (L) 3.56 (L)   Hemoglobin Latest Ref Range: 14.0 - 18.0 g/dL 9.7 (L) 9.9 (L) 12.1 (L)   Hematocrit Latest Ref Range: 42.0 - 52.0 % 28.0 (L) 29.6 (L) 35.1 (L)   MCV Latest Ref Range: 81.4 - 97.8 fL 95.6 102.4 (H) 98.6 (H)   MCH Latest Ref Range: 27.0 - 33.0 pg 33.1 (H) 34.3 (H) 34.0 (H)   MCHC Latest Ref Range: 33.7 - 35.3 g/dL 34.6 33.4 (L) 34.5   RDW Latest Ref Range: 35.9 - 50.0 fL 53.6 (H) 62.8 (H) 46.2   Platelet Count Latest Ref Range: 164 - 446 K/uL 90 (L) 132 (L) 51 (L)   MPV Latest Ref Range: 9.0 - 12.9 fL 13.3 (H) 13.4 (H)    Neutrophils-Polys Latest Ref Range: 44.00 - 72.00 % 69.60 63.40 57.50   Neutrophils (Absolute) Latest Ref Range: 1.82 - 7.42 K/uL 1.54 (L) 0.99 (L) 0.98 (L)   Lymphocytes Latest Ref Range: 22.00 - 41.00 % 12.70 (L) 16.00 (L) 23.90   Lymphs (Absolute) Latest Ref Range: 1.00 - 4.80 K/uL 0.28 (L) 0.25 (L) 0.41 (L)   Monocytes Latest Ref Range: 0.00 - 13.40 % 14.00 (H) 16.70 (H) 11.50   Monos (Absolute) Latest Ref Range: 0.00 - 0.85 K/uL 0.31 0.26 0.20       Assessment    Imp:    Visit Diagnosis:    1. Tonsil cancer (HCC)     2. Chemotherapy-induced neutropenia (HCC)  CBC WITH DIFFERENTIAL   3. Thrombocytopenia (HCC)  VITAMIN B12    FOLATE     Plan:    Suspect the low WBC and low H/H are due to cis platinum and will recover. The thrombocytopenia proceeds the chemo. Will check B12/Folate levels. If they are normal and abnormal labs persist, may have to consider abdominal scan to r/o splenomegaly and//or BMX.   RTC 2 months with CBC - same day as ov with Dr. Harris.           Mike Craft M.D.

## 2019-06-29 ENCOUNTER — HOSPITAL ENCOUNTER (OUTPATIENT)
Dept: LAB | Facility: MEDICAL CENTER | Age: 59
End: 2019-06-29
Attending: INTERNAL MEDICINE
Payer: COMMERCIAL

## 2019-06-29 DIAGNOSIS — D69.6 THROMBOCYTOPENIA (HCC): ICD-10-CM

## 2019-06-29 LAB
FOLATE SERPL-MCNC: 14.1 NG/ML
VIT B12 SERPL-MCNC: 598 PG/ML (ref 211–911)

## 2019-06-29 PROCEDURE — 82746 ASSAY OF FOLIC ACID SERUM: CPT

## 2019-06-29 PROCEDURE — 36415 COLL VENOUS BLD VENIPUNCTURE: CPT

## 2019-06-29 PROCEDURE — 82607 VITAMIN B-12: CPT

## 2019-08-10 ENCOUNTER — HOSPITAL ENCOUNTER (OUTPATIENT)
Dept: LAB | Facility: MEDICAL CENTER | Age: 59
End: 2019-08-10
Attending: INTERNAL MEDICINE
Payer: COMMERCIAL

## 2019-08-10 LAB
FOLATE SERPL-MCNC: 14.5 NG/ML
VIT B12 SERPL-MCNC: 567 PG/ML (ref 211–911)

## 2019-08-10 PROCEDURE — 36415 COLL VENOUS BLD VENIPUNCTURE: CPT

## 2019-08-10 PROCEDURE — 82607 VITAMIN B-12: CPT

## 2019-08-10 PROCEDURE — 82746 ASSAY OF FOLIC ACID SERUM: CPT

## 2019-08-13 ENCOUNTER — TELEPHONE (OUTPATIENT)
Dept: HEMATOLOGY ONCOLOGY | Facility: MEDICAL CENTER | Age: 59
End: 2019-08-13

## 2019-08-13 NOTE — TELEPHONE ENCOUNTER
Patient called he stated that he notice a message on my chart from [facility] stating that they had done the wrong blood work and did the vitamin b12 draw twice and didn't do the CBC.  Patient would like to know if he should still come to Friday's appointment or cancel it and reschedule it for the following week?    He mention that he can only go get blood work done on Saturdays.    We may contact Maximino at [phone].

## 2019-08-14 NOTE — TELEPHONE ENCOUNTER
Spoke to patient he decided to cancel this Friday's appointment and reschedule on 09/04/19 and he will go to labs this Saturday to have CBC done.

## 2019-08-16 ENCOUNTER — APPOINTMENT (OUTPATIENT)
Dept: HEMATOLOGY ONCOLOGY | Facility: MEDICAL CENTER | Age: 59
End: 2019-08-16
Payer: COMMERCIAL

## 2019-08-16 ENCOUNTER — APPOINTMENT (OUTPATIENT)
Dept: RADIATION ONCOLOGY | Facility: MEDICAL CENTER | Age: 59
End: 2019-08-16
Attending: RADIOLOGY
Payer: COMMERCIAL

## 2019-08-17 ENCOUNTER — HOSPITAL ENCOUNTER (OUTPATIENT)
Dept: LAB | Facility: MEDICAL CENTER | Age: 59
End: 2019-08-17
Attending: INTERNAL MEDICINE
Payer: COMMERCIAL

## 2019-08-17 DIAGNOSIS — T45.1X5A CHEMOTHERAPY-INDUCED NEUTROPENIA (HCC): ICD-10-CM

## 2019-08-17 DIAGNOSIS — D70.1 CHEMOTHERAPY-INDUCED NEUTROPENIA (HCC): ICD-10-CM

## 2019-08-17 LAB
BASOPHILS # BLD AUTO: 0.6 % (ref 0–1.8)
BASOPHILS # BLD: 0.01 K/UL (ref 0–0.12)
EOSINOPHIL # BLD AUTO: 0.1 K/UL (ref 0–0.51)
EOSINOPHIL NFR BLD: 5.5 % (ref 0–6.9)
ERYTHROCYTE [DISTWIDTH] IN BLOOD BY AUTOMATED COUNT: 46.2 FL (ref 35.9–50)
HCT VFR BLD AUTO: 34.9 % (ref 42–52)
HGB BLD-MCNC: 11.9 G/DL (ref 14–18)
IMM GRANULOCYTES # BLD AUTO: 0 K/UL (ref 0–0.11)
IMM GRANULOCYTES NFR BLD AUTO: 0 % (ref 0–0.9)
LYMPHOCYTES # BLD AUTO: 0.31 K/UL (ref 1–4.8)
LYMPHOCYTES NFR BLD: 17.1 % (ref 22–41)
MCH RBC QN AUTO: 33 PG (ref 27–33)
MCHC RBC AUTO-ENTMCNC: 34.1 G/DL (ref 33.7–35.3)
MCV RBC AUTO: 96.7 FL (ref 81.4–97.8)
MONOCYTES # BLD AUTO: 0.22 K/UL (ref 0–0.85)
MONOCYTES NFR BLD AUTO: 12.2 % (ref 0–13.4)
NEUTROPHILS # BLD AUTO: 1.17 K/UL (ref 1.82–7.42)
NEUTROPHILS NFR BLD: 64.6 % (ref 44–72)
NRBC # BLD AUTO: 0 K/UL
NRBC BLD-RTO: 0 /100 WBC
PLATELET # BLD AUTO: 56 K/UL (ref 164–446)
PMV BLD AUTO: 14.5 FL (ref 9–12.9)
RBC # BLD AUTO: 3.61 M/UL (ref 4.7–6.1)
WBC # BLD AUTO: 1.8 K/UL (ref 4.8–10.8)

## 2019-08-17 PROCEDURE — 85025 COMPLETE CBC W/AUTO DIFF WBC: CPT

## 2019-08-17 PROCEDURE — 36415 COLL VENOUS BLD VENIPUNCTURE: CPT

## 2019-08-20 ENCOUNTER — TELEPHONE (OUTPATIENT)
Dept: HEMATOLOGY ONCOLOGY | Facility: MEDICAL CENTER | Age: 59
End: 2019-08-20

## 2019-08-20 NOTE — TELEPHONE ENCOUNTER
Patient called stated that he has appointment on 09/04/19. With Dr Craft but his unable to make it in to town two days in a row he would like to switch it to 09/03/19 but Dr Craft doesn't have an availability at the moment. So he will just follow up with Dr Harris his Rad.  Oncologist.

## 2019-09-03 ENCOUNTER — TELEPHONE (OUTPATIENT)
Dept: HEMATOLOGY ONCOLOGY | Facility: MEDICAL CENTER | Age: 59
End: 2019-09-03

## 2019-09-03 ENCOUNTER — HOSPITAL ENCOUNTER (OUTPATIENT)
Dept: RADIATION ONCOLOGY | Facility: MEDICAL CENTER | Age: 59
End: 2019-09-30
Attending: RADIOLOGY
Payer: COMMERCIAL

## 2019-09-03 VITALS
BODY MASS INDEX: 26.88 KG/M2 | DIASTOLIC BLOOD PRESSURE: 65 MMHG | HEART RATE: 66 BPM | OXYGEN SATURATION: 97 % | WEIGHT: 203.72 LBS | TEMPERATURE: 97.7 F | SYSTOLIC BLOOD PRESSURE: 139 MMHG

## 2019-09-03 DIAGNOSIS — C09.9 TONSIL CANCER (HCC): ICD-10-CM

## 2019-09-03 DIAGNOSIS — D61.818 PANCYTOPENIA (HCC): ICD-10-CM

## 2019-09-03 PROCEDURE — 99212 OFFICE O/P EST SF 10 MIN: CPT | Mod: 25 | Performed by: RADIOLOGY

## 2019-09-03 PROCEDURE — 31575 DIAGNOSTIC LARYNGOSCOPY: CPT | Performed by: RADIOLOGY

## 2019-09-03 PROCEDURE — 99213 OFFICE O/P EST LOW 20 MIN: CPT | Mod: 25 | Performed by: RADIOLOGY

## 2019-09-03 ASSESSMENT — PAIN SCALES - GENERAL
PAINLEVEL: NO PAIN
PAINLEVEL: NO PAIN

## 2019-09-03 NOTE — PROGRESS NOTES
RADIATION ONCOLOGY FOLLOW-UP    DATE OF SERVICE:   9/3/2019    IDENTIFICATION:   A 59 y.o. male with  Visit Diagnoses     ICD-10-CM   1. Tonsil cancer (HCC) C09.9     Tonsil cancer (HCC)  Staging form: Pharynx - HPV-Mediated Oropharynx, AJCC 8th Edition  - Clinical stage from 1/15/2019: Stage II (cT3, cN2, cM0, p16: Positive) - Signed by dEward SKELTON M.D. on 3/27/2019  Laterality: Right  Tumor size (mm): 50      RADIATION SUMMARY:  Radiation Treatments     Historical Treatments (Plans: 1)    Plan Most Recent Treatment Date Elapsed Course Treatment Days Fractions Treated Prescribed Fraction Dose (cGy) Prescribed Total Dose (cGy)   Oropharynx 4/1/2019 45 @ [id number] 33 of 33 212 6,996        Reference Point Most Recent Treatment Date Elapsed Course Treatment Days Most Recent Session Dose (cGy) Total Dose (cGy)   Oropharynx 4/1/2019 45 @ [id number] - 6,996   Oropharynx CP 4/1/2019 45 @ [id number] - 7,194                HISTORY OF PRESENT ILLNESS:   Scheduled follow-up visit.  Overall feels well.  Denies any significant xerostomia, altered taste, dysphasia or odynophagia.  Xerostomia 7-8 ( 0-10, 0=completely dry), Taste 6-7 ( 0-10, 0=no taste), Dysphagia 0, Odynophagia 0    CURRENT MEDICATIONS:  No current outpatient medications on file.     No current facility-administered medications for this encounter.        ALLERGIES:  Patient has no known allergies.    REVIEW OF SYSTEMS:  A review of systems for today's date of service was reviewed and uploaded into the electronic medical record.    PHYSICAL EXAM:   PERFORMANCE STATUS:  100, Fully active, able to carry on all pre-disease performed without restriction (ECOG equivalent 0)  /65 (BP Location: Left arm, Patient Position: Sitting, BP Cuff Size: Adult)   Pulse 66   Temp 36.5 °C (97.7 °F) (Temporal)   Wt 92.4 kg (203 lb 11.5 oz)   SpO2 97%   BMI 26.88 kg/m²   Physical Exam   Constitutional: He is oriented to person, place, and time. He appears  well-developed and well-nourished. No distress.   HENT:   Head: Normocephalic.   Mouth/Throat: Oropharynx is clear and moist. No oropharyngeal exudate.   Edentulous.   Neck: Normal range of motion. Neck supple.   Cardiovascular: Normal rate, regular rhythm and normal heart sounds.   Pulmonary/Chest: Effort normal and breath sounds normal.   Lymphadenopathy:     He has no cervical adenopathy.   Neurological: He is alert and oriented to person, place, and time. No cranial nerve deficit. Coordination normal.   Skin: Skin is warm and dry. He is not diaphoretic. No erythema.   Psychiatric: He has a normal mood and affect. His behavior is normal. Judgment and thought content normal.   Nursing note and vitals reviewed.        FIBEROPTIC EXAM:  Normal exam.    LABORATORY DATA:   Lab Results   Component Value Date/Time    WBC 1.8 (LL) 08/17/2019 0755    WBC 1.7 (LL) 06/15/2019 0736    WBC 1.6 (LL) 04/08/2019 1043    HEMOGLOBIN 11.9 (L) 08/17/2019 0755    HEMOGLOBIN 12.1 (L) 06/15/2019 0736    HEMOGLOBIN 9.9 (L) 04/08/2019 1043    HEMATOCRIT 34.9 (L) 08/17/2019 0755    HEMATOCRIT 35.1 (L) 06/15/2019 0736    HEMATOCRIT 29.6 (L) 04/08/2019 1043    MCV 96.7 08/17/2019 0755    MCV 98.6 (H) 06/15/2019 0736    .4 (H) 04/08/2019 1043    PLATELETCT 56 (L) 08/17/2019 0755    PLATELETCT 51 (L) 06/15/2019 0736    PLATELETCT 132 (L) 04/08/2019 1043    NEUTS 1.17 (L) 08/17/2019 0755    NEUTS 0.98 (L) 06/15/2019 0736    NEUTS 0.99 (L) 04/08/2019 1043      Lab Results   Component Value Date/Time    SODIUM 146 (H) 06/15/2019 0736    SODIUM 141 04/08/2019 1043    SODIUM 141 03/25/2019 0947    POTASSIUM 3.6 06/15/2019 0736    POTASSIUM 3.6 04/08/2019 1043    POTASSIUM 4.0 03/25/2019 0947    BUN 12 06/15/2019 0736    BUN 19 04/08/2019 1043    BUN 20 03/25/2019 0947    CREATININE 0.67 06/15/2019 0736    CREATININE 0.60 04/08/2019 1043    CREATININE 0.58 03/25/2019 0947    CALCIUM 9.1 06/15/2019 0736    CALCIUM 9.0 04/08/2019 1043     CALCIUM 9.1 03/25/2019 0947    ALBUMIN 3.3 06/15/2019 0736    ALBUMIN 3.4 04/08/2019 1043    ALBUMIN 3.5 03/25/2019 0947    ASTSGOT 86 (H) 06/15/2019 0736    ASTSGOT 29 04/08/2019 1043    ASTSGOT 36 03/25/2019 0947    ALKPHOSPHAT 44 06/15/2019 0736    ALKPHOSPHAT 64 04/08/2019 1043    ALKPHOSPHAT 64 03/25/2019 0947    IFNOTAFR >60 06/15/2019 0736    IFNOTAFR >60 04/08/2019 1043    IFNOTAFR >60 03/25/2019 0947       RADIOLOGY DATA:  No results found.    IMPRESSION:    A 59 y.o. with  Visit Diagnoses     ICD-10-CM   1. Tonsil cancer (HCC) C09.9     Tonsil cancer (HCC)  Staging form: Pharynx - HPV-Mediated Oropharynx, AJCC 8th Edition  - Clinical stage from 1/15/2019: Stage II (cT3, cN2, cM0, p16: Positive) - Signed by Edward SKELTON M.D. on 3/27/2019  Laterality: Right  Tumor size (mm): 50      CANCER STATUS:  No Evidence of Disease    RECOMMENDATIONS:   Reviewed fiberoptic evaluation with patient.  Reassured him.  There appears to be no evidence of recurrent disease.  He is pancytopenic.  But he is being followed by Dr. Craft and medical oncology.  He will return here for follow-up in 2 months.    Thank you for the opportunity to participate in his care.  If any questions or comments, please do not hesitate in calling.      No orders of the defined types were placed in this encounter.

## 2019-09-03 NOTE — TELEPHONE ENCOUNTER
Called patient per Dr. Craft and stated that Dr. Harris had called Dr. Craft and asked that the patient be seen here for a follow up appointment.     I informed patient that Dr. Harris would like the patient to be seen her for a follow up.   Patient agreed and stated that he is busy right now and would like a call back tomorrow to schedule.

## 2019-09-03 NOTE — NON-PROVIDER
Patient was seen today in clinic with Dr. Harris for follow up.  Vitals signs and weight were obtained and pain assessment was completed.  Allergies and medications were reviewed with the patient.  Review of systems completed.     Vitals/Pain:  Vitals:    09/03/19 1342   BP: 139/65   BP Location: Left arm   Patient Position: Sitting   BP Cuff Size: Adult   Pulse: 66   Temp: 36.5 °C (97.7 °F)   TempSrc: Temporal   SpO2: 97%   Weight: 92.4 kg (203 lb 11.5 oz)   Pain Score: No pain        Allergies:   Patient has no known allergies.    Current Medications:  No current outpatient medications on file.     No current facility-administered medications for this encounter.          PCP:  Armani Eddy, Med Ass't

## 2019-09-04 NOTE — PROCEDURES
DATE OF SERVICE: 09/03/19        FIBEROPTIC NASO-PHARYNGOSCOPY NOTE      Flexible fiberoptic exam was performed after anesthesia of left nostril and oropharynx.    Nasopharynx:   R. Eustachian canal opening, torus, fossa of Rosenmuller appear normal     L. Eustachian canal opening, torus, fossa of Rosenmuller appear normal      Oropharynx:    Base of tongue normal appearing.     Vallecula normal appearing.     Epiglottis normal appearing.     PE folds normal appearing.    Larynx:   R. AE folds, false vocal folds, arytenoids appear normal     L. AE folds, false vocal folds, arytenoids appear normal     R. Cord mobile     L. Cord mobile      R. Piriform sinus appears normal     L. Piriform sinus appears normal      Edward SKELTON M.D.  Electronically signed by: Edward Harris V, 9/4/2019 4:38 PM  [phone]

## 2019-09-17 ENCOUNTER — TELEPHONE (OUTPATIENT)
Dept: MEDICAL GROUP | Facility: PHYSICIAN GROUP | Age: 59
End: 2019-09-17

## 2019-09-17 ENCOUNTER — OFFICE VISIT (OUTPATIENT)
Dept: MEDICAL GROUP | Facility: PHYSICIAN GROUP | Age: 59
End: 2019-09-17
Payer: COMMERCIAL

## 2019-09-17 VITALS
BODY MASS INDEX: 27.17 KG/M2 | HEART RATE: 69 BPM | TEMPERATURE: 98.8 F | DIASTOLIC BLOOD PRESSURE: 56 MMHG | RESPIRATION RATE: 14 BRPM | SYSTOLIC BLOOD PRESSURE: 128 MMHG | HEIGHT: 73 IN | OXYGEN SATURATION: 99 % | WEIGHT: 205 LBS

## 2019-09-17 DIAGNOSIS — Z85.818 HISTORY OF CANCER TONSIL: ICD-10-CM

## 2019-09-17 DIAGNOSIS — D70.1 CHEMOTHERAPY-INDUCED NEUTROPENIA (HCC): ICD-10-CM

## 2019-09-17 DIAGNOSIS — T45.1X5A CHEMOTHERAPY-INDUCED NEUTROPENIA (HCC): ICD-10-CM

## 2019-09-17 DIAGNOSIS — D61.818 PANCYTOPENIA (HCC): ICD-10-CM

## 2019-09-17 PROBLEM — E66.9 OBESITY (BMI 35.0-39.9 WITHOUT COMORBIDITY): Status: RESOLVED | Noted: 2017-10-23 | Resolved: 2019-09-17

## 2019-09-17 PROBLEM — Z72.0 TOBACCO ABUSE: Status: RESOLVED | Noted: 2017-04-11 | Resolved: 2019-09-17

## 2019-09-17 PROBLEM — E66.9 OBESITY (BMI 30-39.9): Status: RESOLVED | Noted: 2017-04-11 | Resolved: 2019-09-17

## 2019-09-17 PROBLEM — E11.9 TYPE 2 DIABETES MELLITUS WITHOUT COMPLICATION, WITHOUT LONG-TERM CURRENT USE OF INSULIN (HCC): Status: RESOLVED | Noted: 2017-06-01 | Resolved: 2019-09-17

## 2019-09-17 PROCEDURE — 99214 OFFICE O/P EST MOD 30 MIN: CPT | Performed by: FAMILY MEDICINE

## 2019-09-17 NOTE — ASSESSMENT & PLAN NOTE
Diagnosed and treated last year with chemotherapy and radiation. No recurrence. Monitored by oncology  Pancytopenia since chemotherapy.

## 2019-09-17 NOTE — PROGRESS NOTES
"Subjective:   Maximino Green is a 59 y.o. male here today for evaluation and management of:     Pancytopenia (HCC)  Thought to be due to chemotherapy. Being monitored by his oncologist.     Chemotherapy-induced neutropenia (HCC)  Results for MAXIMINO GREEN (MRN [id number]) as of 9/17/2019 16:41   Ref. Range 6/15/2019 07:36 6/17/2019 11:32 6/29/2019 09:57 8/10/2019 07:21 8/17/2019 07:55   WBC Latest Ref Range: 4.8 - 10.8 K/uL 1.7 (LL)    1.8 (LL)   RBC Latest Ref Range: 4.70 - 6.10 M/uL 3.56 (L)    3.61 (L)   Hemoglobin Latest Ref Range: 14.0 - 18.0 g/dL 12.1 (L)    11.9 (L)   Hematocrit Latest Ref Range: 42.0 - 52.0 % 35.1 (L)    34.9 (L)   MCV Latest Ref Range: 81.4 - 97.8 fL 98.6 (H)    96.7   MCH Latest Ref Range: 27.0 - 33.0 pg 34.0 (H)    33.0   MCHC Latest Ref Range: 33.7 - 35.3 g/dL 34.5    34.1   RDW Latest Ref Range: 35.9 - 50.0 fL 46.2    46.2   Platelet Count Latest Ref Range: 164 - 446 K/uL 51 (L)    56 (L)   MPV Latest Ref Range: 9.0 - 12.9 fL     14.5 (H)   Has follow up with oncology for monitoring.     History of cancer tonsil  Diagnosed and treated last year with chemotherapy and radiation. No recurrence. Monitored by oncology  Pancytopenia since chemotherapy.          Current medicines (including changes today)  No current outpatient medications on file.     No current facility-administered medications for this visit.      He  has a past medical history of Alcoholic (HCC), Cancer (HCC) (2019), Dental disorder, Diabetes (HCC), Hypertension, and Tonsil cancer (HCC).    ROS  No chest pain, no shortness of breath, no abdominal pain       Objective:     /56 (BP Location: Left arm, Patient Position: Sitting, BP Cuff Size: Adult)   Pulse 69   Temp 37.1 °C (98.8 °F) (Temporal)   Resp 14   Ht 1.854 m (6' 1\")   Wt 93 kg (205 lb)   SpO2 99%  Body mass index is 27.05 kg/m².   Physical Exam:  Constitutional: Alert, no distress.  Skin: Warm, dry, good turgor, no rashes in visible areas.  Eye: " Equal, round and reactive, conjunctiva clear, lids normal.  ENMT: Lips without lesions, good dentition, oropharynx clear.  Neck: Trachea midline, no masses, no thyromegaly. No cervical or supraclavicular lymphadenopathy  Respiratory: Unlabored respiratory effort, lungs clear to auscultation, no wheezes, no ronchi.  Cardiovascular: Normal S1, S2, no murmur, no edema.  Abdomen: Soft, non-tender, no masses, no hepatosplenomegaly.  Psych: Alert and oriented x3, normal affect and mood.        Assessment and Plan:   The following treatment plan was discussed    1. Pancytopenia (HCC)  No fevers, chills, er precautions discussed.     2. Chemotherapy-induced neutropenia (HCC)  Continue follow up with oncology for evaluation     3. History of cancer tonsil  Recovering well. Patient feels well.       Followup: Return in about 6 months (around 3/17/2020) for chemothearpy induced pancytopenia.

## 2019-09-17 NOTE — TELEPHONE ENCOUNTER
I advised patient to call to schedule, please give patient a call for follow up with Dr. Craft regarding pancytopenia follow up.   Thank you

## 2019-09-17 NOTE — ASSESSMENT & PLAN NOTE
Results for VALENTIN YADAV (MRN [id number]) as of 9/17/2019 16:41   Ref. Range 6/15/2019 07:36 6/17/2019 11:32 6/29/2019 09:57 8/10/2019 07:21 8/17/2019 07:55   WBC Latest Ref Range: 4.8 - 10.8 K/uL 1.7 (LL)    1.8 (LL)   RBC Latest Ref Range: 4.70 - 6.10 M/uL 3.56 (L)    3.61 (L)   Hemoglobin Latest Ref Range: 14.0 - 18.0 g/dL 12.1 (L)    11.9 (L)   Hematocrit Latest Ref Range: 42.0 - 52.0 % 35.1 (L)    34.9 (L)   MCV Latest Ref Range: 81.4 - 97.8 fL 98.6 (H)    96.7   MCH Latest Ref Range: 27.0 - 33.0 pg 34.0 (H)    33.0   MCHC Latest Ref Range: 33.7 - 35.3 g/dL 34.5    34.1   RDW Latest Ref Range: 35.9 - 50.0 fL 46.2    46.2   Platelet Count Latest Ref Range: 164 - 446 K/uL 51 (L)    56 (L)   MPV Latest Ref Range: 9.0 - 12.9 fL     14.5 (H)   Has follow up with oncology for monitoring.

## 2019-09-18 ENCOUNTER — TELEPHONE (OUTPATIENT)
Dept: HEMATOLOGY ONCOLOGY | Facility: MEDICAL CENTER | Age: 59
End: 2019-09-18

## 2019-09-18 NOTE — TELEPHONE ENCOUNTER
Called patient to schedule a follow up appointment.   Earliest he can come in is 10/1/19.   Patient is scheduled to see Dr. Craft on 10/1/19 at 2:30.

## 2019-09-30 ENCOUNTER — TELEPHONE (OUTPATIENT)
Dept: HEMATOLOGY ONCOLOGY | Facility: MEDICAL CENTER | Age: 59
End: 2019-09-30

## 2019-09-30 DIAGNOSIS — D69.6 THROMBOCYTOPENIA (HCC): ICD-10-CM

## 2019-09-30 NOTE — TELEPHONE ENCOUNTER
Called patient and left message to inform him that he needs to have a CBC done before his appointment tomorrow.

## 2019-09-30 NOTE — PROGRESS NOTES
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 Treated with weekly cis platinum and radiation therapy. Was thrombocytopenic prior to chemotherapy and has been persistently neutropenic since. The staging PET CT does not mention splenomegaly although in reviewing the films the spleen does look rather generous to me. B12 and folate levels were normal.       PMH:    No Known Allergies    Medications:    No current outpatient medications on file prior to visit.     No current facility-administered medications on file prior to visit.          Objective    Vitals:    /80 (BP Location: Right arm, Patient Position: Sitting, BP Cuff Size: Adult)   Pulse 64   Temp 36.8 °C (98.2 °F) (Temporal)   Resp 16   Ht 1.854 m (6' 1\")   Wt 94.7 kg (208 lb 10.7 oz)   SpO2 98%   BMI 27.53 kg/m²     Physical Exam:    Abdomen - I cannot fell liver or spleen    Labs:    Results for VALENTIN YADAV (MRN [id number])  Did not get CBC drawn today - these labs are from 8/17/19   Ref. Range 8/17/2019 07:55   WBC Latest Ref Range: 4.8 - 10.8 K/uL 1.8 (LL)   RBC Latest Ref Range: 4.70 - 6.10 M/uL 3.61 (L)   Hemoglobin Latest Ref Range: 14.0 - 18.0 g/dL 11.9 (L)   Hematocrit Latest Ref Range: 42.0 - 52.0 % 34.9 (L)   MCV Latest Ref Range: 81.4 - 97.8 fL 96.7   MCH Latest Ref Range: 27.0 - 33.0 pg 33.0   MCHC Latest Ref Range: 33.7 - 35.3 g/dL 34.1   RDW Latest Ref Range: 35.9 - 50.0 fL 46.2   Platelet Count Latest Ref Range: 164 - 446 K/uL 56 (L)     Assessment    Imp:    Visit Diagnosis:    1. History of cancer tonsil     2. Pancytopenia (HCC)  US-SPLEEN    CBC WITH DIFFERENTIAL     R/o splenomegaly as a cause of low counts    Plan:    CBC today - US - MyChart message me for review of " results  F/u with Dr. Harris re H&N cancer    Mike Craft M.D.

## 2019-10-01 ENCOUNTER — HOSPITAL ENCOUNTER (OUTPATIENT)
Dept: LAB | Facility: MEDICAL CENTER | Age: 59
End: 2019-10-01
Attending: INTERNAL MEDICINE
Payer: COMMERCIAL

## 2019-10-01 ENCOUNTER — OFFICE VISIT (OUTPATIENT)
Dept: HEMATOLOGY ONCOLOGY | Facility: MEDICAL CENTER | Age: 59
End: 2019-10-01
Payer: COMMERCIAL

## 2019-10-01 VITALS
BODY MASS INDEX: 27.66 KG/M2 | DIASTOLIC BLOOD PRESSURE: 80 MMHG | TEMPERATURE: 98.2 F | RESPIRATION RATE: 16 BRPM | OXYGEN SATURATION: 98 % | HEART RATE: 64 BPM | WEIGHT: 208.67 LBS | HEIGHT: 73 IN | SYSTOLIC BLOOD PRESSURE: 140 MMHG

## 2019-10-01 DIAGNOSIS — Z85.818 HISTORY OF CANCER TONSIL: ICD-10-CM

## 2019-10-01 DIAGNOSIS — D61.818 PANCYTOPENIA (HCC): ICD-10-CM

## 2019-10-01 LAB
BASOPHILS # BLD AUTO: 1 % (ref 0–1.8)
BASOPHILS # BLD: 0.03 K/UL (ref 0–0.12)
EOSINOPHIL # BLD AUTO: 0.14 K/UL (ref 0–0.51)
EOSINOPHIL NFR BLD: 4.6 % (ref 0–6.9)
ERYTHROCYTE [DISTWIDTH] IN BLOOD BY AUTOMATED COUNT: 46.2 FL (ref 35.9–50)
HCT VFR BLD AUTO: 37.2 % (ref 42–52)
HGB BLD-MCNC: 12.3 G/DL (ref 14–18)
IMM GRANULOCYTES # BLD AUTO: 0.01 K/UL (ref 0–0.11)
IMM GRANULOCYTES NFR BLD AUTO: 0.3 % (ref 0–0.9)
LYMPHOCYTES # BLD AUTO: 0.68 K/UL (ref 1–4.8)
LYMPHOCYTES NFR BLD: 22.1 % (ref 22–41)
MCH RBC QN AUTO: 32.3 PG (ref 27–33)
MCHC RBC AUTO-ENTMCNC: 33.1 G/DL (ref 33.7–35.3)
MCV RBC AUTO: 97.6 FL (ref 81.4–97.8)
MONOCYTES # BLD AUTO: 0.29 K/UL (ref 0–0.85)
MONOCYTES NFR BLD AUTO: 9.4 % (ref 0–13.4)
NEUTROPHILS # BLD AUTO: 1.92 K/UL (ref 1.82–7.42)
NEUTROPHILS NFR BLD: 62.6 % (ref 44–72)
NRBC # BLD AUTO: 0 K/UL
NRBC BLD-RTO: 0 /100 WBC
PLATELET # BLD AUTO: 78 K/UL (ref 164–446)
PMV BLD AUTO: 14.8 FL (ref 9–12.9)
RBC # BLD AUTO: 3.81 M/UL (ref 4.7–6.1)
WBC # BLD AUTO: 3.1 K/UL (ref 4.8–10.8)

## 2019-10-01 PROCEDURE — 36415 COLL VENOUS BLD VENIPUNCTURE: CPT

## 2019-10-01 PROCEDURE — 99213 OFFICE O/P EST LOW 20 MIN: CPT | Performed by: INTERNAL MEDICINE

## 2019-10-01 PROCEDURE — 85025 COMPLETE CBC W/AUTO DIFF WBC: CPT

## 2019-10-01 ASSESSMENT — PAIN SCALES - GENERAL: PAINLEVEL: NO PAIN

## 2019-10-18 ENCOUNTER — HOSPITAL ENCOUNTER (OUTPATIENT)
Dept: RADIOLOGY | Facility: MEDICAL CENTER | Age: 59
End: 2019-10-18
Attending: INTERNAL MEDICINE
Payer: COMMERCIAL

## 2019-10-18 DIAGNOSIS — D61.818 PANCYTOPENIA (HCC): ICD-10-CM

## 2019-10-18 PROCEDURE — 76705 ECHO EXAM OF ABDOMEN: CPT

## 2019-11-07 ENCOUNTER — HOSPITAL ENCOUNTER (OUTPATIENT)
Dept: RADIATION ONCOLOGY | Facility: MEDICAL CENTER | Age: 59
End: 2019-11-30
Attending: RADIOLOGY
Payer: COMMERCIAL

## 2019-11-07 VITALS
WEIGHT: 211.2 LBS | HEART RATE: 67 BPM | BODY MASS INDEX: 27.86 KG/M2 | OXYGEN SATURATION: 98 % | SYSTOLIC BLOOD PRESSURE: 145 MMHG | TEMPERATURE: 98.8 F | DIASTOLIC BLOOD PRESSURE: 61 MMHG

## 2019-11-07 DIAGNOSIS — C09.9 TONSIL CANCER (HCC): ICD-10-CM

## 2019-11-07 PROCEDURE — 31575 DIAGNOSTIC LARYNGOSCOPY: CPT | Performed by: RADIOLOGY

## 2019-11-07 PROCEDURE — 99213 OFFICE O/P EST LOW 20 MIN: CPT | Mod: 25 | Performed by: RADIOLOGY

## 2019-11-07 PROCEDURE — 99212 OFFICE O/P EST SF 10 MIN: CPT | Mod: 25 | Performed by: RADIOLOGY

## 2019-11-07 ASSESSMENT — PAIN SCALES - GENERAL: PAINLEVEL: NO PAIN

## 2019-11-07 NOTE — PROGRESS NOTES
RADIATION ONCOLOGY FOLLOW-UP    DATE OF SERVICE:   11/7/2019    IDENTIFICATION:   A 59 y.o. male with  Visit Diagnoses     ICD-10-CM   1. Tonsil cancer (HCC) C09.9     Tonsil cancer (HCC)  Staging form: Pharynx - HPV-Mediated Oropharynx, AJCC 8th Edition  - Clinical stage from 1/15/2019: Stage II (cT3, cN2, cM0, p16: Positive) - Signed by Edward SKELTON M.D. on 3/27/2019  Laterality: Right  Tumor size (mm): 50      RADIATION SUMMARY:  Course: C1_Oropharynx    Treatment Site Ref. 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  He overall is doing well.  He is working full-time.  States that the serous to me is very slowly improving.  He has had more dramatic improvement in his taste.  He is now able to eat more acidic foods without any problems.  Denies dysphasia or odynophagia.  Xerostomia 4 ( 0-10, 0=completely dry), Taste 7 ( 0-10, 0=no taste), Dysphagia 0, Odynophagia 0    CURRENT MEDICATIONS:  No current outpatient medications on file.     No current facility-administered medications for this encounter.        ALLERGIES:  Patient has no known allergies.    REVIEW OF SYSTEMS:  A review of systems for today's date of service was reviewed and uploaded into the electronic medical record.    PHYSICAL EXAM:   PERFORMANCE STATUS:  Karnofsky Score 11/7/2019   Karnofsky Score 100   Some recent data might be hidden     ECOG Performance Review 11/7/2019 9/3/2019   ECOG Performance Status Fully active, able to carry on all pre-disease performance without restriction Fully active, able to carry on all pre-disease performance without restriction   Some recent data might be hidden     /61   Pulse 67   Temp 37.1 °C (98.8 °F)   Wt 95.8 kg (211 lb 3.2 oz)   SpO2 98%   BMI 27.86  kg/m²   Physical Exam  Vitals signs and nursing note reviewed.   Constitutional:       General: He is not in acute distress.     Appearance: He is well-developed. He is not diaphoretic.   HENT:      Head: Normocephalic.      Mouth/Throat:      Mouth: Mucous membranes are moist.      Pharynx: Oropharynx is clear. No oropharyngeal exudate or posterior oropharyngeal erythema.      Comments: Scarring noted in the right tonsillar region.  Mild induration to palpation.  No evidence of tenderness.  Neck:      Musculoskeletal: Normal range of motion and neck supple.   Cardiovascular:      Rate and Rhythm: Normal rate and regular rhythm.      Heart sounds: Normal heart sounds.   Pulmonary:      Effort: Pulmonary effort is normal.      Breath sounds: Normal breath sounds.   Lymphadenopathy:      Cervical: No cervical adenopathy.   Skin:     General: Skin is warm and dry.      Findings: No erythema.   Neurological:      Mental Status: He is alert and oriented to person, place, and time.      Cranial Nerves: No cranial nerve deficit.      Coordination: Coordination normal.   Psychiatric:         Behavior: Behavior normal.         Thought Content: Thought content normal.         Judgment: Judgment normal.           FIBEROPTIC EXAM:  Normal exam.    LABORATORY DATA:   Lab Results   Component Value Date/Time    WBC 3.1 (L) 10/01/2019 1507    WBC 1.8 (LL) 08/17/2019 0755    WBC 1.7 (LL) 06/15/2019 0736    HEMOGLOBIN 12.3 (L) 10/01/2019 1507    HEMOGLOBIN 11.9 (L) 08/17/2019 0755    HEMOGLOBIN 12.1 (L) 06/15/2019 0736    HEMATOCRIT 37.2 (L) 10/01/2019 1507    HEMATOCRIT 34.9 (L) 08/17/2019 0755    HEMATOCRIT 35.1 (L) 06/15/2019 0736    MCV 97.6 10/01/2019 1507    MCV 96.7 08/17/2019 0755    MCV 98.6 (H) 06/15/2019 0736    PLATELETCT 78 (L) 10/01/2019 1507    PLATELETCT 56 (L) 08/17/2019 0755    PLATELETCT 51 (L) 06/15/2019 0736    NEUTS 1.92 10/01/2019 1507    NEUTS 1.17 (L) 08/17/2019 0755    NEUTS 0.98 (L) 06/15/2019 0736      Lab  Results   Component Value Date/Time    SODIUM 146 (H) 06/15/2019 0736    SODIUM 141 04/08/2019 1043    SODIUM 141 03/25/2019 0947    POTASSIUM 3.6 06/15/2019 0736    POTASSIUM 3.6 04/08/2019 1043    POTASSIUM 4.0 03/25/2019 0947    BUN 12 06/15/2019 0736    BUN 19 04/08/2019 1043    BUN 20 03/25/2019 0947    CREATININE 0.67 06/15/2019 0736    CREATININE 0.60 04/08/2019 1043    CREATININE 0.58 03/25/2019 0947    CALCIUM 9.1 06/15/2019 0736    CALCIUM 9.0 04/08/2019 1043    CALCIUM 9.1 03/25/2019 0947    ALBUMIN 3.3 06/15/2019 0736    ALBUMIN 3.4 04/08/2019 1043    ALBUMIN 3.5 03/25/2019 0947    ASTSGOT 86 (H) 06/15/2019 0736    ASTSGOT 29 04/08/2019 1043    ASTSGOT 36 03/25/2019 0947    ALKPHOSPHAT 44 06/15/2019 0736    ALKPHOSPHAT 64 04/08/2019 1043    ALKPHOSPHAT 64 03/25/2019 0947    IFNOTAFR >60 06/15/2019 0736    IFNOTAFR >60 04/08/2019 1043    IFNOTAFR >60 03/25/2019 0947       RADIOLOGY DATA:  Us-spleen    Result Date: 10/20/2019  10/18/2019 4:33 PM HISTORY/REASON FOR EXAM:  Low platelets TECHNIQUE/EXAM DESCRIPTION AND NUMBER OF VIEWS: Ultrasound of the spleen COMPARISON: None FINDINGS: The spleen is enlarged measuring 16.1 x 14.7 cm in size. No focal mass is identified in the spleen. No cystic structure is noted. No focal abnormal echogenicity is identified. No subcapsular fluid collections are noted. No fluid around the spleen is identified. Normal arterial and venous flow is noted in the spleen and splenic hilum.     1.  Moderate splenomegaly is present. 2.  No focal abnormalities noted in the spleen.      IMPRESSION:    A 59 y.o. with  Visit Diagnoses     ICD-10-CM   1. Tonsil cancer (HCC) C09.9     Tonsil cancer (HCC)  Staging form: Pharynx - HPV-Mediated Oropharynx, AJCC 8th Edition  - Clinical stage from 1/15/2019: Stage II (cT3, cN2, cM0, p16: Positive) - Signed by Edward SKELTON M.D. on 3/27/2019  Laterality: Right  Tumor size (mm): 50      CANCER STATUS:  No Evidence of  Disease    RECOMMENDATIONS:   Reviewed exam findings with patient.  Reassured him.  Did ask him to return for follow-up evaluation in 2 months.    Thank you for the opportunity to participate in his care.  If any questions or comments, please do not hesitate in calling.      No orders of the defined types were placed in this encounter.

## 2019-11-07 NOTE — PROCEDURES
DATE OF SERVICE: 11/07/19        FIBEROPTIC NASO-PHARYNGOSCOPY NOTE      Flexible fiberoptic exam was performed after anesthesia of left nostril and oropharynx.    Nasopharynx:   R. Eustachian canal opening, torus, fossa of Rosenmuller appear normal     L. Eustachian canal opening, torus, fossa of Rosenmuller appear normal      Oropharynx:    Base of tongue normal appearing.     Vallecula normal appearing.     Epiglottis normal appearing.     PE folds normal appearing.    Larynx:  R. AE folds, false vocal folds, arytenoids appear normal     L. AE folds, false vocal folds, arytenoids appear normal     R. Cord mobile     L. Cord mobile      R. Piriform sinus appears normal     L. Piriform sinus appears normal      Edward SKELTON M.D.  Electronically signed by: Edward Harris V, 11/7/2019 3:15 PM  [phone]

## 2019-11-07 NOTE — NON-PROVIDER
Patient was seen today in clinic with Dr. Harris for follow up.  Vitals signs and weight were obtained and pain assessment was completed.  Allergies and medications were reviewed with the patient.  Review of systems completed.     Vitals/Pain:  Vitals:    11/07/19 1456   BP: 145/61   Pulse: 67   Temp: 37.1 °C (98.8 °F)   SpO2: 98%   Weight: 95.8 kg (211 lb 3.2 oz)   Pain Score: No pain        Allergies:   Patient has no known allergies.    Current Medications:  No current outpatient medications on file.     No current facility-administered medications for this encounter.          PCP:  Armani Guzman, Med Ass't

## 2020-01-09 ENCOUNTER — HOSPITAL ENCOUNTER (OUTPATIENT)
Dept: RADIATION ONCOLOGY | Facility: MEDICAL CENTER | Age: 60
End: 2020-01-31
Attending: RADIOLOGY
Payer: COMMERCIAL

## 2020-01-09 VITALS
DIASTOLIC BLOOD PRESSURE: 66 MMHG | HEART RATE: 70 BPM | WEIGHT: 218.5 LBS | BODY MASS INDEX: 28.83 KG/M2 | SYSTOLIC BLOOD PRESSURE: 155 MMHG | OXYGEN SATURATION: 96 %

## 2020-01-09 DIAGNOSIS — C09.9 TONSIL CANCER (HCC): ICD-10-CM

## 2020-01-09 PROCEDURE — 99212 OFFICE O/P EST SF 10 MIN: CPT | Mod: 25 | Performed by: RADIOLOGY

## 2020-01-09 PROCEDURE — 99213 OFFICE O/P EST LOW 20 MIN: CPT | Mod: 25 | Performed by: RADIOLOGY

## 2020-01-09 PROCEDURE — 31575 DIAGNOSTIC LARYNGOSCOPY: CPT | Performed by: RADIOLOGY

## 2020-01-09 ASSESSMENT — PAIN SCALES - GENERAL: PAINLEVEL: NO PAIN

## 2020-01-09 NOTE — PROGRESS NOTES
RADIATION ONCOLOGY FOLLOW-UP    DATE OF SERVICE:   1/9/2020    IDENTIFICATION:   A 59 y.o. 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     Tonsil cancer (HCC)  Staging form: Pharynx - HPV-Mediated Oropharynx, AJCC 8th Edition  - Clinical stage from 1/15/2019: Stage II (cT3, cN2, cM0, p16: Positive) - Signed by Edward SKELTON M.D. on 3/27/2019  Laterality: Right  Tumor size (mm): 50      RADIATION SUMMARY:   Course: C1_Oropharynx     Treatment Site Ref. 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    He continues to do well.  Reports continued improvement in xerostomia and taste.  He denies any complaints except for difficulty eating starches.  He requires water to wash them down.    Xerostomia 6( 0-10, 0=completely dry), Taste 8 ( 0-10, 0=no taste), Dysphagia 0, Odynophagia 0    CURRENT MEDICATIONS:  No current outpatient medications on file.     No current facility-administered medications for this encounter.        ALLERGIES:  Patient has no known allergies.    REVIEW OF SYSTEMS:      Review of Systems   All other systems reviewed and are negative.      PHYSICAL EXAM:   PERFORMANCE STATUS:  Karnofsky Score 1/9/2020 11/7/2019   Karnofsky Score 100 100   Some recent data might be hidden     ECOG Performance Review 1/9/2020 11/7/2019 9/3/2019   ECOG Performance Status Fully active, able to carry on all pre-disease performance without restriction Fully active, able to carry on all pre-disease performance without restriction Fully active, able to carry on all pre-disease performance without restriction   Some recent data might be hidden     /66   Pulse 70   Wt 99.1 kg  (218 lb 8 oz)   SpO2 96%   BMI 28.83 kg/m²   Physical Exam      FIBEROPTIC EXAM:  No evidence of disease    LABORATORY DATA:   Lab Results   Component Value Date/Time    SODIUM 146 (H) 06/15/2019 07:36 AM    POTASSIUM 3.6 06/15/2019 07:36 AM    CHLORIDE 111 06/15/2019 07:36 AM    CO2 29 06/15/2019 07:36 AM    GLUCOSE 92 06/15/2019 07:36 AM    BUN 12 06/15/2019 07:36 AM    CREATININE 0.67 06/15/2019 07:36 AM      Lab Results   Component Value Date/Time    WBC 3.1 (L) 10/01/2019 03:07 PM    RBC 3.81 (L) 10/01/2019 03:07 PM    HEMOGLOBIN 12.3 (L) 10/01/2019 03:07 PM    HEMATOCRIT 37.2 (L) 10/01/2019 03:07 PM    MCV 97.6 10/01/2019 03:07 PM    MCH 32.3 10/01/2019 03:07 PM    MCHC 33.1 (L) 10/01/2019 03:07 PM    MPV 14.8 (H) 10/01/2019 03:07 PM    NEUTSPOLYS 62.60 10/01/2019 03:07 PM    LYMPHOCYTES 22.10 10/01/2019 03:07 PM    MONOCYTES 9.40 10/01/2019 03:07 PM    EOSINOPHILS 4.60 10/01/2019 03:07 PM    BASOPHILS 1.00 10/01/2019 03:07 PM    ANISOCYTOSIS 1+ 06/15/2019 07:36 AM          RADIOLOGY DATA:  No results found.    IMPRESSION:    A 59 y.o. with  Tonsil cancer (HCC)  Staging form: Pharynx - HPV-Mediated Oropharynx, AJCC 8th Edition  - Clinical stage from 1/15/2019: Stage II (cT3, cN2, cM0, p16: Positive) - Signed by Edward SKELTON M.D. on 3/27/2019  Laterality: Right  Tumor size (mm): 50      CANCER STATUS:  No Evidence of Disease    RECOMMENDATIONS:   Reviewed fiberoptic evaluation with patient.  Reassured him.  Did recommend follow-up in 4 months.    Thank you for the opportunity to participate in his care.  If any questions or comments, please do not hesitate in calling.      No orders of the defined types were placed in this encounter.

## 2020-01-09 NOTE — PROCEDURES
DATE OF SERVICE: 01/09/20        FIBEROPTIC NASO-PHARYNGOSCOPY NOTE      Flexible fiberoptic exam was performed after anesthesia of left nostril and oropharynx.    Nasopharynx:       R. Eustachian canal opening, torus, fossa of Rosenmuller appear normal  L. Eustachian canal opening, torus, fossa of Rosenmuller appear normal      Oropharynx:        Base of tongue normal appearing.  Vallecula normal appearing.  Epiglottis normal appearing.  PE folds normal appearing.    Larynx:      R. AE folds, false vocal folds, arytenoids appear normal  L. AE folds, false vocal folds, arytenoids appear normal  R. Cord mobile  L. Cord mobile   R. Piriform sinus appears normal  L. Piriform sinus appears normal      Edward SKELTON M.D.  Electronically signed by: Edward Harris V, 1/9/2020 3:30 PM  [phone]

## 2020-01-09 NOTE — NON-PROVIDER
Patient was seen today in clinic with Dr. Harris for follow up.  Vitals signs and weight were obtained and pain assessment was completed.  Allergies and medications were reviewed with the patient.  Review of systems completed.     Vitals/Pain:  Vitals:    01/09/20 1443   BP: 155/66   Pulse: 70   SpO2: 96%   Weight: 99.1 kg (218 lb 8 oz)   Pain Score: No pain        Allergies:   Patient has no known allergies.    Current Medications:  No current outpatient medications on file.     No current facility-administered medications for this encounter.          PCP:  Armani Guzman, Med Ass't

## 2020-03-26 ENCOUNTER — OFFICE VISIT (OUTPATIENT)
Dept: MEDICAL GROUP | Facility: PHYSICIAN GROUP | Age: 60
End: 2020-03-26
Payer: COMMERCIAL

## 2020-03-26 DIAGNOSIS — Z12.11 COLON CANCER SCREENING: ICD-10-CM

## 2020-03-26 DIAGNOSIS — D70.1 CHEMOTHERAPY-INDUCED NEUTROPENIA (HCC): ICD-10-CM

## 2020-03-26 DIAGNOSIS — T45.1X5A CHEMOTHERAPY-INDUCED NEUTROPENIA (HCC): ICD-10-CM

## 2020-03-26 DIAGNOSIS — D61.818 PANCYTOPENIA (HCC): ICD-10-CM

## 2020-03-26 PROBLEM — K12.33 ORAL MUCOSITIS (ULCERATIVE) DUE TO RADIATION: Status: RESOLVED | Noted: 2019-06-18 | Resolved: 2020-03-26

## 2020-03-26 PROCEDURE — 99441 PR PHYSICIAN TELEPHONE EVALUATION 5-10 MIN: CPT | Mod: CR | Performed by: FAMILY MEDICINE

## 2020-03-26 ASSESSMENT — PATIENT HEALTH QUESTIONNAIRE - PHQ9: CLINICAL INTERPRETATION OF PHQ2 SCORE: 0

## 2020-03-26 NOTE — ASSESSMENT & PLAN NOTE
He completed chemotherapy march 2019 and now seeing his oncologist every 4 months.   He has no fevers, no enlarged lymph nodes.

## 2020-03-26 NOTE — ASSESSMENT & PLAN NOTE
History of tonsillar cancer. Pancytopenia is improving. He has a follow up appointment with his oncologist in May and will wait till this for his follow up labs. He has no fevers, no difficulty with swallowing or talking.

## 2020-03-26 NOTE — PROGRESS NOTES
Telephone Appointment Visit   As a means of avoiding spread of COVID-19, this visit is being conducted by telephone. This telephone visit was initiated by the patient and they verbally consented.    Time at start of call: 4.05    Reason for Call:  Lab Follow-up    Patient Comments / History:    History of tonsillar cancer. Pancytopenia is improving. He has a follow up appointment with his oncologist in May and will wait till this for his follow up labs. He has no fevers, no difficulty with swallowing or talking.   He feels healthy as a horse. Is more worried about his wife.     We discussed ASCVD risk 18% he delines statin.         Labs / Images Reviewed   Results for VALENTIN YADAV (MRN [id number]) as of 3/26/2020 16:07   Ref. Range 4/13/2017 07:40 5/25/2019 08:23   Cholesterol,Tot Latest Ref Range: 100 - 199 mg/dL 205 (H) 131   Triglycerides Latest Ref Range: 0 - 149 mg/dL 100 80   HDL Latest Ref Range: >=40 mg/dL 44 34 (A)   LDL Latest Ref Range: <100 mg/dL 141 (H) 81       Assessment and Plan:     1. Colon cancer screening  - COLOGUARD (FIT DNA)    2. Chemotherapy-induced neutropenia (HCC)    3. Pancytopenia (HCC)      Follow-up: he will call scheduling for follow up visits as needed or 1 year follow up.   Time at end of call: 4.15  Total Time Spent: 5-10 minutes    Giancarlo Lomeli M.D.

## 2020-06-11 ENCOUNTER — HOSPITAL ENCOUNTER (OUTPATIENT)
Dept: RADIATION ONCOLOGY | Facility: MEDICAL CENTER | Age: 60
End: 2020-06-30
Attending: RADIOLOGY
Payer: COMMERCIAL

## 2020-06-11 VITALS
WEIGHT: 244.71 LBS | TEMPERATURE: 97.7 F | OXYGEN SATURATION: 94 % | BODY MASS INDEX: 32.29 KG/M2 | DIASTOLIC BLOOD PRESSURE: 78 MMHG | SYSTOLIC BLOOD PRESSURE: 149 MMHG | HEART RATE: 77 BPM

## 2020-06-11 DIAGNOSIS — C09.9 TONSIL CANCER (HCC): ICD-10-CM

## 2020-06-11 PROCEDURE — 99213 OFFICE O/P EST LOW 20 MIN: CPT | Mod: 25 | Performed by: RADIOLOGY

## 2020-06-11 PROCEDURE — 31575 DIAGNOSTIC LARYNGOSCOPY: CPT | Performed by: RADIOLOGY

## 2020-06-11 PROCEDURE — 99212 OFFICE O/P EST SF 10 MIN: CPT | Mod: 25 | Performed by: RADIOLOGY

## 2020-06-11 ASSESSMENT — PAIN SCALES - GENERAL: PAINLEVEL: NO PAIN

## 2020-06-11 ASSESSMENT — FIBROSIS 4 INDEX: FIB4 SCORE: 8.33

## 2020-06-11 NOTE — PROGRESS NOTES
RADIATION ONCOLOGY FOLLOW-UP    DATE OF SERVICE:   6/11/2020    IDENTIFICATION:   A 59 y.o. male with  Tonsil cancer (HCC)  Staging form: Pharynx - HPV-Mediated Oropharynx, AJCC 8th Edition  - Clinical stage from 1/15/2019: Stage II (cT3, cN2, cM0, p16: Positive) - Signed by Edward SKELTON M.D. on 3/27/2019  Laterality: Right  Tumor size (mm): 50      RADIATION SUMMARY:   Course: C1_Oropharynx     Treatment Site Ref. ID Energy Dose/Fx (cGy) #Fx Dose Correction (cGy) Total Dose (cGy) Start Date End Date Elapsed Days   Oropharynx Oropharynx 6X 212 33 / 33 0 6,996 2/11/2019 3/28/2019 45        HISTORY OF PRESENT ILLNESS:   Patient is a  with at least a 40-pack-year tobacco history and still continues to smoke approximately quarter pack per day.  He has poor dentition and is been under regular dental care.  Approximately 2 months ago on a dental visit he was noted to have abnormality involving the right tonsillar region suspicious for malignancy.  He was referred initially to oral surgery and subsequently from oral surgery was referred to Dr. Wolf.  On Dr. Wolf's evaluation December 13, 2018 he noted a large right tonsillar mass extending onto the right soft palate and towards the base of tongue involving a small amount of retromolar trigone region.  In addition he noted bilateral neck nodes approximately 2 nodes in the right neck and one on the left neck level 2 region.     Biopsies were arranged as well as CT neck and soft tissue.  Patient however canceled most of these appointments concerned about the cost associated with care.  Dr. Wolf was able to convince him to at least have biopsy of the tonsil region which was done on 12/27/2018 demonstrating squamous cell carcinoma.  No differentiation given.  Positive for P 16.     He was referred here for additional evaluation and management.  Patient presents today still concerned about costs associated with care which is resulting in significant  distress.  I did assuage some of his concerns by referring him to financial counseling as well as the .     Surprisingly, he offers very little pharyngeal symptoms.  He reports mild sore throat denies otalgia.  He denies any significant weight loss, he denies dysphagia, he denies odynophagia    Completed combined modality therapy 3/28/2019.    INTERVAL HISTORY:  Scheduled follow-up visit.  Overall continues to do well.  Still has some mild xerostomia and impaired taste.  Xerostomia 7 ( 0-10, 0=completely dry), Taste 7 ( 0-10, 0=no taste), Dysphagia 0, Odynophagia 0    CURRENT MEDICATIONS:  No current outpatient medications on file.     No current facility-administered medications for this encounter.        ALLERGIES:  Patient has no known allergies.    REVIEW OF SYSTEMS:  A review of systems for today's date of service was reviewed and uploaded into the electronic medical record.    PHYSICAL EXAM:   PERFORMANCE STATUS:  Karnofsky Score 6/11/2020 1/9/2020   Karnofsky Score 100 100   Some recent data might be hidden     ECOG Performance Review 1/9/2020   ECOG Performance Status Fully active, able to carry on all pre-disease performance without restriction   Some recent data might be hidden     /78   Pulse 77   Temp 36.5 °C (97.7 °F)   Wt 111 kg (244 lb 11.4 oz)   SpO2 94%   BMI 32.29 kg/m²   Physical Exam  Vitals signs and nursing note reviewed.   Constitutional:       General: He is not in acute distress.     Appearance: He is well-developed. He is not diaphoretic.   HENT:      Head: Normocephalic.      Mouth/Throat:      Mouth: Mucous membranes are moist.      Pharynx: No oropharyngeal exudate.      Comments: Edentulous  Neck:      Musculoskeletal: Normal range of motion and neck supple.   Cardiovascular:      Rate and Rhythm: Normal rate and regular rhythm.      Heart sounds: Normal heart sounds.   Pulmonary:      Effort: Pulmonary effort is normal.      Breath sounds: Normal breath sounds.    Lymphadenopathy:      Cervical: No cervical adenopathy.   Skin:     General: Skin is warm and dry.      Findings: No erythema.   Neurological:      Mental Status: He is alert and oriented to person, place, and time.      Cranial Nerves: No cranial nerve deficit.      Coordination: Coordination normal.   Psychiatric:         Behavior: Behavior normal.         Thought Content: Thought content normal.         Judgment: Judgment normal.           FIBEROPTIC EXAM:  Normal exam    LABORATORY DATA:   Lab Results   Component Value Date/Time    SODIUM 146 (H) 06/15/2019 07:36 AM    POTASSIUM 3.6 06/15/2019 07:36 AM    CHLORIDE 111 06/15/2019 07:36 AM    CO2 29 06/15/2019 07:36 AM    GLUCOSE 92 06/15/2019 07:36 AM    BUN 12 06/15/2019 07:36 AM    CREATININE 0.67 06/15/2019 07:36 AM      Lab Results   Component Value Date/Time    WBC 3.1 (L) 10/01/2019 03:07 PM    RBC 3.81 (L) 10/01/2019 03:07 PM    HEMOGLOBIN 12.3 (L) 10/01/2019 03:07 PM    HEMATOCRIT 37.2 (L) 10/01/2019 03:07 PM    MCV 97.6 10/01/2019 03:07 PM    MCH 32.3 10/01/2019 03:07 PM    MCHC 33.1 (L) 10/01/2019 03:07 PM    MPV 14.8 (H) 10/01/2019 03:07 PM    NEUTSPOLYS 62.60 10/01/2019 03:07 PM    LYMPHOCYTES 22.10 10/01/2019 03:07 PM    MONOCYTES 9.40 10/01/2019 03:07 PM    EOSINOPHILS 4.60 10/01/2019 03:07 PM    BASOPHILS 1.00 10/01/2019 03:07 PM    ANISOCYTOSIS 1+ 06/15/2019 07:36 AM          RADIOLOGY DATA:  No results found.    IMPRESSION:    A 59 y.o. with  Tonsil cancer (HCC)  Staging form: Pharynx - HPV-Mediated Oropharynx, AJCC 8th Edition  - Clinical stage from 1/15/2019: Stage II (cT3, cN2, cM0, p16: Positive) - Signed by Edward SKELTON M.D. on 3/27/2019  Laterality: Right  Tumor size (mm): 50      CANCER STATUS:  No Evidence of Disease    RECOMMENDATIONS:   Reviewed fiberoptic evaluation with patient.  Reassured him.  At this point will see him on yearly basis.  Patient's been instructed to follow-up with me if there are head neck related  concerns.    Thank you for the opportunity to participate in his care.  If any questions or comments, please do not hesitate in calling.      No orders of the defined types were placed in this encounter.

## 2020-06-11 NOTE — PROCEDURES
DATE OF SERVICE: 6/11/2020         FIBEROPTIC NASO-PHARYNGOSCOPY NOTE      Flexible fiberoptic exam was performed after anesthesia of left nostril and oropharynx.    Nasopharynx:       R. Eustachian canal opening, torus, fossa of Rosenmuller appear normal  L. Eustachian canal opening, torus, fossa of Rosenmuller appear normal      Oropharynx:        Base of tongue normal appearing.  Vallecula normal appearing.  Epiglottis normal appearing.  PE folds normal appearing.    Larynx:      R. AE folds, false vocal folds, arytenoids appear normal  L. AE folds, false vocal folds, arytenoids appear normal  R. Cord mobile  L. Cord mobile   R. Piriform sinus appears normal  L. Piriform sinus appears normal      Edward SKELTON M.D.  Electronically signed by: Edward SKELTON M.D., 6/11/2020 2:44 PM  [phone]

## 2020-10-20 ENCOUNTER — NON-PROVIDER VISIT (OUTPATIENT)
Dept: MEDICAL GROUP | Facility: PHYSICIAN GROUP | Age: 60
End: 2020-10-20
Payer: COMMERCIAL

## 2020-10-20 DIAGNOSIS — Z23 NEED FOR VACCINATION: ICD-10-CM

## 2020-10-20 PROCEDURE — 90471 IMMUNIZATION ADMIN: CPT | Performed by: FAMILY MEDICINE

## 2020-10-20 PROCEDURE — 90686 IIV4 VACC NO PRSV 0.5 ML IM: CPT | Performed by: FAMILY MEDICINE

## 2021-06-10 ENCOUNTER — APPOINTMENT (OUTPATIENT)
Dept: RADIATION ONCOLOGY | Facility: MEDICAL CENTER | Age: 61
End: 2021-06-10
Attending: RADIOLOGY
Payer: COMMERCIAL

## 2021-06-22 ENCOUNTER — HOSPITAL ENCOUNTER (OUTPATIENT)
Dept: RADIATION ONCOLOGY | Facility: MEDICAL CENTER | Age: 61
End: 2021-06-30
Attending: RADIOLOGY
Payer: COMMERCIAL

## 2021-06-22 VITALS
DIASTOLIC BLOOD PRESSURE: 82 MMHG | TEMPERATURE: 98.2 F | HEART RATE: 77 BPM | OXYGEN SATURATION: 97 % | WEIGHT: 282.19 LBS | SYSTOLIC BLOOD PRESSURE: 167 MMHG | BODY MASS INDEX: 37.23 KG/M2

## 2021-06-22 PROCEDURE — 99212 OFFICE O/P EST SF 10 MIN: CPT | Mod: 25 | Performed by: RADIOLOGY

## 2021-06-22 PROCEDURE — 31575 DIAGNOSTIC LARYNGOSCOPY: CPT | Performed by: RADIOLOGY

## 2021-06-22 PROCEDURE — 99213 OFFICE O/P EST LOW 20 MIN: CPT | Mod: 25 | Performed by: RADIOLOGY

## 2021-06-22 ASSESSMENT — PAIN SCALES - GENERAL: PAINLEVEL: NO PAIN

## 2021-06-22 NOTE — PROCEDURES
DATE OF SERVICE: 6/22/2021         FIBEROPTIC NASO-PHARYNGOSCOPY NOTE      Flexible fiberoptic exam was performed after anesthesia of left nostril and oropharynx.    Nasopharynx:       R. Eustachian canal opening, torus, fossa of Rosenmuller appear normal  L. Eustachian canal opening, torus, fossa of Rosenmuller appear normal      Oropharynx:        Base of tongue normal appearing.  Vallecula normal appearing.  Epiglottis normal appearing.  PE folds normal appearing.    Larynx:      R. AE fold, false vocal fold, arytenoid appear normal  L. AE fold, false vocal fold, arytenoid appear normal  R. Cord mobile  L. Cord mobile   R. Piriform sinus appears normal  L. Piriform sinus appears normal      Edward SKELTON M.D.  Electronically signed by: Edward SKELTON M.D., 6/22/2021 3:29 PM  [phone]

## 2021-06-22 NOTE — NON-PROVIDER
Patient was seen today in clinic with Dr. Harris for follow up.  Vitals signs and weight were obtained and pain assessment was completed.  Allergies and medications were reviewed with the patient.  Toxicities of treatment assessed.     Vitals/Pain:  Vitals:    06/22/21 1446   BP: (!) 167/82   Pulse: 77   Temp: 36.8 °C (98.2 °F)   SpO2: 97%   Weight: (!) 128 kg (282 lb 3 oz)   Pain Score: No pain        Allergies:   Patient has no known allergies.    Current Medications:  No current outpatient medications on file.     No current facility-administered medications for this encounter.         PCP:  Armani Guzman, Med Ass't

## 2021-06-22 NOTE — PROGRESS NOTES
RADIATION ONCOLOGY FOLLOW-UP    DATE OF SERVICE:   6/22/2021    IDENTIFICATION:   A 61 y.o. male with  Tonsil cancer (HCC)  Staging form: Pharynx - HPV-Mediated Oropharynx, AJCC 8th Edition  - Clinical stage from 1/15/2019: Stage II (cT3, cN2, cM0, p16: Positive) - Signed by Edward SKELTON M.D. on 3/27/2019  Laterality: Right  Tumor size (mm): 50      RADIATION SUMMARY:  [facility] Treatment Information        Some values may be hidden. Unless noted otherwise, only the newest values recorded on each date are displayed.         [facility] Treatment Summary 4/1/19   Oropharynx Plan from Course C1_Oropharynx   Fraction 33 of 33   Elapsed Course Days 45 @ [id number]   Prescribed Fraction Dose 212 cGy   Prescribed Total Dose 6,996 cGy   Oropharynx Reference Point from Course C1_Oropharynx   Elapsed Course Days 45 @ [id number]   Session Dose -   Total Dose 6,996 cGy   Oropharynx CP Reference Point from Course C1_Oropharynx   Elapsed Course Days 45 @ [id number]   Session Dose -   Total Dose 7,194 cGy             HISTORY OF PRESENT ILLNESS:   Patient is a  with at least a 40-pack-year tobacco history and still continues to smoke approximately quarter pack per day.  He has poor dentition and is been under regular dental care.  Approximately 2 months ago on a dental visit he was noted to have abnormality involving the right tonsillar region suspicious for malignancy.  He was referred initially to oral surgery and subsequently from oral surgery was referred to Dr. Wolf.  On Dr. Wolf's evaluation December 13, 2018 he noted a large right tonsillar mass extending onto the right soft palate and towards the base of tongue involving a small amount of retromolar trigone region.  In addition he noted bilateral neck nodes approximately 2 nodes in the right neck and one on the left neck level 2 region.     Biopsies were arranged as well as CT neck and soft tissue.  Patient however canceled most of these appointments  "concerned about the cost associated with care.  Dr. Wolf was able to convince him to at least have biopsy of the tonsil region which was done on 12/27/2018 demonstrating squamous cell carcinoma.  No differentiation given.  Positive for P 16.     He was referred here for additional evaluation and management.  Patient presents today still concerned about costs associated with care which is resulting in significant distress.  I did assuage some of his concerns by referring him to financial counseling as well as the .     Surprisingly, he offers very little pharyngeal symptoms.  He reports mild sore throat denies otalgia.  He denies any significant weight loss, he denies dysphagia, he denies odynophagia     Completed combined modality therapy 3/28/2019.     6/11/2020 Scheduled follow-up visit.  Overall continues to do well.  Still has some mild xerostomia and impaired taste.  Xerostomia 7 ( 0-10, 0=completely dry), Taste 7 ( 0-10, 0=no taste), Dysphagia 0, Odynophagia 0    INTERVAL HISTORY:  Scheduled follow-up visit.  Overall doing well.  Primary complaint is xerostomia.  Getting regular dental care.  Scheduled to have posts placed for implants.  Xerostomia 4 ( 0-10, 0=completely dry), Taste 7 ( 0-10, 0=no taste), Dysphagia 0, Odynophagia 0    CURRENT MEDICATIONS:  No current outpatient medications on file.     No current facility-administered medications for this encounter.       ALLERGIES:  Patient has no known allergies.    REVIEW OF SYSTEMS:    A complete review of system taken. Pertinent items in HPI.     PHYSICAL EXAM:   PERFORMANCE STATUS:  No flowsheet data found.  Vitals 6/22/2021 6/11/2020 1/9/2020 11/7/2019 10/1/2019 9/17/2019 9/3/2019   SYSTOLIC 167 149 155 145 140 128 139   DIASTOLIC 82 78 66 61 80 56 65   PULSE 77 77 70 67 64 69 66   TEMPERATURE 98.2 97.7 - 98.8 98.2 98.8 97.7   RESPIRATIONS - - - - 16 14 -   WEIGHT 282.19 244.71 218.5 211.2 208.67 205 203.72   HEIGHT - - - - 6' 1\" 6' 1\" - "   BMI 37.23 kg/m2 32.29 kg/m2 28.83 kg/m2 27.86 kg/m2 27.53 kg/m2 27.05 kg/m2 26.88 kg/m2   SPO2 97 94 96 98 98 99 97       Physical Exam  Vitals and nursing note reviewed.   Constitutional:       General: He is not in acute distress.     Appearance: He is well-developed. He is not diaphoretic.   HENT:      Head: Normocephalic.      Mouth/Throat:      Mouth: Mucous membranes are dry.      Pharynx: Oropharynx is clear. No oropharyngeal exudate or posterior oropharyngeal erythema.      Comments: Edentulous  Eyes:      Extraocular Movements: Extraocular movements intact.   Cardiovascular:      Rate and Rhythm: Normal rate and regular rhythm.   Pulmonary:      Effort: Pulmonary effort is normal.   Musculoskeletal:      Cervical back: Normal range of motion and neck supple.   Lymphadenopathy:      Cervical: No cervical adenopathy.   Skin:     General: Skin is warm and dry.      Findings: No erythema.   Neurological:      Mental Status: He is alert and oriented to person, place, and time.      Cranial Nerves: No cranial nerve deficit.      Coordination: Coordination normal.   Psychiatric:         Behavior: Behavior normal.         Thought Content: Thought content normal.         Judgment: Judgment normal.           FIBEROPTIC EXAM:  Normal exam    LABORATORY DATA:   Lab Results   Component Value Date/Time    WBC 3.1 (L) 10/01/2019 03:07 PM    RBC 3.81 (L) 10/01/2019 03:07 PM    HEMOGLOBIN 12.3 (L) 10/01/2019 03:07 PM    HEMATOCRIT 37.2 (L) 10/01/2019 03:07 PM    MCV 97.6 10/01/2019 03:07 PM    MCH 32.3 10/01/2019 03:07 PM    MCHC 33.1 (L) 10/01/2019 03:07 PM    RDW 46.2 10/01/2019 03:07 PM    PLATELETCT 78 (L) 10/01/2019 03:07 PM    MPV 14.8 (H) 10/01/2019 03:07 PM    NEUTSPOLYS 62.60 10/01/2019 03:07 PM    LYMPHOCYTES 22.10 10/01/2019 03:07 PM    MONOCYTES 9.40 10/01/2019 03:07 PM    EOSINOPHILS 4.60 10/01/2019 03:07 PM    BASOPHILS 1.00 10/01/2019 03:07 PM    ANISOCYTOSIS 1+ 06/15/2019 07:36 AM      Lab Results    Component Value Date/Time    SODIUM 146 (H) 06/15/2019 07:36 AM    POTASSIUM 3.6 06/15/2019 07:36 AM    CHLORIDE 111 06/15/2019 07:36 AM    CO2 29 06/15/2019 07:36 AM    GLUCOSE 92 06/15/2019 07:36 AM    BUN 12 06/15/2019 07:36 AM    CREATININE 0.67 06/15/2019 07:36 AM         RADIOLOGY DATA:  No results found.    IMPRESSION:    A 61 y.o. with  Tonsil cancer (HCC)  Staging form: Pharynx - HPV-Mediated Oropharynx, AJCC 8th Edition  - Clinical stage from 1/15/2019: Stage II (cT3, cN2, cM0, p16: Positive) - Signed by Edward SKELTON M.D. on 3/27/2019  Laterality: Right  Tumor size (mm): 50      CANCER STATUS:  No Evidence of Disease    RECOMMENDATIONS:   Reviewed fiberoptic exam with patient.  Reassured him.  Follow-up in 1 year.      Thank you for the opportunity to participate in his care.  If any questions or comments, please do not hesitate in calling.      No orders of the defined types were placed in this encounter.

## 2021-10-28 ENCOUNTER — NON-PROVIDER VISIT (OUTPATIENT)
Dept: MEDICAL GROUP | Facility: PHYSICIAN GROUP | Age: 61
End: 2021-10-28
Payer: COMMERCIAL

## 2021-10-28 DIAGNOSIS — Z23 NEED FOR IMMUNIZATION AGAINST INFLUENZA: ICD-10-CM

## 2021-10-28 DIAGNOSIS — Z23 NEED FOR VACCINATION: ICD-10-CM

## 2021-10-28 PROCEDURE — 90471 IMMUNIZATION ADMIN: CPT | Performed by: FAMILY MEDICINE

## 2021-10-28 PROCEDURE — 90686 IIV4 VACC NO PRSV 0.5 ML IM: CPT | Performed by: FAMILY MEDICINE

## 2021-10-29 NOTE — PROGRESS NOTES
"Maximino Green is a 61 y.o. male here for a non-provider visit for:   FLU    Reason for immunization: Annual Flu Vaccine  Immunization records indicate need for vaccine: No  Minimum interval has been met for this vaccine: Yes  ABN completed: Not Indicated    VIS Dated 6/16/21 was given to patient: Yes  All IAC Questionnaire questions were answered \"No.\"    Patient tolerated injection and no adverse effects were observed or reported: Yes    Pt scheduled for next dose in series: Not Indicated    "

## 2022-03-15 ENCOUNTER — OFFICE VISIT (OUTPATIENT)
Dept: MEDICAL GROUP | Facility: PHYSICIAN GROUP | Age: 62
End: 2022-03-15
Payer: COMMERCIAL

## 2022-03-15 VITALS
SYSTOLIC BLOOD PRESSURE: 170 MMHG | DIASTOLIC BLOOD PRESSURE: 94 MMHG | RESPIRATION RATE: 20 BRPM | HEIGHT: 73 IN | OXYGEN SATURATION: 95 % | TEMPERATURE: 96.7 F | WEIGHT: 292.4 LBS | HEART RATE: 76 BPM | BODY MASS INDEX: 38.75 KG/M2

## 2022-03-15 DIAGNOSIS — Z23 NEED FOR VACCINATION: ICD-10-CM

## 2022-03-15 DIAGNOSIS — R74.8 ELEVATED LIVER ENZYMES: ICD-10-CM

## 2022-03-15 DIAGNOSIS — E11.65 CONTROLLED TYPE 2 DIABETES MELLITUS WITH HYPERGLYCEMIA, WITHOUT LONG-TERM CURRENT USE OF INSULIN (HCC): ICD-10-CM

## 2022-03-15 DIAGNOSIS — E66.9 OBESITY (BMI 30-39.9): ICD-10-CM

## 2022-03-15 DIAGNOSIS — D61.818 PANCYTOPENIA (HCC): ICD-10-CM

## 2022-03-15 DIAGNOSIS — Z12.11 SCREENING FOR COLON CANCER: ICD-10-CM

## 2022-03-15 DIAGNOSIS — I10 ESSENTIAL HYPERTENSION: ICD-10-CM

## 2022-03-15 PROBLEM — M41.25 OTHER IDIOPATHIC SCOLIOSIS, THORACOLUMBAR REGION: Status: ACTIVE | Noted: 2022-03-15

## 2022-03-15 PROCEDURE — 90732 PPSV23 VACC 2 YRS+ SUBQ/IM: CPT | Performed by: FAMILY MEDICINE

## 2022-03-15 PROCEDURE — 99214 OFFICE O/P EST MOD 30 MIN: CPT | Mod: 25 | Performed by: FAMILY MEDICINE

## 2022-03-15 PROCEDURE — 90471 IMMUNIZATION ADMIN: CPT | Performed by: FAMILY MEDICINE

## 2022-03-15 RX ORDER — LISINOPRIL 20 MG/1
20 TABLET ORAL DAILY
Qty: 90 TABLET | Refills: 3 | Status: SHIPPED | OUTPATIENT
Start: 2022-03-15 | End: 2022-03-15

## 2022-03-15 RX ORDER — LISINOPRIL 20 MG/1
20 TABLET ORAL DAILY
Qty: 90 TABLET | Refills: 3 | Status: SHIPPED | OUTPATIENT
Start: 2022-03-15 | End: 2022-06-24

## 2022-03-15 ASSESSMENT — PATIENT HEALTH QUESTIONNAIRE - PHQ9: CLINICAL INTERPRETATION OF PHQ2 SCORE: 0

## 2022-03-15 NOTE — ASSESSMENT & PLAN NOTE
Repeat labs ordered. Had been on chemo and radiation for tonsillar cancer and now in remission and followed by Dr. Whitfield and has a scope done once a year.

## 2022-03-15 NOTE — PATIENT INSTRUCTIONS
"DASH Eating Plan  DASH stands for \"Dietary Approaches to Stop Hypertension.\" The DASH eating plan is a healthy eating plan that has been shown to reduce high blood pressure (hypertension). It may also reduce your risk for type 2 diabetes, heart disease, and stroke. The DASH eating plan may also help with weight loss.  What are tips for following this plan?    General guidelines  · Avoid eating more than 2,300 mg (milligrams) of salt (sodium) a day. If you have hypertension, you may need to reduce your sodium intake to 1,500 mg a day.  · Limit alcohol intake to no more than 1 drink a day for nonpregnant women and 2 drinks a day for men. One drink equals 12 oz of beer, 5 oz of wine, or 1½ oz of hard liquor.  · Work with your health care provider to maintain a healthy body weight or to lose weight. Ask what an ideal weight is for you.  · Get at least 30 minutes of exercise that causes your heart to beat faster (aerobic exercise) most days of the week. Activities may include walking, swimming, or biking.  · Work with your health care provider or diet and nutrition specialist (dietitian) to adjust your eating plan to your individual calorie needs.  Reading food labels    · Check food labels for the amount of sodium per serving. Choose foods with less than 5 percent of the Daily Value of sodium. Generally, foods with less than 300 mg of sodium per serving fit into this eating plan.  · To find whole grains, look for the word \"whole\" as the first word in the ingredient list.  Shopping  · Buy products labeled as \"low-sodium\" or \"no salt added.\"  · Buy fresh foods. Avoid canned foods and premade or frozen meals.  Cooking  · Avoid adding salt when cooking. Use salt-free seasonings or herbs instead of table salt or sea salt. Check with your health care provider or pharmacist before using salt substitutes.  · Do not costa foods. Cook foods using healthy methods such as baking, boiling, grilling, and broiling instead.  · Cook with " heart-healthy oils, such as olive, canola, soybean, or sunflower oil.  Meal planning  · Eat a balanced diet that includes:  ? 5 or more servings of fruits and vegetables each day. At each meal, try to fill half of your plate with fruits and vegetables.  ? Up to 6-8 servings of whole grains each day.  ? Less than 6 oz of lean meat, poultry, or fish each day. A 3-oz serving of meat is about the same size as a deck of cards. One egg equals 1 oz.  ? 2 servings of low-fat dairy each day.  ? A serving of nuts, seeds, or beans 5 times each week.  ? Heart-healthy fats. Healthy fats called Omega-3 fatty acids are found in foods such as flaxseeds and coldwater fish, like sardines, salmon, and mackerel.  · Limit how much you eat of the following:  ? Canned or prepackaged foods.  ? Food that is high in trans fat, such as fried foods.  ? Food that is high in saturated fat, such as fatty meat.  ? Sweets, desserts, sugary drinks, and other foods with added sugar.  ? Full-fat dairy products.  · Do not salt foods before eating.  · Try to eat at least 2 vegetarian meals each week.  · Eat more home-cooked food and less restaurant, buffet, and fast food.  · When eating at a restaurant, ask that your food be prepared with less salt or no salt, if possible.  What foods are recommended?  The items listed may not be a complete list. Talk with your dietitian about what dietary choices are best for you.  Grains  Whole-grain or whole-wheat bread. Whole-grain or whole-wheat pasta. Brown rice. Oatmeal. Quinoa. Bulgur. Whole-grain and low-sodium cereals. Jeanna bread. Low-fat, low-sodium crackers. Whole-wheat flour tortillas.  Vegetables  Fresh or frozen vegetables (raw, steamed, roasted, or grilled). Low-sodium or reduced-sodium tomato and vegetable juice. Low-sodium or reduced-sodium tomato sauce and tomato paste. Low-sodium or reduced-sodium canned vegetables.  Fruits  All fresh, dried, or frozen fruit. Canned fruit in natural juice (without  added sugar).  Meat and other protein foods  Skinless chicken or turkey. Ground chicken or turkey. Pork with fat trimmed off. Fish and seafood. Egg whites. Dried beans, peas, or lentils. Unsalted nuts, nut butters, and seeds. Unsalted canned beans. Lean cuts of beef with fat trimmed off. Low-sodium, lean deli meat.  Dairy  Low-fat (1%) or fat-free (skim) milk. Fat-free, low-fat, or reduced-fat cheeses. Nonfat, low-sodium ricotta or cottage cheese. Low-fat or nonfat yogurt. Low-fat, low-sodium cheese.  Fats and oils  Soft margarine without trans fats. Vegetable oil. Low-fat, reduced-fat, or light mayonnaise and salad dressings (reduced-sodium). Canola, safflower, olive, soybean, and sunflower oils. Avocado.  Seasoning and other foods  Herbs. Spices. Seasoning mixes without salt. Unsalted popcorn and pretzels. Fat-free sweets.  What foods are not recommended?  The items listed may not be a complete list. Talk with your dietitian about what dietary choices are best for you.  Grains  Baked goods made with fat, such as croissants, muffins, or some breads. Dry pasta or rice meal packs.  Vegetables  Creamed or fried vegetables. Vegetables in a cheese sauce. Regular canned vegetables (not low-sodium or reduced-sodium). Regular canned tomato sauce and paste (not low-sodium or reduced-sodium). Regular tomato and vegetable juice (not low-sodium or reduced-sodium). Pickles. Olives.  Fruits  Canned fruit in a light or heavy syrup. Fried fruit. Fruit in cream or butter sauce.  Meat and other protein foods  Fatty cuts of meat. Ribs. Fried meat. Aguirre. Sausage. Bologna and other processed lunch meats. Salami. Fatback. Hotdogs. Bratwurst. Salted nuts and seeds. Canned beans with added salt. Canned or smoked fish. Whole eggs or egg yolks. Chicken or turkey with skin.  Dairy  Whole or 2% milk, cream, and half-and-half. Whole or full-fat cream cheese. Whole-fat or sweetened yogurt. Full-fat cheese. Nondairy creamers. Whipped toppings.  Processed cheese and cheese spreads.  Fats and oils  Butter. Stick margarine. Lard. Shortening. Ghee. Aguirre fat. Tropical oils, such as coconut, palm kernel, or palm oil.  Seasoning and other foods  Salted popcorn and pretzels. Onion salt, garlic salt, seasoned salt, table salt, and sea salt. Worcestershire sauce. Tartar sauce. Barbecue sauce. Teriyaki sauce. Soy sauce, including reduced-sodium. Steak sauce. Canned and packaged gravies. Fish sauce. Oyster sauce. Cocktail sauce. Horseradish that you find on the shelf. Ketchup. Mustard. Meat flavorings and tenderizers. Bouillon cubes. Hot sauce and Tabasco sauce. Premade or packaged marinades. Premade or packaged taco seasonings. Relishes. Regular salad dressings.  Where to find more information:  · National Heart, Lung, and Blood [place]: www.nhlbi.nih.gov  · American Heart Association: www.heart.org  Summary  · The DASH eating plan is a healthy eating plan that has been shown to reduce high blood pressure (hypertension). It may also reduce your risk for type 2 diabetes, heart disease, and stroke.  · With the DASH eating plan, you should limit salt (sodium) intake to 2,300 mg a day. If you have hypertension, you may need to reduce your sodium intake to 1,500 mg a day.  · When on the DASH eating plan, aim to eat more fresh fruits and vegetables, whole grains, lean proteins, low-fat dairy, and heart-healthy fats.  · Work with your health care provider or diet and nutrition specialist (dietitian) to adjust your eating plan to your individual calorie needs.  This information is not intended to replace advice given to you by your health care provider. Make sure you discuss any questions you have with your health care provider.  Document Released: 12/06/2012 Document Revised: 11/30/2018 Document Reviewed: 12/11/2017  Elsevier Patient Education © 2020 [employer]

## 2022-03-15 NOTE — ASSESSMENT & PLAN NOTE
Chronic condition, was on blood pressure medication a few years ago.   He has elevated BP today 170/90s  He has increased weight of 50 lbs over last 3 months. Also not been on a great diet.   He has been having some weight gain and mild b/l LE edema but no PND or orthopnea, no JVD on exam.   Information on DASH Diet.   He has been making healthy diet changes.   Will start lisinopril 20 mg daily.

## 2022-03-15 NOTE — PROGRESS NOTES
"Subjective:   Maximino Green is a 61 y.o. male here today for evaluation and management of:     Other idiopathic scoliosis, thoracolumbar region        Essential hypertension  Chronic condition, was on blood pressure medication a few years ago.   He has elevated BP today 170/90s  He has increased weight of 50 lbs over last 3 months. Also not been on a great diet.   He has been having some weight gain and mild b/l LE edema but no PND or orthopnea, no JVD on exam.   Information on DASH Diet.   He has been making healthy diet changes.   Will start lisinopril 20 mg daily.       Pancytopenia (HCC)  Repeat labs ordered. Had been on chemo and radiation for tonsillar cancer and now in remission and followed by Dr. Whitfield and has a scope done once a year.       Elevated liver enzymes  Repeat labs ordered. He is advised on weight loss and diet changes.          note: patient does not have scoliosis, unable to remove this erroneous diagnosis.       Current medicines (including changes today)  Current Outpatient Medications   Medication Sig Dispense Refill   • lisinopril (PRINIVIL) 20 MG Tab Take 1 Tablet by mouth every day. For high blood pressure 90 Tablet 3     No current facility-administered medications for this visit.     He  has a past medical history of Alcoholic (HCC), Cancer (HCC) (2019), Dental disorder, Diabetes (HCC), Hypertension, and Tonsil cancer (HCC).    ROS  No chest pain, no shortness of breath, no abdominal pain       Objective:     BP (!) 170/94   Pulse 76   Temp 35.9 °C (96.7 °F)   Resp 20   Ht 1.859 m (6' 1.2\")   Wt (!) 133 kg (292 lb 6.4 oz)   SpO2 95%  Body mass index is 38.37 kg/m².   Physical Exam:  Constitutional: Alert, no distress.  Skin: Warm, dry, good turgor, no rashes in visible areas.  Eye: Equal, round and reactive, conjunctiva clear, lids normal.  ENMT: Lips without lesions, good dentition, oropharynx clear.  Neck: Trachea midline, no masses, no thyromegaly. No cervical or " supraclavicular lymphadenopathy  Respiratory: Unlabored respiratory effort, lungs clear to auscultation, no wheezes, no ronchi.  Cardiovascular: Normal S1, S2, no murmur, no edema.  Abdomen: Soft, non-tender, no masses, no hepatosplenomegaly.  Psych: Alert and oriented x3, normal affect and mood.        Assessment and Plan:   The following treatment plan was discussed    1. Screening for colon cancer    - Referral to GI for Colonoscopy    2. Need for vaccination    - Pneumovax Vaccine (PPSV23)    3. Controlled type 2 diabetes mellitus with hyperglycemia, without long-term current use of insulin (HCC)    - Hemoglobin A1C; Future  - Lipid Profile; Future  - Microalbumin Creat Ratio Urine (Lab Collect); Future  - Comp Metabolic Panel; Future    4. Pancytopenia (HCC)    - CBC WITH DIFFERENTIAL; Future    5. Essential hypertension      6. Elevated liver enzymes      7. Obesity (BMI 30-39.9)    - Patient identified as having weight management issue.  Appropriate orders and counseling given.      Followup: Return in about 3 months (around 6/15/2022) for HtN, review labs. .

## 2022-03-15 NOTE — PROGRESS NOTES
"Subjective:   Maximino Green is a 61 y.o. male here today for evaluation and management of:     Other idiopathic scoliosis, thoracolumbar region        Essential hypertension  Chronic condition, was on blood pressure medication a few years ago.   He has elevated BP today 170/90s  He has increased weight of 50 lbs over last 3 months. Also not been on a great diet.   Information on DASH Diet.   He has been making healthy diet changes.   Will start lisinopril 20 mg daily.       Pancytopenia (HCC)  Repeat labs ordered. Had been on chemo and radiation for tonsillar cancer and now in remission and followed by Dr. Whitfield and has a scope done once a year.       Elevated liver enzymes  Repeat labs ordered. He is advised on weight loss and diet changes.            Current medicines (including changes today)  Current Outpatient Medications   Medication Sig Dispense Refill   • lisinopril (PRINIVIL) 20 MG Tab Take 1 Tablet by mouth every day. 90 Tablet 3     No current facility-administered medications for this visit.     He  has a past medical history of Alcoholic (HCC), Cancer (HCC) (2019), Dental disorder, Diabetes (HCC), Hypertension, and Tonsil cancer (HCC).    ROS  No chest pain, no shortness of breath, no abdominal pain       Objective:     BP (!) 170/94   Pulse 76   Temp 35.9 °C (96.7 °F)   Resp 20   Ht 1.859 m (6' 1.2\")   Wt (!) 133 kg (292 lb 6.4 oz)   SpO2 95%  Body mass index is 38.37 kg/m².   Physical Exam:  Constitutional: Alert, no distress.  Skin: Warm, dry, good turgor, no rashes in visible areas.  Eye: Equal, round and reactive, conjunctiva clear, lids normal.  ENMT: Lips without lesions, good dentition, oropharynx clear.  Neck: Trachea midline, no masses, no thyromegaly. No cervical or supraclavicular lymphadenopathy  Respiratory: Unlabored respiratory effort, lungs clear to auscultation, no wheezes, no ronchi.  Cardiovascular: Normal S1, S2, no murmur, no edema.  Abdomen: Soft, non-tender, no " masses, no hepatosplenomegaly.  Psych: Alert and oriented x3, normal affect and mood.        Assessment and Plan:   The following treatment plan was discussed    1. Other idiopathic scoliosis, thoracolumbar region      2. Screening for colon cancer    - Referral to GI for Colonoscopy    3. Need for vaccination    - Pneumovax Vaccine (PPSV23)    4. Controlled type 2 diabetes mellitus with hyperglycemia, without long-term current use of insulin (HCC)    - Hemoglobin A1C; Future  - Lipid Profile; Future  - Microalbumin Creat Ratio Urine (Lab Collect); Future  - Comp Metabolic Panel; Future    5. Pancytopenia (HCC)    - CBC WITH DIFFERENTIAL; Future    6. Essential hypertension      7. Elevated liver enzymes      Followup: Return in about 3 months (around 6/15/2022) for HtN, review labs. .

## 2022-03-18 ENCOUNTER — HOSPITAL ENCOUNTER (OUTPATIENT)
Dept: LAB | Facility: MEDICAL CENTER | Age: 62
End: 2022-03-18
Attending: FAMILY MEDICINE
Payer: COMMERCIAL

## 2022-03-18 DIAGNOSIS — D61.818 PANCYTOPENIA (HCC): ICD-10-CM

## 2022-03-18 DIAGNOSIS — E11.65 CONTROLLED TYPE 2 DIABETES MELLITUS WITH HYPERGLYCEMIA, WITHOUT LONG-TERM CURRENT USE OF INSULIN (HCC): ICD-10-CM

## 2022-03-18 LAB
ALBUMIN SERPL BCP-MCNC: 4 G/DL (ref 3.2–4.9)
ALBUMIN/GLOB SERPL: 1.5 G/DL
ALP SERPL-CCNC: 75 U/L (ref 30–99)
ALT SERPL-CCNC: 44 U/L (ref 2–50)
ANION GAP SERPL CALC-SCNC: 13 MMOL/L (ref 7–16)
AST SERPL-CCNC: 41 U/L (ref 12–45)
BASOPHILS # BLD AUTO: 1.2 % (ref 0–1.8)
BASOPHILS # BLD: 0.06 K/UL (ref 0–0.12)
BILIRUB SERPL-MCNC: 1 MG/DL (ref 0.1–1.5)
BUN SERPL-MCNC: 10 MG/DL (ref 8–22)
CALCIUM SERPL-MCNC: 9.3 MG/DL (ref 8.5–10.5)
CHLORIDE SERPL-SCNC: 106 MMOL/L (ref 96–112)
CHOLEST SERPL-MCNC: 215 MG/DL (ref 100–199)
CO2 SERPL-SCNC: 23 MMOL/L (ref 20–33)
CREAT SERPL-MCNC: 0.7 MG/DL (ref 0.5–1.4)
EOSINOPHIL # BLD AUTO: 0.27 K/UL (ref 0–0.51)
EOSINOPHIL NFR BLD: 5.5 % (ref 0–6.9)
ERYTHROCYTE [DISTWIDTH] IN BLOOD BY AUTOMATED COUNT: 44.8 FL (ref 35.9–50)
FASTING STATUS PATIENT QL REPORTED: NORMAL
GFR SERPLBLD CREATININE-BSD FMLA CKD-EPI: 104 ML/MIN/1.73 M 2
GLOBULIN SER CALC-MCNC: 2.6 G/DL (ref 1.9–3.5)
GLUCOSE SERPL-MCNC: 114 MG/DL (ref 65–99)
HCT VFR BLD AUTO: 44.1 % (ref 42–52)
HDLC SERPL-MCNC: 82 MG/DL
HGB BLD-MCNC: 15.4 G/DL (ref 14–18)
IMM GRANULOCYTES # BLD AUTO: 0.01 K/UL (ref 0–0.11)
IMM GRANULOCYTES NFR BLD AUTO: 0.2 % (ref 0–0.9)
LDLC SERPL CALC-MCNC: 119 MG/DL
LYMPHOCYTES # BLD AUTO: 1.07 K/UL (ref 1–4.8)
LYMPHOCYTES NFR BLD: 21.9 % (ref 22–41)
MCH RBC QN AUTO: 31.4 PG (ref 27–33)
MCHC RBC AUTO-ENTMCNC: 34.9 G/DL (ref 33.7–35.3)
MCV RBC AUTO: 90 FL (ref 81.4–97.8)
MONOCYTES # BLD AUTO: 0.47 K/UL (ref 0–0.85)
MONOCYTES NFR BLD AUTO: 9.6 % (ref 0–13.4)
NEUTROPHILS # BLD AUTO: 3 K/UL (ref 1.82–7.42)
NEUTROPHILS NFR BLD: 61.6 % (ref 44–72)
NRBC # BLD AUTO: 0 K/UL
NRBC BLD-RTO: 0 /100 WBC
PLATELET # BLD AUTO: 117 K/UL (ref 164–446)
PMV BLD AUTO: 15 FL (ref 9–12.9)
POTASSIUM SERPL-SCNC: 4.5 MMOL/L (ref 3.6–5.5)
PROT SERPL-MCNC: 6.6 G/DL (ref 6–8.2)
RBC # BLD AUTO: 4.9 M/UL (ref 4.7–6.1)
SODIUM SERPL-SCNC: 142 MMOL/L (ref 135–145)
TRIGL SERPL-MCNC: 68 MG/DL (ref 0–149)
WBC # BLD AUTO: 4.9 K/UL (ref 4.8–10.8)

## 2022-03-18 PROCEDURE — 36415 COLL VENOUS BLD VENIPUNCTURE: CPT

## 2022-03-18 PROCEDURE — 82570 ASSAY OF URINE CREATININE: CPT

## 2022-03-18 PROCEDURE — 80061 LIPID PANEL: CPT

## 2022-03-18 PROCEDURE — 80053 COMPREHEN METABOLIC PANEL: CPT

## 2022-03-18 PROCEDURE — 83036 HEMOGLOBIN GLYCOSYLATED A1C: CPT

## 2022-03-18 PROCEDURE — 85025 COMPLETE CBC W/AUTO DIFF WBC: CPT

## 2022-03-18 PROCEDURE — 82043 UR ALBUMIN QUANTITATIVE: CPT

## 2022-03-19 LAB
CREAT UR-MCNC: 25.28 MG/DL
EST. AVERAGE GLUCOSE BLD GHB EST-MCNC: 114 MG/DL
HBA1C MFR BLD: 5.6 % (ref 4–5.6)
MICROALBUMIN UR-MCNC: <1.2 MG/DL
MICROALBUMIN/CREAT UR: NORMAL MG/G (ref 0–30)

## 2022-03-22 NOTE — RESULT ENCOUNTER NOTE
Released to drea Stanton,  Your labs show a huge improvement in your white blood cells and platelets.   A1c is great at 5.6  Mild elevated cholesterol levels.   There is no protein in the urine which is good.   Sodium level is back to normal.     Giancarlo Lomeli M.D.

## 2022-06-09 ENCOUNTER — HOSPITAL ENCOUNTER (OUTPATIENT)
Dept: RADIATION ONCOLOGY | Facility: MEDICAL CENTER | Age: 62
End: 2022-06-30
Attending: RADIOLOGY
Payer: COMMERCIAL

## 2022-06-09 VITALS
WEIGHT: 290 LBS | BODY MASS INDEX: 38.05 KG/M2 | DIASTOLIC BLOOD PRESSURE: 66 MMHG | SYSTOLIC BLOOD PRESSURE: 140 MMHG | TEMPERATURE: 98.8 F | HEART RATE: 63 BPM | OXYGEN SATURATION: 96 %

## 2022-06-09 PROCEDURE — 31575 DIAGNOSTIC LARYNGOSCOPY: CPT | Performed by: RADIOLOGY

## 2022-06-09 PROCEDURE — 99212 OFFICE O/P EST SF 10 MIN: CPT | Mod: 25 | Performed by: RADIOLOGY

## 2022-06-09 PROCEDURE — 99213 OFFICE O/P EST LOW 20 MIN: CPT | Mod: 25 | Performed by: RADIOLOGY

## 2022-06-09 ASSESSMENT — FIBROSIS 4 INDEX: FIB4 SCORE: 3.22

## 2022-06-09 ASSESSMENT — PAIN SCALES - GENERAL: PAINLEVEL: NO PAIN

## 2022-06-09 NOTE — PROGRESS NOTES
RADIATION ONCOLOGY FOLLOW-UP    Patient name:  Maximino Green    Primary Physician:  Giancarlo Lomeli M.D. MRN: [id number]  [facility]: [id number]   Referring physician:  Andre Wolf M.D.  : 1960, 61 y.o.     DATE OF SERVICE:   2022    IDENTIFICATION:   A 61 y.o. male with  Tonsil cancer (HCC)  Staging form: Pharynx - HPV-Mediated Oropharynx, AJCC 8th Edition  - Clinical stage from 1/15/2019: Stage II (cT3, cN2, cM0, p16: Positive) - Signed by Edward SKELTON M.D. on 3/27/2019  Laterality: Right  Tumor size (mm): 50      RADIATION SUMMARY:  Aria Treatment Information        Some values may be hidden. Unless noted otherwise, only the newest values recorded on each date are displayed.         Aria Treatment Summary 3/28/19 4/1/19   Oropharynx Plan from Course C1_Oropharynx   Fraction 33 of 33 33 of 33   Elapsed Course Days  @  45 @ [id number]   Prescribed Fraction Dose 212 cGy 212 cGy   Prescribed Total Dose 6,996 cGy 6,996 cGy   Oropharynx Reference Point from Course C1_Oropharynx   Elapsed Course Days 45 @  45 @ [id number]   Session Dose 212 cGy --   Total Dose 6,996 cGy 6,996 cGy   Oropharynx CP Reference Point from Course C1_Oropharynx   Elapsed Course Days 45 @ [id number] 45 @ [id number]   Session Dose 218 cGy --   Total Dose 7,194 cGy 7,194 cGy              HISTORY OF PRESENT ILLNESS:   Subjective    Patient is a  with at least a 40-pack-year tobacco history and still continues to smoke approximately quarter pack per day.  He has poor dentition and is been under regular dental care.  Approximately 2 months ago on a dental visit he was noted to have abnormality involving the right tonsillar region suspicious for malignancy.  He was referred initially to oral surgery and subsequently from oral surgery was referred to Dr. Wolf.  On Dr. Wolf's evaluation 2018 he noted a large right tonsillar mass extending onto the right soft palate and  towards the base of tongue involving a small amount of retromolar trigone region.  In addition he noted bilateral neck nodes approximately 2 nodes in the right neck and one on the left neck level 2 region.     Biopsies were arranged as well as CT neck and soft tissue.  Patient however canceled most of these appointments concerned about the cost associated with care.  Dr. Wolf was able to convince him to at least have biopsy of the tonsil region which was done on 12/27/2018 demonstrating squamous cell carcinoma.  No differentiation given.  Positive for P 16.     He was referred here for additional evaluation and management.  Patient presents today still concerned about costs associated with care which is resulting in significant distress.  I did assuage some of his concerns by referring him to financial counseling as well as the .     Surprisingly, he offers very little pharyngeal symptoms.  He reports mild sore throat denies otalgia.  He denies any significant weight loss, he denies dysphagia, he denies odynophagia     Completed combined modality therapy 3/28/2019.     6/11/2020 Scheduled follow-up visit.  Overall continues to do well.  Still has some mild xerostomia and impaired taste.  Xerostomia 7 ( 0-10, 0=completely dry), Taste 7 ( 0-10, 0=no taste), Dysphagia 0, Odynophagia 0    6/2/21 Scheduled follow-up visit.  Overall doing well.  Primary complaint is xerostomia.  Getting regular dental care.  Scheduled to have posts placed for implants.  Xerostomia 4 ( 0-10, 0=completely dry), Taste 7 ( 0-10, 0=no taste), Dysphagia 0, Odynophagia 0        INTERVAL HISTORY:  6/9/2022.  Scheduled follow-up visit.  Continues to do well.  Xerostomia and taste improving.  Edentulous.  Xerostomia 6-7 ( 0-10, 0=completely dry), Taste 7-8 ( 0-10, 0=no taste), Dysphagia 0, Odynophagia 0    CURRENT MEDICATIONS:  Current Outpatient Medications   Medication Sig Dispense Refill   • lisinopril (PRINIVIL) 20 MG Tab Take 1  "Tablet by mouth every day. For high blood pressure 90 Tablet 3     No current facility-administered medications for this encounter.       ALLERGIES:  Patient has no known allergies.    REVIEW OF SYSTEMS:    A complete review of system taken. Pertinent items in HPI.  All other negative.    PHYSICAL EXAM:   PERFORMANCE STATUS:  No flowsheet data found.  Vitals 6/9/2022 3/15/2022 6/22/2021 6/11/2020 1/9/2020 11/7/2019 10/1/2019   SYSTOLIC 140 170 167 149 155 145 140   DIASTOLIC 66 94 82 78 66 61 80   PULSE 63 76 77 77 70 67 64   TEMPERATURE 98.8 96.7 98.2 97.7 - 98.8 98.2   RESPIRATIONS - 20 - - - - 16   WEIGHT 290 292.4 282.19 244.71 218.5 211.2 208.67   HEIGHT - 6' 1.2\" - - - - 6' 1\"   BMI 38.05 kg/m2 38.37 kg/m2 37.23 kg/m2 32.29 kg/m2 28.83 kg/m2 27.86 kg/m2 27.53 kg/m2   SPO2 96 95 97 94 96 98 98       Physical Exam  Vitals and nursing note reviewed.   Constitutional:       General: He is not in acute distress.     Appearance: He is well-developed. He is not diaphoretic.   HENT:      Head: Normocephalic.      Mouth/Throat:      Mouth: Mucous membranes are moist.      Pharynx: Oropharynx is clear. No oropharyngeal exudate or posterior oropharyngeal erythema.   Eyes:      Extraocular Movements: Extraocular movements intact.   Cardiovascular:      Rate and Rhythm: Normal rate and regular rhythm.   Pulmonary:      Effort: Pulmonary effort is normal.   Musculoskeletal:      Cervical back: Normal range of motion and neck supple.   Lymphadenopathy:      Cervical: No cervical adenopathy.   Skin:     General: Skin is warm and dry.      Findings: No erythema.   Neurological:      Mental Status: He is alert and oriented to person, place, and time.      Cranial Nerves: No cranial nerve deficit.      Coordination: Coordination normal.   Psychiatric:         Behavior: Behavior normal.         Thought Content: Thought content normal.         Judgment: Judgment normal.           FIBEROPTIC EXAM:  Normal exam    LABORATORY DATA: "   Lab Results   Component Value Date/Time    WBC 4.9 03/18/2022 08:18 AM    RBC 4.90 03/18/2022 08:18 AM    HEMOGLOBIN 15.4 03/18/2022 08:18 AM    HEMATOCRIT 44.1 03/18/2022 08:18 AM    MCV 90.0 03/18/2022 08:18 AM    MCH 31.4 03/18/2022 08:18 AM    MCHC 34.9 03/18/2022 08:18 AM    RDW 44.8 03/18/2022 08:18 AM    PLATELETCT 117 (L) 03/18/2022 08:18 AM    MPV 15.0 (H) 03/18/2022 08:18 AM    NEUTSPOLYS 61.60 03/18/2022 08:18 AM    LYMPHOCYTES 21.90 (L) 03/18/2022 08:18 AM    MONOCYTES 9.60 03/18/2022 08:18 AM    EOSINOPHILS 5.50 03/18/2022 08:18 AM    BASOPHILS 1.20 03/18/2022 08:18 AM    ANISOCYTOSIS 1+ 06/15/2019 07:36 AM      Lab Results   Component Value Date/Time    SODIUM 142 03/18/2022 08:18 AM    POTASSIUM 4.5 03/18/2022 08:18 AM    CHLORIDE 106 03/18/2022 08:18 AM    CO2 23 03/18/2022 08:18 AM    GLUCOSE 114 (H) 03/18/2022 08:18 AM    BUN 10 03/18/2022 08:18 AM    CREATININE 0.70 03/18/2022 08:18 AM         RADIOLOGY DATA:  No results found.    IMPRESSION:    A 61 y.o. with  Tonsil cancer (HCC)  Staging form: Pharynx - HPV-Mediated Oropharynx, AJCC 8th Edition  - Clinical stage from 1/15/2019: Stage II (cT3, cN2, cM0, p16: Positive) - Signed by Edward SKELTON M.D. on 3/27/2019  Laterality: Right  Tumor size (mm): 50      CANCER STATUS:  No Evidence of Disease    RECOMMENDATIONS:   Doing well.  Will return for follow-up in 1 year.      Thank you for the opportunity to participate in his care.  If any questions or comments, please do not hesitate in calling.      No orders of the defined types were placed in this encounter.

## 2022-06-09 NOTE — NON-PROVIDER
Patient was seen today in clinic with Dr. Harris for follow up.  Vitals signs and weight were obtained and pain assessment was completed.  Allergies and medications were reviewed with the patient.  Toxicities of treatment assessed.     Vitals/Pain:  Vitals:    06/09/22 1500   BP: (!) 140/66   BP Location: Right arm   Patient Position: Sitting   BP Cuff Size: Large adult   Pulse: 63   Temp: 37.1 °C (98.8 °F)   TempSrc: Temporal   SpO2: 96%   Weight: (!) 132 kg (290 lb)   Pain Score: No pain        Allergies:   Patient has no known allergies.    Current Medications:  Current Outpatient Medications   Medication Sig Dispense Refill   • lisinopril (PRINIVIL) 20 MG Tab Take 1 Tablet by mouth every day. For high blood pressure 90 Tablet 3     No current facility-administered medications for this encounter.           Shelia Monsivais, Med Ass't

## 2022-06-09 NOTE — PROCEDURES
DATE OF SERVICE: 6/9/2022         FIBEROPTIC NASO-PHARYNGOSCOPY NOTE      Flexible fiberoptic exam was performed after anesthesia of left nostril and oropharynx.    Nasopharynx:       R. Eustachian canal opening, torus, fossa of Rosenmuller appear normal  L. Eustachian canal opening, torus, fossa of Rosenmuller appear normal      Oropharynx:        Base of tongue normal appearing.  Vallecula normal appearing.  Epiglottis normal appearing.  PE folds normal appearing.    Larynx:      R. AE fold, false vocal fold, arytenoid appear normal  L. AE fold, false vocal fold, arytenoid appear normal  R. Cord mobile  L. Cord mobile   R. Piriform sinus appears normal  L. Piriform sinus appears normal      Edward SKELTON M.D.  Electronically signed by: Edward SKELTON M.D., 6/9/2022 3:45 PM  [phone]

## 2022-06-24 ENCOUNTER — OFFICE VISIT (OUTPATIENT)
Dept: MEDICAL GROUP | Facility: PHYSICIAN GROUP | Age: 62
End: 2022-06-24
Payer: COMMERCIAL

## 2022-06-24 VITALS
HEIGHT: 73 IN | DIASTOLIC BLOOD PRESSURE: 86 MMHG | HEART RATE: 70 BPM | OXYGEN SATURATION: 96 % | RESPIRATION RATE: 20 BRPM | WEIGHT: 293.6 LBS | TEMPERATURE: 98.3 F | BODY MASS INDEX: 38.91 KG/M2 | SYSTOLIC BLOOD PRESSURE: 116 MMHG

## 2022-06-24 DIAGNOSIS — I10 ESSENTIAL HYPERTENSION: ICD-10-CM

## 2022-06-24 DIAGNOSIS — T45.1X5A CHEMOTHERAPY-INDUCED NEUTROPENIA (HCC): ICD-10-CM

## 2022-06-24 DIAGNOSIS — I49.9 IRREGULAR HEART RATE: ICD-10-CM

## 2022-06-24 DIAGNOSIS — E66.9 OBESITY (BMI 30-39.9): ICD-10-CM

## 2022-06-24 DIAGNOSIS — D70.1 CHEMOTHERAPY-INDUCED NEUTROPENIA (HCC): ICD-10-CM

## 2022-06-24 DIAGNOSIS — I48.11 LONGSTANDING PERSISTENT ATRIAL FIBRILLATION (HCC): ICD-10-CM

## 2022-06-24 DIAGNOSIS — E11.65 CONTROLLED TYPE 2 DIABETES MELLITUS WITH HYPERGLYCEMIA, WITHOUT LONG-TERM CURRENT USE OF INSULIN (HCC): ICD-10-CM

## 2022-06-24 PROCEDURE — 99214 OFFICE O/P EST MOD 30 MIN: CPT | Performed by: FAMILY MEDICINE

## 2022-06-24 RX ORDER — LISINOPRIL 10 MG/1
10 TABLET ORAL DAILY
Qty: 90 TABLET | Refills: 3 | Status: SHIPPED | OUTPATIENT
Start: 2022-06-24 | End: 2022-08-31

## 2022-06-24 RX ORDER — ROSUVASTATIN CALCIUM 10 MG/1
10 TABLET, COATED ORAL EVERY EVENING
Qty: 90 TABLET | Refills: 3 | Status: ON HOLD | OUTPATIENT
Start: 2022-06-24 | End: 2022-09-06

## 2022-06-24 ASSESSMENT — FIBROSIS 4 INDEX: FIB4 SCORE: 3.28

## 2022-06-24 NOTE — ASSESSMENT & PLAN NOTE
Spent time talking about diet he has cut out his oreo habit  He is eating bananas, sandwiches, fruit bars.   Advised to try alternatives to fruit bars.

## 2022-06-24 NOTE — ASSESSMENT & PLAN NOTE
Much better control with lisinopril 20 mg  He has no chest pain, no LE edema  Having trouble losing weight.

## 2022-06-24 NOTE — PROGRESS NOTES
Subjective:   Maximino Green is a 62 y.o. male here today for evaluation and management of:     Essential hypertension  Much better control with lisinopril 20 mg  He has no chest pain, no LE edema  Having trouble losing weight.     Chemotherapy-induced neutropenia (HCC)  Good results  Has follow up with his oncologist  WBC levels improving.     Obesity (BMI 30-39.9)  Spent time talking about diet he has cut out his oreo habit  He is eating bananas, sandwiches, fruit bars.   Advised to try alternatives to fruit bars.     Longstanding persistent atrial fibrillation (HCC)  Present since 2019, at that time chads score zero  Now has HTN, well controlled on lisinopril  Will check echo    He has irregular heart rate on exam  ekg shows afibb HR 130s  He has no chest pain,   Metoprolol 25 bid started, decrease lisinopril to 10 mg  chadsvasc score 1  Advised to start asa 81 mg  Will ask cardiology to evaluate for coumadin/doac  He is not interested in ablation procedure     He was understandably upset by the news and I reassured him that no huge changes except medication to control heart rate and prevent strokes.        He has appt for colonoscopy in July  Foot exam normal sensation to monofilament testing b/l  Has significant callus. Declines podiatry.     Current medicines (including changes today)  Current Outpatient Medications   Medication Sig Dispense Refill   • rosuvastatin (CRESTOR) 10 MG Tab Take 1 Tablet by mouth every evening. To prevent heart attack and stroke 90 Tablet 3   • aspirin EC (ECOTRIN) 81 MG Tablet Delayed Response Take 1 Tablet by mouth every day. 100 Tablet 3   • metoprolol tartrate (LOPRESSOR) 25 MG Tab Take 1 Tablet by mouth 2 times a day. For afibb high heart rate 180 Tablet 3   • lisinopril (PRINIVIL) 10 MG Tab Take 1 Tablet by mouth every day. 90 Tablet 3     No current facility-administered medications for this visit.     He  has a past medical history of Alcoholic (HCC), Cancer (HCC)  "(2019), Dental disorder, Diabetes (HCC), Hypertension, and Tonsil cancer (HCC).    ROS  No chest pain, no shortness of breath, no abdominal pain       Objective:     /86 (BP Location: Right arm, Patient Position: Sitting, BP Cuff Size: Large adult)   Pulse 70   Temp 36.8 °C (98.3 °F) (Temporal)   Resp 20   Ht 1.854 m (6' 1\")   Wt (!) 133 kg (293 lb 9.6 oz)   SpO2 96%  Body mass index is 38.74 kg/m².   Physical Exam:  Constitutional: Alert, no distress.  Skin: Warm, dry, good turgor, no rashes in visible areas.  Eye: Equal, round and reactive, conjunctiva clear, lids normal.  ENMT: Lips without lesions, good dentition, oropharynx clear.  Neck: Trachea midline, no masses, no thyromegaly. No cervical or supraclavicular lymphadenopathy  Respiratory: Unlabored respiratory effort, lungs clear to auscultation, no wheezes, no ronchi.  Cardiovascular: irregular heart rate no murmur, mild b/l no pitting LE edema.  Abdomen: Soft, non-tender, no masses, no hepatosplenomegaly.  Psych: Alert and oriented x3, normal affect and mood.        Assessment and Plan:   The following treatment plan was discussed    1. Essential hypertension      2. Chemotherapy-induced neutropenia (HCC)      3. Obesity (BMI 30-39.9)      4. Controlled type 2 diabetes mellitus with hyperglycemia, without long-term current use of insulin (HCC)  - Diabetic Monofilament LE Exam      Followup: Return in about 3 months (around 9/24/2022).         "

## 2022-06-25 NOTE — ASSESSMENT & PLAN NOTE
Present since 2019, at that time chads score zero  Now has HTN, well controlled on lisinopril  Will check echo    He has irregular heart rate on exam  ekg shows afibb HR 130s  He has no chest pain,   Metoprolol 25 bid started, decrease lisinopril to 10 mg  chadsvasc score 1  Advised to start asa 81 mg  Will ask cardiology to evaluate for coumadin/doac  He is not interested in ablation procedure     He was understandably upset by the news and I reassured him that no huge changes except medication to control heart rate and prevent strokes.

## 2022-08-31 ENCOUNTER — HOSPITAL ENCOUNTER (INPATIENT)
Facility: MEDICAL CENTER | Age: 62
LOS: 6 days | DRG: 291 | End: 2022-09-06
Attending: EMERGENCY MEDICINE | Admitting: INTERNAL MEDICINE
Payer: COMMERCIAL

## 2022-08-31 ENCOUNTER — APPOINTMENT (OUTPATIENT)
Dept: RADIOLOGY | Facility: MEDICAL CENTER | Age: 62
DRG: 291 | End: 2022-08-31
Attending: EMERGENCY MEDICINE
Payer: COMMERCIAL

## 2022-08-31 ENCOUNTER — OFFICE VISIT (OUTPATIENT)
Dept: MEDICAL GROUP | Facility: PHYSICIAN GROUP | Age: 62
End: 2022-08-31
Payer: COMMERCIAL

## 2022-08-31 VITALS
RESPIRATION RATE: 18 BRPM | OXYGEN SATURATION: 94 % | BODY MASS INDEX: 41.75 KG/M2 | HEART RATE: 80 BPM | HEIGHT: 73 IN | SYSTOLIC BLOOD PRESSURE: 126 MMHG | TEMPERATURE: 97.6 F | WEIGHT: 315 LBS | DIASTOLIC BLOOD PRESSURE: 84 MMHG

## 2022-08-31 DIAGNOSIS — L03.115 CELLULITIS OF RIGHT LOWER EXTREMITY: ICD-10-CM

## 2022-08-31 DIAGNOSIS — I10 ESSENTIAL HYPERTENSION: ICD-10-CM

## 2022-08-31 DIAGNOSIS — R06.02 SHORTNESS OF BREATH: ICD-10-CM

## 2022-08-31 DIAGNOSIS — I48.91 ATRIAL FIBRILLATION WITH RVR (HCC): ICD-10-CM

## 2022-08-31 DIAGNOSIS — I50.21 ACUTE SYSTOLIC HEART FAILURE (HCC): ICD-10-CM

## 2022-08-31 DIAGNOSIS — I50.9 CONGESTIVE HEART FAILURE, UNSPECIFIED HF CHRONICITY, UNSPECIFIED HEART FAILURE TYPE (HCC): ICD-10-CM

## 2022-08-31 DIAGNOSIS — R60.0 LOWER EXTREMITY EDEMA: ICD-10-CM

## 2022-08-31 DIAGNOSIS — I49.9 IRREGULAR HEART RATE: ICD-10-CM

## 2022-08-31 PROBLEM — L03.90 CELLULITIS: Status: ACTIVE | Noted: 2022-08-31

## 2022-08-31 PROBLEM — I50.31 ACUTE DIASTOLIC HEART FAILURE (HCC): Status: ACTIVE | Noted: 2022-08-31

## 2022-08-31 PROBLEM — D69.6 THROMBOCYTOPENIA (HCC): Status: ACTIVE | Noted: 2022-08-31

## 2022-08-31 LAB
ALBUMIN SERPL BCP-MCNC: 3.5 G/DL (ref 3.2–4.9)
ALBUMIN/GLOB SERPL: 1.1 G/DL
ALP SERPL-CCNC: 74 U/L (ref 30–99)
ALT SERPL-CCNC: 32 U/L (ref 2–50)
ANION GAP SERPL CALC-SCNC: 12 MMOL/L (ref 7–16)
APTT PPP: 32.5 SEC (ref 24.7–36)
AST SERPL-CCNC: 47 U/L (ref 12–45)
BASOPHILS # BLD AUTO: 1.5 % (ref 0–1.8)
BASOPHILS # BLD: 0.08 K/UL (ref 0–0.12)
BILIRUB SERPL-MCNC: 1.9 MG/DL (ref 0.1–1.5)
BUN SERPL-MCNC: 19 MG/DL (ref 8–22)
CALCIUM SERPL-MCNC: 10 MG/DL (ref 8.5–10.5)
CHLORIDE SERPL-SCNC: 104 MMOL/L (ref 96–112)
CO2 SERPL-SCNC: 23 MMOL/L (ref 20–33)
CREAT SERPL-MCNC: 1.09 MG/DL (ref 0.5–1.4)
CRP SERPL HS-MCNC: <0.3 MG/DL (ref 0–0.75)
EKG IMPRESSION: NORMAL
EOSINOPHIL # BLD AUTO: 0.3 K/UL (ref 0–0.51)
EOSINOPHIL NFR BLD: 5.8 % (ref 0–6.9)
ERYTHROCYTE [DISTWIDTH] IN BLOOD BY AUTOMATED COUNT: 49.2 FL (ref 35.9–50)
GFR SERPLBLD CREATININE-BSD FMLA CKD-EPI: 77 ML/MIN/1.73 M 2
GLOBULIN SER CALC-MCNC: 3.1 G/DL (ref 1.9–3.5)
GLUCOSE SERPL-MCNC: 79 MG/DL (ref 65–99)
HCT VFR BLD AUTO: 52.3 % (ref 42–52)
HGB BLD-MCNC: 17.3 G/DL (ref 14–18)
IMM GRANULOCYTES # BLD AUTO: 0.01 K/UL (ref 0–0.11)
IMM GRANULOCYTES NFR BLD AUTO: 0.2 % (ref 0–0.9)
INR PPP: 1.47 (ref 0.87–1.13)
LACTATE SERPL-SCNC: 2.8 MMOL/L (ref 0.5–2)
LYMPHOCYTES # BLD AUTO: 0.93 K/UL (ref 1–4.8)
LYMPHOCYTES NFR BLD: 18 % (ref 22–41)
MAGNESIUM SERPL-MCNC: 1.6 MG/DL (ref 1.5–2.5)
MAGNESIUM SERPL-MCNC: 1.7 MG/DL (ref 1.5–2.5)
MAGNESIUM SERPL-MCNC: 1.7 MG/DL (ref 1.5–2.5)
MCH RBC QN AUTO: 31.7 PG (ref 27–33)
MCHC RBC AUTO-ENTMCNC: 33.1 G/DL (ref 33.7–35.3)
MCV RBC AUTO: 95.8 FL (ref 81.4–97.8)
MONOCYTES # BLD AUTO: 0.56 K/UL (ref 0–0.85)
MONOCYTES NFR BLD AUTO: 10.8 % (ref 0–13.4)
NEUTROPHILS # BLD AUTO: 3.29 K/UL (ref 1.82–7.42)
NEUTROPHILS NFR BLD: 63.7 % (ref 44–72)
NRBC # BLD AUTO: 0 K/UL
NRBC BLD-RTO: 0 /100 WBC
NT-PROBNP SERPL IA-MCNC: 1709 PG/ML (ref 0–125)
PLATELET # BLD AUTO: 101 K/UL (ref 164–446)
POTASSIUM SERPL-SCNC: 4.9 MMOL/L (ref 3.6–5.5)
PROCALCITONIN SERPL-MCNC: 0.06 NG/ML
PROT SERPL-MCNC: 6.6 G/DL (ref 6–8.2)
PROTHROMBIN TIME: 17.5 SEC (ref 12–14.6)
RBC # BLD AUTO: 5.46 M/UL (ref 4.7–6.1)
SODIUM SERPL-SCNC: 139 MMOL/L (ref 135–145)
TSH SERPL DL<=0.005 MIU/L-ACNC: 11.1 UIU/ML (ref 0.38–5.33)
WBC # BLD AUTO: 5.2 K/UL (ref 4.8–10.8)

## 2022-08-31 PROCEDURE — 36415 COLL VENOUS BLD VENIPUNCTURE: CPT

## 2022-08-31 PROCEDURE — 700111 HCHG RX REV CODE 636 W/ 250 OVERRIDE (IP): Performed by: EMERGENCY MEDICINE

## 2022-08-31 PROCEDURE — 700102 HCHG RX REV CODE 250 W/ 637 OVERRIDE(OP): Performed by: INTERNAL MEDICINE

## 2022-08-31 PROCEDURE — 99285 EMERGENCY DEPT VISIT HI MDM: CPT

## 2022-08-31 PROCEDURE — A9270 NON-COVERED ITEM OR SERVICE: HCPCS | Performed by: EMERGENCY MEDICINE

## 2022-08-31 PROCEDURE — 83735 ASSAY OF MAGNESIUM: CPT

## 2022-08-31 PROCEDURE — 83880 ASSAY OF NATRIURETIC PEPTIDE: CPT

## 2022-08-31 PROCEDURE — 700111 HCHG RX REV CODE 636 W/ 250 OVERRIDE (IP): Performed by: INTERNAL MEDICINE

## 2022-08-31 PROCEDURE — 85730 THROMBOPLASTIN TIME PARTIAL: CPT

## 2022-08-31 PROCEDURE — 84145 PROCALCITONIN (PCT): CPT

## 2022-08-31 PROCEDURE — 700102 HCHG RX REV CODE 250 W/ 637 OVERRIDE(OP): Performed by: NURSE PRACTITIONER

## 2022-08-31 PROCEDURE — 93005 ELECTROCARDIOGRAM TRACING: CPT

## 2022-08-31 PROCEDURE — A9270 NON-COVERED ITEM OR SERVICE: HCPCS | Performed by: INTERNAL MEDICINE

## 2022-08-31 PROCEDURE — 87040 BLOOD CULTURE FOR BACTERIA: CPT

## 2022-08-31 PROCEDURE — 86140 C-REACTIVE PROTEIN: CPT

## 2022-08-31 PROCEDURE — A9270 NON-COVERED ITEM OR SERVICE: HCPCS | Performed by: NURSE PRACTITIONER

## 2022-08-31 PROCEDURE — 700102 HCHG RX REV CODE 250 W/ 637 OVERRIDE(OP): Performed by: EMERGENCY MEDICINE

## 2022-08-31 PROCEDURE — 700101 HCHG RX REV CODE 250: Performed by: INTERNAL MEDICINE

## 2022-08-31 PROCEDURE — 83605 ASSAY OF LACTIC ACID: CPT

## 2022-08-31 PROCEDURE — 96374 THER/PROPH/DIAG INJ IV PUSH: CPT

## 2022-08-31 PROCEDURE — 700101 HCHG RX REV CODE 250: Performed by: EMERGENCY MEDICINE

## 2022-08-31 PROCEDURE — 99223 1ST HOSP IP/OBS HIGH 75: CPT | Performed by: INTERNAL MEDICINE

## 2022-08-31 PROCEDURE — 770020 HCHG ROOM/CARE - TELE (206)

## 2022-08-31 PROCEDURE — 71045 X-RAY EXAM CHEST 1 VIEW: CPT

## 2022-08-31 PROCEDURE — 84443 ASSAY THYROID STIM HORMONE: CPT

## 2022-08-31 PROCEDURE — 80053 COMPREHEN METABOLIC PANEL: CPT

## 2022-08-31 PROCEDURE — 85025 COMPLETE CBC W/AUTO DIFF WBC: CPT

## 2022-08-31 PROCEDURE — 93005 ELECTROCARDIOGRAM TRACING: CPT | Performed by: EMERGENCY MEDICINE

## 2022-08-31 PROCEDURE — 99223 1ST HOSP IP/OBS HIGH 75: CPT | Mod: FS | Performed by: NURSE PRACTITIONER

## 2022-08-31 PROCEDURE — 85610 PROTHROMBIN TIME: CPT

## 2022-08-31 PROCEDURE — 99214 OFFICE O/P EST MOD 30 MIN: CPT | Performed by: FAMILY MEDICINE

## 2022-08-31 RX ORDER — MAGNESIUM SULFATE HEPTAHYDRATE 40 MG/ML
4 INJECTION, SOLUTION INTRAVENOUS ONCE
Status: COMPLETED | OUTPATIENT
Start: 2022-08-31 | End: 2022-09-01

## 2022-08-31 RX ORDER — POLYETHYLENE GLYCOL 3350 17 G/17G
1 POWDER, FOR SOLUTION ORAL
Status: DISCONTINUED | OUTPATIENT
Start: 2022-08-31 | End: 2022-09-06 | Stop reason: HOSPADM

## 2022-08-31 RX ORDER — DILTIAZEM HYDROCHLORIDE 5 MG/ML
10 INJECTION INTRAVENOUS EVERY 4 HOURS PRN
Status: DISCONTINUED | OUTPATIENT
Start: 2022-08-31 | End: 2022-08-31

## 2022-08-31 RX ORDER — ACETAMINOPHEN 325 MG/1
650 TABLET ORAL EVERY 6 HOURS PRN
Status: DISCONTINUED | OUTPATIENT
Start: 2022-08-31 | End: 2022-09-06 | Stop reason: HOSPADM

## 2022-08-31 RX ORDER — PROCHLORPERAZINE EDISYLATE 5 MG/ML
5-10 INJECTION INTRAMUSCULAR; INTRAVENOUS EVERY 4 HOURS PRN
Status: DISCONTINUED | OUTPATIENT
Start: 2022-08-31 | End: 2022-09-06 | Stop reason: HOSPADM

## 2022-08-31 RX ORDER — SULFAMETHOXAZOLE AND TRIMETHOPRIM 800; 160 MG/1; MG/1
1 TABLET ORAL 2 TIMES DAILY
Qty: 14 TABLET | Refills: 0 | Status: ON HOLD | OUTPATIENT
Start: 2022-08-31 | End: 2022-09-06

## 2022-08-31 RX ORDER — HYDRALAZINE HYDROCHLORIDE 20 MG/ML
20 INJECTION INTRAMUSCULAR; INTRAVENOUS EVERY 6 HOURS PRN
Status: DISCONTINUED | OUTPATIENT
Start: 2022-08-31 | End: 2022-09-06 | Stop reason: HOSPADM

## 2022-08-31 RX ORDER — ONDANSETRON 2 MG/ML
4 INJECTION INTRAMUSCULAR; INTRAVENOUS EVERY 4 HOURS PRN
Status: DISCONTINUED | OUTPATIENT
Start: 2022-08-31 | End: 2022-09-06 | Stop reason: HOSPADM

## 2022-08-31 RX ORDER — MORPHINE SULFATE 4 MG/ML
4 INJECTION INTRAVENOUS
Status: DISCONTINUED | OUTPATIENT
Start: 2022-08-31 | End: 2022-09-06 | Stop reason: HOSPADM

## 2022-08-31 RX ORDER — FUROSEMIDE 20 MG/1
20 TABLET ORAL 2 TIMES DAILY
Qty: 60 TABLET | Refills: 1 | Status: ON HOLD | OUTPATIENT
Start: 2022-08-31 | End: 2022-09-06

## 2022-08-31 RX ORDER — OXYCODONE HYDROCHLORIDE 5 MG/1
5 TABLET ORAL
Status: DISCONTINUED | OUTPATIENT
Start: 2022-08-31 | End: 2022-09-06 | Stop reason: HOSPADM

## 2022-08-31 RX ORDER — LABETALOL HYDROCHLORIDE 5 MG/ML
10 INJECTION, SOLUTION INTRAVENOUS EVERY 4 HOURS PRN
Status: DISCONTINUED | OUTPATIENT
Start: 2022-08-31 | End: 2022-08-31

## 2022-08-31 RX ORDER — OXYCODONE HYDROCHLORIDE 10 MG/1
10 TABLET ORAL
Status: DISCONTINUED | OUTPATIENT
Start: 2022-08-31 | End: 2022-09-06 | Stop reason: HOSPADM

## 2022-08-31 RX ORDER — FUROSEMIDE 10 MG/ML
40 INJECTION INTRAMUSCULAR; INTRAVENOUS
Status: DISCONTINUED | OUTPATIENT
Start: 2022-08-31 | End: 2022-08-31

## 2022-08-31 RX ORDER — PROMETHAZINE HYDROCHLORIDE 25 MG/1
12.5-25 SUPPOSITORY RECTAL EVERY 4 HOURS PRN
Status: DISCONTINUED | OUTPATIENT
Start: 2022-08-31 | End: 2022-09-06 | Stop reason: HOSPADM

## 2022-08-31 RX ORDER — DOXYCYCLINE 100 MG/1
100 TABLET ORAL EVERY 12 HOURS
Status: DISCONTINUED | OUTPATIENT
Start: 2022-08-31 | End: 2022-09-03

## 2022-08-31 RX ORDER — POTASSIUM CHLORIDE 20 MEQ/1
20 TABLET, EXTENDED RELEASE ORAL 2 TIMES DAILY
Qty: 60 TABLET | Refills: 1 | Status: ON HOLD | OUTPATIENT
Start: 2022-08-31 | End: 2022-09-06

## 2022-08-31 RX ORDER — METOPROLOL TARTRATE 1 MG/ML
5 INJECTION, SOLUTION INTRAVENOUS
Status: COMPLETED | OUTPATIENT
Start: 2022-08-31 | End: 2022-08-31

## 2022-08-31 RX ORDER — ROSUVASTATIN CALCIUM 10 MG/1
10 TABLET, COATED ORAL EVERY EVENING
Status: DISCONTINUED | OUTPATIENT
Start: 2022-08-31 | End: 2022-09-06 | Stop reason: HOSPADM

## 2022-08-31 RX ORDER — BISACODYL 10 MG
10 SUPPOSITORY, RECTAL RECTAL
Status: DISCONTINUED | OUTPATIENT
Start: 2022-08-31 | End: 2022-09-06 | Stop reason: HOSPADM

## 2022-08-31 RX ORDER — FUROSEMIDE 10 MG/ML
40 INJECTION INTRAMUSCULAR; INTRAVENOUS ONCE
Status: COMPLETED | OUTPATIENT
Start: 2022-08-31 | End: 2022-08-31

## 2022-08-31 RX ORDER — METOPROLOL TARTRATE 1 MG/ML
5 INJECTION, SOLUTION INTRAVENOUS
Status: DISCONTINUED | OUTPATIENT
Start: 2022-08-31 | End: 2022-09-06 | Stop reason: HOSPADM

## 2022-08-31 RX ORDER — ONDANSETRON 4 MG/1
4 TABLET, ORALLY DISINTEGRATING ORAL EVERY 4 HOURS PRN
Status: DISCONTINUED | OUTPATIENT
Start: 2022-08-31 | End: 2022-09-06 | Stop reason: HOSPADM

## 2022-08-31 RX ORDER — AMOXICILLIN 250 MG
2 CAPSULE ORAL 2 TIMES DAILY
Status: DISCONTINUED | OUTPATIENT
Start: 2022-08-31 | End: 2022-09-06 | Stop reason: HOSPADM

## 2022-08-31 RX ORDER — PROMETHAZINE HYDROCHLORIDE 25 MG/1
12.5-25 TABLET ORAL EVERY 4 HOURS PRN
Status: DISCONTINUED | OUTPATIENT
Start: 2022-08-31 | End: 2022-09-06 | Stop reason: HOSPADM

## 2022-08-31 RX ORDER — LISINOPRIL 10 MG/1
10 TABLET ORAL DAILY
Status: ON HOLD | COMMUNITY
End: 2022-09-06

## 2022-08-31 RX ORDER — AMOXICILLIN AND CLAVULANATE POTASSIUM 875; 125 MG/1; MG/1
1 TABLET, FILM COATED ORAL EVERY 12 HOURS
Status: DISCONTINUED | OUTPATIENT
Start: 2022-08-31 | End: 2022-09-06 | Stop reason: HOSPADM

## 2022-08-31 RX ORDER — FUROSEMIDE 10 MG/ML
40 INJECTION INTRAMUSCULAR; INTRAVENOUS
Status: DISCONTINUED | OUTPATIENT
Start: 2022-09-01 | End: 2022-09-01

## 2022-08-31 RX ORDER — LISINOPRIL 10 MG/1
10 TABLET ORAL
Status: DISCONTINUED | OUTPATIENT
Start: 2022-09-01 | End: 2022-09-01

## 2022-08-31 RX ADMIN — METOPROLOL TARTRATE 5 MG: 1 INJECTION, SOLUTION INTRAVENOUS at 13:25

## 2022-08-31 RX ADMIN — APIXABAN 5 MG: 5 TABLET, FILM COATED ORAL at 18:28

## 2022-08-31 RX ADMIN — ONDANSETRON 4 MG: 2 INJECTION INTRAMUSCULAR; INTRAVENOUS at 19:51

## 2022-08-31 RX ADMIN — AMOXICILLIN AND CLAVULANATE POTASSIUM 1 TABLET: 875; 125 TABLET, FILM COATED ORAL at 18:27

## 2022-08-31 RX ADMIN — METOPROLOL TARTRATE 5 MG: 1 INJECTION, SOLUTION INTRAVENOUS at 13:12

## 2022-08-31 RX ADMIN — METOPROLOL TARTRATE 5 MG: 5 INJECTION INTRAVENOUS at 18:28

## 2022-08-31 RX ADMIN — FUROSEMIDE 40 MG: 10 INJECTION, SOLUTION INTRAMUSCULAR; INTRAVENOUS at 18:29

## 2022-08-31 RX ADMIN — METOPROLOL TARTRATE 5 MG: 5 INJECTION INTRAVENOUS at 19:51

## 2022-08-31 RX ADMIN — METOPROLOL TARTRATE 5 MG: 1 INJECTION, SOLUTION INTRAVENOUS at 13:44

## 2022-08-31 RX ADMIN — METOPROLOL TARTRATE 25 MG: 25 TABLET, FILM COATED ORAL at 23:38

## 2022-08-31 RX ADMIN — MAGNESIUM SULFATE HEPTAHYDRATE 4 G: 40 INJECTION, SOLUTION INTRAVENOUS at 23:37

## 2022-08-31 RX ADMIN — METOPROLOL TARTRATE 25 MG: 25 TABLET, FILM COATED ORAL at 18:27

## 2022-08-31 RX ADMIN — DOXYCYCLINE 100 MG: 100 TABLET, FILM COATED ORAL at 18:27

## 2022-08-31 RX ADMIN — ROSUVASTATIN CALCIUM 10 MG: 10 TABLET, COATED ORAL at 18:28

## 2022-08-31 ASSESSMENT — ENCOUNTER SYMPTOMS
DIZZINESS: 0
BLURRED VISION: 0
TREMORS: 0
HALLUCINATIONS: 0
NERVOUS/ANXIOUS: 0
NECK PAIN: 0
COUGH: 0
HEMOPTYSIS: 0
FOCAL WEAKNESS: 0
SHORTNESS OF BREATH: 1
CHILLS: 0
POLYDIPSIA: 0
DOUBLE VISION: 0
HEADACHES: 0
ABDOMINAL DISTENTION: 1
HEMATOLOGIC/LYMPHATIC NEGATIVE: 1
ORTHOPNEA: 1
FATIGUE: 1
NAUSEA: 0
PALPITATIONS: 0
SPUTUM PRODUCTION: 0
WEIGHT LOSS: 0
FLANK PAIN: 0
FEVER: 0
PSYCHIATRIC NEGATIVE: 1
ROS GI COMMENTS: +BLOATING
ENDOCRINE NEGATIVE: 1
PND: 0
VOMITING: 0
BACK PAIN: 0
PHOTOPHOBIA: 0
SPEECH CHANGE: 0
BRUISES/BLEEDS EASILY: 0
WEAKNESS: 1
COLOR CHANGE: 1
MUSCULOSKELETAL NEGATIVE: 1
LIGHT-HEADEDNESS: 0
HEARTBURN: 0
CLAUDICATION: 0

## 2022-08-31 ASSESSMENT — CHA2DS2 SCORE
HYPERTENSION: NO
AGE 65 TO 74: NO
CHA2DS2 VASC SCORE: 2
DIABETES: YES
SEX: MALE
VASCULAR DISEASE: NO
AGE IN YEARS: <65
SEX: MALE
VASCULAR DISEASE: NO
CHA2D2S VASC SCORE: 1
CHF OR LEFT VENTRICULAR DYSFUNCTION: NO
CHF OR LEFT VENTRICULAR DYSFUNCTION: YES
DIABETES: NO
HYPERTENSION: YES
PRIOR STROKE OR TIA OR THROMBOEMBOLISM: NO
AGE 75 OR GREATER: NO
PRIOR STROKE OR TIA OR THROMBOEMBOLISM: NO

## 2022-08-31 ASSESSMENT — LIFESTYLE VARIABLES: SUBSTANCE_ABUSE: 0

## 2022-08-31 ASSESSMENT — FIBROSIS 4 INDEX
FIB4 SCORE: 3.28
FIB4 SCORE: 3.28
FIB4 SCORE: 5.1

## 2022-08-31 NOTE — ASSESSMENT & PLAN NOTE
bp currently in normal range without metoprolol or lisinopril   He had stopped both meds as he ws feeling worse on them.   Ok to stay off for now and restart if bp increases.

## 2022-08-31 NOTE — PROGRESS NOTES
Subjective:   Maximino Green is a 62 y.o. male here today for evaluation and management of:     Essential hypertension  bp currently in normal range without metoprolol or lisinopril   He had stopped both meds as he ws feeling worse on them.   Ok to stay off for now and restart if bp increases.       Cellulitis of right lower extremity  For a week he has b/l LE edema, redness, tiredness with walking.   On exam, skin of b/l LE is bright red right>left  Will check d dimer  Advised him to restart on asa 81 mg, crestor  rx for lasix and bactrim provided.   ER precautions reviewed: worsening redness, fatigue, shortness of breath go to ER/call 911    Atrial fibrillation with RVR (HCC)  Sob for a week  B/l LE edema  ekg shows afibb rvr  No one to take him to ER  Ambulance called.        Current medicines (including changes today)  Current Outpatient Medications   Medication Sig Dispense Refill    sulfamethoxazole-trimethoprim (BACTRIM DS) 800-160 MG tablet Take 1 Tablet by mouth 2 times a day for 7 days. 14 Tablet 0    furosemide (LASIX) 20 MG Tab Take 1 Tablet by mouth 2 times a day. For swelling of legs 60 Tablet 1    potassium chloride SA (KDUR) 20 MEQ Tab CR Take 1 Tablet by mouth 2 times a day. Take with lasix 60 Tablet 1    rosuvastatin (CRESTOR) 10 MG Tab Take 1 Tablet by mouth every evening. To prevent heart attack and stroke (Patient not taking: Reported on 8/31/2022) 90 Tablet 3    aspirin EC (ECOTRIN) 81 MG Tablet Delayed Response Take 1 Tablet by mouth every day. (Patient not taking: Reported on 8/31/2022) 100 Tablet 3     No current facility-administered medications for this visit.     He  has a past medical history of Alcoholic (HCC), Cancer (HCC) (2019), Dental disorder, Diabetes (HCC), Hypertension, and Tonsil cancer (HCC).    ROS  No chest pain, no shortness of breath, no abdominal pain       Objective:     /84 (BP Location: Left arm, Patient Position: Sitting, BP Cuff Size: Adult long)   Pulse  "80   Temp 36.4 °C (97.6 °F) (Temporal)   Resp 18   Ht 1.854 m (6' 1\")   Wt (!) 147 kg (323 lb)   SpO2 94%  Body mass index is 42.61 kg/m².   Physical Exam:  Constitutional: Alert, no distress. Short of breath with exertion.   Skin: Warm, dry, good turgor, bright red rash up b/l LE ankles to few inches below knees.  Eye: Equal, round and reactive, conjunctiva clear, lids normal.  ENMT: Lips without lesions, good dentition, oropharynx clear.  Neck: Trachea midline, no masses, no thyromegaly. No cervical or supraclavicular lymphadenopathy  Respiratory: Unlabored respiratory effort, lungs clear to auscultation, no wheezes, no ronchi.  Cardiovascular: irregular HR, b/l LE edema.  Abdomen: Soft, non-tender, no masses, no hepatosplenomegaly.  Psych: Alert and oriented x3, normal affect and mood.        Assessment and Plan:   The following treatment plan was discussed    1. Irregular heart rate  - EKG - Clinic Performed    2. Shortness of breath  - EKG - Clinic Performed    3. Lower extremity edema  - EKG - Clinic Performed  - D-DIMER; Future    4. Essential hypertension    5. Cellulitis of right lower extremity    6. Atrial fibrillation with RVR (HCC)    Other orders  - sulfamethoxazole-trimethoprim (BACTRIM DS) 800-160 MG tablet; Take 1 Tablet by mouth 2 times a day for 7 days.  Dispense: 14 Tablet; Refill: 0  - furosemide (LASIX) 20 MG Tab; Take 1 Tablet by mouth 2 times a day. For swelling of legs  Dispense: 60 Tablet; Refill: 1  - potassium chloride SA (KDUR) 20 MEQ Tab CR; Take 1 Tablet by mouth 2 times a day. Take with lasix  Dispense: 60 Tablet; Refill: 1      Followup: Return in about 2 weeks (around 9/14/2022).           "

## 2022-08-31 NOTE — ASSESSMENT & PLAN NOTE
For a week he has b/l LE edema, redness, tiredness with walking.   On exam, skin of b/l LE is bright red right>left  Will check d dimer  Advised him to restart on asa 81 mg, crestor  rx for lasix and bactrim provided.   ER precautions reviewed: worsening redness, fatigue, shortness of breath go to ER/call 911

## 2022-08-31 NOTE — CONSULTS
Cardiology Initial Consultation    Date of Service  8/31/2022    Referring Physician  Italo Noble M.D.    Reason for Consultation  Afib with RVR, fluid overload    History of Presenting Illness  Maximino Green is a 62 y.o. male with a past medical history of throat CA in 2019, hypertension, obesity, and persistent Afib who presented on 8/31/2022 after seeing his PCP this morning for LE swelling and weakness x 1 week, he was in Afib rates 150-160's per EKG and was brought to ED by EMS.     Prior to today's visit to PCP he was last seen on 6/24/2022 by PCP, with A. fib rate in the 130s at this time, a cardiology referral was placed however patient did not follow through with making appointment. He was started on metoprolol 25 mg twice daily and lisinopril 10 mg at that time.  Patient feels these medications are making him worse he was feeling weak, tired, with increased swelling bilateral lower extremities, he stopped taking these medications a few weeks ago.  Per patient baseline weight between 280-290lbs, and today he presents weighing 330 pounds.    Currently unable to obtain labs, multiple specimens have hemolyzed. Currently awaiting labs with arterial stick, awaiting lab results slightly potassium prior to dosing IV furosemide.    He denies alcohol, smoking and other substance abuse. Denies family Hx of atrial fibrillation.    He did see Dr. Negron in 2019 paroxysmal atrial fibrillation, but he did not continue to follow with cardiology.    Review of Systems  Review of Systems   Constitutional:  Positive for fatigue.   HENT: Negative.     Respiratory:  Positive for shortness of breath.         + orthopnea, + PND, SOB with exertion   Cardiovascular:  Positive for leg swelling. Negative for chest pain and palpitations.   Gastrointestinal:  Positive for abdominal distention.        +bloating   Endocrine: Negative.    Genitourinary: Negative.    Musculoskeletal: Negative.    Skin:  Positive for color  change (redness to BLE).   Neurological:  Positive for weakness. Negative for dizziness and light-headedness.   Hematological: Negative.    Psychiatric/Behavioral: Negative.       Past Medical History   has a past medical history of Alcoholic (HCC), Cancer (HCC) (2019), Dental disorder, Diabetes (HCC), Hypertension, and Tonsil cancer (HCC).    Surgical History   has a past surgical history that includes cholecystectomy (2003) and other (12/27/2018).    Family History  family history includes Alcohol abuse in his mother; Cancer in his mother and sister; GI Disease in his sister; Heart Disease in his father; Respiratory Disease in his mother.    Social History   reports that he quit smoking about 3 years ago. His smoking use included cigarettes and cigars. He has a 4.00 pack-year smoking history. He has never used smokeless tobacco. He reports that he does not drink alcohol and does not use drugs.    Medications  Prior to Admission Medications   Prescriptions Last Dose Informant Patient Reported? Taking?   aspirin EC (ECOTRIN) 81 MG Tablet Delayed Response   No No   Sig: Take 1 Tablet by mouth every day.   Patient not taking: Reported on 8/31/2022   furosemide (LASIX) 20 MG Tab   No No   Sig: Take 1 Tablet by mouth 2 times a day. For swelling of legs   potassium chloride SA (KDUR) 20 MEQ Tab CR   No No   Sig: Take 1 Tablet by mouth 2 times a day. Take with lasix   rosuvastatin (CRESTOR) 10 MG Tab   No No   Sig: Take 1 Tablet by mouth every evening. To prevent heart attack and stroke   Patient not taking: Reported on 8/31/2022   sulfamethoxazole-trimethoprim (BACTRIM DS) 800-160 MG tablet   No No   Sig: Take 1 Tablet by mouth 2 times a day for 7 days.      Facility-Administered Medications: None       Allergies  No Known Allergies    Vital signs in last 24 hours  Temp:  [37.1 °C (98.8 °F)] 37.1 °C (98.8 °F)  Pulse:  [120-166] 120  Resp:  [15-17] 16  BP: ()/() 96/80  SpO2:  [91 %-95 %] 93 %    Physical  Exam  Physical Exam  Constitutional:       Appearance: Normal appearance. He is obese. He is ill-appearing.   HENT:      Head: Normocephalic.      Nose: Nose normal.   Eyes:      Pupils: Pupils are equal, round, and reactive to light.   Neck:      Comments: JVD difficult to assess given body habitus  Cardiovascular:      Rate and Rhythm: Tachycardia present. Rhythm irregular.      Pulses: Normal pulses.      Heart sounds: Normal heart sounds.      Comments: Distant heart tones  Pulmonary:      Effort: Pulmonary effort is normal.      Breath sounds: Normal breath sounds.   Abdominal:      General: Abdomen is flat. Bowel sounds are normal. There is distension.      Palpations: Abdomen is soft.   Musculoskeletal:         General: Swelling (3+ pitting edema to BLE) present. Normal range of motion.      Cervical back: Normal range of motion.   Skin:     General: Skin is dry.      Capillary Refill: Capillary refill takes more than 3 seconds.      Coloration: Skin is pale.      Comments: Cool extremities     Neurological:      Mental Status: He is alert and oriented to person, place, and time.   Psychiatric:         Mood and Affect: Mood normal.         Behavior: Behavior normal.         Thought Content: Thought content normal.         Judgment: Judgment normal.       Lab Review  Lab Results   Component Value Date/Time    WBC 4.9 03/18/2022 08:18 AM    RBC 4.90 03/18/2022 08:18 AM    HEMOGLOBIN 15.4 03/18/2022 08:18 AM    HEMATOCRIT 44.1 03/18/2022 08:18 AM    MCV 90.0 03/18/2022 08:18 AM    MCH 31.4 03/18/2022 08:18 AM    MCHC 34.9 03/18/2022 08:18 AM    MPV 15.0 (H) 03/18/2022 08:18 AM      Lab Results   Component Value Date/Time    SODIUM 142 03/18/2022 08:18 AM    POTASSIUM 4.5 03/18/2022 08:18 AM    CHLORIDE 106 03/18/2022 08:18 AM    CO2 23 03/18/2022 08:18 AM    GLUCOSE 114 (H) 03/18/2022 08:18 AM    BUN 10 03/18/2022 08:18 AM    CREATININE 0.70 03/18/2022 08:18 AM      Lab Results   Component Value Date/Time     ASTSGOT 41 03/18/2022 08:18 AM    ALTSGPT 44 03/18/2022 08:18 AM     Lab Results   Component Value Date/Time    CHOLSTRLTOT 215 (H) 03/18/2022 08:18 AM     (H) 03/18/2022 08:18 AM    HDL 82 03/18/2022 08:18 AM    TRIGLYCERIDE 68 03/18/2022 08:18 AM       No results for input(s): NTPROBNP in the last 72 hours.    Cardiac Imaging and Procedures Review  EKG 8/31/22:  Reviewed atrial fibrillation rates 150-160's    Echocardiogram:  (pending)    Cardiac Catheterization: N/A    Imaging  Chest X-Ray 8/31/22:    1.  Bibasilar atelectasis versus consolidations and associated small to moderate pleural effusions.  2.  Cardiomegaly    Stress Test:  N/A    Assessment/Plan    Atrial fibrillation with RVR  -Persistent since at least June 2022  -Current rates 130's after 3 doses of IV Metoprolol 5mg  -Class III HF symptoms, + orthopnea, PND, and severe BLE   -[id number] Score: 2, NOAC recommended  -Risk Factors: HTN, ?DM, ?HEATHER, obesity, ?heart failure  -Further Testing Recommended: Echo pending  -Medications: No diltiazem in Afib patient with HF symptoms  -Recommend metoprolol 25 mg every 6 hours for rate control  -Anticoagulation: recommend apixaban 5mg bid, await labs to assess renal function prior to initiating   -IV furosemide once labs have resulted  -Consider MAGEN with DCCV during this admission for persistent AFib       Thank you for allowing me to participate in the care of this patient.  This note was generated prior to assessment by Dr. Morales.   Please refer to Dr. Morales's attestation note for further direction.     Please contact me with any questions.    PEPITO Moe.   Cardiologist, [facility]  [phone]

## 2022-08-31 NOTE — ED PROVIDER NOTES
ED Provider Note    ER PROVIDER NOTE      CHIEF COMPLAINT  Chief Complaint   Patient presents with    Leg Swelling     Leg swelling and redness X1 wk     Rapid Heart Beat     -170   Pt has Hx of Afib       HPI  Maximino Green is a 62 y.o. male who presents to the emergency department complaining of bilateral leg swelling as well as some shortness of breath.  Patient reports symptoms progressive over the last 1 to 2 weeks.  He went to see his primary care doctor today and was sent here.  He reports no chest pain or palpitations, no syncope or lightheadedness.  He states he primarily feels short of breath with walking.  He has no swelling to both of his legs although no other abnormal swelling.  There is also some redness to both of his legs.  He reports no fevers or chills.  No cough or congestion.  No abdominal pain, nausea or vomiting or diarrhea.    He does report that he had a brief episode of atrial fibrillation after a G-tube placement in 2019 but does not take any medications for this and he did stop taking his antihypertensives what sounds like metoprolol around 2 weeks ago.    REVIEW OF SYSTEMS  Pertinent positives include shortness of breath, leg swelling. Pertinent negatives include no fever. See HPI for details. All other systems reviewed and are negative.    PAST MEDICAL HISTORY   has a past medical history of Alcoholic (HCC), Cancer (HCC) (2019), Dental disorder, Diabetes (HCC), Hypertension, and Tonsil cancer (HCC).    SURGICAL HISTORY   has a past surgical history that includes cholecystectomy (2003) and other (12/27/2018).    FAMILY HISTORY  Family History   Problem Relation Age of Onset    Cancer Mother         colon    Respiratory Disease Mother         emphysema- smoker    Alcohol abuse Mother     Heart Disease Father         Detials not known    Cancer Sister         brain tumor    GI Disease Sister        SOCIAL HISTORY  Social History     Socioeconomic History    Marital status:  "   Occupational History    Occupation:    Tobacco Use    Smoking status: Former     Packs/day: 0.10     Years: 40.00     Pack years: 4.00     Types: Cigarettes, Cigars     Quit date: 2/11/2019     Years since quitting: 3.5    Smokeless tobacco: Never    Tobacco comments:     pt is trying to cut down currently 3/4 ppd   Vaping Use    Vaping Use: Never used   Substance and Sexual Activity    Alcohol use: No     Alcohol/week: 0.0 oz     Comment: alcoholism  last 2009    Drug use: No    Sexual activity: Yes     Partners: Female      Social History     Substance and Sexual Activity   Drug Use No       CURRENT MEDICATIONS  Home Medications       Reviewed by Portia Higgins R.N. (Registered Nurse) on 08/31/22 at 1129  Med List Status: Partial     Medication Last Dose Status   aspirin EC (ECOTRIN) 81 MG Tablet Delayed Response  Active   furosemide (LASIX) 20 MG Tab  Active   potassium chloride SA (KDUR) 20 MEQ Tab CR  Active   rosuvastatin (CRESTOR) 10 MG Tab  Active   sulfamethoxazole-trimethoprim (BACTRIM DS) 800-160 MG tablet  Active                    ALLERGIES  No Known Allergies    PHYSICAL EXAM  VITAL SIGNS: BP (!) 123/92   Pulse (!) 144   Temp 37.1 °C (98.8 °F) (Temporal)   Resp 17   Ht 1.854 m (6' 1\")   Wt (!) 150 kg (330 lb)   SpO2 94%   BMI 43.54 kg/m²   Pulse ox interpretation: I interpret this pulse ox as normal.    Constitutional: Alert in no apparent distress.  HENT: No signs of trauma, Bilateral external ears normal, Nose normal.   Eyes: Pupils are equal and reactive, Conjunctiva normal, Non-icteric.   Neck: Normal range of motion, No tenderness, Supple, No stridor.   Cardiovascular: tachycardic irregular, no murmurs.   Thorax & Lungs: Normal breath sounds, No respiratory distress, No wheezing, No chest tenderness.   Abdomen: Bowel sounds normal, Soft, No tenderness, No masses, No pulsatile masses. No peritoneal signs.  Skin: Warm, Dry, No rash.   Back: No bony tenderness, No " CVA tenderness.   Extremities: Intact distal pulses, 2+ BLLE edema with erthema, No tenderness, No cyanosis, Negative Edith's sign.  Musculoskeletal: Good range of motion in all major joints. No tenderness to palpation or major deformities noted.   Neurologic: Alert , Normal motor function, Normal sensory function, No focal deficits noted.   Psychiatric: Affect normal, Judgment normal, Mood normal.     DIAGNOSTIC STUDIES / PROCEDURES    Results for orders placed or performed during the hospital encounter of 08/31/22   APTT   Result Value Ref Range    APTT 32.5 24.7 - 36.0 sec   PROTHROMBIN TIME (INR)   Result Value Ref Range    PT 17.5 (H) 12.0 - 14.6 sec    INR 1.47 (H) 0.87 - 1.13   Comp Metabolic Panel   Result Value Ref Range    Sodium 139 135 - 145 mmol/L    Potassium 4.9 3.6 - 5.5 mmol/L    Chloride 104 96 - 112 mmol/L    Co2 23 20 - 33 mmol/L    Anion Gap 12.0 7.0 - 16.0    Glucose 79 65 - 99 mg/dL    Bun 19 8 - 22 mg/dL    Creatinine 1.09 0.50 - 1.40 mg/dL    Calcium 10.0 8.5 - 10.5 mg/dL    AST(SGOT) 47 (H) 12 - 45 U/L    ALT(SGPT) 32 2 - 50 U/L    Alkaline Phosphatase 74 30 - 99 U/L    Total Bilirubin 1.9 (H) 0.1 - 1.5 mg/dL    Albumin 3.5 3.2 - 4.9 g/dL    Total Protein 6.6 6.0 - 8.2 g/dL    Globulin 3.1 1.9 - 3.5 g/dL    A-G Ratio 1.1 g/dL   proBrain Natriuretic Peptide, NT   Result Value Ref Range    NT-proBNP 1709 (H) 0 - 125 pg/mL   MAGNESIUM   Result Value Ref Range    Magnesium 1.7 1.5 - 2.5 mg/dL   CRP QUANTITIVE (NON-CARDIAC)   Result Value Ref Range    Stat C-Reactive Protein <0.30 0.00 - 0.75 mg/dL   LACTIC ACID   Result Value Ref Range    Lactic Acid 2.8 (H) 0.5 - 2.0 mmol/L   PROCALCITONIN   Result Value Ref Range    Procalcitonin 0.06 <0.25 ng/mL   CBC WITH DIFFERENTIAL   Result Value Ref Range    WBC 5.2 4.8 - 10.8 K/uL    RBC 5.46 4.70 - 6.10 M/uL    Hemoglobin 17.3 14.0 - 18.0 g/dL    Hematocrit 52.3 (H) 42.0 - 52.0 %    MCV 95.8 81.4 - 97.8 fL    MCH 31.7 27.0 - 33.0 pg    MCHC 33.1 (L)  33.7 - 35.3 g/dL    RDW 49.2 35.9 - 50.0 fL    Platelet Count 101 (L) 164 - 446 K/uL    Neutrophils-Polys 63.70 44.00 - 72.00 %    Lymphocytes 18.00 (L) 22.00 - 41.00 %    Monocytes 10.80 0.00 - 13.40 %    Eosinophils 5.80 0.00 - 6.90 %    Basophils 1.50 0.00 - 1.80 %    Immature Granulocytes 0.20 0.00 - 0.90 %    Nucleated RBC 0.00 /100 WBC    Neutrophils (Absolute) 3.29 1.82 - 7.42 K/uL    Lymphs (Absolute) 0.93 (L) 1.00 - 4.80 K/uL    Monos (Absolute) 0.56 0.00 - 0.85 K/uL    Eos (Absolute) 0.30 0.00 - 0.51 K/uL    Baso (Absolute) 0.08 0.00 - 0.12 K/uL    Immature Granulocytes (abs) 0.01 0.00 - 0.11 K/uL    NRBC (Absolute) 0.00 K/uL   ESTIMATED GFR   Result Value Ref Range    GFR (CKD-EPI) 77 >60 mL/min/1.73 m 2   EKG   Result Value Ref Range    Report       [facility] Emergency Dept.    Test Date:  2022  Pt Name:    VALENTIN YADAV                 Department: ER  MRN:        [id number]                      Room:       [facility]  Gender:     Male                         Technician: [id number]  :        1960                   Requested By:ER TRIAGE PROTOCOL  Order #:    [id number]                    Reading MD: AALIYAH CORREIA MD    Measurements  Intervals                                Axis  Rate:       165                          P:  AR:                                      QRS:        53  QRSD:       91                           T:          -28  QT:         295  QTc:        489    Interpretive Statements  Atrial fibrillation with rapid V-rate  Low voltage, extremity leads  Nonspecific T abnormalities, lateral leads  Compared to ECG 2019 17:54:59  Low QRS voltage now present  T-wave abnormality now present  Sinus rhythm no longer present  Electronically Signed On 2022 12:16:00 PDT by AALIYAH CORREIA MD           RADIOLOGY  DX-CHEST-PORTABLE (1 VIEW)   Final Result      1.  Bibasilar atelectasis versus consolidations and associated small to moderate pleural effusions.   2.   Cardiomegaly.        The radiologist's interpretation of all radiological studies have been reviewed and images independently viewed by me.    COURSE & MEDICAL DECISION MAKING  Nursing notes, VS, PMSFHx reviewed in chart.    12:12 PM Patient seen and examined at bedside. Patient will be treated with metoprolol . Ordered for cbc, cmp. Coags, mag. Bnp. xrayto evaluate his symptoms.     12:46 PM  Patient is reevaluated, heart rate has improved slightly into the 140s we will continue with metoprolol    3 doses of metoprolol, heart rate still 130s, systolic 100.    Discussed the case with Dr. Morales from cardiology who will evaluate the patient.  At this point she would recommend against further IV metoprolol.  Will likely need oral metoprolol and diuresis.        We will hold on diuresis and anticoagulation at this time pending the return of his renal function and metabolic panel    4:58 PM  Patient's metabolic panel has returned, I have ordered for apixaban as well as 40 mg of IV Lasix    Case discussed with the hospitalist for admission        Decision Making:  This is a 62 y.o. male present with bilateral leg swelling as well as some dyspnea on exertion and findings suggestive of volume overload.  Patient is in atrial fibrillation with rapid ventricular response and has likely developed some concomitant heart failure because of this and very certainly could have been in this for many weeks.  Patient will be admitted as above for diuresis, rate control, we will initiate anticoagulation given his [id number] of 2.  Cardiology has been consulted we appreciate their recommendations and help managing this patient.  There was some concern from primary care/urgent care of cellulitis however patient has bilateral erythema no infectious symptoms this seems highly unlikely to be infectious process more likely stasis dermatitis    Patient is admitted in guarded condition        FINAL IMPRESSION  1. Atrial fibrillation with RVR  (HCC)    2. Congestive heart failure, unspecified HF chronicity, unspecified heart failure type (HCC)       The total critical care time spent on this patient was 40 minutes, resuscitating patient, speaking with admitting physician, and interpreting test results. This 40 minutes is exclusive of separately billable procedures.         The note accurately reflects work and decisions made by me.  Italo Noble M.D.  8/31/2022  5:01 PM

## 2022-08-31 NOTE — ED NOTES
Lab and Hematology called, all samples that were drawn by phlebotomy hemolyzed. Recollect ordered.

## 2022-08-31 NOTE — ED TRIAGE NOTES
Chief Complaint   Patient presents with    Leg Swelling     Leg swelling and redness X1 wk     Rapid Heart Beat     -170   Pt has Hx of Afib     BIB EMS from Urgent Care. Pt was being seen for his leg swelling. Pt stopping taking his Metoprolol 1.5 weeks ago. Pt currently asymptomatic.

## 2022-09-01 ENCOUNTER — APPOINTMENT (OUTPATIENT)
Dept: CARDIOLOGY | Facility: MEDICAL CENTER | Age: 62
DRG: 291 | End: 2022-09-01
Attending: NURSE PRACTITIONER
Payer: COMMERCIAL

## 2022-09-01 ENCOUNTER — APPOINTMENT (OUTPATIENT)
Dept: RADIOLOGY | Facility: MEDICAL CENTER | Age: 62
DRG: 291 | End: 2022-09-01
Attending: INTERNAL MEDICINE
Payer: COMMERCIAL

## 2022-09-01 LAB
ALBUMIN SERPL BCP-MCNC: 3.2 G/DL (ref 3.2–4.9)
ALBUMIN/GLOB SERPL: 1.1 G/DL
ALP SERPL-CCNC: 65 U/L (ref 30–99)
ALT SERPL-CCNC: 30 U/L (ref 2–50)
ANION GAP SERPL CALC-SCNC: 12 MMOL/L (ref 7–16)
AST SERPL-CCNC: 43 U/L (ref 12–45)
BASOPHILS # BLD AUTO: 1.7 % (ref 0–1.8)
BASOPHILS # BLD: 0.1 K/UL (ref 0–0.12)
BILIRUB SERPL-MCNC: 1.6 MG/DL (ref 0.1–1.5)
BUN SERPL-MCNC: 23 MG/DL (ref 8–22)
CALCIUM SERPL-MCNC: 9.8 MG/DL (ref 8.5–10.5)
CHLORIDE SERPL-SCNC: 105 MMOL/L (ref 96–112)
CO2 SERPL-SCNC: 21 MMOL/L (ref 20–33)
CREAT SERPL-MCNC: 1.22 MG/DL (ref 0.5–1.4)
EOSINOPHIL # BLD AUTO: 0.31 K/UL (ref 0–0.51)
EOSINOPHIL NFR BLD: 5.3 % (ref 0–6.9)
ERYTHROCYTE [DISTWIDTH] IN BLOOD BY AUTOMATED COUNT: 49.8 FL (ref 35.9–50)
FOLATE SERPL-MCNC: 11.4 NG/ML
GFR SERPLBLD CREATININE-BSD FMLA CKD-EPI: 67 ML/MIN/1.73 M 2
GLOBULIN SER CALC-MCNC: 2.8 G/DL (ref 1.9–3.5)
GLUCOSE SERPL-MCNC: 95 MG/DL (ref 65–99)
HCT VFR BLD AUTO: 50.5 % (ref 42–52)
HGB BLD-MCNC: 16.1 G/DL (ref 14–18)
IMM GRANULOCYTES # BLD AUTO: 0.02 K/UL (ref 0–0.11)
IMM GRANULOCYTES NFR BLD AUTO: 0.3 % (ref 0–0.9)
LACTATE SERPL-SCNC: 2.6 MMOL/L (ref 0.5–2)
LV EJECT FRACT  99904: 30
LV EJECT FRACT MOD 2C 99903: 40.8
LV EJECT FRACT MOD 4C 99902: 21.43
LV EJECT FRACT MOD BP 99901: 37.53
LYMPHOCYTES # BLD AUTO: 1.26 K/UL (ref 1–4.8)
LYMPHOCYTES NFR BLD: 21.6 % (ref 22–41)
MAGNESIUM SERPL-MCNC: 2.3 MG/DL (ref 1.5–2.5)
MCH RBC QN AUTO: 31.4 PG (ref 27–33)
MCHC RBC AUTO-ENTMCNC: 31.9 G/DL (ref 33.7–35.3)
MCV RBC AUTO: 98.6 FL (ref 81.4–97.8)
MONOCYTES # BLD AUTO: 0.99 K/UL (ref 0–0.85)
MONOCYTES NFR BLD AUTO: 17 % (ref 0–13.4)
NEUTROPHILS # BLD AUTO: 3.14 K/UL (ref 1.82–7.42)
NEUTROPHILS NFR BLD: 54.1 % (ref 44–72)
NRBC # BLD AUTO: 0 K/UL
NRBC BLD-RTO: 0 /100 WBC
NT-PROBNP SERPL IA-MCNC: 2515 PG/ML (ref 0–125)
PLATELET # BLD AUTO: 86 K/UL (ref 164–446)
POTASSIUM SERPL-SCNC: 4.7 MMOL/L (ref 3.6–5.5)
PROT SERPL-MCNC: 6 G/DL (ref 6–8.2)
RBC # BLD AUTO: 5.12 M/UL (ref 4.7–6.1)
SODIUM SERPL-SCNC: 138 MMOL/L (ref 135–145)
T4 FREE SERPL-MCNC: 1.01 NG/DL (ref 0.93–1.7)
TROPONIN T SERPL-MCNC: 36 NG/L (ref 6–19)
VIT B12 SERPL-MCNC: 1128 PG/ML (ref 211–911)
WBC # BLD AUTO: 5.8 K/UL (ref 4.8–10.8)

## 2022-09-01 PROCEDURE — 83880 ASSAY OF NATRIURETIC PEPTIDE: CPT

## 2022-09-01 PROCEDURE — 84484 ASSAY OF TROPONIN QUANT: CPT

## 2022-09-01 PROCEDURE — 93306 TTE W/DOPPLER COMPLETE: CPT

## 2022-09-01 PROCEDURE — 700102 HCHG RX REV CODE 250 W/ 637 OVERRIDE(OP): Performed by: INTERNAL MEDICINE

## 2022-09-01 PROCEDURE — A9270 NON-COVERED ITEM OR SERVICE: HCPCS | Performed by: INTERNAL MEDICINE

## 2022-09-01 PROCEDURE — 770020 HCHG ROOM/CARE - TELE (206)

## 2022-09-01 PROCEDURE — 99233 SBSQ HOSP IP/OBS HIGH 50: CPT | Mod: GC | Performed by: HOSPITALIST

## 2022-09-01 PROCEDURE — 83735 ASSAY OF MAGNESIUM: CPT

## 2022-09-01 PROCEDURE — A9270 NON-COVERED ITEM OR SERVICE: HCPCS | Performed by: NURSE PRACTITIONER

## 2022-09-01 PROCEDURE — 84439 ASSAY OF FREE THYROXINE: CPT

## 2022-09-01 PROCEDURE — 700117 HCHG RX CONTRAST REV CODE 255: Performed by: NURSE PRACTITIONER

## 2022-09-01 PROCEDURE — 700111 HCHG RX REV CODE 636 W/ 250 OVERRIDE (IP): Performed by: INTERNAL MEDICINE

## 2022-09-01 PROCEDURE — 700111 HCHG RX REV CODE 636 W/ 250 OVERRIDE (IP): Performed by: STUDENT IN AN ORGANIZED HEALTH CARE EDUCATION/TRAINING PROGRAM

## 2022-09-01 PROCEDURE — 700102 HCHG RX REV CODE 250 W/ 637 OVERRIDE(OP): Performed by: NURSE PRACTITIONER

## 2022-09-01 PROCEDURE — 80053 COMPREHEN METABOLIC PANEL: CPT

## 2022-09-01 PROCEDURE — 93306 TTE W/DOPPLER COMPLETE: CPT | Mod: 26 | Performed by: INTERNAL MEDICINE

## 2022-09-01 PROCEDURE — 85025 COMPLETE CBC W/AUTO DIFF WBC: CPT

## 2022-09-01 PROCEDURE — 82746 ASSAY OF FOLIC ACID SERUM: CPT

## 2022-09-01 PROCEDURE — 99232 SBSQ HOSP IP/OBS MODERATE 35: CPT | Mod: FS | Performed by: INTERNAL MEDICINE

## 2022-09-01 PROCEDURE — 83605 ASSAY OF LACTIC ACID: CPT

## 2022-09-01 PROCEDURE — 93970 EXTREMITY STUDY: CPT

## 2022-09-01 PROCEDURE — 36415 COLL VENOUS BLD VENIPUNCTURE: CPT

## 2022-09-01 PROCEDURE — 82607 VITAMIN B-12: CPT

## 2022-09-01 RX ORDER — LISINOPRIL 10 MG/1
10 TABLET ORAL EVERY EVENING
Status: DISCONTINUED | OUTPATIENT
Start: 2022-09-02 | End: 2022-09-02

## 2022-09-01 RX ORDER — ENOXAPARIN SODIUM 150 MG/ML
150 INJECTION SUBCUTANEOUS EVERY 12 HOURS
Status: DISCONTINUED | OUTPATIENT
Start: 2022-09-01 | End: 2022-09-04

## 2022-09-01 RX ORDER — METOPROLOL TARTRATE 50 MG/1
50 TABLET, FILM COATED ORAL EVERY 6 HOURS
Status: DISCONTINUED | OUTPATIENT
Start: 2022-09-01 | End: 2022-09-02

## 2022-09-01 RX ORDER — SPIRONOLACTONE 25 MG/1
25 TABLET ORAL
Status: DISCONTINUED | OUTPATIENT
Start: 2022-09-01 | End: 2022-09-06 | Stop reason: HOSPADM

## 2022-09-01 RX ORDER — ENOXAPARIN SODIUM 150 MG/ML
1 INJECTION SUBCUTANEOUS EVERY 12 HOURS
Status: DISCONTINUED | OUTPATIENT
Start: 2022-09-01 | End: 2022-09-01

## 2022-09-01 RX ORDER — FUROSEMIDE 10 MG/ML
40 INJECTION INTRAMUSCULAR; INTRAVENOUS
Status: DISCONTINUED | OUTPATIENT
Start: 2022-09-01 | End: 2022-09-02

## 2022-09-01 RX ADMIN — METOPROLOL TARTRATE 50 MG: 50 TABLET, FILM COATED ORAL at 13:07

## 2022-09-01 RX ADMIN — AMOXICILLIN AND CLAVULANATE POTASSIUM 1 TABLET: 875; 125 TABLET, FILM COATED ORAL at 17:50

## 2022-09-01 RX ADMIN — HUMAN ALBUMIN MICROSPHERES AND PERFLUTREN 3 ML: 10; .22 INJECTION, SOLUTION INTRAVENOUS at 11:20

## 2022-09-01 RX ADMIN — DOXYCYCLINE 100 MG: 100 TABLET, FILM COATED ORAL at 17:51

## 2022-09-01 RX ADMIN — ROSUVASTATIN CALCIUM 10 MG: 10 TABLET, COATED ORAL at 17:50

## 2022-09-01 RX ADMIN — METOPROLOL TARTRATE 25 MG: 25 TABLET, FILM COATED ORAL at 06:41

## 2022-09-01 RX ADMIN — METOPROLOL TARTRATE 50 MG: 50 TABLET, FILM COATED ORAL at 17:50

## 2022-09-01 RX ADMIN — FUROSEMIDE 40 MG: 10 INJECTION, SOLUTION INTRAMUSCULAR; INTRAVENOUS at 15:11

## 2022-09-01 RX ADMIN — AMOXICILLIN AND CLAVULANATE POTASSIUM 1 TABLET: 875; 125 TABLET, FILM COATED ORAL at 06:41

## 2022-09-01 RX ADMIN — DOXYCYCLINE 100 MG: 100 TABLET, FILM COATED ORAL at 06:41

## 2022-09-01 RX ADMIN — FUROSEMIDE 40 MG: 10 INJECTION, SOLUTION INTRAMUSCULAR; INTRAVENOUS at 06:41

## 2022-09-01 RX ADMIN — LISINOPRIL 10 MG: 10 TABLET ORAL at 06:41

## 2022-09-01 RX ADMIN — APIXABAN 5 MG: 5 TABLET, FILM COATED ORAL at 06:41

## 2022-09-01 RX ADMIN — ENOXAPARIN SODIUM 150 MG: 150 INJECTION SUBCUTANEOUS at 17:51

## 2022-09-01 RX ADMIN — SPIRONOLACTONE 25 MG: 25 TABLET ORAL at 13:07

## 2022-09-01 ASSESSMENT — COGNITIVE AND FUNCTIONAL STATUS - GENERAL
DRESSING REGULAR LOWER BODY CLOTHING: A LITTLE
MOVING TO AND FROM BED TO CHAIR: A LITTLE
WALKING IN HOSPITAL ROOM: A LITTLE
SUGGESTED CMS G CODE MODIFIER MOBILITY: CK
DAILY ACTIVITIY SCORE: 22
TOILETING: A LITTLE
MOBILITY SCORE: 19
SUGGESTED CMS G CODE MODIFIER DAILY ACTIVITY: CJ
MOVING FROM LYING ON BACK TO SITTING ON SIDE OF FLAT BED: A LITTLE
STANDING UP FROM CHAIR USING ARMS: A LITTLE
CLIMB 3 TO 5 STEPS WITH RAILING: A LITTLE

## 2022-09-01 ASSESSMENT — ENCOUNTER SYMPTOMS
CONSTIPATION: 0
MYALGIAS: 0
BRUISES/BLEEDS EASILY: 0
PALPITATIONS: 0
SPEECH CHANGE: 0
COUGH: 0
SENSORY CHANGE: 0
PND: 0
DIZZINESS: 0
NAUSEA: 0
FOCAL WEAKNESS: 0
FEVER: 0
DIAPHORESIS: 0
VOMITING: 0
ABDOMINAL PAIN: 0
WHEEZING: 0
CHEST TIGHTNESS: 0
CHILLS: 0
SORE THROAT: 0
SHORTNESS OF BREATH: 0
HEARTBURN: 0
BLOOD IN STOOL: 0
BACK PAIN: 0
ORTHOPNEA: 1
HEADACHES: 0
SHORTNESS OF BREATH: 1
APNEA: 0
DOUBLE VISION: 0
LOSS OF CONSCIOUSNESS: 0
CHOKING: 0
STRIDOR: 0
BLURRED VISION: 0
NERVOUS/ANXIOUS: 0

## 2022-09-01 ASSESSMENT — FIBROSIS 4 INDEX
FIB4 SCORE: 5.66
FIB4 SCORE: 5.66

## 2022-09-01 NOTE — PROGRESS NOTES
4 Eyes Skin Assessment Completed by DARRIAN Nicole and DARRIAN Montes.    Head WDL  Ears WDL  Nose WDL  Mouth WDL  Neck WDL  Breast/Chest WDL  Shoulder Blades WDL  Spine WDL  (R) Arm/Elbow/Hand WDL  (L) Arm/Elbow/Hand WDL  Abdomen Redness  Groin Redness  Scrotum/Coccyx/Buttocks WDL  (R) Leg Redness and Edema  (L) Leg Redness and Edema  (R) Heel/Foot/Toe WDL  (L) Heel/Foot/Toe WDL          Devices In Places Tele Box      Interventions In Place Pillows    Possible Skin Injury No    Pictures Uploaded Into Epic N/A  Wound Consult Placed N/A  RN Wound Prevention Protocol Ordered No

## 2022-09-01 NOTE — PROGRESS NOTES
Cardiology Follow Up Progress Note    Date of Service  9/1/2022    Attending Physician  SKYLER Torres M.D.    Chief Complaint     Initially presented with worsening lower extremity edema, dyspnea on exertion, orthopnea, weight gain about 40 pounds since June.      Cardiology consultation for evaluation of A. fib RVR, rate 160's.    HPI  Maximino Green is a 62 y.o. male admitted 8/31/2022 with worsening heart failure symptoms found to be in A. fib RVR.    PMH: Paroxysmal A. 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    Interim Events  No overnight cardiac events.  Telemetry-A. fib rate 1 teens-120  Echo in process currently  Standing weight pending  Labs pending  Overall feels symptoms improved    Review of Systems  Review of Systems   Respiratory:  Negative for apnea, cough, choking, chest tightness, shortness of breath, wheezing and stridor.    Cardiovascular:  Positive for leg swelling.     Vital signs in last 24 hours  Temp:  [36.3 °C (97.4 °F)-37.1 °C (98.8 °F)] 36.3 °C (97.4 °F)  Pulse:  [115-166] 115  Resp:  [15-20] 18  BP: ()/() 115/78  SpO2:  [91 %-95 %] 95 %    Physical Exam  Physical Exam  Neck:      Vascular: JVD present.   Cardiovascular:      Rate and Rhythm: Tachycardia present. Rhythm irregular.      Pulses: Normal pulses.   Pulmonary:      Breath sounds: No wheezing.   Musculoskeletal:      Right lower leg: 3+ Edema present.      Left lower leg: 3+ Edema present.   Skin:     General: Skin is warm.      Comments: Lower extremity with mild cellulitis   Neurological:      Mental Status: He is alert.   Psychiatric:         Mood and Affect: Mood normal.       Lab Review  Lab Results   Component Value Date/Time    WBC 5.8 09/01/2022 06:21 AM    RBC 5.12 09/01/2022 06:21 AM    HEMOGLOBIN 16.1 09/01/2022 06:21 AM    HEMATOCRIT 50.5 09/01/2022 06:21 AM    MCV 98.6 (H) 09/01/2022  06:21 AM    MCH 31.4 09/01/2022 06:21 AM    MCHC 31.9 (L) 09/01/2022 06:21 AM    MPV 15.0 (H) 03/18/2022 08:18 AM      Lab Results   Component Value Date/Time    SODIUM 139 08/31/2022 04:16 PM    POTASSIUM 4.9 08/31/2022 04:16 PM    CHLORIDE 104 08/31/2022 04:16 PM    CO2 23 08/31/2022 04:16 PM    GLUCOSE 79 08/31/2022 04:16 PM    BUN 19 08/31/2022 04:16 PM    CREATININE 1.09 08/31/2022 04:16 PM      Lab Results   Component Value Date/Time    ASTSGOT 47 (H) 08/31/2022 04:16 PM    ALTSGPT 32 08/31/2022 04:16 PM     Lab Results   Component Value Date/Time    CHOLSTRLTOT 215 (H) 03/18/2022 08:18 AM     (H) 03/18/2022 08:18 AM    HDL 82 03/18/2022 08:18 AM    TRIGLYCERIDE 68 03/18/2022 08:18 AM       Recent Labs     08/31/22  1616   NTPROBNP 1709*       Cardiac Imaging and Procedures Review  EKG:  My personal interpretation of the EKG dated 8/21/2022 is A. fib RVR, rate 165    Echocardiogram: Pending    Cardiac Catheterization: Not applicable    Imaging  Chest X-Ray:   1.  Bibasilar atelectasis versus consolidations and associated small to moderate pleural effusions.  2.  Cardiomegaly.    Stress Test: Not applicable    Assessment/Plan    #A. fib RVR, rate 160s on presentation.  #Suspect tachycardia mediated CHF (patient with worsening lower extremity edema, dyspnea on exertion, orthopnea, weight gain ~ 40 #) , echocardiogram pending.  #History of PAF, diagnosed 2019, declined OAC CHADS2 score 1.  #NT proBNP on admission 1700.  #BMI 42.  #Hypertension.  #Left lower extremity cellulitis, on Augmentin and doxycycline.  #Thrombocytopenia, chronic, platelet count currently 86.  # Tonsillar cancer 2019 s/p chemoradiation, in remission.    Recommendations    #Continue with apixaban 5 mg twice daily.  #Metroprolol 25 mg every 6 hours.  #Continue with furosemide 40 IV twice daily.  #Daily standing weight  #Strict intake and output  #Keep K above 4, Mg above 2    Follow-up on echocardiogram.    I personally spent a total  of 12 minutes which includes face-to-face time and non-face-to-face time spent on preparing to see the patient, reviewing hospital notes and tests, obtaining history from the patient, performing a medically appropriate exam, counseling and educating the patient, ordering medications/tests/procedures/referrals as clinically indicated, and documenting information in the electronic medical record.       Thank you for allowing me to participate in the care of this patient.  I will continue to follow this patient    Please contact me with any questions.    DUSTIN Willis.   Cardiologist, [facility]  [phone]

## 2022-09-01 NOTE — H&P
Hospital Medicine History & Physical Note    Date of Service  8/31/2022    Primary Care Physician  Giancarlo Lomeli M.D.    Consultants  Cardiology Dr. Morales    Code Status  Full Code    Chief Complaint  Chief Complaint   Patient presents with    Leg Swelling     Leg swelling and redness X1 wk     Rapid Heart Beat     -170   Pt has Hx of Afib       History of Presenting Illness  Maximino Green is a 62 y.o. male with past medical history of tonsillar cancer in remission, s/p chemoradiation in 2019, hypertension, alcohol dependence/abuse in remission, single paroxysm  of A. fib, hypertension, who presented 8/31/2022 with complaints of bilateral lower extremity edema, dyspnea on exertion, some orthopnea, for about 2 weeks.  Patient stated he gained about 40 pounds since June.  He denies chest pain, palpitation, fever, chills, dizziness.  He has occasional dry cough.  He has not been on any medications until today, when his PCP prescribed metoprolol, Lasix.  Patient found to have A. fib with RVR, heart rate 140 and above.  He received 3 doses of IV metoprolol 5 mg without significant improvement.  Cardiology consulted, started on scheduled metoprolol 25 mg every 6 hours, as well as apixaban.  Chest x-ray showed bibasilar atelectasis versus consolidation and small to moderate pleural effusions.  Cardiomegaly.  EKG showed low voltage, A. fib with heart rate 145.  I discussed the plan of care with patient.    Review of Systems  Review of Systems   Constitutional:  Negative for chills, fever and weight loss.   HENT:  Negative for ear pain, hearing loss and tinnitus.    Eyes:  Negative for blurred vision, double vision and photophobia.   Respiratory:  Positive for shortness of breath. Negative for cough, hemoptysis and sputum production.    Cardiovascular:  Positive for orthopnea and leg swelling. Negative for chest pain, palpitations, claudication and PND.   Gastrointestinal:  Negative for heartburn, nausea and  vomiting.   Genitourinary:  Negative for dysuria, flank pain, frequency and hematuria.   Musculoskeletal:  Negative for back pain, joint pain and neck pain.   Skin:  Negative for itching and rash.   Neurological:  Negative for tremors, speech change, focal weakness and headaches.   Endo/Heme/Allergies:  Negative for environmental allergies and polydipsia. Does not bruise/bleed easily.   Psychiatric/Behavioral:  Negative for hallucinations and substance abuse. The patient is not nervous/anxious.      Past Medical History   has a past medical history of Alcoholic (HCC), Cancer (HCC) (2019), Dental disorder, Diabetes (HCC), Hypertension, and Tonsil cancer (HCC).    Surgical History   has a past surgical history that includes cholecystectomy (2003) and other (12/27/2018).     Family History  family history includes Alcohol abuse in his mother; Cancer in his mother and sister; GI Disease in his sister; Heart Disease in his father; Respiratory Disease in his mother.   Family history reviewed with patient. There is no family history that is pertinent to the chief complaint.     Social History   reports that he quit smoking about 3 years ago. His smoking use included cigarettes and cigars. He has a 4.00 pack-year smoking history. He has never used smokeless tobacco. He reports that he does not drink alcohol and does not use drugs.    Allergies  No Known Allergies    Medications  Prior to Admission Medications   Prescriptions Last Dose Informant Patient Reported? Taking?   aspirin EC (ECOTRIN) 81 MG Tablet Delayed Response > 1 WEEK at UNKNOWN TIME Patient No No   Sig: Take 1 Tablet by mouth every day.   furosemide (LASIX) 20 MG Tab New RX at NOT STARTED Patient No No   Sig: Take 1 Tablet by mouth 2 times a day. For swelling of legs   lisinopril (PRINIVIL) 10 MG Tab > 1 WEEK at DISCONTINUED Patient Yes Yes   Sig: Take 10 mg by mouth every day. (DISCONTINUED 8/31/2022)   metoprolol tartrate (LOPRESSOR) 25 MG Tab > 1 WEEK at  DISCONTINUED Patient Yes Yes   Sig: Take 25 mg by mouth 2 times a day. (DISCONTINUED 8/31/2022)   potassium chloride SA (KDUR) 20 MEQ Tab CR New RX at NOT STARTED Patient No No   Sig: Take 1 Tablet by mouth 2 times a day. Take with lasix   rosuvastatin (CRESTOR) 10 MG Tab > 1 WEEK at UNKNOWN TIME Patient No No   Sig: Take 1 Tablet by mouth every evening. To prevent heart attack and stroke   sulfamethoxazole-trimethoprim (BACTRIM DS) 800-160 MG tablet New RX at NOT STARTED Patient No No   Sig: Take 1 Tablet by mouth 2 times a day for 7 days.      Facility-Administered Medications: None       Physical Exam  Temp:  [36.4 °C (97.6 °F)-37.1 °C (98.8 °F)] 37.1 °C (98.8 °F)  Pulse:  [] 144  Resp:  [15-18] 17  BP: ()/() 123/92  SpO2:  [91 %-95 %] 94 %  Blood Pressure: (!) 123/92   Temperature: 37.1 °C (98.8 °F)   Pulse: (!) 144   Respiration: 17   Pulse Oximetry: 94 %       Physical Exam  Vitals and nursing note reviewed.   Constitutional:       General: He is not in acute distress.     Appearance: Normal appearance.   HENT:      Head: Normocephalic and atraumatic.      Nose: Nose normal.      Mouth/Throat:      Mouth: Mucous membranes are moist.   Eyes:      Extraocular Movements: Extraocular movements intact.      Pupils: Pupils are equal, round, and reactive to light.   Cardiovascular:      Rate and Rhythm: Normal rate and regular rhythm.   Pulmonary:      Effort: Pulmonary effort is normal.      Breath sounds: Normal breath sounds.   Abdominal:      General: Abdomen is flat. There is no distension.      Tenderness: There is no abdominal tenderness.   Musculoskeletal:         General: No swelling or deformity. Normal range of motion.      Cervical back: Normal range of motion and neck supple.      Right lower leg: Edema (Up to upper thigh, +2) present.      Left lower leg: Edema (Up to upper thigh, +2) present.   Skin:     General: Skin is warm and dry.      Comments: Erythematous discoloration of the  shins bilaterally.  No warmth   Neurological:      General: No focal deficit present.      Mental Status: He is alert and oriented to person, place, and time.   Psychiatric:         Mood and Affect: Mood normal.         Behavior: Behavior normal.       Laboratory:  Recent Labs     08/31/22  1616   WBC 5.2   RBC 5.46   HEMOGLOBIN 17.3   HEMATOCRIT 52.3*   MCV 95.8   MCH 31.7   MCHC 33.1*   RDW 49.2   PLATELETCT 101*     Recent Labs     08/31/22  1616   SODIUM 139   POTASSIUM 4.9   CHLORIDE 104   CO2 23   GLUCOSE 79   BUN 19   CREATININE 1.09   CALCIUM 10.0     Recent Labs     08/31/22  1616   ALTSGPT 32   ASTSGOT 47*   ALKPHOSPHAT 74   TBILIRUBIN 1.9*   GLUCOSE 79     Recent Labs     08/31/22  1234   APTT 32.5   INR 1.47*     Recent Labs     08/31/22  1616   NTPROBNP 1709*         No results for input(s): TROPONINT in the last 72 hours.    Imaging:  DX-CHEST-PORTABLE (1 VIEW)   Final Result      1.  Bibasilar atelectasis versus consolidations and associated small to moderate pleural effusions.   2.  Cardiomegaly.          X-Ray:  I have personally reviewed the images and compared with prior images.    Assessment/Plan:  Justification for Admission Status  I anticipate this patient will require at least two midnights for appropriate medical management, necessitating inpatient admission because CHF and A. fib with RVR    Patient will need a Telemetry bed on CARDIOLOGY service .  The need is secondary to CHF new onset of A. fib with RVR.    * A-fib (HCC)- (present on admission)  Assessment & Plan  Onset of the current paroxysmal is unknown.  Denies palpitations or chest pain  Started on metoprolol 25 mg every 6 hours per cardiology recommendation.  IV metoprolol as needed  Started on Eliquis ([id number] 2 score 2)  Monitor on telemetry  Monitor and replace electrolytes  Will obtain transthoracic echo.    Cellulitis  Assessment & Plan  Bilateral lower extremity superficial mild cellulitis noted.  Will start p.o.  Augmentin and doxycycline  IV Lasix  Obtain bilateral lower extremity venous ultrasound    Thrombocytopenia (HCC)  Assessment & Plan  Probably secondary to chronic liver disease.  Chronic  Monitor    Acute diastolic heart failure (HCC)  Assessment & Plan  Probably secondary to uncontrolled heart rate  Most recent echo in 2019 showed EF 65%, RVSP 45, diastolic dysfunction grade 1.  Plan: IV Lasix 40 mg twice daily  Heart rate control with metoprolol pursued  Obtain transthoracic echo  Daily weight monitoring.  Cardiac diet with 2 g salt restriction  Input and output monitoring    Tonsil cancer (HCC)- (present on admission)  Assessment & Plan  Reported in remission, status post chemoradiation in 2019  Follow-up with Dr. Whitfield yearly.    Essential hypertension- (present on admission)  Assessment & Plan  Started on metoprolol, lisinopril  Monitor blood pressure.      VTE prophylaxis: therapeutic anticoagulation with apixaban

## 2022-09-01 NOTE — ED NOTES
Floor RN @ bedside to transport pt. All belongings with pt. A/O4. NAD. VSS. Respirations equal and unlabored.

## 2022-09-01 NOTE — PROGRESS NOTES
Pt to T723 with ACLS RN on [place]. Pt A&Ox4, on RA. Tele box applied. Pt denies pain or SOB at this time. Pt remains in Afib with rate between 120-160s; asymptomatic at this time. Pt educated on need to call for assistance out of bed.

## 2022-09-01 NOTE — DIETARY
NUTRITION SERVICES: BMI - Pt with BMI >40 (=Body mass index is 42.35 kg/m².), Class III obesity. Weight loss counseling not appropriate in acute care setting. RECOMMEND - If appropriate at DC please refer to outpatient nutrition services for weight management.      RD available PRN

## 2022-09-01 NOTE — ASSESSMENT & PLAN NOTE
Onset of the current paroxysmal episode is unknown. Has remained in afib throughout admission, now with better rate control 80s-100s.  He is asymptomatic when he is in RVR.  - Continue metoprolol XL 50 mg daily  - No plans for heart cath during this admission, as etiology of CHF is likely arrhythmic, okay to start DOAC  - Discontinued Lovenox, starting apixaban today  - Given his thrombocytopenia, would not use aspirin in addition to DOAC  - Monitor on telemetry  - Monitor and replace electrolytes

## 2022-09-01 NOTE — ASSESSMENT & PLAN NOTE
Most likely secondary to uncontrolled afib with RVR. Had a previous diagnosis of heart failure with preserved ejection fraction. Echo in 2019 showed LVEF of 65% with RVSP of 45. Echo during this admission showed reduced systolic function with ejection fraction of 30%. He reports a 40 lb weight gain since June.  Respiratory status and volume status is gradually improving with aggressive diuresis, but remains hypervolemic.   Cardiology following:    - Transition to oral diuresis with torsemide 80 mg daily   - Continue metoprolol to XL    - Continue dapagliflozin   - Start Entresto at discharge if BP allows    -Continue apixaban for A. fib    - Given his thrombocytopenia, would not use aspirin in addition to DOAC   - There is a chance that the EF may rise following adequate rate control of his a fib. We recommend a repeat echo following discharge to reassess his baseline.    - As he does not have unequivocal anginal symptoms, coronary angiography can be considered as an outpatient if he does not have recovery of his systolic function in 1-2 months on optimal medical therapy and heart rate contro  -Continue metoprolol, losartan, and spironolactone  -Continue scheduled potassium replacement in the outpatient setting  -Replete electrolytes as necessary.   -Continue to monitor on telemetry.   -Strict I/Os.   -Daily weight measurements.   - Low sodium diet

## 2022-09-01 NOTE — DISCHARGE PLANNING
Case Management Discharge Planning    Admission Date: 8/31/2022  GMLOS: 3.8  ALOS: 1    6-Clicks ADL Score:  NEEDED  6-Clicks Mobility Score:  NEEDED      Anticipated Discharge Dispo: Discharge Disposition: Discharged to home/self care (01)    DME Needed: No    Action(s) Taken: OTHER    Escalations Completed: None    Medically Clear: No    Next Steps: f/u with pt and medical team to discuss dc needs and barriers.    Barriers to Discharge: Medical clearance

## 2022-09-01 NOTE — ED NOTES
Med rec completed per patient at bedside.  Allergies reviewed with patient. NKDA.  Patient's preferred pharmacy: [facility].    Patient states he has not had any of his medications for over a week due to not feeling well, fatigued. Metoprolol and lisinopril were discontinued today 8/31/2022 by the prescribing physician, EDEL Lomeli. Patient was given new prescriptions for furosemide, potassium chloride, and Bactrim DS which he states he has not started yet.

## 2022-09-01 NOTE — ASSESSMENT & PLAN NOTE
Bilateral nonblanching lower extremity erythema. Warm and tender to palpation. Lower extremity venous US negative for DVTs. Consistent with cellulitis on pitting edema. Improved with initial antibiotics of Augmentin and doxazosin and diuresis.  Given the findings of negative MRSA nares, doxycycline was discontinued 9/3, however subsequently worsened and doxycycline was resumed.    -Continue doxycycline (has finished course of Augmentin)

## 2022-09-01 NOTE — PROGRESS NOTES
[facility] Internal Medicine Daily Progress Note    Date of Service  9/1/2022    [facility] Team: [employer] IM White Team   Attending: SKYLER Torres M.d.  Senior Resident: Dr. Ivory  Intern:  Dr. Armas  Contact Number: [phone]    Chief Complaint  Maximino Green is a 62 y.o. male admitted 8/31/2022 with Afib with RVR and volume overload in the context of a likely acute exacerbation of CHF.    Hospital Course  No notes on file    Interval Problem Update  Admitted overnight  -reports doing okay this AM  -denies active chest pain  -remains in Afib    -Started on therapeutic Lovenox after discussion with pharmacy, discontinuing apixaban in case of potential procedures  -ECHO completed today    I have discussed this patient's plan of care and discharge plan at IDT rounds today with Case Management, Nursing, Nursing leadership, and other members of the IDT team.    Consultants/Specialty  cardiology    Code Status  Full Code    Disposition  Patient is not medically cleared for discharge.   Anticipate discharge to to home with close outpatient follow-up.  I have placed the appropriate orders for post-discharge needs.    Review of Systems  Review of Systems   Constitutional:  Negative for chills and fever.   HENT:  Negative for sore throat.    Eyes:  Negative for double vision.   Respiratory:  Negative for cough and wheezing.    Cardiovascular:  Positive for orthopnea and leg swelling. Negative for chest pain.   Gastrointestinal:  Negative for abdominal pain, blood in stool and constipation.   Genitourinary:  Negative for dysuria and urgency.   Musculoskeletal:  Negative for back pain.   Neurological:  Negative for sensory change and speech change.   Endo/Heme/Allergies:  Does not bruise/bleed easily.   Psychiatric/Behavioral:  The patient is not nervous/anxious.       Physical Exam  Temp:  [35.8 °C (96.5 °F)-36.6 °C (97.8 °F)] 35.8 °C (96.5 °F)  Pulse:  [] 121  Resp:  [18-20] 18  BP: ()/(54-79) 97/57  SpO2:  [93 %-95  %] 93 %    Physical Exam  Constitutional:       Appearance: He is obese.   HENT:      Head: Normocephalic.      Mouth/Throat:      Mouth: Mucous membranes are moist.   Eyes:      General: No scleral icterus.  Cardiovascular:      Rate and Rhythm: Tachycardia present. Rhythm irregular.   Pulmonary:      Breath sounds: No wheezing or rales.   Abdominal:      General: There is distension.   Musculoskeletal:      Right lower leg: Edema present.      Left lower leg: Edema present.      Comments: Mild erythema over BL Shins   Skin:     General: Skin is warm and dry.   Neurological:      Mental Status: He is oriented to person, place, and time.      Cranial Nerves: No cranial nerve deficit.   Psychiatric:         Mood and Affect: Mood normal.         Behavior: Behavior normal.       Fluids    Intake/Output Summary (Last 24 hours) at 9/1/2022 1913  Last data filed at 9/1/2022 1540  Gross per 24 hour   Intake 600 ml   Output 950 ml   Net -350 ml       Laboratory  Recent Labs     08/31/22  1616 09/01/22  0621   WBC 5.2 5.8   RBC 5.46 5.12   HEMOGLOBIN 17.3 16.1   HEMATOCRIT 52.3* 50.5   MCV 95.8 98.6*   MCH 31.7 31.4   MCHC 33.1* 31.9*   RDW 49.2 49.8   PLATELETCT 101* 86*     Recent Labs     08/31/22  1616 09/01/22  0718   SODIUM 139 138   POTASSIUM 4.9 4.7   CHLORIDE 104 105   CO2 23 21   GLUCOSE 79 95   BUN 19 23*   CREATININE 1.09 1.22   CALCIUM 10.0 9.8     Recent Labs     08/31/22  1234   APTT 32.5   INR 1.47*               Imaging  EC-ECHOCARDIOGRAM COMPLETE W/ CONT   Final Result      US-EXTREMITY VENOUS LOWER BILAT   Final Result      DX-CHEST-PORTABLE (1 VIEW)   Final Result      1.  Bibasilar atelectasis versus consolidations and associated small to moderate pleural effusions.   2.  Cardiomegaly.           Assessment/Plan  Problem Representation:    * A-fib (HCC)- (present on admission)  Assessment & Plan  Onset of the current paroxysmal is unknown.  Denies palpitations or chest pain  Started on metoprolol 25 mg  every 6 hours per cardiology recommendation.  IV metoprolol as needed  Weight based lovenox for possible procedures  Monitor on telemetry  Monitor and replace electrolytes  -TTE obtained 9/1/22    Acute diastolic heart failure (HCC)  Assessment & Plan  Probably secondary to uncontrolled heart rate  Most recent echo in 2019 showed EF 65%, RVSP 45, diastolic dysfunction grade 1.  Plan: IV Lasix 40 mg twice daily  Heart rate control with metoprolol pursued as above  Obtain transthoracic echo  Daily weight monitoring.  Cardiac diet with 2 g salt restriction  Input and output monitoring    Tonsil cancer (HCC)- (present on admission)  Assessment & Plan  Reported in remission, status post chemoradiation in 2019  Follow-up with Dr. Whitfield yearly.    Essential hypertension- (present on admission)  Assessment & Plan  Started on metoprolol, lisinopril  Monitor blood pressure.    Cellulitis  Assessment & Plan  Bilateral lower extremity superficial mild cellulitis noted.   p.o. Augmentin and doxycycline started on admission  IV Lasix  bilateral lower extremity venous ultrasound    Thrombocytopenia (HCC)  Assessment & Plan  Probably secondary to chronic liver disease.  Chronic  Monitor       VTE prophylaxis: therapeutic anticoagulation with apixaban    I have performed a physical exam and reviewed and updated ROS and Plan today (9/1/2022). In review of yesterday's note (8/31/2022), there are no changes except as documented above.

## 2022-09-02 PROBLEM — I50.21 ACUTE SYSTOLIC HEART FAILURE (HCC): Status: ACTIVE | Noted: 2022-08-31

## 2022-09-02 PROBLEM — E03.9 HYPOTHYROIDISM: Status: ACTIVE | Noted: 2022-09-02

## 2022-09-02 LAB
ALBUMIN SERPL BCP-MCNC: 2.8 G/DL (ref 3.2–4.9)
ALBUMIN/GLOB SERPL: 1.1 G/DL
ALP SERPL-CCNC: 59 U/L (ref 30–99)
ALT SERPL-CCNC: 23 U/L (ref 2–50)
ANION GAP SERPL CALC-SCNC: 10 MMOL/L (ref 7–16)
AST SERPL-CCNC: 33 U/L (ref 12–45)
BASOPHILS # BLD AUTO: 1.7 % (ref 0–1.8)
BASOPHILS # BLD: 0.09 K/UL (ref 0–0.12)
BILIRUB SERPL-MCNC: 0.9 MG/DL (ref 0.1–1.5)
BUN SERPL-MCNC: 31 MG/DL (ref 8–22)
CALCIUM SERPL-MCNC: 9.5 MG/DL (ref 8.5–10.5)
CHLORIDE SERPL-SCNC: 107 MMOL/L (ref 96–112)
CO2 SERPL-SCNC: 22 MMOL/L (ref 20–33)
CREAT SERPL-MCNC: 1.33 MG/DL (ref 0.5–1.4)
EOSINOPHIL # BLD AUTO: 0.46 K/UL (ref 0–0.51)
EOSINOPHIL NFR BLD: 8.5 % (ref 0–6.9)
ERYTHROCYTE [DISTWIDTH] IN BLOOD BY AUTOMATED COUNT: 48.3 FL (ref 35.9–50)
GFR SERPLBLD CREATININE-BSD FMLA CKD-EPI: 60 ML/MIN/1.73 M 2
GLOBULIN SER CALC-MCNC: 2.6 G/DL (ref 1.9–3.5)
GLUCOSE SERPL-MCNC: 86 MG/DL (ref 65–99)
HCT VFR BLD AUTO: 47.4 % (ref 42–52)
HGB BLD-MCNC: 15.9 G/DL (ref 14–18)
IMM GRANULOCYTES # BLD AUTO: 0.01 K/UL (ref 0–0.11)
IMM GRANULOCYTES NFR BLD AUTO: 0.2 % (ref 0–0.9)
LYMPHOCYTES # BLD AUTO: 0.99 K/UL (ref 1–4.8)
LYMPHOCYTES NFR BLD: 18.3 % (ref 22–41)
MAGNESIUM SERPL-MCNC: 2 MG/DL (ref 1.5–2.5)
MCH RBC QN AUTO: 32.1 PG (ref 27–33)
MCHC RBC AUTO-ENTMCNC: 33.5 G/DL (ref 33.7–35.3)
MCV RBC AUTO: 95.8 FL (ref 81.4–97.8)
MONOCYTES # BLD AUTO: 0.64 K/UL (ref 0–0.85)
MONOCYTES NFR BLD AUTO: 11.8 % (ref 0–13.4)
NEUTROPHILS # BLD AUTO: 3.22 K/UL (ref 1.82–7.42)
NEUTROPHILS NFR BLD: 59.5 % (ref 44–72)
NRBC # BLD AUTO: 0 K/UL
NRBC BLD-RTO: 0 /100 WBC
PHOSPHATE SERPL-MCNC: 4.1 MG/DL (ref 2.5–4.5)
PLATELET # BLD AUTO: 91 K/UL (ref 164–446)
POTASSIUM SERPL-SCNC: 4.1 MMOL/L (ref 3.6–5.5)
PROT SERPL-MCNC: 5.4 G/DL (ref 6–8.2)
RBC # BLD AUTO: 4.95 M/UL (ref 4.7–6.1)
SCCMEC + MECA PNL NOSE NAA+PROBE: NEGATIVE
SODIUM SERPL-SCNC: 139 MMOL/L (ref 135–145)
WBC # BLD AUTO: 5.4 K/UL (ref 4.8–10.8)

## 2022-09-02 PROCEDURE — 700102 HCHG RX REV CODE 250 W/ 637 OVERRIDE(OP)

## 2022-09-02 PROCEDURE — 85025 COMPLETE CBC W/AUTO DIFF WBC: CPT

## 2022-09-02 PROCEDURE — A9270 NON-COVERED ITEM OR SERVICE: HCPCS

## 2022-09-02 PROCEDURE — 84100 ASSAY OF PHOSPHORUS: CPT

## 2022-09-02 PROCEDURE — 83735 ASSAY OF MAGNESIUM: CPT

## 2022-09-02 PROCEDURE — 700102 HCHG RX REV CODE 250 W/ 637 OVERRIDE(OP): Performed by: INTERNAL MEDICINE

## 2022-09-02 PROCEDURE — 770020 HCHG ROOM/CARE - TELE (206)

## 2022-09-02 PROCEDURE — 700111 HCHG RX REV CODE 636 W/ 250 OVERRIDE (IP): Performed by: NURSE PRACTITIONER

## 2022-09-02 PROCEDURE — A9270 NON-COVERED ITEM OR SERVICE: HCPCS | Performed by: INTERNAL MEDICINE

## 2022-09-02 PROCEDURE — 700111 HCHG RX REV CODE 636 W/ 250 OVERRIDE (IP): Performed by: STUDENT IN AN ORGANIZED HEALTH CARE EDUCATION/TRAINING PROGRAM

## 2022-09-02 PROCEDURE — 87641 MR-STAPH DNA AMP PROBE: CPT

## 2022-09-02 PROCEDURE — 99233 SBSQ HOSP IP/OBS HIGH 50: CPT | Mod: GC | Performed by: HOSPITALIST

## 2022-09-02 PROCEDURE — 99233 SBSQ HOSP IP/OBS HIGH 50: CPT | Mod: FS | Performed by: INTERNAL MEDICINE

## 2022-09-02 PROCEDURE — 36415 COLL VENOUS BLD VENIPUNCTURE: CPT

## 2022-09-02 PROCEDURE — 700102 HCHG RX REV CODE 250 W/ 637 OVERRIDE(OP): Performed by: NURSE PRACTITIONER

## 2022-09-02 PROCEDURE — 80053 COMPREHEN METABOLIC PANEL: CPT

## 2022-09-02 PROCEDURE — A9270 NON-COVERED ITEM OR SERVICE: HCPCS | Performed by: NURSE PRACTITIONER

## 2022-09-02 RX ORDER — ASPIRIN 81 MG/1
81 TABLET, CHEWABLE ORAL DAILY
Status: DISCONTINUED | OUTPATIENT
Start: 2022-09-02 | End: 2022-09-03

## 2022-09-02 RX ORDER — FUROSEMIDE 10 MG/ML
40 INJECTION INTRAMUSCULAR; INTRAVENOUS ONCE
Status: COMPLETED | OUTPATIENT
Start: 2022-09-02 | End: 2022-09-02

## 2022-09-02 RX ORDER — LOSARTAN POTASSIUM 25 MG/1
25 TABLET ORAL EVERY EVENING
Status: DISCONTINUED | OUTPATIENT
Start: 2022-09-03 | End: 2022-09-06 | Stop reason: HOSPADM

## 2022-09-02 RX ORDER — METOLAZONE 5 MG/1
5 TABLET ORAL
Status: DISCONTINUED | OUTPATIENT
Start: 2022-09-02 | End: 2022-09-04

## 2022-09-02 RX ORDER — FUROSEMIDE 10 MG/ML
80 INJECTION INTRAMUSCULAR; INTRAVENOUS
Status: DISCONTINUED | OUTPATIENT
Start: 2022-09-02 | End: 2022-09-05

## 2022-09-02 RX ADMIN — ROSUVASTATIN CALCIUM 10 MG: 10 TABLET, COATED ORAL at 17:22

## 2022-09-02 RX ADMIN — FUROSEMIDE 80 MG: 10 INJECTION, SOLUTION INTRAMUSCULAR; INTRAVENOUS at 17:23

## 2022-09-02 RX ADMIN — AMOXICILLIN AND CLAVULANATE POTASSIUM 1 TABLET: 875; 125 TABLET, FILM COATED ORAL at 17:22

## 2022-09-02 RX ADMIN — METOPROLOL TARTRATE 50 MG: 50 TABLET, FILM COATED ORAL at 06:07

## 2022-09-02 RX ADMIN — AMOXICILLIN AND CLAVULANATE POTASSIUM 1 TABLET: 875; 125 TABLET, FILM COATED ORAL at 06:07

## 2022-09-02 RX ADMIN — SPIRONOLACTONE 25 MG: 25 TABLET ORAL at 06:07

## 2022-09-02 RX ADMIN — ASPIRIN 81 MG: 81 TABLET, CHEWABLE ORAL at 07:54

## 2022-09-02 RX ADMIN — DOXYCYCLINE 100 MG: 100 TABLET, FILM COATED ORAL at 06:07

## 2022-09-02 RX ADMIN — FUROSEMIDE 40 MG: 10 INJECTION, SOLUTION INTRAMUSCULAR; INTRAVENOUS at 06:07

## 2022-09-02 RX ADMIN — METOPROLOL TARTRATE 50 MG: 50 TABLET, FILM COATED ORAL at 00:11

## 2022-09-02 RX ADMIN — ENOXAPARIN SODIUM 150 MG: 150 INJECTION SUBCUTANEOUS at 06:07

## 2022-09-02 RX ADMIN — ENOXAPARIN SODIUM 150 MG: 150 INJECTION SUBCUTANEOUS at 17:22

## 2022-09-02 RX ADMIN — METOPROLOL TARTRATE 25 MG: 25 TABLET, FILM COATED ORAL at 17:23

## 2022-09-02 RX ADMIN — DOXYCYCLINE 100 MG: 100 TABLET, FILM COATED ORAL at 17:22

## 2022-09-02 RX ADMIN — FUROSEMIDE 40 MG: 10 INJECTION, SOLUTION INTRAMUSCULAR; INTRAVENOUS at 10:13

## 2022-09-02 RX ADMIN — METOPROLOL TARTRATE 25 MG: 25 TABLET, FILM COATED ORAL at 12:02

## 2022-09-02 RX ADMIN — METOLAZONE 5 MG: 5 TABLET ORAL at 10:13

## 2022-09-02 ASSESSMENT — ENCOUNTER SYMPTOMS
BACK PAIN: 0
PALPITATIONS: 0
SHORTNESS OF BREATH: 0
DIAPHORESIS: 0
SENSORY CHANGE: 0
CHEST TIGHTNESS: 0
BLOOD IN STOOL: 0
MYALGIAS: 0
HEARTBURN: 0
CHOKING: 0
CHILLS: 0
FEVER: 0
PND: 0
LOSS OF CONSCIOUSNESS: 0
ORTHOPNEA: 0
CONSTIPATION: 0
HEADACHES: 0
SHORTNESS OF BREATH: 1
NERVOUS/ANXIOUS: 0
SORE THROAT: 0
FOCAL WEAKNESS: 0
BLURRED VISION: 0
APNEA: 0
SPEECH CHANGE: 0
DIZZINESS: 0
BRUISES/BLEEDS EASILY: 0
VOMITING: 0
COUGH: 0
ABDOMINAL PAIN: 0
WHEEZING: 0
STRIDOR: 0
DOUBLE VISION: 0
NAUSEA: 0
ORTHOPNEA: 1

## 2022-09-02 ASSESSMENT — FIBROSIS 4 INDEX: FIB4 SCORE: 5.66

## 2022-09-02 NOTE — PROGRESS NOTES
Report received from [facility] shift RN, assumed patient care. Patient is calmly resting in bed, no signs of distress, even and unlabored breathing noted. Pt on 1L O2 via NC. Tele box on and in place. Patient has call light within reach, fall precautions in place. Patient remains safe and stable, will continue to monitor.

## 2022-09-02 NOTE — NON-PROVIDER
Daily Progress Note:     Date of Service: 9/1/2022  Primary Team: [facility] IM White Team   Attending: SKYLER Torres M.D.   Senior Resident: Dr. Domingo Ivory MD   Intern: Dr. Paulo Armas MD  Contact:  [phone]    Chief Complaint:   Mr. Maximino Boyer is a 62 year old male that was admitted on 09/01/22 with atrial fibrillation with rapid ventricular response and acute diastolic heart failure.     Interval update:  Overnight, the patient was admitted for a-fib with RVR and acute diastolic HF. His HR on admission was >160 bpm. He received 3 doses of IV metoprolol in the ER without significant improvement. Cardiology was consulted and they scheduled Metoprolol 25 mg PO every 6 hours. Due to his fluid overloaded status, he was also initiated on lasix 40 mg IV twice daily. Per telemetry, he was in A-fib overnight, though his heart rate was, on average, in the 120s.    Subjective:   Today, Mr. Green reports no symptomatic improvement. He states that his main symptoms are shortness of breath with exertion and leg swelling. He usually sleeps at an incline, but feels that he is currently requiring greater bed elevation. If he lies flat, he states that he quickly begins to cough. He denies paroxysmal nocturnal dyspnea. He denies syncope, lightheadedness, chest pain, palpitations, shortness of breath at rest, or cough. He reports that at baseline, he is able to walk several blocks to a mile without difficulty. On admission, he was able to tolerate 25-30 feet of walking before becoming severely short of breath. He is on no home oxygen, but is currently requiring 2 L oxygen.  He cannot recall any trauma to his lower extremity and states that the bilateral redness began at the same time he noticed his legs starting to swell. He denies fever or chills.     Consultants/Specialty:  Cardiology     Review of Systems:   Review of Systems   Constitutional:  Negative for chills, diaphoresis and fever.        40 pound weight gain since  June.    Eyes:  Negative for blurred vision and double vision.   Respiratory:  Positive for shortness of breath. Negative for cough.    Cardiovascular:  Positive for orthopnea and leg swelling. Negative for chest pain, palpitations and PND.   Gastrointestinal:  Negative for abdominal pain, heartburn, nausea and vomiting.   Genitourinary:  Negative for dysuria and urgency.   Musculoskeletal:  Negative for myalgias.   Skin:         Bilateral redness of the lower extremity.    Neurological:  Negative for dizziness, sensory change, focal weakness, loss of consciousness and headaches.     Objective Data:   Vitals:   Temp:  [36.3 °C (97.4 °F)-36.6 °C (97.8 °F)] 36.6 °C (97.8 °F)  Pulse:  [] 109  Resp:  [18-20] 18  BP: ()/(54-91) 100/54  SpO2:  [93 %-95 %] 95 %    Physical Exam:   Physical Exam  Constitutional:       General: He is not in acute distress.     Appearance: He is obese.   HENT:      Head: Normocephalic.      Mouth/Throat:      Mouth: Mucous membranes are moist.      Pharynx: No oropharyngeal exudate or posterior oropharyngeal erythema.      Comments: Changes from radiation treatment present in the back of throat.   Eyes:      General: No scleral icterus.     Extraocular Movements: Extraocular movements intact.      Pupils: Pupils are equal, round, and reactive to light.   Neck:      Vascular: No carotid bruit.   Cardiovascular:      Rate and Rhythm: Tachycardia present. Rhythm irregular.      Heart sounds: No murmur heard.     Comments: Jugular venous distention present.   Pulmonary:      Effort: Pulmonary effort is normal. No respiratory distress.      Breath sounds: No wheezing or rales.   Abdominal:      General: Abdomen is flat. There is no distension.      Palpations: Abdomen is soft.      Tenderness: There is no abdominal tenderness. There is no guarding.   Musculoskeletal:      Cervical back: Normal range of motion.      Right lower leg: Edema present.      Left lower leg: Edema present.       Comments: Nonblanching erythema present in the bilateral legs. Begins just above the feet and terminates in the mid-shin bilaterally. Warm to palpation. Tender to palpation.    Skin:     General: Skin is warm.      Capillary Refill: Capillary refill takes 2 to 3 seconds.      Findings: Erythema present.      Comments: Feet are warm and dry.    Neurological:      General: No focal deficit present.      Mental Status: He is alert.       Labs:   Recent Labs     08/31/22  1616 09/01/22  0621   WBC 5.2 5.8   RBC 5.46 5.12   HEMOGLOBIN 17.3 16.1   HEMATOCRIT 52.3* 50.5   MCV 95.8 98.6*   MCH 31.7 31.4   RDW 49.2 49.8   PLATELETCT 101* 86*   NEUTSPOLYS 63.70 54.10   LYMPHOCYTES 18.00* 21.60*   MONOCYTES 10.80 17.00*   EOSINOPHILS 5.80 5.30   BASOPHILS 1.50 1.70     Recent Labs     03/18/22  0818 08/31/22  1616 09/01/22  0718   ALBUMIN 4.0 3.5 3.2   HDL 82  --   --    TRIGLYCERIDE 68  --   --    SODIUM 142 139 138   POTASSIUM 4.5 4.9 4.7   CHLORIDE 106 104 105   CO2 23 23 21   BUN 10 19 23*   CREATININE 0.70 1.09 1.22     Recent Labs     08/31/22  1234 08/31/22  1616 09/01/22  0718   ASTSGOT  --  47* 43   ALTSGPT  --  32 30   TBILIRUBIN  --  1.9* 1.6*   ALKPHOSPHAT  --  74 65   GLOBULIN  --  3.1 2.8   INR 1.47*  --   --        Imaging:   EC-ECHOCARDIOGRAM COMPLETE W/ CONT   Final Result      US-EXTREMITY VENOUS LOWER BILAT   Final Result      DX-CHEST-PORTABLE (1 VIEW)   Final Result      1.  Bibasilar atelectasis versus consolidations and associated small to moderate pleural effusions.   2.  Cardiomegaly.           Assessment and plan:     #Atrial fibrillation with rapid ventricular response  -Onset of current episode is unknown.   -Admitted with HR >160. Received 3 doses of IV metoprolol 5 mg with no improvement.   -Patient currently on 25 mg Metoprolol PO every 6 hours.   -HR, on average, is currently in 120s.   -Echo from 2019 showed EF of 65% with RVSP 45.     -Cardiology consulted, appreciate their recommendations.    -Echocardiogram ordered.   -Increase Metoprolol to 50 mg PO every 6 hours.   -Continue to monitor on telemetry.   -Replete electrolytes as necessary.   -Due to possibility of the patient needing a heart catheterization this admission, he is anticoagulated with therapeutic enoxaparin.     #Acute diastolic heart failure  -Echo in 2019 showed LVEF of 65% with RVSP of 45.   -His clinical picture is consistent with cor pulmonale, likely secondary to undiagnosed HEATHER.   -He reports a 40 lb weight gain since June.   -675 mL urine output in first 8 hours on Lasix.     -Cardiology consulted.   -Echocardiogram ordered.   -Continue Lasix 40 mg IV BID with a goal of 5-6 L of fluid removal during this admission. Consider increasing Lasix to reach this goal.   -Begin spironolactone 25 mg daily.   -Continue to monitor on telemetry.   -Strict I/Os.   -Daily weight measurements.     #Cellulitis  -Bilateral nonblanching lower extremity erythema. Warm and tender to palpation. Consistent with cellulitis on pitting edema.   -Lower extremity venous US negative for DVTs.     -Continue augmentin and doxycycline.     #Hypothyroidism   -TSH of 11.1.     -Free T4 ordered. Due to current atrial fibrillation, must be cautious with any thyroid hormone replacement.     #Hypertension  -Continue home lisinopril   -Increase metoprolol to 50 mg PO every 6 hours.     VTE prophylaxis: Therapeutic enoxaparin.     Dispo: Inpatient.     Nakul Monsivais, MS4.   [facility]

## 2022-09-02 NOTE — NON-PROVIDER
Daily Progress Note:     Date of Service: 9/2/2022  Primary Team: [facility] IM White Team   Attending: SKYLER Torres M.D.   Senior Resident: Dr. Domingo Ivory MD   Intern: Dr. Paulo Armas MD  Contact:  [phone]    Chief Complaint:   Mr. Maximino Boyer is a 62 year old male that was admitted on 09/01/22 with atrial fibrillation with rapid ventricular response and acute diastolic heart failure.     Interval update:  Overnight, the patient was admitted for a-fib with RVR and acute diastolic HF. Heart better controlled overnight, 80s-100s on metoprolol tartrate 25 mg every 6 hours. He lost 5 pounds in the last 24 hours on IV Laxis 40 BID. Per the chart, he lost 275 cc in urine. Echo demonstrated LVEF of 30%, down from 65% in 2019.     Subjective:   Today, Mr. Green reports no symptomatic improvement. Overnight, he was able to lie flat comfortably, with no shortness of breath or cough. He also reports that his legs appear significantly less swollen than yesterday. He states that he has been collecting all the urine he has produced in the urinal. It appears very dark. He continues to deny syncope, lightheadedness, chest pain, shortness of breath at rest, palpitations, cough, fever or chills.    Consultants/Specialty:  Cardiology     Review of Systems:   Review of Systems   Constitutional:  Negative for chills, diaphoresis and fever.        40 pound weight gain since June.    Eyes:  Negative for blurred vision and double vision.   Respiratory:  Positive for shortness of breath. Negative for cough.    Cardiovascular:  Positive for orthopnea and leg swelling. Negative for chest pain, palpitations and PND.        Orthopnea and leg swelling much improved from yesterday.   Gastrointestinal:  Negative for abdominal pain, heartburn, nausea and vomiting.   Genitourinary:  Negative for dysuria and urgency.   Musculoskeletal:  Negative for myalgias.   Skin:         Bilateral redness of the lower extremity.    Neurological:   Negative for dizziness, sensory change, focal weakness, loss of consciousness and headaches.     Objective Data:   Vitals:   Temp:  [35.8 °C (96.5 °F)-36.6 °C (97.8 °F)] 36.1 °C (96.9 °F)  Pulse:  [] 88  Resp:  [17-18] 17  BP: ()/(52-72) 108/67  SpO2:  [92 %-95 %] 94 %    Physical Exam:   Physical Exam  Constitutional:       General: He is not in acute distress.     Appearance: He is obese.   HENT:      Head: Normocephalic.      Mouth/Throat:      Mouth: Mucous membranes are moist.      Pharynx: No oropharyngeal exudate or posterior oropharyngeal erythema.      Comments: Changes from radiation treatment present in the back of throat.   Eyes:      General: No scleral icterus.     Extraocular Movements: Extraocular movements intact.      Pupils: Pupils are equal, round, and reactive to light.   Neck:      Vascular: No carotid bruit.   Cardiovascular:      Rate and Rhythm: Normal rate. Rhythm irregular.      Heart sounds: No murmur heard.  Pulmonary:      Effort: Pulmonary effort is normal. No respiratory distress.      Breath sounds: No wheezing or rales.   Abdominal:      General: Abdomen is flat. There is no distension.      Palpations: Abdomen is soft.      Tenderness: There is no abdominal tenderness. There is no guarding.   Musculoskeletal:      Cervical back: Normal range of motion.      Right lower leg: Edema present.      Left lower leg: Edema present.      Comments: Nonblanching erythema present in the bilateral legs. Begins just above the feet and terminates in the mid-shin bilaterally. Warm to palpation. Tender to palpation. Improved from yesterday.    Skin:     General: Skin is warm.      Capillary Refill: Capillary refill takes 2 to 3 seconds.      Findings: Erythema present.      Comments: Feet are warm and dry.    Neurological:      General: No focal deficit present.      Mental Status: He is alert.       Labs:   Recent Labs     08/31/22  1616 09/01/22  0621 09/02/22  0757   WBC 5.2 5.8 5.4    RBC 5.46 5.12 4.95   HEMOGLOBIN 17.3 16.1 15.9   HEMATOCRIT 52.3* 50.5 47.4   MCV 95.8 98.6* 95.8   MCH 31.7 31.4 32.1   RDW 49.2 49.8 48.3   PLATELETCT 101* 86* 91*   NEUTSPOLYS 63.70 54.10 59.50   LYMPHOCYTES 18.00* 21.60* 18.30*   MONOCYTES 10.80 17.00* 11.80   EOSINOPHILS 5.80 5.30 8.50*   BASOPHILS 1.50 1.70 1.70       Recent Labs     03/18/22  0818 08/31/22  1616 09/01/22  0718 09/02/22  0757   ALBUMIN 4.0 3.5 3.2 2.8*   HDL 82  --   --   --    TRIGLYCERIDE 68  --   --   --    SODIUM 142 139 138 139   POTASSIUM 4.5 4.9 4.7 4.1   CHLORIDE 106 104 105 107   CO2 23 23 21 22   BUN 10 19 23* 31*   CREATININE 0.70 1.09 1.22 1.33   PHOSPHORUS  --   --   --  4.1       Recent Labs     08/31/22  1234 08/31/22  1616 09/01/22  0718 09/02/22  0757   ASTSGOT  --  47* 43 33   ALTSGPT  --  32 30 23   TBILIRUBIN  --  1.9* 1.6* 0.9   ALKPHOSPHAT  --  74 65 59   GLOBULIN  --  3.1 2.8 2.6   INR 1.47*  --   --   --          Imaging:   EC-ECHOCARDIOGRAM COMPLETE W/ CONT   Final Result      US-EXTREMITY VENOUS LOWER BILAT   Final Result      DX-CHEST-PORTABLE (1 VIEW)   Final Result      1.  Bibasilar atelectasis versus consolidations and associated small to moderate pleural effusions.   2.  Cardiomegaly.           Assessment and plan:     #Atrial fibrillation with rapid ventricular response  -Onset of current episode is unknown.   -Admitted with HR >160.   -Patient currently on 25 mg Metoprolol PO every 6 hours.   -HR, on average, is currently in 80s-100s.    -Echo during this admission showed LVEF of 30%.     -Cardiology consulted, appreciate their recommendations.   -Echocardiogram ordered.   -Continue metoprolol tartrate 25 mg PO every 6 hours.   -Continue to monitor on telemetry.   -Replete electrolytes as necessary.   -Due to possibility of the patient needing a heart catheterization this admission, he is anticoagulated with therapeutic enoxaparin.     #Acute decompensated HFrEF  -Echo in 2019 showed LVEF of 65% with RVSP of  45.   -Echo during this admission showed LVEF of 30%.   -He reports a 40 lb weight gain since June.   -5 lb weight loss in last 24 hours on IV Lasix.   -275 cc out in urine.   -Slight uptrend in his creatinine. 1.22 yesterday, 1.33 today.   -No uptrend in his liver enzymes.   -His lack of response to diuretics is concerning. This could indicate that he is poorly perfusing, which carries a poor prognosis.  -There is a chance that the EF may rise following adequate rate control of his a fib. We recommend a repeat echo following discharge to reassess his baseline.     -Cardiology consulted.   - Lasix increased to 80 mg IV BID with a goal of 5-6 L of fluid removal during this admission. Additionally, metolazone 5 mg daily added for diuretic support.   -Per cardiology, if the patient continues to respond poorly to diuretics, he may require a right heart cath, as well as, inotropic support.   -Continue metoprolol, losartan, and spironolactone.   -Replete electrolytes as necessary.   -Continue to monitor on telemetry.   -Strict I/Os.   -Daily weight measurements.     #Cellulitis  -Bilateral nonblanching lower extremity erythema. Warm and tender to palpation. Consistent with cellulitis on pitting edema. Improved with antibiotics.   -Lower extremity venous US negative for DVTs.     -Continue augmentin and doxycycline.     #Hypothyroidism   -TSH of 11.1.   -Free T4 of 1.01.     -Due to atrial fibrillation, no thyroid hormone replacement indicated at this time.     #Hypertension  -Continue metoprolol, lisinopril, and spironolactone.     VTE prophylaxis: Therapeutic enoxaparin.     Dispo: Inpatient.     Nakul Monsivais, MS4.   [facility]

## 2022-09-03 LAB
ALBUMIN SERPL BCP-MCNC: 3.1 G/DL (ref 3.2–4.9)
ALBUMIN/GLOB SERPL: 1.2 G/DL
ALP SERPL-CCNC: 66 U/L (ref 30–99)
ALT SERPL-CCNC: 21 U/L (ref 2–50)
ANION GAP SERPL CALC-SCNC: 11 MMOL/L (ref 7–16)
ANION GAP SERPL CALC-SCNC: 12 MMOL/L (ref 7–16)
AST SERPL-CCNC: 33 U/L (ref 12–45)
BASOPHILS # BLD AUTO: 1.5 % (ref 0–1.8)
BASOPHILS # BLD: 0.08 K/UL (ref 0–0.12)
BILIRUB SERPL-MCNC: 0.8 MG/DL (ref 0.1–1.5)
BUN SERPL-MCNC: 28 MG/DL (ref 8–22)
BUN SERPL-MCNC: 30 MG/DL (ref 8–22)
CALCIUM SERPL-MCNC: 10 MG/DL (ref 8.5–10.5)
CALCIUM SERPL-MCNC: 9.9 MG/DL (ref 8.5–10.5)
CHLORIDE SERPL-SCNC: 103 MMOL/L (ref 96–112)
CHLORIDE SERPL-SCNC: 104 MMOL/L (ref 96–112)
CO2 SERPL-SCNC: 27 MMOL/L (ref 20–33)
CO2 SERPL-SCNC: 29 MMOL/L (ref 20–33)
CREAT SERPL-MCNC: 1.37 MG/DL (ref 0.5–1.4)
CREAT SERPL-MCNC: 1.39 MG/DL (ref 0.5–1.4)
EOSINOPHIL # BLD AUTO: 0.52 K/UL (ref 0–0.51)
EOSINOPHIL NFR BLD: 10 % (ref 0–6.9)
ERYTHROCYTE [DISTWIDTH] IN BLOOD BY AUTOMATED COUNT: 48.7 FL (ref 35.9–50)
GFR SERPLBLD CREATININE-BSD FMLA CKD-EPI: 57 ML/MIN/1.73 M 2
GFR SERPLBLD CREATININE-BSD FMLA CKD-EPI: 58 ML/MIN/1.73 M 2
GLOBULIN SER CALC-MCNC: 2.5 G/DL (ref 1.9–3.5)
GLUCOSE SERPL-MCNC: 109 MG/DL (ref 65–99)
GLUCOSE SERPL-MCNC: 92 MG/DL (ref 65–99)
HCT VFR BLD AUTO: 50.1 % (ref 42–52)
HGB BLD-MCNC: 16.6 G/DL (ref 14–18)
IMM GRANULOCYTES # BLD AUTO: 0.01 K/UL (ref 0–0.11)
IMM GRANULOCYTES NFR BLD AUTO: 0.2 % (ref 0–0.9)
LYMPHOCYTES # BLD AUTO: 1.15 K/UL (ref 1–4.8)
LYMPHOCYTES NFR BLD: 22.1 % (ref 22–41)
MAGNESIUM SERPL-MCNC: 1.7 MG/DL (ref 1.5–2.5)
MCH RBC QN AUTO: 31.9 PG (ref 27–33)
MCHC RBC AUTO-ENTMCNC: 33.1 G/DL (ref 33.7–35.3)
MCV RBC AUTO: 96.2 FL (ref 81.4–97.8)
MONOCYTES # BLD AUTO: 0.68 K/UL (ref 0–0.85)
MONOCYTES NFR BLD AUTO: 13.1 % (ref 0–13.4)
NEUTROPHILS # BLD AUTO: 2.77 K/UL (ref 1.82–7.42)
NEUTROPHILS NFR BLD: 53.1 % (ref 44–72)
NRBC # BLD AUTO: 0 K/UL
NRBC BLD-RTO: 0 /100 WBC
PHOSPHATE SERPL-MCNC: 4.2 MG/DL (ref 2.5–4.5)
PLATELET # BLD AUTO: 70 K/UL (ref 164–446)
POTASSIUM SERPL-SCNC: 3.2 MMOL/L (ref 3.6–5.5)
POTASSIUM SERPL-SCNC: 3.6 MMOL/L (ref 3.6–5.5)
PROT SERPL-MCNC: 5.6 G/DL (ref 6–8.2)
RBC # BLD AUTO: 5.21 M/UL (ref 4.7–6.1)
SODIUM SERPL-SCNC: 142 MMOL/L (ref 135–145)
SODIUM SERPL-SCNC: 144 MMOL/L (ref 135–145)
WBC # BLD AUTO: 5.2 K/UL (ref 4.8–10.8)

## 2022-09-03 PROCEDURE — 700102 HCHG RX REV CODE 250 W/ 637 OVERRIDE(OP)

## 2022-09-03 PROCEDURE — 83735 ASSAY OF MAGNESIUM: CPT

## 2022-09-03 PROCEDURE — 99232 SBSQ HOSP IP/OBS MODERATE 35: CPT | Mod: GC | Performed by: HOSPITALIST

## 2022-09-03 PROCEDURE — A9270 NON-COVERED ITEM OR SERVICE: HCPCS

## 2022-09-03 PROCEDURE — 99232 SBSQ HOSP IP/OBS MODERATE 35: CPT | Mod: FS | Performed by: INTERNAL MEDICINE

## 2022-09-03 PROCEDURE — A9270 NON-COVERED ITEM OR SERVICE: HCPCS | Performed by: INTERNAL MEDICINE

## 2022-09-03 PROCEDURE — 85025 COMPLETE CBC W/AUTO DIFF WBC: CPT

## 2022-09-03 PROCEDURE — A9270 NON-COVERED ITEM OR SERVICE: HCPCS | Performed by: STUDENT IN AN ORGANIZED HEALTH CARE EDUCATION/TRAINING PROGRAM

## 2022-09-03 PROCEDURE — 700102 HCHG RX REV CODE 250 W/ 637 OVERRIDE(OP): Performed by: NURSE PRACTITIONER

## 2022-09-03 PROCEDURE — 700102 HCHG RX REV CODE 250 W/ 637 OVERRIDE(OP): Performed by: INTERNAL MEDICINE

## 2022-09-03 PROCEDURE — 700102 HCHG RX REV CODE 250 W/ 637 OVERRIDE(OP): Performed by: STUDENT IN AN ORGANIZED HEALTH CARE EDUCATION/TRAINING PROGRAM

## 2022-09-03 PROCEDURE — 770020 HCHG ROOM/CARE - TELE (206)

## 2022-09-03 PROCEDURE — 700111 HCHG RX REV CODE 636 W/ 250 OVERRIDE (IP)

## 2022-09-03 PROCEDURE — 80048 BASIC METABOLIC PNL TOTAL CA: CPT

## 2022-09-03 PROCEDURE — 80053 COMPREHEN METABOLIC PANEL: CPT

## 2022-09-03 PROCEDURE — A9270 NON-COVERED ITEM OR SERVICE: HCPCS | Performed by: NURSE PRACTITIONER

## 2022-09-03 PROCEDURE — 700111 HCHG RX REV CODE 636 W/ 250 OVERRIDE (IP): Performed by: STUDENT IN AN ORGANIZED HEALTH CARE EDUCATION/TRAINING PROGRAM

## 2022-09-03 PROCEDURE — 84100 ASSAY OF PHOSPHORUS: CPT

## 2022-09-03 PROCEDURE — 36415 COLL VENOUS BLD VENIPUNCTURE: CPT

## 2022-09-03 PROCEDURE — 700111 HCHG RX REV CODE 636 W/ 250 OVERRIDE (IP): Performed by: NURSE PRACTITIONER

## 2022-09-03 RX ORDER — POTASSIUM CHLORIDE 20 MEQ/1
20 TABLET, EXTENDED RELEASE ORAL 2 TIMES DAILY
Status: DISCONTINUED | OUTPATIENT
Start: 2022-09-04 | End: 2022-09-06 | Stop reason: HOSPADM

## 2022-09-03 RX ORDER — METOPROLOL SUCCINATE 50 MG/1
50 TABLET, EXTENDED RELEASE ORAL 2 TIMES DAILY
Status: DISCONTINUED | OUTPATIENT
Start: 2022-09-03 | End: 2022-09-05

## 2022-09-03 RX ORDER — MAGNESIUM SULFATE HEPTAHYDRATE 40 MG/ML
2 INJECTION, SOLUTION INTRAVENOUS ONCE
Status: COMPLETED | OUTPATIENT
Start: 2022-09-03 | End: 2022-09-03

## 2022-09-03 RX ORDER — DAPAGLIFLOZIN 10 MG/1
10 TABLET, FILM COATED ORAL DAILY
Status: DISCONTINUED | OUTPATIENT
Start: 2022-09-03 | End: 2022-09-03

## 2022-09-03 RX ORDER — POTASSIUM CHLORIDE 20 MEQ/1
40 TABLET, EXTENDED RELEASE ORAL EVERY 6 HOURS
Status: COMPLETED | OUTPATIENT
Start: 2022-09-03 | End: 2022-09-03

## 2022-09-03 RX ADMIN — POTASSIUM CHLORIDE 40 MEQ: 1500 TABLET, EXTENDED RELEASE ORAL at 07:40

## 2022-09-03 RX ADMIN — FUROSEMIDE 80 MG: 10 INJECTION, SOLUTION INTRAMUSCULAR; INTRAVENOUS at 14:04

## 2022-09-03 RX ADMIN — ROSUVASTATIN CALCIUM 10 MG: 10 TABLET, COATED ORAL at 17:37

## 2022-09-03 RX ADMIN — METOPROLOL TARTRATE 25 MG: 25 TABLET, FILM COATED ORAL at 01:06

## 2022-09-03 RX ADMIN — DOXYCYCLINE 100 MG: 100 TABLET, FILM COATED ORAL at 05:43

## 2022-09-03 RX ADMIN — AMOXICILLIN AND CLAVULANATE POTASSIUM 1 TABLET: 875; 125 TABLET, FILM COATED ORAL at 17:37

## 2022-09-03 RX ADMIN — LOSARTAN POTASSIUM 25 MG: 25 TABLET, FILM COATED ORAL at 17:38

## 2022-09-03 RX ADMIN — ASPIRIN 81 MG: 81 TABLET, CHEWABLE ORAL at 05:43

## 2022-09-03 RX ADMIN — METOPROLOL SUCCINATE 50 MG: 50 TABLET, EXTENDED RELEASE ORAL at 17:36

## 2022-09-03 RX ADMIN — SPIRONOLACTONE 25 MG: 25 TABLET ORAL at 05:44

## 2022-09-03 RX ADMIN — METOLAZONE 5 MG: 5 TABLET ORAL at 05:43

## 2022-09-03 RX ADMIN — MAGNESIUM SULFATE HEPTAHYDRATE 2 G: 40 INJECTION, SOLUTION INTRAVENOUS at 07:40

## 2022-09-03 RX ADMIN — FUROSEMIDE 80 MG: 10 INJECTION, SOLUTION INTRAMUSCULAR; INTRAVENOUS at 05:44

## 2022-09-03 RX ADMIN — POTASSIUM CHLORIDE 40 MEQ: 1500 TABLET, EXTENDED RELEASE ORAL at 12:35

## 2022-09-03 RX ADMIN — METOPROLOL TARTRATE 25 MG: 25 TABLET, FILM COATED ORAL at 05:44

## 2022-09-03 RX ADMIN — ENOXAPARIN SODIUM 150 MG: 150 INJECTION SUBCUTANEOUS at 17:36

## 2022-09-03 RX ADMIN — ENOXAPARIN SODIUM 150 MG: 150 INJECTION SUBCUTANEOUS at 05:44

## 2022-09-03 RX ADMIN — AMOXICILLIN AND CLAVULANATE POTASSIUM 1 TABLET: 875; 125 TABLET, FILM COATED ORAL at 05:43

## 2022-09-03 RX ADMIN — METOPROLOL TARTRATE 25 MG: 25 TABLET, FILM COATED ORAL at 12:35

## 2022-09-03 ASSESSMENT — ENCOUNTER SYMPTOMS
NERVOUS/ANXIOUS: 0
SHORTNESS OF BREATH: 0
SPEECH CHANGE: 0
ABDOMINAL PAIN: 0
DOUBLE VISION: 0
ORTHOPNEA: 0
STRIDOR: 0
COUGH: 0
BLOOD IN STOOL: 0
CHOKING: 0
WHEEZING: 0
FEVER: 0
BRUISES/BLEEDS EASILY: 0
CONSTIPATION: 0
CHILLS: 0
SORE THROAT: 0
APNEA: 0
SENSORY CHANGE: 0
BACK PAIN: 0
CHEST TIGHTNESS: 0

## 2022-09-03 ASSESSMENT — FIBROSIS 4 INDEX
FIB4 SCORE: 6.38
FIB4 SCORE: 6.38

## 2022-09-03 NOTE — CARE PLAN
Problem: Care Map:  Admission Optimal Outcome for the Heart Failure Patient  Goal: Admission:  Optimal Care of the heart failure patient  Outcome: Progressing     Problem: Care Map:  Day 1 Optimal Outcome for the Heart Failure Patient  Goal: Day 1:  Optimal Care of the heart failure patient  Outcome: Progressing     Problem: Care Map:  Day 2 Optimal Outcome for the Heart Failure Patient  Goal: Day 2:  Optimal Care of the heart failure patient  Outcome: Progressing     Problem: Care Map:  Day 3 Optimal Outcome for the Heart Failure Patient  Goal: Day 3:  Optimal Care of the heart failure patient  Outcome: Progressing     Problem: Care Map:  Day Before Discharge Optimal Outcome for the Heart Failure Patient  Goal: Day Before Discharge:  Optimal Care of the heart failure patient  Outcome: Progressing     Problem: Care Map:  Day of Discharge Optimal Outcome for the Heart Failure Patient  Goal: Day of Discharge:  Optimal Care of the heart failure patient  Outcome: Progressing     Problem: Knowledge Deficit - Standard  Goal: Patient and family/care givers will demonstrate understanding of plan of care, disease process/condition, diagnostic tests and medications  Outcome: Progressing     Problem: Fall Risk  Goal: Patient will remain free from falls  Outcome: Progressing   The patient is Watcher - Medium risk of patient condition declining or worsening    Shift Goals  Clinical Goals: HR control, hemodynamic stability  Patient Goals: rest  Family Goals: n/a    Progress made toward(s) clinical / shift goals: VS stable, HR controlled, good urine output.     Patient is not progressing towards the following goals: Continuing aggressive diuresis.

## 2022-09-03 NOTE — PROGRESS NOTES
[facility] Internal Medicine Daily Progress Note    Date of Service  9/3/2022    R Team: [facility] IM White Team   Attending: SKYLER Torres M.d.  Senior Resident: Dr. Ivory  Intern:  Dr. Armas  Contact Number: [phone]    Chief Complaint  Maximino Green is a 62 y.o. male admitted 8/31/2022 with Afib with RVR and volume overload in the context of a likely acute exacerbation of CHF.    Hospital Course  Maximino Green is a 62 y.o. male with history of paroxysmal A. 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 fib with RVR and acute systolic heart failure with ejection fraction of 30%.  Ongoing diuresis and titration of beta-blockers for rate control and other heart failure medications with improving volume status.    Interval Problem Update  Ports continued improvement in respiratory status and ability to lay flat.  Does not feel short of breath at rest.  Leg swelling is stable since yesterday.  Adequate urine output with 3.5 L yesterday. He continues to deny syncope, lightheadedness, chest pain, shortness of breath at rest, palpitations, cough, fever or chills.    I have discussed this patient's plan of care and discharge plan at IDT rounds today with Case Management, Nursing, Nursing leadership, and other members of the IDT team.    Consultants/Specialty  cardiology    Code Status  Full Code    Disposition  Patient is not medically cleared for discharge.   Anticipate discharge to to home with close outpatient follow-up.  I have placed the appropriate orders for post-discharge needs.    Review of Systems  Review of Systems   Constitutional:  Negative for chills and fever.   HENT:  Negative for sore throat.    Eyes:  Negative for double vision.   Respiratory:  Negative for cough and wheezing.    Cardiovascular:  Positive for leg swelling. Negative for chest pain and orthopnea.   Gastrointestinal:  Negative  for abdominal pain, blood in stool and constipation.   Genitourinary:  Negative for dysuria and urgency.   Musculoskeletal:  Negative for back pain.   Neurological:  Negative for sensory change and speech change.   Endo/Heme/Allergies:  Does not bruise/bleed easily.   Psychiatric/Behavioral:  The patient is not nervous/anxious.       Physical Exam  Temp:  [35.8 °C (96.5 °F)-36.1 °C (97 °F)] 36.1 °C (97 °F)  Pulse:  [74-96] 74  Resp:  [15-17] 17  BP: (101-107)/(53-77) 101/53  SpO2:  [90 %-95 %] 94 %    Physical Exam  Constitutional:       Appearance: He is obese.   HENT:      Head: Normocephalic.      Mouth/Throat:      Mouth: Mucous membranes are moist.   Eyes:      General: No scleral icterus.  Cardiovascular:      Rate and Rhythm: Normal rate. Rhythm irregular.   Pulmonary:      Breath sounds: No wheezing or rales.   Abdominal:      General: There is distension.   Musculoskeletal:      Right lower leg: Edema present.      Left lower leg: Edema present.      Comments: Mild erythema over BL Shins, no warmth, swelling improved from yesterday   Skin:     General: Skin is warm and dry.   Neurological:      Mental Status: He is oriented to person, place, and time.      Cranial Nerves: No cranial nerve deficit.   Psychiatric:         Mood and Affect: Mood normal.         Behavior: Behavior normal.       Fluids    Intake/Output Summary (Last 24 hours) at 9/3/2022 1604  Last data filed at 9/3/2022 1418  Gross per 24 hour   Intake --   Output 7375 ml   Net -7375 ml       Laboratory  Recent Labs     09/01/22  0621 09/02/22 0757 09/03/22  0159   WBC 5.8 5.4 5.2   RBC 5.12 4.95 5.21   HEMOGLOBIN 16.1 15.9 16.6   HEMATOCRIT 50.5 47.4 50.1   MCV 98.6* 95.8 96.2   MCH 31.4 32.1 31.9   MCHC 31.9* 33.5* 33.1*   RDW 49.8 48.3 48.7   PLATELETCT 86* 91* 70*     Recent Labs     09/02/22  0757 09/03/22  0159 09/03/22  1436   SODIUM 139 142 144   POTASSIUM 4.1 3.2* 3.6   CHLORIDE 107 103 104   CO2 22 27 29   GLUCOSE 86 92 109*   BUN 31*  30* 28*   CREATININE 1.33 1.39 1.37   CALCIUM 9.5 9.9 10.0                     Imaging  EC-ECHOCARDIOGRAM COMPLETE W/ CONT   Final Result      US-EXTREMITY VENOUS LOWER BILAT   Final Result      DX-CHEST-PORTABLE (1 VIEW)   Final Result      1.  Bibasilar atelectasis versus consolidations and associated small to moderate pleural effusions.   2.  Cardiomegaly.         * A-fib (HCC)- (present on admission)  Assessment & Plan  Onset of the current paroxysmal episode is unknown. Has remained in afib throughout admission, now with better rate control 80s-100s. Denies palpitations or chest pain.   - Continue metoprolol XL 25 mg BID  - Due to possibility of the patient needing a heart catheterization this admission, he is anticoagulated with therapeutic enoxaparin. Will transition to DOAC on discharge.  - Monitor on telemetry  - Monitor and replace electrolytes    Acute systolic heart failure (HCC)  Assessment & Plan  Probably secondary to uncontrolled heart rate. Had a previous diagnosis of heart failure with preserved ejection fraction. Echo in 2019 showed LVEF of 65% with RVSP of 45. Echo during this admission showed reduced systolic function with ejection fraction of 30%. He reports a 40 lb weight gain since June.  Respiratory status and volume status is gradually improving with aggressive diuresis, but remains hypervolemic.   Cardiology following:   -There is a chance that the EF may rise following adequate rate control of his a fib. We recommend a repeat echo following discharge to reassess his baseline.    - Lasix 80 mg IV BID with a goal of 5-6 L of fluid removal during this admission.    - Metolazone 5 mg daily    -Transition his metoprolol to XL formulation scheduled twice daily    - If renal function stable tomorrow, consider initiating dapagliflozin   - Evaluate start of Entresto at discharge if BP allows    - Ongoing discussion regarding need for heart cath  -Continue metoprolol, losartan, and spironolactone.    -Replete electrolytes as necessary.   -Continue to monitor on telemetry.   -Strict I/Os.   -Daily weight measurements.   - Low sodium diet      Hypothyroidism  Assessment & Plan  TSH elevated at 11.1.   However, free T4 is at the lower range of normal at 1.01.   -Due to atrial fibrillation, no thyroid hormone replacement indicated at this time  -Outpatient follow-up for reevaluation and consideration of treatment    Cellulitis  Assessment & Plan  Bilateral nonblanching lower extremity erythema. Warm and tender to palpation. Consistent with cellulitis on pitting edema. Improved with antibiotics and diuresis. Lower extremity venous US negative for DVTs.   -Continue augmentin  - Discontinue doxycycline as MRSA nares are negative    Thrombocytopenia (HCC)  Assessment & Plan  Probably secondary to chronic liver disease.  Chronic  Monitor    Tonsil cancer (HCC)- (present on admission)  Assessment & Plan  Reported in remission, status post chemoradiation in 2019  Follow-up with Dr. Whitfield yearly.    Essential hypertension- (present on admission)  Assessment & Plan  -Continue metoprolol, lisinopril, and spironolactone.        VTE prophylaxis: therapeutic anticoagulation with apixaban    I have performed a physical exam and reviewed and updated ROS and Plan today (9/3/2022). In review of yesterday's note (9/2/2022), there are no changes except as documented above.    Electronically signed by Paulo Armas MD, 9/3/2022 4:04 PM  Internal Medicine PGY1

## 2022-09-03 NOTE — PROGRESS NOTES
Cardiology Follow Up Progress Note    Date of Service  9/3/2022    Attending Physician  SKYLER Torres M.D.    Chief Complaint     Presented with worsening LE edema, LAMA, orthopnea, weight gain ~40 pounds since June.      Cardiology consultation for evaluation of A. fib RVR, rate 160's.    HPI  Maximino Green is a 62 y.o. male admitted 8/31/2022 with new worsening HF symptoms , found to be in A. fib RVR, rate 160.    PMH: Paroxysmal A. 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    Interim Events  No overnight cardiac events.  Telemetry-A. fib rate low 100s.  Effective diuresis overnight  I/O =-4900  Keep K>4, Mg>2  BP appropriate    Review of Systems  Review of Systems   Respiratory:  Negative for apnea, cough, choking, chest tightness, shortness of breath, wheezing and stridor.    Cardiovascular:  Positive for leg swelling.     Vital signs in last 24 hours  Temp:  [35.8 °C (96.5 °F)-36.1 °C (97 °F)] 35.8 °C (96.5 °F)  Pulse:  [78-96] 87  Resp:  [15-17] 16  BP: (101-108)/(63-77) 101/63  SpO2:  [90 %-95 %] 94 %    Physical Exam  Physical Exam  Neck:      Vascular: JVD present.   Cardiovascular:      Rate and Rhythm: Normal rate. Rhythm irregular.      Pulses: Normal pulses.   Pulmonary:      Breath sounds: No wheezing.   Musculoskeletal:      Right lower leg: 3+ Edema present.      Left lower leg: 3+ Edema present.   Skin:     General: Skin is warm.      Comments: Lower extremity with mild cellulitis   Neurological:      Mental Status: He is alert.   Psychiatric:         Mood and Affect: Mood normal.       Lab Review  Lab Results   Component Value Date/Time    WBC 5.2 09/03/2022 01:59 AM    RBC 5.21 09/03/2022 01:59 AM    HEMOGLOBIN 16.6 09/03/2022 01:59 AM    HEMATOCRIT 50.1 09/03/2022 01:59 AM    MCV 96.2 09/03/2022 01:59 AM    MCH 31.9 09/03/2022 01:59 AM    MCHC 33.1 (L) 09/03/2022 01:59 AM    MPV 15.0 (H)  03/18/2022 08:18 AM      Lab Results   Component Value Date/Time    SODIUM 142 09/03/2022 01:59 AM    POTASSIUM 3.2 (L) 09/03/2022 01:59 AM    CHLORIDE 103 09/03/2022 01:59 AM    CO2 27 09/03/2022 01:59 AM    GLUCOSE 92 09/03/2022 01:59 AM    BUN 30 (H) 09/03/2022 01:59 AM    CREATININE 1.39 09/03/2022 01:59 AM      Lab Results   Component Value Date/Time    ASTSGOT 33 09/03/2022 01:59 AM    ALTSGPT 21 09/03/2022 01:59 AM     Lab Results   Component Value Date/Time    CHOLSTRLTOT 215 (H) 03/18/2022 08:18 AM     (H) 03/18/2022 08:18 AM    HDL 82 03/18/2022 08:18 AM    TRIGLYCERIDE 68 03/18/2022 08:18 AM    TROPONINT 36 (H) 09/01/2022 12:24 PM       Recent Labs     08/31/22  1616 09/01/22  0718   NTPROBNP 1709* 2515*         Cardiac Imaging and Procedures Review  EKG:  My personal interpretation of the EKG dated 8/21/2022 is A. fib RVR, rate 165    Echocardiogram:   No prior study is available for comparison.   Moderately reduced left ventricular systolic function.   The left ventricular ejection fraction is estimated to be 30%.   Wall motion is difficult to assess with the limitations of image   quality, even with the use of echo contrast.  Reduced right ventricular systolic function.  Mild mitral regurgitation.  Estimated right ventricular systolic pressure is 30 mmHg.  Dilated inferior vena cava without inspiratory collapse.        Cardiac Catheterization: Not applicable    Imaging  Chest X-Ray:   1.  Bibasilar atelectasis versus consolidations and associated small to moderate pleural effusions.  2.  Cardiomegaly.    Stress Test: Not applicable    Assessment/Plan    #New cardiomyopathy LVEF 30% .  # Afib RVR, rate 160 on presentation.  #HFrEF NYHA class III stage C. Acute.  # #BMI 42.  #Hypertension.  #Lower extremity cellulitis, on Augmentin and doxycycline.  #Thrombocytopenia, chronic, platelet count currently 86.  # Tonsillar cancer 2019 s/p chemoradiation, in remission.    Recommendations    #Continue  with IV diuresis furosemide 80 mg IV twice daily.  #Continue with metolazone 5 mg daily.  #Keep K above 4, mag above 2.  #Currently on therapeutic Lovenox.  #Continue Metroprolol 25 mg every 6 hours, transition to Toprol-XL on discharge.  #Continue losartan 25 mg & spironolactone 25 mg.  # Evaluate start of Entresto at discharge if BP allows.  # Consider start of SGLT2 inhibitor, Farxiga 10 mg daily.  #Daily standing weight  #Strict intake and output      Cardiology will follow along    I personally spent a total of 15 minutes which includes face-to-face time and non-face-to-face time spent on preparing to see the patient, reviewing hospital notes and tests, obtaining history from the patient, performing a medically appropriate exam, counseling and educating the patient, ordering medications/tests/procedures/referrals as clinically indicated, and documenting information in the electronic medical record.       Thank you for allowing me to participate in the care of this patient.  I will continue to follow this patient    Please contact me with any questions.    DUSTIN Willis.   Cardiologist, [facility]  [phone]

## 2022-09-03 NOTE — PROGRESS NOTES
[facility] Internal Medicine Daily Progress Note    Date of Service  9/2/2022    R Team: R IM White Team   Attending: SKYLER Torres M.d.  Senior Resident: Dr. Ivory  Intern:  Dr. Armas  Contact Number: [phone]    Chief Complaint  Maximino Green is a 62 y.o. male admitted 8/31/2022 with Afib with RVR and volume overload in the context of a likely acute exacerbation of CHF.    Hospital Course  Maximino Green is a 62 y.o. male with history of paroxysmal A. 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 fib with RVR and acute systolic heart failure with ejection fraction of 30%.  Ongoing diuresis and titration of beta-blockers for rate control and other heart failure medications with improving volume status.    Interval Problem Update  Heart better controlled overnight, 80s-100s on metoprolol tartrate 50 mg every 6 hours. He lost 5 pounds in the last 24 hours on IV Laxis 40 BID. Echo demonstrated LVEF of 30%, down from 65% in 2019.    Today, Mr. Green reports no symptomatic improvement. Overnight, he was able to lie flat comfortably, with no shortness of breath or cough. He also reports that his legs appear significantly less swollen than yesterday. He states that he has been collecting all the urine he has produced in the urinal. It appears very dark. He continues to deny syncope, lightheadedness, chest pain, shortness of breath at rest, palpitations, cough, fever or chills.    I have discussed this patient's plan of care and discharge plan at IDT rounds today with Case Management, Nursing, Nursing leadership, and other members of the IDT team.    Consultants/Specialty  cardiology    Code Status  Full Code    Disposition  Patient is not medically cleared for discharge.   Anticipate discharge to to home with close outpatient follow-up.  I have placed the appropriate orders for post-discharge  needs.    Review of Systems  Review of Systems   Constitutional:  Negative for chills and fever.   HENT:  Negative for sore throat.    Eyes:  Negative for double vision.   Respiratory:  Negative for cough and wheezing.    Cardiovascular:  Positive for leg swelling. Negative for chest pain and orthopnea.   Gastrointestinal:  Negative for abdominal pain, blood in stool and constipation.   Genitourinary:  Negative for dysuria and urgency.   Musculoskeletal:  Negative for back pain.   Neurological:  Negative for sensory change and speech change.   Endo/Heme/Allergies:  Does not bruise/bleed easily.   Psychiatric/Behavioral:  The patient is not nervous/anxious.       Physical Exam  Temp:  [35.9 °C (96.6 °F)-36.2 °C (97.2 °F)] 36 °C (96.8 °F)  Pulse:  [] 88  Resp:  [16-18] 16  BP: ()/(52-72) 107/67  SpO2:  [90 %-94 %] 90 %    Physical Exam  Constitutional:       Appearance: He is obese.   HENT:      Head: Normocephalic.      Mouth/Throat:      Mouth: Mucous membranes are moist.   Eyes:      General: No scleral icterus.  Cardiovascular:      Rate and Rhythm: Normal rate. Rhythm irregular.   Pulmonary:      Breath sounds: No wheezing or rales.   Abdominal:      General: There is distension.   Musculoskeletal:      Right lower leg: Edema present.      Left lower leg: Edema present.      Comments: Mild erythema over BL Shins, no warmth, swelling improved from yesterday   Skin:     General: Skin is warm and dry.   Neurological:      Mental Status: He is oriented to person, place, and time.      Cranial Nerves: No cranial nerve deficit.   Psychiatric:         Mood and Affect: Mood normal.         Behavior: Behavior normal.       Fluids    Intake/Output Summary (Last 24 hours) at 9/2/2022 1915  Last data filed at 9/2/2022 1857  Gross per 24 hour   Intake 240 ml   Output 2625 ml   Net -2385 ml       Laboratory  Recent Labs     08/31/22  1616 09/01/22  0621 09/02/22  0757   WBC 5.2 5.8 5.4   RBC 5.46 5.12 4.95    HEMOGLOBIN 17.3 16.1 15.9   HEMATOCRIT 52.3* 50.5 47.4   MCV 95.8 98.6* 95.8   MCH 31.7 31.4 32.1   MCHC 33.1* 31.9* 33.5*   RDW 49.2 49.8 48.3   PLATELETCT 101* 86* 91*     Recent Labs     08/31/22  1616 09/01/22  0718 09/02/22  0757   SODIUM 139 138 139   POTASSIUM 4.9 4.7 4.1   CHLORIDE 104 105 107   CO2 23 21 22   GLUCOSE 79 95 86   BUN 19 23* 31*   CREATININE 1.09 1.22 1.33   CALCIUM 10.0 9.8 9.5     Recent Labs     08/31/22  1234   APTT 32.5   INR 1.47*               Imaging  EC-ECHOCARDIOGRAM COMPLETE W/ CONT   Final Result      US-EXTREMITY VENOUS LOWER BILAT   Final Result      DX-CHEST-PORTABLE (1 VIEW)   Final Result      1.  Bibasilar atelectasis versus consolidations and associated small to moderate pleural effusions.   2.  Cardiomegaly.         * A-fib (HCC)- (present on admission)  Assessment & Plan  Onset of the current paroxysmal episode is unknown. Has remained in afib throughout admission, now with better rate control 80s-100s. Denies palpitations or chest pain.   - Continue metoprolol 25 mg every 6 hours per cardiology recommendation.    - -Due to possibility of the patient needing a heart catheterization this admission, he is anticoagulated with therapeutic enoxaparin. Will transition to DOAC on discharge.  - Monitor on telemetry  - Monitor and replace electrolytes    Acute systolic heart failure (HCC)  Assessment & Plan  Probably secondary to uncontrolled heart rate. Had a previous diagnosis of heart failure with preserved ejection fraction. Echo in 2019 showed LVEF of 65% with RVSP of 45. Echo during this admission showed reduced systolic function with ejection fraction of 30%. He reports a 40 lb weight gain since June.  Respiratory status and volume status is gradually improving with aggressive diuresis, but remains hypervolemic.   Cardiology following:   -His lack of response to diuretics is concerning. This could indicate that he is poorly perfusing, which carries a poor  prognosis.   -There is a chance that the EF may rise following adequate rate control of his a fib. We recommend a repeat echo following discharge to reassess his baseline.    - Lasix increased to 80 mg IV BID with a goal of 5-6 L of fluid removal during this admission.    - Metolazone 5 mg daily added for diuretic support.    -If the patient continues to respond poorly to diuretics, he may require a right heart cath, as well as, inotropic support.   -Continue metoprolol, losartan, and spironolactone.   -Replete electrolytes as necessary.   -Continue to monitor on telemetry.   -Strict I/Os.   -Daily weight measurements.   - Low sodium diet      Hypothyroidism  Assessment & Plan  TSH elevated at 11.1.   However, free T4 is at the lower range of normal at 1.01.   -Due to atrial fibrillation, no thyroid hormone replacement indicated at this time  -Outpatient follow-up for reevaluation and consideration of treatment    Cellulitis  Assessment & Plan  Bilateral nonblanching lower extremity erythema. Warm and tender to palpation. Consistent with cellulitis on pitting edema. Improved with antibiotics and diuresis. Lower extremity venous US negative for DVTs.   -Continue augmentin and doxycycline.     Thrombocytopenia (HCC)  Assessment & Plan  Probably secondary to chronic liver disease.  Chronic  Monitor    Tonsil cancer (HCC)- (present on admission)  Assessment & Plan  Reported in remission, status post chemoradiation in 2019  Follow-up with Dr. Whitfield yearly.    Essential hypertension- (present on admission)  Assessment & Plan  -Continue metoprolol, lisinopril, and spironolactone.        VTE prophylaxis: therapeutic anticoagulation with apixaban    I have performed a physical exam and reviewed and updated ROS and Plan today (9/2/2022). In review of yesterday's note (9/1/2022), there are no changes except as documented above.

## 2022-09-03 NOTE — ASSESSMENT & PLAN NOTE
TSH elevated at 11.1.   However, free T4 is at the lower range of normal at 1.01.   -Due to atrial fibrillation, no thyroid hormone replacement indicated at this time  -Outpatient follow-up for reevaluation and consideration of treatment

## 2022-09-03 NOTE — PROGRESS NOTES
Report received from [facility] shift RN, assumed patient care. Patient is calmly resting in bed, no signs of distress, even and unlabored breathing noted. Pt on 2L O2 via NC. Tele box on and in place. Patient has call light within reach, fall precautions in place. Patient remains safe and stable, will continue to monitor.

## 2022-09-03 NOTE — HOSPITAL COURSE
Maximino Green is a 62 y.o. male with history of paroxysmal A. 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 fib with RVR and acute systolic heart failure with ejection fraction of 30%.  Ongoing diuresis and titration of beta-blockers for rate control and other heart failure medications with improving volume status.

## 2022-09-04 LAB
ANION GAP SERPL CALC-SCNC: 8 MMOL/L (ref 7–16)
BASOPHILS # BLD AUTO: 1.2 % (ref 0–1.8)
BASOPHILS # BLD: 0.07 K/UL (ref 0–0.12)
BUN SERPL-MCNC: 26 MG/DL (ref 8–22)
CALCIUM SERPL-MCNC: 10.1 MG/DL (ref 8.5–10.5)
CHLORIDE SERPL-SCNC: 97 MMOL/L (ref 96–112)
CO2 SERPL-SCNC: 32 MMOL/L (ref 20–33)
CREAT SERPL-MCNC: 0.96 MG/DL (ref 0.5–1.4)
EOSINOPHIL # BLD AUTO: 0.43 K/UL (ref 0–0.51)
EOSINOPHIL NFR BLD: 7.1 % (ref 0–6.9)
ERYTHROCYTE [DISTWIDTH] IN BLOOD BY AUTOMATED COUNT: 45.2 FL (ref 35.9–50)
GFR SERPLBLD CREATININE-BSD FMLA CKD-EPI: 89 ML/MIN/1.73 M 2
GLUCOSE SERPL-MCNC: 81 MG/DL (ref 65–99)
HCT VFR BLD AUTO: 50.1 % (ref 42–52)
HGB BLD-MCNC: 17.3 G/DL (ref 14–18)
IMM GRANULOCYTES # BLD AUTO: 0.02 K/UL (ref 0–0.11)
IMM GRANULOCYTES NFR BLD AUTO: 0.3 % (ref 0–0.9)
LYMPHOCYTES # BLD AUTO: 0.89 K/UL (ref 1–4.8)
LYMPHOCYTES NFR BLD: 14.7 % (ref 22–41)
MAGNESIUM SERPL-MCNC: 1.4 MG/DL (ref 1.5–2.5)
MCH RBC QN AUTO: 31.8 PG (ref 27–33)
MCHC RBC AUTO-ENTMCNC: 34.5 G/DL (ref 33.7–35.3)
MCV RBC AUTO: 92.1 FL (ref 81.4–97.8)
MONOCYTES # BLD AUTO: 0.77 K/UL (ref 0–0.85)
MONOCYTES NFR BLD AUTO: 12.7 % (ref 0–13.4)
NEUTROPHILS # BLD AUTO: 3.89 K/UL (ref 1.82–7.42)
NEUTROPHILS NFR BLD: 64 % (ref 44–72)
NRBC # BLD AUTO: 0 K/UL
NRBC BLD-RTO: 0 /100 WBC
PHOSPHATE SERPL-MCNC: 3.6 MG/DL (ref 2.5–4.5)
PLATELET # BLD AUTO: 84 K/UL (ref 164–446)
POTASSIUM SERPL-SCNC: 3.6 MMOL/L (ref 3.6–5.5)
RBC # BLD AUTO: 5.44 M/UL (ref 4.7–6.1)
SODIUM SERPL-SCNC: 137 MMOL/L (ref 135–145)
WBC # BLD AUTO: 6.1 K/UL (ref 4.8–10.8)

## 2022-09-04 PROCEDURE — A9270 NON-COVERED ITEM OR SERVICE: HCPCS

## 2022-09-04 PROCEDURE — 700111 HCHG RX REV CODE 636 W/ 250 OVERRIDE (IP): Performed by: STUDENT IN AN ORGANIZED HEALTH CARE EDUCATION/TRAINING PROGRAM

## 2022-09-04 PROCEDURE — 85025 COMPLETE CBC W/AUTO DIFF WBC: CPT

## 2022-09-04 PROCEDURE — A9270 NON-COVERED ITEM OR SERVICE: HCPCS | Performed by: NURSE PRACTITIONER

## 2022-09-04 PROCEDURE — 99232 SBSQ HOSP IP/OBS MODERATE 35: CPT | Mod: FS | Performed by: INTERNAL MEDICINE

## 2022-09-04 PROCEDURE — 700111 HCHG RX REV CODE 636 W/ 250 OVERRIDE (IP): Performed by: NURSE PRACTITIONER

## 2022-09-04 PROCEDURE — 700111 HCHG RX REV CODE 636 W/ 250 OVERRIDE (IP)

## 2022-09-04 PROCEDURE — 83735 ASSAY OF MAGNESIUM: CPT

## 2022-09-04 PROCEDURE — 99233 SBSQ HOSP IP/OBS HIGH 50: CPT | Mod: GC | Performed by: HOSPITALIST

## 2022-09-04 PROCEDURE — 700102 HCHG RX REV CODE 250 W/ 637 OVERRIDE(OP): Performed by: STUDENT IN AN ORGANIZED HEALTH CARE EDUCATION/TRAINING PROGRAM

## 2022-09-04 PROCEDURE — 770020 HCHG ROOM/CARE - TELE (206)

## 2022-09-04 PROCEDURE — 80048 BASIC METABOLIC PNL TOTAL CA: CPT

## 2022-09-04 PROCEDURE — A9270 NON-COVERED ITEM OR SERVICE: HCPCS | Performed by: INTERNAL MEDICINE

## 2022-09-04 PROCEDURE — 700102 HCHG RX REV CODE 250 W/ 637 OVERRIDE(OP): Performed by: INTERNAL MEDICINE

## 2022-09-04 PROCEDURE — 700102 HCHG RX REV CODE 250 W/ 637 OVERRIDE(OP): Performed by: NURSE PRACTITIONER

## 2022-09-04 PROCEDURE — 84100 ASSAY OF PHOSPHORUS: CPT

## 2022-09-04 PROCEDURE — 36415 COLL VENOUS BLD VENIPUNCTURE: CPT

## 2022-09-04 PROCEDURE — A9270 NON-COVERED ITEM OR SERVICE: HCPCS | Performed by: STUDENT IN AN ORGANIZED HEALTH CARE EDUCATION/TRAINING PROGRAM

## 2022-09-04 PROCEDURE — 700102 HCHG RX REV CODE 250 W/ 637 OVERRIDE(OP)

## 2022-09-04 RX ORDER — DAPAGLIFLOZIN 10 MG/1
10 TABLET, FILM COATED ORAL DAILY
Status: DISCONTINUED | OUTPATIENT
Start: 2022-09-04 | End: 2022-09-06 | Stop reason: HOSPADM

## 2022-09-04 RX ORDER — DOXYCYCLINE 100 MG/1
100 TABLET ORAL EVERY 12 HOURS
Status: DISCONTINUED | OUTPATIENT
Start: 2022-09-04 | End: 2022-09-06 | Stop reason: HOSPADM

## 2022-09-04 RX ORDER — MAGNESIUM SULFATE HEPTAHYDRATE 40 MG/ML
4 INJECTION, SOLUTION INTRAVENOUS ONCE
Status: COMPLETED | OUTPATIENT
Start: 2022-09-04 | End: 2022-09-04

## 2022-09-04 RX ADMIN — DAPAGLIFLOZIN 10 MG: 10 TABLET, FILM COATED ORAL at 08:45

## 2022-09-04 RX ADMIN — ROSUVASTATIN CALCIUM 10 MG: 10 TABLET, COATED ORAL at 17:44

## 2022-09-04 RX ADMIN — METOLAZONE 5 MG: 5 TABLET ORAL at 05:34

## 2022-09-04 RX ADMIN — FUROSEMIDE 80 MG: 10 INJECTION, SOLUTION INTRAMUSCULAR; INTRAVENOUS at 05:34

## 2022-09-04 RX ADMIN — POTASSIUM CHLORIDE 20 MEQ: 1500 TABLET, EXTENDED RELEASE ORAL at 05:34

## 2022-09-04 RX ADMIN — FUROSEMIDE 80 MG: 10 INJECTION, SOLUTION INTRAMUSCULAR; INTRAVENOUS at 13:47

## 2022-09-04 RX ADMIN — AMOXICILLIN AND CLAVULANATE POTASSIUM 1 TABLET: 875; 125 TABLET, FILM COATED ORAL at 17:43

## 2022-09-04 RX ADMIN — AMOXICILLIN AND CLAVULANATE POTASSIUM 1 TABLET: 875; 125 TABLET, FILM COATED ORAL at 05:34

## 2022-09-04 RX ADMIN — LOSARTAN POTASSIUM 25 MG: 25 TABLET, FILM COATED ORAL at 17:44

## 2022-09-04 RX ADMIN — APIXABAN 5 MG: 5 TABLET, FILM COATED ORAL at 17:44

## 2022-09-04 RX ADMIN — METOPROLOL SUCCINATE 50 MG: 50 TABLET, EXTENDED RELEASE ORAL at 05:34

## 2022-09-04 RX ADMIN — ENOXAPARIN SODIUM 150 MG: 150 INJECTION SUBCUTANEOUS at 05:34

## 2022-09-04 RX ADMIN — MAGNESIUM SULFATE HEPTAHYDRATE 4 G: 40 INJECTION, SOLUTION INTRAVENOUS at 08:45

## 2022-09-04 RX ADMIN — SPIRONOLACTONE 25 MG: 25 TABLET ORAL at 05:34

## 2022-09-04 RX ADMIN — METOPROLOL SUCCINATE 50 MG: 50 TABLET, EXTENDED RELEASE ORAL at 17:44

## 2022-09-04 RX ADMIN — DOXYCYCLINE 100 MG: 100 TABLET, FILM COATED ORAL at 12:32

## 2022-09-04 RX ADMIN — POTASSIUM CHLORIDE 20 MEQ: 1500 TABLET, EXTENDED RELEASE ORAL at 17:44

## 2022-09-04 ASSESSMENT — ENCOUNTER SYMPTOMS
CHOKING: 0
ORTHOPNEA: 0
CHILLS: 0
SHORTNESS OF BREATH: 0
COUGH: 0
BRUISES/BLEEDS EASILY: 0
BLURRED VISION: 0
ABDOMINAL PAIN: 0
CHEST TIGHTNESS: 0
DOUBLE VISION: 0
HEADACHES: 0
MYALGIAS: 0
DIZZINESS: 0
SENSORY CHANGE: 0
SPEECH CHANGE: 0
FOCAL WEAKNESS: 0
BACK PAIN: 0
NAUSEA: 0
LOSS OF CONSCIOUSNESS: 0
DIAPHORESIS: 0
SORE THROAT: 0
BLOOD IN STOOL: 0
NERVOUS/ANXIOUS: 0
VOMITING: 0
APNEA: 0
PALPITATIONS: 0
CONSTIPATION: 0
HEARTBURN: 0
PND: 0
STRIDOR: 0
WHEEZING: 0
FEVER: 0

## 2022-09-04 ASSESSMENT — PATIENT HEALTH QUESTIONNAIRE - PHQ9
2. FEELING DOWN, DEPRESSED, IRRITABLE, OR HOPELESS: NOT AT ALL
SUM OF ALL RESPONSES TO PHQ9 QUESTIONS 1 AND 2: 0
1. LITTLE INTEREST OR PLEASURE IN DOING THINGS: NOT AT ALL

## 2022-09-04 ASSESSMENT — FIBROSIS 4 INDEX
FIB4 SCORE: 5.32
FIB4 SCORE: 6.38

## 2022-09-04 ASSESSMENT — PAIN DESCRIPTION - PAIN TYPE
TYPE: ACUTE PAIN
TYPE: ACUTE PAIN

## 2022-09-04 NOTE — CARE PLAN
Problem: Care Map:  Admission Optimal Outcome for the Heart Failure Patient  Goal: Admission:  Optimal Care of the heart failure patient  Outcome: Progressing     Problem: Care Map:  Day 1 Optimal Outcome for the Heart Failure Patient  Goal: Day 1:  Optimal Care of the heart failure patient  Outcome: Progressing     Problem: Care Map:  Day 2 Optimal Outcome for the Heart Failure Patient  Goal: Day 2:  Optimal Care of the heart failure patient  Outcome: Progressing     Problem: Care Map:  Day 3 Optimal Outcome for the Heart Failure Patient  Goal: Day 3:  Optimal Care of the heart failure patient  Outcome: Progressing     Problem: Care Map:  Day Before Discharge Optimal Outcome for the Heart Failure Patient  Goal: Day Before Discharge:  Optimal Care of the heart failure patient  Outcome: Progressing     Problem: Care Map:  Day of Discharge Optimal Outcome for the Heart Failure Patient  Goal: Day of Discharge:  Optimal Care of the heart failure patient  Outcome: Progressing     Problem: Knowledge Deficit - Standard  Goal: Patient and family/care givers will demonstrate understanding of plan of care, disease process/condition, diagnostic tests and medications  Outcome: Progressing     Problem: Fall Risk  Goal: Patient will remain free from falls  Outcome: Progressing   The patient is Stable - Low risk of patient condition declining or worsening    Shift Goals  Clinical Goals: hemodynamic stability, I/Os  Patient Goals: rest  Family Goals: n/a    Progress made toward(s) clinical / shift goals: VS stable, good urine output.     Patient is not progressing towards the following goals: N/A

## 2022-09-04 NOTE — PROGRESS NOTES
Cardiology Follow Up Progress Note    Date of Service  9/4/2022    Attending Physician  SKYLER Torres M.D.    Chief Complaint     Presented with worsening LE edema, LAMA, orthopnea, weight gain ~40 pounds since June, suspect tachycardia mediated HF.      Cardiology consultation for evaluation of A. fib RVR, rate 160's.    HPI  Maximino Green is a 62 y.o. male admitted 8/31/2022 with new worsening HF symptoms , found to be in A. fib RVR, rate 160.    PMH: Paroxysmal A. 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    Interim Events  No overnight cardiac events.  Telemetry-A. fib rate low 100s.  Effective diuresis overnight  Weight down considerably, current weight 296, dry weight~290  Keep K>4, Mg>2  Mg currently 1.4, replete  BP appropriate    Review of Systems  Review of Systems   Respiratory:  Negative for apnea, cough, choking, chest tightness, shortness of breath, wheezing and stridor.    Cardiovascular:  Positive for leg swelling.     Vital signs in last 24 hours  Temp:  [35.9 °C (96.7 °F)-36.7 °C (98 °F)] 35.9 °C (96.7 °F)  Pulse:  [] 98  Resp:  [16-18] 18  BP: ()/(53-67) 99/59  SpO2:  [91 %-95 %] 91 %    Physical Exam  Physical Exam  Cardiovascular:      Rate and Rhythm: Normal rate. Rhythm irregular.      Pulses: Normal pulses.   Pulmonary:      Breath sounds: No wheezing.   Musculoskeletal:      Right lower leg: 3+ Edema present.      Left lower leg: 3+ Edema present.   Skin:     General: Skin is warm.      Comments: Lower extremity with mild cellulitis   Neurological:      Mental Status: He is alert.   Psychiatric:         Mood and Affect: Mood normal.       Lab Review  Lab Results   Component Value Date/Time    WBC 6.1 09/04/2022 05:48 AM    RBC 5.44 09/04/2022 05:48 AM    HEMOGLOBIN 17.3 09/04/2022 05:48 AM    HEMATOCRIT 50.1 09/04/2022 05:48 AM    MCV 92.1 09/04/2022 05:48 AM    MCH 31.8  09/04/2022 05:48 AM    MCHC 34.5 09/04/2022 05:48 AM    MPV 15.0 (H) 03/18/2022 08:18 AM      Lab Results   Component Value Date/Time    SODIUM 137 09/04/2022 05:48 AM    POTASSIUM 3.6 09/04/2022 05:48 AM    CHLORIDE 97 09/04/2022 05:48 AM    CO2 32 09/04/2022 05:48 AM    GLUCOSE 81 09/04/2022 05:48 AM    BUN 26 (H) 09/04/2022 05:48 AM    CREATININE 0.96 09/04/2022 05:48 AM      Lab Results   Component Value Date/Time    ASTSGOT 33 09/03/2022 01:59 AM    ALTSGPT 21 09/03/2022 01:59 AM     Lab Results   Component Value Date/Time    CHOLSTRLTOT 215 (H) 03/18/2022 08:18 AM     (H) 03/18/2022 08:18 AM    HDL 82 03/18/2022 08:18 AM    TRIGLYCERIDE 68 03/18/2022 08:18 AM    TROPONINT 36 (H) 09/01/2022 12:24 PM       No results for input(s): NTPROBNP in the last 72 hours.      Cardiac Imaging and Procedures Review  EKG:  My personal interpretation of the EKG dated 8/21/2022 is A. fib RVR, rate 165    Echocardiogram:   No prior study is available for comparison.   Moderately reduced left ventricular systolic function.   The left ventricular ejection fraction is estimated to be 30%.   Wall motion is difficult to assess with the limitations of image   quality, even with the use of echo contrast.  Reduced right ventricular systolic function.  Mild mitral regurgitation.  Estimated right ventricular systolic pressure is 30 mmHg.  Dilated inferior vena cava without inspiratory collapse.        Cardiac Catheterization: Not applicable    Imaging  Chest X-Ray:   1.  Bibasilar atelectasis versus consolidations and associated small to moderate pleural effusions.  2.  Cardiomegaly.    Stress Test: Not applicable    Assessment/Plan    #New cardiomyopathy LVEF 30% .  # Afib RVR, rate 160 on presentation.  #HFrEF NYHA class III stage C. Acute.  # #BMI 42.  #Hypertension.  #Lower extremity cellulitis, on Augmentin and doxycycline.  #Thrombocytopenia, chronic, platelet count currently 86.  # Tonsillar cancer 2019 s/p chemoradiation,  in remission.    Recommendations    #Continue with IV diuresis furosemide 80 mg IV twice daily.  #Discontiue metolazone 5 mg daily.  #Keep K above 4, Mg above 2.  #Currently on therapeutic Lovenox.  #Continue Toprol-XL 50 BID.  #Continue losartan 25 mg & spironolactone 25 mg.  # Evaluate start of Entresto at discharge if BP allows.  #Initiate Farxiga 10 mg daily.  #Daily standing weight  #Strict intake and output      Cardiology will follow along    I personally spent a total of 15 minutes which includes face-to-face time and non-face-to-face time spent on preparing to see the patient, reviewing hospital notes and tests, obtaining history from the patient, performing a medically appropriate exam, counseling and educating the patient, ordering medications/tests/procedures/referrals as clinically indicated, and documenting information in the electronic medical record.       Thank you for allowing me to participate in the care of this patient.  I will continue to follow this patient    Please contact me with any questions.    DUSTIN Willis.   Cardiologist, [facility]  [phone]

## 2022-09-04 NOTE — CARE PLAN
The patient is Stable - Low risk of patient condition declining or worsening    Shift Goals  Clinical Goals: I/O's, hemodynamic stability  Patient Goals: comfort, rest  Family Goals: JESSICA    Progress made toward(s) clinical / shift goals:  Patients intake and output have been monitored. No complaints of any pain or SOB.    Problem: Knowledge Deficit - Standard  Goal: Patient and family/care givers will demonstrate understanding of plan of care, disease process/condition, diagnostic tests and medications  Outcome: Progressing     Problem: Fall Risk  Goal: Patient will remain free from falls  Outcome: Progressing       Patient is not progressing towards the following goals:

## 2022-09-04 NOTE — PROGRESS NOTES
Report received and assumed patient care. Pt A&O x 4 and on 2L NC, saturating 92 %. Pt has tele box in place. Pt updated on plan of care for the day and has call light within reach. Fall precautions are in place.

## 2022-09-04 NOTE — PROGRESS NOTES
[facility] Internal Medicine Daily Progress Note    Date of Service  9/4/2022    R Team: R IM White Team   Attending: SKYLER Torres M.d.  Senior Resident: Dr. Ivory  Intern:  Dr. Armas  Contact Number: [phone]    Chief Complaint  Maximino Green is a 62 y.o. male admitted 8/31/2022 with Afib with RVR and volume overload in the context of a likely acute exacerbation of CHF.    Hospital Course  Maximino Green is a 62 y.o. male with history of paroxysmal A. 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 fib with RVR and acute systolic heart failure with ejection fraction of 30%.  Ongoing diuresis and titration of beta-blockers for rate control and other heart failure medications with improving volume status.    Interval Problem Update  Robust urine output yesterday.  Patient continues to report improvement in respiratory status and ability to lay flat.  Does not feel short of breath at rest.  Leg swelling is slightly improved since yesterday. He continues to have no lightheadedness, chest pain, shortness of breath at rest, palpitations, cough, fever or chills.    I have discussed this patient's plan of care and discharge plan at IDT rounds today with Case Management, Nursing, Nursing leadership, and other members of the IDT team.    Consultants/Specialty  cardiology    Code Status  Full Code    Disposition  Patient is not medically cleared for discharge.   Anticipate discharge to to home with close outpatient follow-up.  I have placed the appropriate orders for post-discharge needs.    Review of Systems  Review of Systems   Constitutional:  Negative for chills and fever.   HENT:  Negative for sore throat.    Eyes:  Negative for double vision.   Respiratory:  Negative for cough and wheezing.    Cardiovascular:  Positive for leg swelling. Negative for chest pain and orthopnea.   Gastrointestinal:   Negative for abdominal pain, blood in stool and constipation.   Genitourinary:  Negative for dysuria and urgency.   Musculoskeletal:  Negative for back pain.   Neurological:  Negative for sensory change and speech change.   Endo/Heme/Allergies:  Does not bruise/bleed easily.   Psychiatric/Behavioral:  The patient is not nervous/anxious.       Physical Exam  Temp:  [35.8 °C (96.5 °F)-36.7 °C (98 °F)] 35.8 °C (96.5 °F)  Pulse:  [] 88  Resp:  [17-18] 18  BP: ()/(53-67) 94/57  SpO2:  [91 %-94 %] 92 %    Physical Exam  Constitutional:       Appearance: He is obese.   HENT:      Head: Normocephalic.      Mouth/Throat:      Mouth: Mucous membranes are moist.   Eyes:      General: No scleral icterus.  Cardiovascular:      Rate and Rhythm: Normal rate. Rhythm irregular.   Pulmonary:      Breath sounds: No wheezing or rales.   Abdominal:      General: There is distension.   Musculoskeletal:      Right lower leg: Edema present.      Left lower leg: Edema present.      Comments: Erythema over bilateral shins with increased redness and warmth compared to yesterday, swelling continues to improve   Skin:     General: Skin is warm and dry.   Neurological:      Mental Status: He is oriented to person, place, and time.      Cranial Nerves: No cranial nerve deficit.   Psychiatric:         Mood and Affect: Mood normal.         Behavior: Behavior normal.       Fluids    Intake/Output Summary (Last 24 hours) at 9/4/2022 1155  Last data filed at 9/4/2022 0800  Gross per 24 hour   Intake --   Output 6800 ml   Net -6800 ml       Laboratory  Recent Labs     09/02/22  0757 09/03/22  0159 09/04/22  0548   WBC 5.4 5.2 6.1   RBC 4.95 5.21 5.44   HEMOGLOBIN 15.9 16.6 17.3   HEMATOCRIT 47.4 50.1 50.1   MCV 95.8 96.2 92.1   MCH 32.1 31.9 31.8   MCHC 33.5* 33.1* 34.5   RDW 48.3 48.7 45.2   PLATELETCT 91* 70* 84*     Recent Labs     09/03/22  0159 09/03/22  1436 09/04/22  0548   SODIUM 142 144 137   POTASSIUM 3.2* 3.6 3.6   CHLORIDE 103  104 97   CO2 27 29 32   GLUCOSE 92 109* 81   BUN 30* 28* 26*   CREATININE 1.39 1.37 0.96   CALCIUM 9.9 10.0 10.1                     Imaging  EC-ECHOCARDIOGRAM COMPLETE W/ CONT   Final Result      US-EXTREMITY VENOUS LOWER BILAT   Final Result      DX-CHEST-PORTABLE (1 VIEW)   Final Result      1.  Bibasilar atelectasis versus consolidations and associated small to moderate pleural effusions.   2.  Cardiomegaly.         * A-fib (HCC)- (present on admission)  Assessment & Plan  Onset of the current paroxysmal episode is unknown. Has remained in afib throughout admission, now with better rate control 80s-100s.  He is asymptomatic when he is in RVR.  - Continue metoprolol XL 25 mg BID  - No plans for heart cath during this admission, as etiology of CHF is likely arrhythmic, okay to start DOAC  - Discontinued Lovenox, starting apixaban today  - Given his thrombocytopenia, would not use aspirin in addition to DOAC  - Monitor on telemetry  - Monitor and replace electrolytes    Acute systolic heart failure (HCC)  Assessment & Plan  Most likely secondary to uncontrolled afib with RVR. Had a previous diagnosis of heart failure with preserved ejection fraction. Echo in 2019 showed LVEF of 65% with RVSP of 45. Echo during this admission showed reduced systolic function with ejection fraction of 30%. He reports a 40 lb weight gain since June.  Respiratory status and volume status is gradually improving with aggressive diuresis, but remains hypervolemic.   Cardiology following:    - Lasix 80 mg IV BID with a goal of 5-6 L of fluid removal during this admission.    - Disctoninue Metolazone 5 mg daily    - Continue metoprolol to XL    - Start dapagliflozin   - Evaluate start of Entresto at discharge if BP allows    - No plans for heart cath during this admission, as etiology is likely arrhythmic, okay to start DOAC   - Given his thrombocytopenia, would not use aspirin in addition to DOAC   - There is a chance that the EF may rise  following adequate rate control of his a fib. We recommend a repeat echo following discharge to reassess his baseline.    - As he does not have unequivocal anginal symptoms, coronary angiography can be considered as an outpatient if he does not have recovery of his systolic function in 1-2 months on optimal medical therapy and heart rate contro  -Continue metoprolol, losartan, and spironolactone  -Replete electrolytes as necessary.   -Continue to monitor on telemetry.   -Strict I/Os.   -Daily weight measurements.   - Low sodium diet      Hypothyroidism  Assessment & Plan  TSH elevated at 11.1.   However, free T4 is at the lower range of normal at 1.01.   -Due to atrial fibrillation, no thyroid hormone replacement indicated at this time  -Outpatient follow-up for reevaluation and consideration of treatment    Cellulitis  Assessment & Plan  Bilateral nonblanching lower extremity erythema. Warm and tender to palpation. Lower extremity venous US negative for DVTs. Consistent with cellulitis on pitting edema. Improved with initial antibiotics of Augmentin and doxazosin and diuresis.  Given the findings of negative MRSA nares, doxycycline was discontinued 9/3.  However, this morning legs have increasing warmth and pain.  Will resume doxycycline in addition to Augmentin.   -Continue augmentin and doxycycline     Thrombocytopenia (HCC)  Assessment & Plan  Probably secondary to chronic liver disease.  Chronic  Monitor    Tonsil cancer (HCC)- (present on admission)  Assessment & Plan  Reported in remission, status post chemoradiation in 2019  Follow-up with Dr. Whitfield yearly.    Essential hypertension- (present on admission)  Assessment & Plan  -Continue metoprolol, lisinopril, and spironolactone.        VTE prophylaxis: therapeutic anticoagulation with apixaban    I have performed a physical exam and reviewed and updated ROS and Plan today (9/4/2022). In review of yesterday's note (9/3/2022), there are no changes except as  documented above.    Electronically signed by Paulo Armas MD, 9/4/2022 11:56 AM  Internal Medicine PGY1

## 2022-09-04 NOTE — NON-PROVIDER
Daily Progress Note:     Date of Service: 9/4/2022  Primary Team: [employer] IM White Team   Attending: SKYLER Torres M.D.   Senior Resident: Dr. Domingo Ivory MD   Intern: Dr. Paulo Armas MD  Contact:  [phone]    Chief Complaint:   Mr. Maximino Boyer is a 62 year old male that was admitted on 09/01/22 with atrial fibrillation with rapid ventricular response and acute diastolic heart failure.     Interval update:  In the last 24 hours, the patient has lost 10 lbs and had over 7.9 L in urine output. He was transitioned to Metoprolol XL 50 mg BID.     Subjective:   Today, Mr. Green reports some symptomatic improvement. He continues to tolerate lying flat without orthopnea. He denies syncope, lightheadedness, chest pain, palpitations, shortness of breath, or PND. He is able to ambulate around his room without shortness of breath. Overnight, he reports poor sleep due to frequent urination.     Consultants/Specialty:  Cardiology     Review of Systems:   Review of Systems   Constitutional:  Negative for chills, diaphoresis and fever.        40 pound weight gain since June.    Eyes:  Negative for blurred vision and double vision.   Respiratory:  Negative for cough and shortness of breath.    Cardiovascular:  Positive for leg swelling. Negative for chest pain, palpitations, orthopnea and PND.        Legs feel less heavy and tight today.    Gastrointestinal:  Negative for abdominal pain, heartburn, nausea and vomiting.   Genitourinary:  Negative for dysuria and urgency.   Musculoskeletal:  Negative for myalgias.   Skin:         Bilateral redness of the lower extremity.    Neurological:  Negative for dizziness, sensory change, focal weakness, loss of consciousness and headaches.     Objective Data:   Vitals:   Temp:  [35.8 °C (96.5 °F)-36.7 °C (98 °F)] 35.8 °C (96.5 °F)  Pulse:  [] 88  Resp:  [17-18] 18  BP: ()/(53-67) 94/57  SpO2:  [91 %-94 %] 92 %    Physical Exam:   Physical Exam  Constitutional:        General: He is not in acute distress.     Appearance: He is obese.   HENT:      Head: Normocephalic.      Mouth/Throat:      Mouth: Mucous membranes are moist.      Pharynx: No oropharyngeal exudate or posterior oropharyngeal erythema.      Comments: Changes from radiation treatment present in the back of throat.   Eyes:      General: No scleral icterus.     Extraocular Movements: Extraocular movements intact.      Pupils: Pupils are equal, round, and reactive to light.   Neck:      Vascular: No carotid bruit.   Cardiovascular:      Rate and Rhythm: Normal rate. Rhythm irregular.      Heart sounds: No murmur heard.     Comments: Jugular venous distention present.   Pulmonary:      Effort: Pulmonary effort is normal. No respiratory distress.      Breath sounds: No wheezing or rales.   Abdominal:      General: Abdomen is flat. There is no distension.      Palpations: Abdomen is soft.      Tenderness: There is no abdominal tenderness. There is no guarding.   Musculoskeletal:      Cervical back: Normal range of motion.      Right lower leg: Edema present.      Left lower leg: Edema present.      Comments: Nonblanching erythema present in the bilateral legs. Begins just above the feet and terminates in the mid-shin bilaterally. Warm to palpation, worse than yesterday. Bilateral pitting edema mildly improved from yesterday.    Skin:     General: Skin is warm.      Capillary Refill: Capillary refill takes 2 to 3 seconds.      Findings: Erythema present.      Comments: Feet are warm and dry.    Neurological:      General: No focal deficit present.      Mental Status: He is alert.       Labs:   Recent Labs     09/02/22  0757 09/03/22  0159 09/04/22  0548   WBC 5.4 5.2 6.1   RBC 4.95 5.21 5.44   HEMOGLOBIN 15.9 16.6 17.3   HEMATOCRIT 47.4 50.1 50.1   MCV 95.8 96.2 92.1   MCH 32.1 31.9 31.8   RDW 48.3 48.7 45.2   PLATELETCT 91* 70* 84*   NEUTSPOLYS 59.50 53.10 64.00   LYMPHOCYTES 18.30* 22.10 14.70*   MONOCYTES 11.80 13.10  12.70   EOSINOPHILS 8.50* 10.00* 7.10*   BASOPHILS 1.70 1.50 1.20       Recent Labs     03/18/22  0818 08/31/22  1616 09/01/22  0718 09/02/22  0757 09/03/22  0159 09/03/22  1436 09/04/22  0548   ALBUMIN 4.0   < > 3.2 2.8* 3.1*  --   --    HDL 82  --   --   --   --   --   --    TRIGLYCERIDE 68  --   --   --   --   --   --    SODIUM 142   < > 138 139 142 144 137   POTASSIUM 4.5   < > 4.7 4.1 3.2* 3.6 3.6   CHLORIDE 106   < > 105 107 103 104 97   CO2 23   < > 21 22 27 29 32   BUN 10   < > 23* 31* 30* 28* 26*   CREATININE 0.70   < > 1.22 1.33 1.39 1.37 0.96   PHOSPHORUS  --   --   --  4.1 4.2  --  3.6    < > = values in this interval not displayed.       Recent Labs     09/02/22 0757 09/03/22  0159   ASTSGOT 33 33   ALTSGPT 23 21   TBILIRUBIN 0.9 0.8   ALKPHOSPHAT 59 66   GLOBULIN 2.6 2.5         Imaging:   EC-ECHOCARDIOGRAM COMPLETE W/ CONT   Final Result      US-EXTREMITY VENOUS LOWER BILAT   Final Result      DX-CHEST-PORTABLE (1 VIEW)   Final Result      1.  Bibasilar atelectasis versus consolidations and associated small to moderate pleural effusions.   2.  Cardiomegaly.           Assessment and plan:     #Atrial fibrillation with rapid ventricular response  -Onset of current episode is unknown.   -Admitted with HR >160.   -Patient currently on Metoprolol XL 50 mg BID.   -HR, on average, is currently in 80s-100s.    -Echo during this admission showed LVEF of 30%.     -Cardiology consulted, appreciate their recommendations.   -Continue Metoprolol XL 50 BID.   -Continue to monitor on telemetry.   -Replete electrolytes as necessary.   -Patient will not undergo heart catheterization at this time, okay to transition to Apixaban for anticoagulation.     #Acute decompensated HFrEF  -Echo in 2019 showed LVEF of 65% with RVSP of 45.   -Echo during this admission showed LVEF of 30%.   -He reports a 40 lb weight gain since June.   -10 lb weight loss in last 24 hours on IV Lasix and metolazone.   -Over 7.9 liters in fluid  output.   -Creatinine and blood pressure have remained stable.    -The patient has continued to have robust diuresis, but is still significantly fluid overloaded with JVD and lower extremity pitting edema.   -There is a chance that the EF may rise following adequate rate control of his a fib. We recommend a repeat echo following discharge to reassess his baseline.     -Cardiology consulted.   -Continue IV Lasix 80 BID. Consider changing the time that the patient receives the Lasix to 6 am and 2 pm to reduce the nocturnal frequency of urination.   -Given the robust diuresis, okay to discontinue metolazone/   -Continue metoprolol, losartan, and spironolactone. Begin dapagliflozin.   -Replete electrolytes as necessary.   -Continue to monitor on telemetry.   -Strict I/Os.   -Daily weight measurements.     #Cellulitis  -Bilateral nonblanching lower extremity erythema. Warm and tender to palpation. Consistent with cellulitis on pitting edema.  -Lower extremity venous US negative for DVTs.   -Rubor and calor have worsened since yesterday when doxycycline was discontinued.     -Continue augmentin. Restart doxycycline.     #Hypothyroidism   -TSH of 11.1.   -Free T4 of 1.01.     -Due to atrial fibrillation, no thyroid hormone replacement indicated at this time.     #Hypertension  -Continue metoprolol, lisinopril, and spironolactone.     VTE prophylaxis: Therapeutic Apixaban.     Dispo: Inpatient for continued diuresis.     Nakul Monsivais, MS4.   [facility]

## 2022-09-05 LAB
ALBUMIN SERPL BCP-MCNC: 2.7 G/DL (ref 3.2–4.9)
ALBUMIN/GLOB SERPL: 1.1 G/DL
ALP SERPL-CCNC: 57 U/L (ref 30–99)
ALT SERPL-CCNC: 17 U/L (ref 2–50)
ANION GAP SERPL CALC-SCNC: 8 MMOL/L (ref 7–16)
ANION GAP SERPL CALC-SCNC: 9 MMOL/L (ref 7–16)
AST SERPL-CCNC: 31 U/L (ref 12–45)
BACTERIA BLD CULT: NORMAL
BACTERIA BLD CULT: NORMAL
BILIRUB SERPL-MCNC: 1.1 MG/DL (ref 0.1–1.5)
BUN SERPL-MCNC: 24 MG/DL (ref 8–22)
BUN SERPL-MCNC: 25 MG/DL (ref 8–22)
CALCIUM SERPL-MCNC: 10.6 MG/DL (ref 8.5–10.5)
CALCIUM SERPL-MCNC: 9.9 MG/DL (ref 8.5–10.5)
CHLORIDE SERPL-SCNC: 92 MMOL/L (ref 96–112)
CHLORIDE SERPL-SCNC: 96 MMOL/L (ref 96–112)
CO2 SERPL-SCNC: 37 MMOL/L (ref 20–33)
CO2 SERPL-SCNC: 38 MMOL/L (ref 20–33)
CREAT SERPL-MCNC: 1.07 MG/DL (ref 0.5–1.4)
CREAT SERPL-MCNC: 1.07 MG/DL (ref 0.5–1.4)
ERYTHROCYTE [DISTWIDTH] IN BLOOD BY AUTOMATED COUNT: 45.3 FL (ref 35.9–50)
GFR SERPLBLD CREATININE-BSD FMLA CKD-EPI: 78 ML/MIN/1.73 M 2
GFR SERPLBLD CREATININE-BSD FMLA CKD-EPI: 78 ML/MIN/1.73 M 2
GLOBULIN SER CALC-MCNC: 2.5 G/DL (ref 1.9–3.5)
GLUCOSE SERPL-MCNC: 113 MG/DL (ref 65–99)
GLUCOSE SERPL-MCNC: 78 MG/DL (ref 65–99)
HCT VFR BLD AUTO: 46.5 % (ref 42–52)
HGB BLD-MCNC: 16.2 G/DL (ref 14–18)
MAGNESIUM SERPL-MCNC: 1.7 MG/DL (ref 1.5–2.5)
MCH RBC QN AUTO: 31.9 PG (ref 27–33)
MCHC RBC AUTO-ENTMCNC: 34.8 G/DL (ref 33.7–35.3)
MCV RBC AUTO: 91.5 FL (ref 81.4–97.8)
PHOSPHATE SERPL-MCNC: 3.4 MG/DL (ref 2.5–4.5)
PLATELET # BLD AUTO: 72 K/UL (ref 164–446)
POTASSIUM SERPL-SCNC: 3 MMOL/L (ref 3.6–5.5)
POTASSIUM SERPL-SCNC: 4.1 MMOL/L (ref 3.6–5.5)
PROT SERPL-MCNC: 5.2 G/DL (ref 6–8.2)
RBC # BLD AUTO: 5.08 M/UL (ref 4.7–6.1)
SIGNIFICANT IND 70042: NORMAL
SIGNIFICANT IND 70042: NORMAL
SITE SITE: NORMAL
SITE SITE: NORMAL
SODIUM SERPL-SCNC: 139 MMOL/L (ref 135–145)
SODIUM SERPL-SCNC: 141 MMOL/L (ref 135–145)
SOURCE SOURCE: NORMAL
SOURCE SOURCE: NORMAL
WBC # BLD AUTO: 5.7 K/UL (ref 4.8–10.8)

## 2022-09-05 PROCEDURE — 700102 HCHG RX REV CODE 250 W/ 637 OVERRIDE(OP): Performed by: INTERNAL MEDICINE

## 2022-09-05 PROCEDURE — A9270 NON-COVERED ITEM OR SERVICE: HCPCS | Performed by: INTERNAL MEDICINE

## 2022-09-05 PROCEDURE — A9270 NON-COVERED ITEM OR SERVICE: HCPCS

## 2022-09-05 PROCEDURE — 85027 COMPLETE CBC AUTOMATED: CPT

## 2022-09-05 PROCEDURE — 83735 ASSAY OF MAGNESIUM: CPT

## 2022-09-05 PROCEDURE — 80053 COMPREHEN METABOLIC PANEL: CPT

## 2022-09-05 PROCEDURE — A9270 NON-COVERED ITEM OR SERVICE: HCPCS | Performed by: STUDENT IN AN ORGANIZED HEALTH CARE EDUCATION/TRAINING PROGRAM

## 2022-09-05 PROCEDURE — 84100 ASSAY OF PHOSPHORUS: CPT

## 2022-09-05 PROCEDURE — 99232 SBSQ HOSP IP/OBS MODERATE 35: CPT | Mod: FS | Performed by: INTERNAL MEDICINE

## 2022-09-05 PROCEDURE — 700102 HCHG RX REV CODE 250 W/ 637 OVERRIDE(OP): Performed by: NURSE PRACTITIONER

## 2022-09-05 PROCEDURE — 99233 SBSQ HOSP IP/OBS HIGH 50: CPT | Mod: GC | Performed by: HOSPITALIST

## 2022-09-05 PROCEDURE — 700102 HCHG RX REV CODE 250 W/ 637 OVERRIDE(OP)

## 2022-09-05 PROCEDURE — 36415 COLL VENOUS BLD VENIPUNCTURE: CPT

## 2022-09-05 PROCEDURE — A9270 NON-COVERED ITEM OR SERVICE: HCPCS | Performed by: NURSE PRACTITIONER

## 2022-09-05 PROCEDURE — 770020 HCHG ROOM/CARE - TELE (206)

## 2022-09-05 PROCEDURE — 80048 BASIC METABOLIC PNL TOTAL CA: CPT

## 2022-09-05 PROCEDURE — 700111 HCHG RX REV CODE 636 W/ 250 OVERRIDE (IP)

## 2022-09-05 PROCEDURE — 700111 HCHG RX REV CODE 636 W/ 250 OVERRIDE (IP): Performed by: NURSE PRACTITIONER

## 2022-09-05 PROCEDURE — 700102 HCHG RX REV CODE 250 W/ 637 OVERRIDE(OP): Performed by: STUDENT IN AN ORGANIZED HEALTH CARE EDUCATION/TRAINING PROGRAM

## 2022-09-05 RX ORDER — MAGNESIUM SULFATE HEPTAHYDRATE 40 MG/ML
2 INJECTION, SOLUTION INTRAVENOUS ONCE
Status: COMPLETED | OUTPATIENT
Start: 2022-09-05 | End: 2022-09-05

## 2022-09-05 RX ORDER — DIGOXIN 125 MCG
125 TABLET ORAL DAILY
Status: DISCONTINUED | OUTPATIENT
Start: 2022-09-05 | End: 2022-09-06 | Stop reason: HOSPADM

## 2022-09-05 RX ORDER — TORSEMIDE 20 MG/1
40 TABLET ORAL ONCE
Status: COMPLETED | OUTPATIENT
Start: 2022-09-05 | End: 2022-09-05

## 2022-09-05 RX ORDER — METOPROLOL SUCCINATE 50 MG/1
50 TABLET, EXTENDED RELEASE ORAL
Status: DISCONTINUED | OUTPATIENT
Start: 2022-09-06 | End: 2022-09-06 | Stop reason: HOSPADM

## 2022-09-05 RX ORDER — TORSEMIDE 20 MG/1
80 TABLET ORAL
Status: DISCONTINUED | OUTPATIENT
Start: 2022-09-06 | End: 2022-09-06 | Stop reason: HOSPADM

## 2022-09-05 RX ORDER — POTASSIUM CHLORIDE 20 MEQ/1
40 TABLET, EXTENDED RELEASE ORAL ONCE
Status: COMPLETED | OUTPATIENT
Start: 2022-09-05 | End: 2022-09-05

## 2022-09-05 RX ADMIN — DIGOXIN 125 MCG: 0.12 TABLET ORAL at 18:35

## 2022-09-05 RX ADMIN — ROSUVASTATIN CALCIUM 10 MG: 10 TABLET, COATED ORAL at 18:35

## 2022-09-05 RX ADMIN — SENNOSIDES AND DOCUSATE SODIUM 2 TABLET: 50; 8.6 TABLET ORAL at 18:34

## 2022-09-05 RX ADMIN — DAPAGLIFLOZIN 10 MG: 10 TABLET, FILM COATED ORAL at 06:07

## 2022-09-05 RX ADMIN — TORSEMIDE 40 MG: 20 TABLET ORAL at 14:42

## 2022-09-05 RX ADMIN — SPIRONOLACTONE 25 MG: 25 TABLET ORAL at 06:07

## 2022-09-05 RX ADMIN — DOXYCYCLINE 100 MG: 100 TABLET, FILM COATED ORAL at 06:06

## 2022-09-05 RX ADMIN — AMOXICILLIN AND CLAVULANATE POTASSIUM 1 TABLET: 875; 125 TABLET, FILM COATED ORAL at 18:34

## 2022-09-05 RX ADMIN — METOPROLOL SUCCINATE 50 MG: 50 TABLET, EXTENDED RELEASE ORAL at 06:07

## 2022-09-05 RX ADMIN — AMOXICILLIN AND CLAVULANATE POTASSIUM 1 TABLET: 875; 125 TABLET, FILM COATED ORAL at 06:06

## 2022-09-05 RX ADMIN — POTASSIUM CHLORIDE 20 MEQ: 1500 TABLET, EXTENDED RELEASE ORAL at 18:34

## 2022-09-05 RX ADMIN — MAGNESIUM SULFATE HEPTAHYDRATE 2 G: 40 INJECTION, SOLUTION INTRAVENOUS at 10:00

## 2022-09-05 RX ADMIN — DOXYCYCLINE 100 MG: 100 TABLET, FILM COATED ORAL at 18:34

## 2022-09-05 RX ADMIN — LOSARTAN POTASSIUM 25 MG: 25 TABLET, FILM COATED ORAL at 18:35

## 2022-09-05 RX ADMIN — APIXABAN 5 MG: 5 TABLET, FILM COATED ORAL at 06:06

## 2022-09-05 RX ADMIN — FUROSEMIDE 80 MG: 10 INJECTION, SOLUTION INTRAMUSCULAR; INTRAVENOUS at 06:06

## 2022-09-05 RX ADMIN — POTASSIUM CHLORIDE 20 MEQ: 1500 TABLET, EXTENDED RELEASE ORAL at 06:07

## 2022-09-05 RX ADMIN — APIXABAN 5 MG: 5 TABLET, FILM COATED ORAL at 18:35

## 2022-09-05 RX ADMIN — POTASSIUM CHLORIDE 40 MEQ: 1500 TABLET, EXTENDED RELEASE ORAL at 10:00

## 2022-09-05 ASSESSMENT — ENCOUNTER SYMPTOMS
BACK PAIN: 0
MYALGIAS: 0
ABDOMINAL PAIN: 0
CONSTIPATION: 0
FEVER: 0
NAUSEA: 0
DIAPHORESIS: 0
ORTHOPNEA: 0
LOSS OF CONSCIOUSNESS: 0
BLURRED VISION: 0
SENSORY CHANGE: 0
DOUBLE VISION: 0
DIZZINESS: 0
WHEEZING: 0
PND: 0
HEADACHES: 0
NERVOUS/ANXIOUS: 0
FOCAL WEAKNESS: 0
SPEECH CHANGE: 0
BLOOD IN STOOL: 0
COUGH: 0
CHILLS: 0
HEARTBURN: 0
SORE THROAT: 0
SHORTNESS OF BREATH: 0
PALPITATIONS: 0
VOMITING: 0
BRUISES/BLEEDS EASILY: 0

## 2022-09-05 ASSESSMENT — PATIENT HEALTH QUESTIONNAIRE - PHQ9
2. FEELING DOWN, DEPRESSED, IRRITABLE, OR HOPELESS: NOT AT ALL
1. LITTLE INTEREST OR PLEASURE IN DOING THINGS: NOT AT ALL
SUM OF ALL RESPONSES TO PHQ9 QUESTIONS 1 AND 2: 0

## 2022-09-05 ASSESSMENT — PAIN DESCRIPTION - PAIN TYPE: TYPE: ACUTE PAIN

## 2022-09-05 ASSESSMENT — FIBROSIS 4 INDEX: FIB4 SCORE: 6.47

## 2022-09-05 NOTE — CARE PLAN
The patient is Stable - Low risk of patient condition declining or worsening    Shift Goals  Clinical Goals: Improve F&E balance, monitor labs and VS, decrease LE edema, wean off supplemental o2  Patient Goals: Comfort, rest, discharge  Family Goals: JESSICA. No family present.    Progress made toward(s) clinical / shift goals:      Problem: Knowledge Deficit - Standard  Goal: Patient and family/care givers will demonstrate understanding of plan of care, disease process/condition, diagnostic tests and medications  Outcome: Progressing     Problem: Fall Risk  Goal: Patient will remain free from falls  Outcome: Progressing       Patient is not progressing towards the following goals: NA

## 2022-09-05 NOTE — PROGRESS NOTES
Bedside report received and patient care assumed. Pt is resting in bed, A&Ox4, with no complaints of pain, and is on 2L NC. Tele box on. All fall precautions are in place, belongings at bedside table.  Pt was updated on POC, no questions or concerns. Pt educated on use of call light for assistance.

## 2022-09-05 NOTE — PROGRESS NOTES
[facility] Cardiology Follow-up Note    Date of Service:    9/5/2022      Name:   Maximino Green   YOB: 1960  Age:   62 y.o.  male   MRN:   [id number]    Reason for cardiology consult: A. fib with RVR    Attending Provider: Dr Torres    HPI:  Maximino Green is a 62 y.o. male with a past medical history of throat CA in 2019, hypertension, obesity, and persistent Afib who presented on 8/31/2022 after seeing his PCP that morning for LE swelling and weakness x 1 week, he was in Afib rates 150-160's per EKG and was brought to ED by EMS.      He was last seen on 6/24/2022 by PCP, with A. fib rate in the 130s at this time, a cardiology referral was placed however patient did not follow through with making appointment. He was started on metoprolol 25 mg twice daily and lisinopril 10 mg at that time.  Patient felt these medications were making him worse. He was feeling weak, tired, with increased swelling bilateral lower extremities, he stopped taking these medications a few weeks ago.  Per patient baseline weight between 280-290lbs, he presented weighing 330 pounds.    His Echo revealed LVEF of 30%, he has been robustly diuresed with both IV furosemide and metolazone.     Interim Events:  - Patient expresses improvement in LE edema and redness, he is up ambulating comfortably in his room, with frequent trips overnight to the bathroom  - Personal Telemetry interpretation: Afib, rate controlled 80's   - Vitals: BP maintaining with IV diuresis, 100's/60's  - Labs reviewed: Platelets 72  - I/O's: Cumulative diuresis with ~ 18L net negative  - Weight: ~ 12lbs     ROS  Constitutional: + fatigue d/t frequent urination  Respiratory:  Denies shortness of breath, no cough.  Cardiovascular: denies chest pain. Denies lower extremity edema.  Denies orthopnea or PND.  : + polyuria, no dysuria.  GI:  Denies nausea/vomiting.  No abdominal distention.  Neuro:  Denies dizziness, syncope.  Hem/lymph: Denies easy  bleeding/bruising.      All other review of systems reviewed and negative.    Past medical, surgical, social, and family history reviewed and unchanged from admission except as noted in HPI.    Medications: Reviewed in MAR  Current Facility-Administered Medications   Medication Dose Frequency Provider Last Rate Last Admin    [START ON 9/6/2022] metoprolol SR (TOPROL XL) tablet 50 mg  50 mg Q DAY Vito Andres M.D.        magnesium sulfate IVPB premix 2 g  2 g Once Paulo Armas M.D. 25 mL/hr at 09/05/22 1000 2 g at 09/05/22 1000    digoxin (LANOXIN) tablet 125 mcg  125 mcg DAILY AT 1800 Vito Andres M.D.        dapagliflozin propanediol (Farxiga) tablet 10 mg  10 mg DAILY Shawanda Mantilla, A.P.N.   10 mg at 09/05/22 0607    apixaban (ELIQUIS) tablet 5 mg  5 mg BID Paulo Armas M.D.   5 mg at 09/05/22 0606    doxycycline monohydrate (ADOXA) tablet 100 mg  100 mg Q12HRS Paulo Armas M.D.   100 mg at 09/05/22 0606    potassium chloride SA (Kdur) tablet 20 mEq  20 mEq BID Paulo Armas M.D.   20 mEq at 09/05/22 0607    furosemide (LASIX) injection 80 mg  80 mg BID DIURETIC Shawanda Mantilla, A.P.N.   80 mg at 09/05/22 0606    losartan (COZAAR) tablet 25 mg  25 mg Q EVENING Shawanda Mantilla, A.P.N.   25 mg at 09/04/22 1744    spironolactone (ALDACTONE) tablet 25 mg  25 mg Q DAY Blaine Morales M.D.   25 mg at 09/05/22 0607    senna-docusate (PERICOLACE or SENOKOT S) 8.6-50 MG per tablet 2 Tablet  2 Tablet BID Frantz Whitfield M.D.        And    polyethylene glycol/lytes (MIRALAX) PACKET 1 Packet  1 Packet QDAY PRN Frantz Whitfield M.D.        And    magnesium hydroxide (MILK OF MAGNESIA) suspension 30 mL  30 mL QDAY PRN Frantz Whitfield M.D.        And    bisacodyl (DULCOLAX) suppository 10 mg  10 mg QDAY PRN Frantz Whitfield M.D.        ondansetron (ZOFRAN) syringe/vial injection 4 mg  4 mg Q4HRS PRN Frantz Whitfield M.D.   4 mg at 08/31/22 1951    ondansetron (ZOFRAN ODT) dispertab 4 mg  4 mg Q4HRS PRN  "Frantz Whitfield M.D.        promethazine (PHENERGAN) tablet 12.5-25 mg  12.5-25 mg Q4HRS PRN Frantz Whitfield M.D.        promethazine (PHENERGAN) suppository 12.5-25 mg  12.5-25 mg Q4HRS PRN Frantz Whitfield M.D.        prochlorperazine (COMPAZINE) injection 5-10 mg  5-10 mg Q4HRS PRN Frantz Whitfield M.D.        Respiratory Therapy Consult   Continuous RT Frantz Whitfield M.D.        acetaminophen (Tylenol) tablet 650 mg  650 mg Q6HRS PRN Frantz Whitfield M.D.        Pharmacy Consult Request ...Pain Management Review 1 Each  1 Each PHARMACY TO DOSE Frantz Whitfield M.D.        oxyCODONE immediate-release (ROXICODONE) tablet 5 mg  5 mg Q3HRS PRN Frantz Whitfield M.D.        Or    oxyCODONE immediate release (ROXICODONE) tablet 10 mg  10 mg Q3HRS PRN Frantz Whitfield M.D.        Or    morphine 4 MG/ML injection 4 mg  4 mg Q3HRS PRN Frantz Whitfield M.D.        rosuvastatin (CRESTOR) tablet 10 mg  10 mg Q EVENING Frantz Whitfield M.D.   10 mg at 09/04/22 1744    Metoprolol Tartrate (LOPRESSOR) injection 5 mg  5 mg Q HOUR PRN Frantz Whitfield M.D.   5 mg at 08/31/22 1951    amoxicillin-clavulanate (AUGMENTIN) 875-125 MG per tablet 1 Tablet  1 Tablet Q12HRS Domingo Ivory M.D.   1 Tablet at 09/05/22 0606    hydrALAZINE (APRESOLINE) injection 20 mg  20 mg Q6HRS PRN Frantz Whitfield M.D.       Last reviewed on 8/31/2022  5:20 PM by Aniya Ventura   No Known Allergies    Physical Exam  Body mass index is 39.44 kg/m². /64   Pulse 84   Temp 36.6 °C (97.8 °F) (Temporal)   Resp 18   Ht 1.854 m (6' 1\")   Wt (!) 136 kg (298 lb 15.1 oz)   SpO2 91%    Vitals:    09/05/22 0400 09/05/22 0604 09/05/22 0745 09/05/22 1108   BP: 111/70 100/70 103/68 100/64   Pulse: 85 99 83 84   Resp: 16 16 18 18   Temp: 36.1 °C (97 °F)  36.4 °C (97.5 °F) 36.6 °C (97.8 °F)   TempSrc: Temporal  Temporal Temporal   SpO2: 96%  89% 91%   Weight:       Height:        Oxygen Therapy:  Pulse Oximetry: 91 %, O2 (LPM): 0, O2 " Delivery Device: None - Room Air    General: no acute distress, chronically ill- appearing, BMI 39  Neck: mild JVD, no bruits  Lungs: CTAB, normal effort. no wheezing, rales, or rhonchi  Heart: irregularly irregular, normal S1 /S2, no murmur, no rub  EXT: 1+ pitting BLE edema, 2+ radial pulses.  2+ pedal pulses.   Abdomen: soft, non tender, non distended  Neurological: No focal deficits, no facial asymmetry.  Normal speech  Psychiatric: Appropriate affect, alert and oriented x 3  Skin: Warm and dry extremities, no rashes, Erythema to BLE.    Labs (personally reviewed):     Lab Results   Component Value Date/Time    SODIUM 141 09/05/2022 02:37 AM    POTASSIUM 3.0 (L) 09/05/2022 02:37 AM    CHLORIDE 96 09/05/2022 02:37 AM    CO2 37 (H) 09/05/2022 02:37 AM    GLUCOSE 78 09/05/2022 02:37 AM    BUN 24 (H) 09/05/2022 02:37 AM    CREATININE 1.07 09/05/2022 02:37 AM     Lab Results   Component Value Date/Time    ALKPHOSPHAT 57 09/05/2022 02:37 AM    ASTSGOT 31 09/05/2022 02:37 AM    ALTSGPT 17 09/05/2022 02:37 AM    TBILIRUBIN 1.1 09/05/2022 02:37 AM      Lab Results   Component Value Date/Time    CHOLSTRLTOT 215 (H) 03/18/2022 08:18 AM     (H) 03/18/2022 08:18 AM    HDL 82 03/18/2022 08:18 AM    TRIGLYCERIDE 68 03/18/2022 08:18 AM     Cardiac Imaging and Procedures Review:      EKG 8/31/2022: My Personal interpretation reveals A. fib with     Echo 9/1/11:  No prior study is available for comparison.   Moderately reduced left ventricular systolic function.   The left ventricular ejection fraction is estimated to be 30%.   Wall motion is difficult to assess with the limitations of image   quality, even with the use of echo contrast.  Reduced right ventricular systolic function.  Mild mitral regurgitation.  Estimated right ventricular systolic pressure is 30 mmHg.  Dilated inferior vena cava without inspiratory collapse.    Assessment and Medical Decision Making:    New cardiomyopathy LVEF 30%   HFrEF, ACC Stage  C,NYHA Class II-III, LVEF 30%:     -Most likely arrhythmogenic, given Afib with RVR for the last 3 months  -Will have outpatient ischemia work-up if LVEF does not improve within 1 to 2 months  -Continue Toprol-XL 50 daily  -Continue spironolactone 25 mg daily  -Continue Farxiga 10 mg daily  -Start Entresto at discharge if financially feasible for patient, if not continue Losartan  -Diuretic: Transition to p.o. torsemide 80 mg daily  -Repeat Echo in 1-2 months, if LVEF not >35%, then will discuss/consider ICD for primary Prevention and also ischemia work-up  -Reinforced s/sx of worsening heart failure with patient and weight monitoring. Pt verbalizes understanding. Pt to call office or RTC if present.    -PUMP line number [phone] (PUMP)  -Heart Failure Education: completed  -Schedulers called, message was left to schedule 1 week appointment with PCP    Afib RVR, rate 160 on presentation 80  -Continue Toprol-XL 50 daily  -Add digoxin for increased rate control 125mcg daily  -Consider rhythm restoration with outpatient DCCV after 30 days of NOAC  -Continue Eliquis 5 mg bid    BMI 42, morbid obesity  -Discussed heart healthy diet  -Obtain outpatient sleep study, possible contributing factor to Afib    Hypertension  -Well-controlled, continue current medications, see above    Lower extremity cellulitis  -Augmentin and doxycycline per internal medicine team     Thrombocytopenia  -chronic, platelet count currently 72 today  -no aspirin     Thank you for allowing me to participate in this patients care.  Please contact me with any questions or concerns.    Please see Dr. Monsivais's attestation for additions and further recommendations.  Cardiology will sign off at this point.  We will schedule 1 week follow-up with heart failure clinic    Future Appointments   Date Time Provider Department Center   9/14/2022 12:45 PM SORAYA [facility] None   9/27/2022  2:40 PM EDEL [place]   6/8/2023  2:00  MYKEL SKELTON M.D. [facility] None       I personally spent a total of 16 minutes which includes face-to-face time and non-face-to-face time spent on preparing to see the patient, reviewing hospital notes and tests, obtaining history from the patient, performing a medically appropriate exam, counseling and educating the patient, ordering medications/tests/procedures/referrals as clinically indicated, and documenting information in the electronic medical record.    PLEASE NOTE: Some of this dictation was created using voice recognition software. I have made every reasonable attempt to correct obvious errors, but I expect that there are errors of grammar and possibly content that I did not discover before finalizing the note.     PEPITO Moe.   [facility]  [phone]

## 2022-09-05 NOTE — NON-PROVIDER
Daily Progress Note:     Date of Service: 9/5/2022  Primary Team: [facility] IM White Team   Attending: SKYLER Torres M.D.   Senior Resident: Dr. Domingo Ivory MD   Intern: Dr. Paulo Armas MD  Contact:  [phone]    Chief Complaint:   Mr. Maximino Boyer is a 62 year old male that was admitted on 09/01/22 with atrial fibrillation with rapid ventricular response and acute diastolic heart failure.     Interval update:  In the last 24 hours, Mr. Green has had over 3.8 L in urine output. His creatinine and blood pressure have remained stable. He reports continued symptomatic improvement. He denies lightheadedness, chest pain, SOB, orthopnea, or palpitations. He is able to ambulate around his room without dyspnea on exertion.     Consultants/Specialty:  Cardiology     Review of Systems:   Review of Systems   Constitutional:  Negative for chills, diaphoresis and fever.        40 pound weight gain since June.    Eyes:  Negative for blurred vision and double vision.   Respiratory:  Negative for cough and shortness of breath.    Cardiovascular:  Positive for leg swelling. Negative for chest pain, palpitations, orthopnea and PND.        Swelling from the ankles above the knees. Improved from yesterday.    Gastrointestinal:  Negative for abdominal pain, heartburn, nausea and vomiting.   Genitourinary:  Negative for dysuria and urgency.   Musculoskeletal:  Negative for myalgias.   Skin:         Bilateral redness of the lower extremity.    Neurological:  Negative for dizziness, sensory change, focal weakness, loss of consciousness and headaches.     Objective Data:   Vitals:   Temp:  [36.1 °C (97 °F)-37 °C (98.6 °F)] 37 °C (98.6 °F)  Pulse:  [] 92  Resp:  [16-20] 18  BP: (100-116)/(64-89) 113/89  SpO2:  [89 %-96 %] 90 %    Physical Exam:   Physical Exam  Constitutional:       General: He is not in acute distress.     Appearance: He is obese.   HENT:      Head: Normocephalic.      Mouth/Throat:      Mouth: Mucous membranes  are moist.      Pharynx: No oropharyngeal exudate or posterior oropharyngeal erythema.      Comments: Changes from radiation treatment present in the back of throat.   Eyes:      General: No scleral icterus.     Extraocular Movements: Extraocular movements intact.      Pupils: Pupils are equal, round, and reactive to light.   Neck:      Vascular: No carotid bruit.   Cardiovascular:      Rate and Rhythm: Normal rate. Rhythm irregular.      Heart sounds: No murmur heard.     Comments: Jugular venous distention present, but improved.   Pulmonary:      Effort: Pulmonary effort is normal. No respiratory distress.      Breath sounds: No wheezing or rales.   Abdominal:      General: Abdomen is flat. There is no distension.      Palpations: Abdomen is soft.      Tenderness: There is no abdominal tenderness. There is no guarding.   Musculoskeletal:      Cervical back: Normal range of motion.      Right lower leg: Edema present.      Left lower leg: Edema present.      Comments: Bilateral pitting edema mildly improved from yesterday. Present from ankles to above knees.   Skin:     General: Skin is warm.      Capillary Refill: Capillary refill takes 2 to 3 seconds.      Findings: Erythema present.      Comments: Nonblanching erythema present in the bilateral legs. Begins just above the feet and terminates in the mid-shin bilaterally. Warm to palpation, better than yesterday.    Neurological:      General: No focal deficit present.      Mental Status: He is alert.       Labs:   Recent Labs     09/03/22  0159 09/04/22  0548 09/05/22  0237   WBC 5.2 6.1 5.7   RBC 5.21 5.44 5.08   HEMOGLOBIN 16.6 17.3 16.2   HEMATOCRIT 50.1 50.1 46.5   MCV 96.2 92.1 91.5   MCH 31.9 31.8 31.9   RDW 48.7 45.2 45.3   PLATELETCT 70* 84* 72*   NEUTSPOLYS 53.10 64.00  --    LYMPHOCYTES 22.10 14.70*  --    MONOCYTES 13.10 12.70  --    EOSINOPHILS 10.00* 7.10*  --    BASOPHILS 1.50 1.20  --        Recent Labs     03/18/22  0818 08/31/22  1616  09/02/22  0757 09/03/22  0159 09/03/22  1436 09/04/22  0548 09/05/22  0237   ALBUMIN 4.0   < > 2.8* 3.1*  --   --  2.7*   HDL 82  --   --   --   --   --   --    TRIGLYCERIDE 68  --   --   --   --   --   --    SODIUM 142   < > 139 142 144 137 141   POTASSIUM 4.5   < > 4.1 3.2* 3.6 3.6 3.0*   CHLORIDE 106   < > 107 103 104 97 96   CO2 23   < > 22 27 29 32 37*   BUN 10   < > 31* 30* 28* 26* 24*   CREATININE 0.70   < > 1.33 1.39 1.37 0.96 1.07   PHOSPHORUS  --    < > 4.1 4.2  --  3.6 3.4    < > = values in this interval not displayed.       Recent Labs     09/03/22  0159 09/05/22  0237   ASTSGOT 33 31   ALTSGPT 21 17   TBILIRUBIN 0.8 1.1   ALKPHOSPHAT 66 57   GLOBULIN 2.5 2.5         Imaging:   EC-ECHOCARDIOGRAM COMPLETE W/ CONT   Final Result      US-EXTREMITY VENOUS LOWER BILAT   Final Result      DX-CHEST-PORTABLE (1 VIEW)   Final Result      1.  Bibasilar atelectasis versus consolidations and associated small to moderate pleural effusions.   2.  Cardiomegaly.           Assessment and plan:     #Atrial fibrillation with rapid ventricular response  -Onset of current episode is unknown.   -Admitted with HR >160.   -Patient currently on Metoprolol XL 50 mg BID.   -HR, on average, is currently in 80s-100s.    -Echo during this admission showed LVEF of 30%. Atrial fibrillation is likely due to a dilated atrium in the setting of dilated cardiomyopathy.     -Cardiology consulted, appreciate their recommendations.   -Metoprolol XL transitioned to 50 mg daily.   -Continue to monitor on telemetry.   -Replete electrolytes as necessary.   -Continue apixaban for anticoagulation.     #Acute decompensated HFrEF  -LVEF was 65% in 2019. Echo during this admission showed LVEF of 30%. Chronic heart failure is likely due to ischemic cardiomyopathy, which would explain drop in EF. Current decompensation is likely due to a sustained rapid ventricular rate in the setting of A-fib with RVR.   -He reports a 40 lb weight gain since June.    -Over 3.8  liters in fluid output in the last 24 hours on IV Lasix 80 mg.   -Creatinine and blood pressure have remained stable.    -The patient has continued to have robust diuresis, appears significantly less fluid overloaded than on admission with reduced JVD and lower extremity pitting edema.   -There is a chance that the EF may rise following adequate rate control of his a fib. We recommend a repeat echo following discharge to reassess his baseline.     -Cardiology consulted.   -Transition to PO torsemide 80 mg daily. Patient will receive 40 mg this evening and then the full 80 mg dose tomorrow morning.   -Continue metoprolol, losartan, spironolactone, and dapagliflozin. Will start Entresto prior to discharge if patient's BP can tolerate it.   -Replete electrolytes as necessary.   -Continue to monitor on telemetry.   -Strict I/Os.   -Daily weight measurements.   -Follow up with cardiology following discharge.     #Cellulitis  -Bilateral nonblanching lower extremity erythema. Warm and tender to palpation. Consistent with cellulitis superimposed on tense leg edema.   -Lower extremity venous US negative for DVTs.   -Rubor and calor have improved since yesterday when doxycycline was restarted.     -Discontinue Augmentin.  -Continue doxycycline.     #Hypothyroidism   -TSH of 11.1.   -Free T4 of 1.01.     -Due to atrial fibrillation, no thyroid hormone replacement indicated at this time.     #Hypertension  -Continue metoprolol, lisinopril, and spironolactone.     VTE prophylaxis: Therapeutic Apixaban.     Dispo: Inpatient for continued diuresis.     Nakul Monsivais, MS4.   [facility]

## 2022-09-06 ENCOUNTER — PHARMACY VISIT (OUTPATIENT)
Dept: PHARMACY | Facility: MEDICAL CENTER | Age: 62
End: 2022-09-06
Payer: COMMERCIAL

## 2022-09-06 VITALS
OXYGEN SATURATION: 93 % | TEMPERATURE: 96.7 F | DIASTOLIC BLOOD PRESSURE: 66 MMHG | HEART RATE: 103 BPM | BODY MASS INDEX: 37.49 KG/M2 | RESPIRATION RATE: 16 BRPM | HEIGHT: 73 IN | WEIGHT: 282.85 LBS | SYSTOLIC BLOOD PRESSURE: 103 MMHG

## 2022-09-06 LAB
ALBUMIN SERPL BCP-MCNC: 2.8 G/DL (ref 3.2–4.9)
ALBUMIN/GLOB SERPL: 1 G/DL
ALP SERPL-CCNC: 55 U/L (ref 30–99)
ALT SERPL-CCNC: 19 U/L (ref 2–50)
ANION GAP SERPL CALC-SCNC: 7 MMOL/L (ref 7–16)
ANION GAP SERPL CALC-SCNC: 9 MMOL/L (ref 7–16)
AST SERPL-CCNC: 41 U/L (ref 12–45)
BASOPHILS # BLD AUTO: 0.8 % (ref 0–1.8)
BASOPHILS # BLD: 0.05 K/UL (ref 0–0.12)
BILIRUB SERPL-MCNC: 1.4 MG/DL (ref 0.1–1.5)
BUN SERPL-MCNC: 24 MG/DL (ref 8–22)
BUN SERPL-MCNC: 24 MG/DL (ref 8–22)
CALCIUM SERPL-MCNC: 10.2 MG/DL (ref 8.5–10.5)
CALCIUM SERPL-MCNC: 10.3 MG/DL (ref 8.5–10.5)
CHLORIDE SERPL-SCNC: 89 MMOL/L (ref 96–112)
CHLORIDE SERPL-SCNC: 92 MMOL/L (ref 96–112)
CO2 SERPL-SCNC: 38 MMOL/L (ref 20–33)
CO2 SERPL-SCNC: 39 MMOL/L (ref 20–33)
CREAT SERPL-MCNC: 0.99 MG/DL (ref 0.5–1.4)
CREAT SERPL-MCNC: 1.18 MG/DL (ref 0.5–1.4)
EOSINOPHIL # BLD AUTO: 0.62 K/UL (ref 0–0.51)
EOSINOPHIL NFR BLD: 10.2 % (ref 0–6.9)
ERYTHROCYTE [DISTWIDTH] IN BLOOD BY AUTOMATED COUNT: 45.8 FL (ref 35.9–50)
GFR SERPLBLD CREATININE-BSD FMLA CKD-EPI: 70 ML/MIN/1.73 M 2
GFR SERPLBLD CREATININE-BSD FMLA CKD-EPI: 86 ML/MIN/1.73 M 2
GLOBULIN SER CALC-MCNC: 2.8 G/DL (ref 1.9–3.5)
GLUCOSE SERPL-MCNC: 146 MG/DL (ref 65–99)
GLUCOSE SERPL-MCNC: 89 MG/DL (ref 65–99)
HCT VFR BLD AUTO: 48.2 % (ref 42–52)
HGB BLD-MCNC: 16.7 G/DL (ref 14–18)
IMM GRANULOCYTES # BLD AUTO: 0.03 K/UL (ref 0–0.11)
IMM GRANULOCYTES NFR BLD AUTO: 0.5 % (ref 0–0.9)
LYMPHOCYTES # BLD AUTO: 1.14 K/UL (ref 1–4.8)
LYMPHOCYTES NFR BLD: 18.7 % (ref 22–41)
MAGNESIUM SERPL-MCNC: 1.7 MG/DL (ref 1.5–2.5)
MCH RBC QN AUTO: 31.6 PG (ref 27–33)
MCHC RBC AUTO-ENTMCNC: 34.6 G/DL (ref 33.7–35.3)
MCV RBC AUTO: 91.3 FL (ref 81.4–97.8)
MONOCYTES # BLD AUTO: 0.94 K/UL (ref 0–0.85)
MONOCYTES NFR BLD AUTO: 15.4 % (ref 0–13.4)
NEUTROPHILS # BLD AUTO: 3.31 K/UL (ref 1.82–7.42)
NEUTROPHILS NFR BLD: 54.4 % (ref 44–72)
NRBC # BLD AUTO: 0 K/UL
NRBC BLD-RTO: 0 /100 WBC
PHOSPHATE SERPL-MCNC: 2.7 MG/DL (ref 2.5–4.5)
PLATELET # BLD AUTO: 74 K/UL (ref 164–446)
POTASSIUM SERPL-SCNC: 3.2 MMOL/L (ref 3.6–5.5)
POTASSIUM SERPL-SCNC: 3.5 MMOL/L (ref 3.6–5.5)
PROT SERPL-MCNC: 5.6 G/DL (ref 6–8.2)
RBC # BLD AUTO: 5.28 M/UL (ref 4.7–6.1)
SODIUM SERPL-SCNC: 135 MMOL/L (ref 135–145)
SODIUM SERPL-SCNC: 139 MMOL/L (ref 135–145)
WBC # BLD AUTO: 6.1 K/UL (ref 4.8–10.8)

## 2022-09-06 PROCEDURE — A9270 NON-COVERED ITEM OR SERVICE: HCPCS | Performed by: INTERNAL MEDICINE

## 2022-09-06 PROCEDURE — 700102 HCHG RX REV CODE 250 W/ 637 OVERRIDE(OP)

## 2022-09-06 PROCEDURE — A9270 NON-COVERED ITEM OR SERVICE: HCPCS | Performed by: STUDENT IN AN ORGANIZED HEALTH CARE EDUCATION/TRAINING PROGRAM

## 2022-09-06 PROCEDURE — 80053 COMPREHEN METABOLIC PANEL: CPT

## 2022-09-06 PROCEDURE — 700102 HCHG RX REV CODE 250 W/ 637 OVERRIDE(OP): Performed by: INTERNAL MEDICINE

## 2022-09-06 PROCEDURE — 84100 ASSAY OF PHOSPHORUS: CPT

## 2022-09-06 PROCEDURE — 80048 BASIC METABOLIC PNL TOTAL CA: CPT

## 2022-09-06 PROCEDURE — RXMED WILLOW AMBULATORY MEDICATION CHARGE: Performed by: NURSE PRACTITIONER

## 2022-09-06 PROCEDURE — 700102 HCHG RX REV CODE 250 W/ 637 OVERRIDE(OP): Performed by: STUDENT IN AN ORGANIZED HEALTH CARE EDUCATION/TRAINING PROGRAM

## 2022-09-06 PROCEDURE — 85025 COMPLETE CBC W/AUTO DIFF WBC: CPT

## 2022-09-06 PROCEDURE — 99239 HOSP IP/OBS DSCHRG MGMT >30: CPT | Mod: GC | Performed by: HOSPITALIST

## 2022-09-06 PROCEDURE — 700102 HCHG RX REV CODE 250 W/ 637 OVERRIDE(OP): Performed by: NURSE PRACTITIONER

## 2022-09-06 PROCEDURE — A9270 NON-COVERED ITEM OR SERVICE: HCPCS

## 2022-09-06 PROCEDURE — A9270 NON-COVERED ITEM OR SERVICE: HCPCS | Performed by: NURSE PRACTITIONER

## 2022-09-06 PROCEDURE — 83735 ASSAY OF MAGNESIUM: CPT

## 2022-09-06 PROCEDURE — 36415 COLL VENOUS BLD VENIPUNCTURE: CPT

## 2022-09-06 PROCEDURE — 700111 HCHG RX REV CODE 636 W/ 250 OVERRIDE (IP)

## 2022-09-06 RX ORDER — MAGNESIUM SULFATE HEPTAHYDRATE 40 MG/ML
2 INJECTION, SOLUTION INTRAVENOUS ONCE
Status: COMPLETED | OUTPATIENT
Start: 2022-09-06 | End: 2022-09-06

## 2022-09-06 RX ORDER — SACUBITRIL AND VALSARTAN 24; 26 MG/1; MG/1
1 TABLET, FILM COATED ORAL 2 TIMES DAILY
Qty: 60 TABLET | Refills: 1 | Status: SHIPPED | OUTPATIENT
Start: 2022-09-06 | End: 2022-10-10 | Stop reason: SDUPTHER

## 2022-09-06 RX ORDER — POTASSIUM CHLORIDE 20 MEQ/1
40 TABLET, EXTENDED RELEASE ORAL ONCE
Status: COMPLETED | OUTPATIENT
Start: 2022-09-06 | End: 2022-09-06

## 2022-09-06 RX ORDER — POTASSIUM CHLORIDE 20 MEQ/1
20 TABLET, EXTENDED RELEASE ORAL 2 TIMES DAILY
Qty: 60 TABLET | Refills: 11 | Status: SHIPPED | OUTPATIENT
Start: 2022-09-06 | End: 2022-10-10

## 2022-09-06 RX ORDER — DOXYCYCLINE 100 MG/1
100 TABLET ORAL EVERY 12 HOURS
Qty: 10 TABLET | Refills: 0 | Status: SHIPPED | OUTPATIENT
Start: 2022-09-06 | End: 2022-09-11

## 2022-09-06 RX ORDER — ACETAMINOPHEN 325 MG/1
650 TABLET ORAL EVERY 6 HOURS PRN
Qty: 30 TABLET | Refills: 0 | Status: SHIPPED | OUTPATIENT
Start: 2022-09-06 | End: 2022-10-25

## 2022-09-06 RX ORDER — ROSUVASTATIN CALCIUM 10 MG/1
10 TABLET, COATED ORAL EVERY EVENING
Qty: 30 TABLET | Refills: 11 | Status: SHIPPED | OUTPATIENT
Start: 2022-09-06 | End: 2023-10-09

## 2022-09-06 RX ORDER — METOPROLOL SUCCINATE 50 MG/1
50 TABLET, EXTENDED RELEASE ORAL DAILY
Qty: 30 TABLET | Refills: 0 | Status: SHIPPED | OUTPATIENT
Start: 2022-09-07 | End: 2022-10-04

## 2022-09-06 RX ORDER — DAPAGLIFLOZIN 10 MG/1
10 TABLET, FILM COATED ORAL DAILY
Qty: 30 TABLET | Refills: 0 | Status: SHIPPED | OUTPATIENT
Start: 2022-09-07

## 2022-09-06 RX ORDER — DIGOXIN 125 MCG
125 TABLET ORAL DAILY
Qty: 30 TABLET | Refills: 0 | Status: SHIPPED | OUTPATIENT
Start: 2022-09-06 | End: 2022-10-04

## 2022-09-06 RX ORDER — SPIRONOLACTONE 25 MG/1
25 TABLET ORAL DAILY
Qty: 30 TABLET | Refills: 3 | Status: SHIPPED | OUTPATIENT
Start: 2022-09-07

## 2022-09-06 RX ADMIN — POTASSIUM CHLORIDE 40 MEQ: 1500 TABLET, EXTENDED RELEASE ORAL at 08:23

## 2022-09-06 RX ADMIN — SPIRONOLACTONE 25 MG: 25 TABLET ORAL at 05:46

## 2022-09-06 RX ADMIN — APIXABAN 5 MG: 5 TABLET, FILM COATED ORAL at 05:45

## 2022-09-06 RX ADMIN — POTASSIUM CHLORIDE 20 MEQ: 1500 TABLET, EXTENDED RELEASE ORAL at 05:45

## 2022-09-06 RX ADMIN — METOPROLOL SUCCINATE 50 MG: 50 TABLET, FILM COATED, EXTENDED RELEASE ORAL at 05:46

## 2022-09-06 RX ADMIN — DAPAGLIFLOZIN 10 MG: 10 TABLET, FILM COATED ORAL at 05:45

## 2022-09-06 RX ADMIN — MAGNESIUM SULFATE HEPTAHYDRATE 2 G: 40 INJECTION, SOLUTION INTRAVENOUS at 08:22

## 2022-09-06 RX ADMIN — DOXYCYCLINE 100 MG: 100 TABLET, FILM COATED ORAL at 05:46

## 2022-09-06 RX ADMIN — AMOXICILLIN AND CLAVULANATE POTASSIUM 1 TABLET: 875; 125 TABLET, FILM COATED ORAL at 05:46

## 2022-09-06 RX ADMIN — TORSEMIDE 80 MG: 20 TABLET ORAL at 05:46

## 2022-09-06 NOTE — PROGRESS NOTES
[facility] Internal Medicine Daily Progress Note    Date of Service  9/5/2022    R Team: R IM White Team   Attending: SKYLER Torres M.d.  Senior Resident: Dr. Ivory  Intern:  Dr. Armas  Contact Number: [phone]    Chief Complaint  Maximino Green is a 62 y.o. male admitted 8/31/2022 with Afib with RVR and volume overload in the context of a likely acute exacerbation of CHF.    Hospital Course  Maximino Green is a 62 y.o. male with history of paroxysmal A. 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 fib with RVR and acute systolic heart failure with ejection fraction of 30%.  Ongoing diuresis and titration of beta-blockers for rate control and other heart failure medications with improving volume status.    Interval Problem Update  In the last 24 hours, Mr. Green has had over 3.8 L in urine output. His creatinine and blood pressure have remained stable. He reports continued symptomatic improvement. He denies lightheadedness, chest pain, SOB, orthopnea, or palpitations. He is able to ambulate around his room without dyspnea on exertion.     I have discussed this patient's plan of care and discharge plan at IDT rounds today with Case Management, Nursing, Nursing leadership, and other members of the IDT team.    Consultants/Specialty  cardiology    Code Status  Full Code    Disposition  Patient is not medically cleared for discharge.   Anticipate discharge to to home with close outpatient follow-up. Likely discharge 9/6.  I have placed the appropriate orders for post-discharge needs.    Review of Systems  Review of Systems   Constitutional:  Negative for chills and fever.   HENT:  Negative for sore throat.    Eyes:  Negative for double vision.   Respiratory:  Negative for cough and wheezing.    Cardiovascular:  Positive for leg swelling. Negative for chest pain and orthopnea.   Gastrointestinal:   Negative for abdominal pain, blood in stool and constipation.   Genitourinary:  Negative for dysuria and urgency.   Musculoskeletal:  Negative for back pain.   Neurological:  Negative for sensory change and speech change.   Endo/Heme/Allergies:  Does not bruise/bleed easily.   Psychiatric/Behavioral:  The patient is not nervous/anxious.       Physical Exam  Temp:  [36.1 °C (97 °F)-37 °C (98.6 °F)] 37 °C (98.6 °F)  Pulse:  [] 92  Resp:  [16-20] 18  BP: (100-113)/(64-89) 113/89  SpO2:  [89 %-96 %] 90 %    Physical Exam  Constitutional:       Appearance: He is obese.   HENT:      Head: Normocephalic.      Mouth/Throat:      Mouth: Mucous membranes are moist.   Eyes:      General: No scleral icterus.  Cardiovascular:      Rate and Rhythm: Normal rate. Rhythm irregular.   Pulmonary:      Breath sounds: No wheezing or rales.   Abdominal:      General: There is distension.   Musculoskeletal:      Right lower leg: Edema present.      Left lower leg: Edema present.      Comments: Erythema over bilateral shins with gradual improvement   Skin:     General: Skin is warm and dry.   Neurological:      Mental Status: He is oriented to person, place, and time.      Cranial Nerves: No cranial nerve deficit.   Psychiatric:         Mood and Affect: Mood normal.         Behavior: Behavior normal.       Fluids    Intake/Output Summary (Last 24 hours) at 9/5/2022 1843  Last data filed at 9/5/2022 1527  Gross per 24 hour   Intake 960 ml   Output 4400 ml   Net -3440 ml       Laboratory  Recent Labs     09/03/22  0159 09/04/22  0548 09/05/22  0237   WBC 5.2 6.1 5.7   RBC 5.21 5.44 5.08   HEMOGLOBIN 16.6 17.3 16.2   HEMATOCRIT 50.1 50.1 46.5   MCV 96.2 92.1 91.5   MCH 31.9 31.8 31.9   MCHC 33.1* 34.5 34.8   RDW 48.7 45.2 45.3   PLATELETCT 70* 84* 72*     Recent Labs     09/04/22  0548 09/05/22  0237 09/05/22  1515   SODIUM 137 141 139   POTASSIUM 3.6 3.0* 4.1   CHLORIDE 97 96 92*   CO2 32 37* 38*   GLUCOSE 81 78 113*   BUN 26* 24* 25*    CREATININE 0.96 1.07 1.07   CALCIUM 10.1 9.9 10.6*                     Imaging  EC-ECHOCARDIOGRAM COMPLETE W/ CONT   Final Result      US-EXTREMITY VENOUS LOWER BILAT   Final Result      DX-CHEST-PORTABLE (1 VIEW)   Final Result      1.  Bibasilar atelectasis versus consolidations and associated small to moderate pleural effusions.   2.  Cardiomegaly.         * A-fib (HCC)- (present on admission)  Assessment & Plan  Onset of the current paroxysmal episode is unknown. Has remained in afib throughout admission, now with better rate control 80s-100s.  He is asymptomatic when he is in RVR.  - Continue metoprolol XL 50 mg daily  - No plans for heart cath during this admission, as etiology of CHF is likely arrhythmic, okay to start DOAC  - Discontinued Lovenox, starting apixaban today  - Given his thrombocytopenia, would not use aspirin in addition to DOAC  - Monitor on telemetry  - Monitor and replace electrolytes    Acute systolic heart failure (HCC)  Assessment & Plan  Most likely secondary to uncontrolled afib with RVR. Had a previous diagnosis of heart failure with preserved ejection fraction. Echo in 2019 showed LVEF of 65% with RVSP of 45. Echo during this admission showed reduced systolic function with ejection fraction of 30%. He reports a 40 lb weight gain since June.  Respiratory status and volume status is gradually improving with aggressive diuresis, but remains hypervolemic.   Cardiology following:    - Transition to oral diuresis with torsemide 80 mg daily   - Continue metoprolol to XL    - Continue dapagliflozin   - Start Entresto at discharge if BP allows    - No plans for heart cath during this admission, as etiology is likely arrhythmic, okay to start DOAC   - Given his thrombocytopenia, would not use aspirin in addition to DOAC   - There is a chance that the EF may rise following adequate rate control of his a fib. We recommend a repeat echo following discharge to reassess his baseline.    - As he  does not have unequivocal anginal symptoms, coronary angiography can be considered as an outpatient if he does not have recovery of his systolic function in 1-2 months on optimal medical therapy and heart rate contro  -Continue metoprolol, losartan, and spironolactone  -Replete electrolytes as necessary.   -Continue to monitor on telemetry.   -Strict I/Os.   -Daily weight measurements.   - Low sodium diet      Hypothyroidism  Assessment & Plan  TSH elevated at 11.1.   However, free T4 is at the lower range of normal at 1.01.   -Due to atrial fibrillation, no thyroid hormone replacement indicated at this time  -Outpatient follow-up for reevaluation and consideration of treatment    Cellulitis  Assessment & Plan  Bilateral nonblanching lower extremity erythema. Warm and tender to palpation. Lower extremity venous US negative for DVTs. Consistent with cellulitis on pitting edema. Improved with initial antibiotics of Augmentin and doxazosin and diuresis.  Given the findings of negative MRSA nares, doxycycline was discontinued 9/3, however subsequently worsened and doxycycline was resumed.    -Continue doxycycline (has finished course of Augmentin)    Thrombocytopenia (HCC)  Assessment & Plan  Probably secondary to chronic liver disease.  Chronic  Monitor    Tonsil cancer (HCC)- (present on admission)  Assessment & Plan  Reported in remission, status post chemoradiation in 2019  Follow-up with Dr. Whitfield yearly.    Essential hypertension- (present on admission)  Assessment & Plan  -Continue metoprolol, lisinopril, and spironolactone.        VTE prophylaxis: therapeutic anticoagulation with apixaban    I have performed a physical exam and reviewed and updated ROS and Plan today (9/5/2022). In review of yesterday's note (9/4/2022), there are no changes except as documented above.    Electronically signed by Paulo Armas MD, 9/5/2022 6:39 PM  Internal Medicine PGY1

## 2022-09-06 NOTE — DISCHARGE INSTRUCTIONS
You were admitted you were admitted for atrial fibrillation with a fast heart rate and new onset congestive heart failure.  We do believe that the likely cause of your heart failure was atrial fibrillation with a fast heart rate, that was likely going on for some time without causing any symptoms.  We have now started a number of medications including diuretics, which cause you to urinate excess fluid.  We have started a beta-blocker called metoprolol which will lower your heart rate.  We have also started apixaban or Eliquis, which is a blood thinner use to prevent stroke in patients with atrial fibrillation.  We have started number of other medications for heart failure and blood pressure.    You are now taking a blood thinner (apixaban, Eliquis), and you have increased risk of bleeding.  You may notice some increased bruising or oozing after minor cuts or injuries.  The major concern is that if you were to fall and hit your head, you must come to the emergency department to be evaluated for a brain bleed.  While this risk is low, this is something you must be aware of.    Please take all medications as prescribed and follow-up with cardiology and the heart failure clinic.  They will monitor you and recheck imaging of your heart, with the hope that you may have some improvement in cardiac function with these medications.    Follow-up with your primary care doctor as well.   [Dear  ___] : Dear  [unfilled], [Please see my note below.] : Please see my note below. [Sincerely,] : Sincerely, [FreeTextEntry2] : Wil Howe M.D.\par [address]\[place] [address]  [FreeTextEntry1] : This letter is in regard to our mutual patient who underwent breast lumpectomy with needle localization.  Enclosed is a copy of her pathology results.\par \par We will continue to take excellent care of your patient.\par \par Please feel free to contact our office with any questions or concerns.  [FreeTextEntry3] : Swathi Omalley M.D., F.A.C.S.

## 2022-09-06 NOTE — PROGRESS NOTES
Assumed care of PT A&O 4. Pt resting in bed with no signs of labored breathing. On 2L. Tele monitor in place, cardiac rhythm being monitored. Call light within reach, bed in lowest position, upper bed rails up. Pt was updated on plan of care for the day.

## 2022-09-06 NOTE — DISCHARGE SUMMARY
[facility] Internal Medicine Discharge Summary    Attending: SKYLER Torres M.d.  Senior Resident: Dr. Ivory   Intern:  Dr. Armas  Contact Number: [phone]    CHIEF COMPLAINT ON ADMISSION  Chief Complaint   Patient presents with    Leg Swelling     Leg swelling and redness X1 wk     Rapid Heart Beat     -170   Pt has Hx of Afib       Reason for Admission  EMS     Admission Date  8/31/2022    CODE STATUS  Full Code    HPI & HOSPITAL COURSE  Maximino Green is a 62 y.o. male with history of paroxysmal A. fib, heart failure with previously preserved ejection fraction, hypertension, tonsillar cancer in remission status post chemoradiation in 2019, who was off all medications and presented with dyspnea on exertion, volume overload, reported 40lb (~18 kg) weight gain, found to be in A. fib with RVR with acute systolic heart failure with ECHO taken while in afib showing ejection fraction of 30%    Regarding his heart failure, it was thought to be secondary to uncontrolled chronic a-fib with RVR. Cardiology was consulted on his care.   He was aggressively diuresed, initially with IV Lasix transitioning to oral torsemide on discharge. His presentation weight was 146kg, on discharge it was 128kg.  Heart failure medications were slowly introduced as pressures and symptoms tolerated.  His volume status improved throughout admission. He remained hemodynamically stable and will be discharged with close outpatient follow-up with cardiology. Was started on fraxiga, entresto, metoprolol, spironolactone, and torsemide with potassium supplement as below. He was not started on aspirin per recommendation of cardiology as he was thrombocytopenic to the 70s. Will need to follow with cardiology and heart failure clinic outpatient.     Regarding his Afib, presentation rates were found to be in the 160s. His rate was controlled with beta-blockers initially and subsequently digoxin.At time of discharge he was still in a-fib,  however his rate was better controlled to the 70s to 90s. He was anticoagulated, discharged with a prescription for apixaban. He is to continue metoprolol and digoxin on discharge.    Regarding, lower extremity cellulitis, he was started on Augmentin and doxycycline. Although patient did have negative MRSA nares, it was noted that when doxycyline was discontinued his cellulitis appeared to worsen. Doxycyline was resumed and he was discharged with an additional 5 days of doxycyline for 10 days total. He completed his course of Augmentin while inpatient.     Regarding his thrombocytopenia, was thought to be secondary to his chronic liver disease. Remained stable while inpatient.     He was found to have an elevated TSH with a lower end of normal T4, however in the context of his afib he was not started on any thyroid replacement and will need to follow up with his PCP regarding this outpatient.     Patient was also found to have an new oxygen need, and was discharged on home oxygen therapy.     Therefore, he is discharged in fair and stable condition to home with close outpatient follow-up.    The patient met 2-midnight criteria for an inpatient stay at the time of discharge.    Discharge Date  9/6/2022    Physical Exam on Day of Discharge  Physical Exam  Vitals:    09/06/22 0731   BP: 103/66   Pulse: (!) 103   Resp: 16   Temp: 35.9 °C (96.7 °F)   SpO2: 93%   Constitutional:       Appearance: He is obese.   HENT:      Head: Normocephalic.      Mouth/Throat:      Mouth: Mucous membranes are moist.   Eyes:      General: No scleral icterus.  Cardiovascular:      Rate and Rhythm: Normal rate. Rhythm irregular.   Pulmonary:      Breath sounds: No wheezing or rales.   Abdominal:      General: There is distension. No tenderness.  Musculoskeletal:      Right lower leg: Edema present.      Left lower leg: Edema present.      Comments: Pitting edema is gradually improving but remains at a moderate level with some wrinkling of  skin now visible.  Erythema over bilateral shins with gradual improvement is no longer warm.  Skin:     General: Skin is warm and dry.   Neurological:      Mental Status: He is oriented to person, place, and time.      Cranial Nerves: No cranial nerve deficit.   Psychiatric:         Mood and Affect: Mood normal.         Behavior: Behavior normal.       FOLLOW UP ITEMS POST DISCHARGE  Follow-up with cardiology, heart failure clinic, and PCP    DISCHARGE DIAGNOSES  Principal Problem:    A-fib (HCC) POA: Yes  Active Problems:    Acute systolic heart failure (HCC) POA: Unknown    Essential hypertension POA: Yes    Tonsil cancer (HCC) POA: Yes      Overview: IMO load March 2020    Thrombocytopenia (HCC) POA: Unknown    Cellulitis POA: Unknown    Hypothyroidism POA: Unknown  Resolved Problems:    * No resolved hospital problems. *      FOLLOW UP  Future Appointments   Date Time Provider Department Center   9/14/2022 12:45 PM SORAYA Strong None   9/27/2022  2:40 PM EDEL [place]   6/8/2023  2:00 PM Edward SKELTON M.D. [facility] None     Giancarlo Lomeli M.D.  [address] [place] [id number] [phone]    Schedule an appointment as soon as possible for a visit in 1 week(s)      MEDICATIONS ON DISCHARGE     Medication List        START taking these medications        Instructions   acetaminophen 325 MG Tabs  Commonly known as: Tylenol   Take 2 Tablets by mouth every 6 hours as needed for Mild Pain, Moderate Pain or Fever.  Dose: 650 mg     apixaban 5mg Tabs  Commonly known as: ELIQUIS   Take 1 Tablet by mouth 2 times a day. Indications: Thromboembolism secondary to Atrial Fibrillation  Dose: 5 mg     dapagliflozin propanediol 10 MG Tabs  Start taking on: September 7, 2022  Commonly known as: Farxiga   Take 1 Tablet by mouth every day.  Dose: 10 mg     digoxin 125 MCG Tabs  Commonly known as: LANOXIN   Take 1 Tablet by mouth every day at 6 PM.  Dose: 125 mcg     doxycycline  monohydrate 100 MG tablet  Commonly known as: ADOXA   Take 1 Tablet by mouth every 12 hours for 5 days.  Dose: 100 mg     Entresto 24-26 MG Tabs  Generic drug: sacubitril-valsartan   Take 1 Tablet by mouth 2 times a day.  Dose: 1 Tablet     metoprolol SR 50 MG Tb24  Start taking on: September 7, 2022  Commonly known as: TOPROL XL   Take 1 Tablet by mouth every day.  Dose: 50 mg     spironolactone 25 MG Tabs  Start taking on: September 7, 2022  Commonly known as: ALDACTONE   Take 1 Tablet by mouth every day.  Dose: 25 mg     Torsemide 40 MG Tabs  Start taking on: September 7, 2022   Take 40 mg by mouth every day.  Dose: 40 mg            CHANGE how you take these medications        Instructions   potassium chloride SA 20 MEQ Tbcr  What changed:   when to take this  additional instructions  Commonly known as: Kdur   Take 1 Tablet by mouth 2 times a day.  Dose: 20 mEq     rosuvastatin 10 MG Tabs  What changed: additional instructions  Commonly known as: CRESTOR   Take 1 Tablet by mouth every evening.  Dose: 10 mg            STOP taking these medications      aspirin EC 81 MG Tbec  Commonly known as: ECOTRIN     furosemide 20 MG Tabs  Commonly known as: LASIX     lisinopril 10 MG Tabs  Commonly known as: PRINIVIL     metoprolol tartrate 25 MG Tabs  Commonly known as: LOPRESSOR     sulfamethoxazole-trimethoprim 800-160 MG tablet  Commonly known as: Bactrim DS              Allergies  No Known Allergies    DIET  Orders Placed This Encounter   Procedures    Diet Order Diet: 2 Gram Sodium     Standing Status:   Standing     Number of Occurrences:   1     Order Specific Question:   Diet:     Answer:   2 Gram Sodium [7]       ACTIVITY  As tolerated.  Weight bearing as tolerated    CONSULTATIONS  Cardiology, Dr. Morales    PROCEDURES  None    LABORATORY  Lab Results   Component Value Date    SODIUM 135 09/06/2022    POTASSIUM 3.5 (L) 09/06/2022    CHLORIDE 89 (L) 09/06/2022    CO2 39 (H) 09/06/2022    GLUCOSE 146 (H) 09/06/2022     BUN 24 (H) 09/06/2022    CREATININE 1.18 09/06/2022        Lab Results   Component Value Date    WBC 6.1 09/06/2022    HEMOGLOBIN 16.7 09/06/2022    HEMATOCRIT 48.2 09/06/2022    PLATELETCT 74 (L) 09/06/2022        Total time of the discharge process was 43 minutes.

## 2022-09-06 NOTE — DISCHARGE PLANNING
Hospital Care Management- Medical Social Work      LMSW met with pt at bedside and obtained choice for [facility]. Faxed choice to [facility].

## 2022-09-06 NOTE — DISCHARGE PLANNING
Received Choice form at 1035   Agency/Facility Name: [facility]   Referral sent per Choice form @ 9938     @5960  Agency/Facility Name: [facility]   Spoke To: Raul  Outcome: Stated it is currently out for delivery, and should arrive by about 1600

## 2022-09-06 NOTE — FACE TO FACE
"Face to Face Note  -  Durable Medical Equipment    Domingo Ivory M.D. - NPI: [id number]  I certify that this patient is under my care and that they had a durable medical equipment(DME)face to face encounter by myself that meets the physician DME face-to-face encounter requirements with this patient on:    Date of encounter:   Patient:                    MRN:                       YOB: 2022  Maximino Green  [id number]  1960     The encounter with the patient was in whole, or in part, for the following medical condition, which is the primary reason for durable medical equipment:  CHF    I certify that, based on my findings, the following durable medical equipment is medically necessary:    Oxygen   HOME O2 Saturation Measurements:(Values must be present for Home Oxygen orders)  Room air sat at rest: 96  Room air sat with amb: 87  With liters of O2: 2, O2 sat at rest with O2: 98  With Liters of O2: 2, O2 sat with amb with O2 : 94  Is the patient mobile?: Yes  If patient feels more short of breath, they can go up to 6 liters per minute and contact healthcare provider.    Supporting Symptoms: The patient requires supplemental oxygen, as the following interventions have been tried with limited or no improvement: \"Ambulation with oximetry.    My Clinical findings support the need for the above equipment due to:  Hypoxia  "

## 2022-09-06 NOTE — CARE PLAN
The patient is Stable - Low risk of patient condition declining or worsening    Shift Goals  Clinical Goals: Arrange home o2, POC for discharge, record strict I&Os  Patient Goals: Discharge  Family Goals: JESSICA. No family present.    Progress made toward(s) clinical / shift goals:      Problem: Knowledge Deficit - Standard  Goal: Patient and family/care givers will demonstrate understanding of plan of care, disease process/condition, diagnostic tests and medications  Outcome: Progressing     Problem: Fall Risk  Goal: Patient will remain free from falls  Outcome: Progressing       Patient is not progressing towards the following goals: NA

## 2022-09-06 NOTE — DISCHARGE PLANNING
Meds-to-Beds: Discharge prescription order listed below delivered to patient's bedside. DARRIAN Perera notified. Patient counseled. Free trial card utilized and patient given information on how to sign up for co-pay card. Patient states he can  other medications that were sent to [facility] pharmacy.     Current Outpatient Medications   Medication Sig Dispense Refill    sacubitril-valsartan (ENTRESTO) 24-26 MG Tab Take 1 Tablet by mouth 2 times a day. 60 Tablet 1      Khalida Corea, PharmD

## 2022-09-14 ENCOUNTER — OFFICE VISIT (OUTPATIENT)
Dept: CARDIOLOGY | Facility: MEDICAL CENTER | Age: 62
End: 2022-09-14
Payer: COMMERCIAL

## 2022-09-14 VITALS
SYSTOLIC BLOOD PRESSURE: 90 MMHG | RESPIRATION RATE: 16 BRPM | WEIGHT: 259 LBS | HEIGHT: 73 IN | OXYGEN SATURATION: 94 % | BODY MASS INDEX: 34.33 KG/M2 | DIASTOLIC BLOOD PRESSURE: 58 MMHG | HEART RATE: 60 BPM

## 2022-09-14 DIAGNOSIS — Z79.899 HIGH RISK MEDICATION USE: ICD-10-CM

## 2022-09-14 DIAGNOSIS — I50.9 HEART FAILURE, NYHA CLASS 2 (HCC): ICD-10-CM

## 2022-09-14 DIAGNOSIS — I50.20 ACC/AHA STAGE C SYSTOLIC HEART FAILURE (HCC): ICD-10-CM

## 2022-09-14 DIAGNOSIS — E78.5 DYSLIPIDEMIA: ICD-10-CM

## 2022-09-14 DIAGNOSIS — I10 ESSENTIAL HYPERTENSION: ICD-10-CM

## 2022-09-14 DIAGNOSIS — R06.02 SOB (SHORTNESS OF BREATH): ICD-10-CM

## 2022-09-14 PROCEDURE — 99214 OFFICE O/P EST MOD 30 MIN: CPT | Performed by: NURSE PRACTITIONER

## 2022-09-14 RX ORDER — DOXYCYCLINE 100 MG/1
CAPSULE ORAL
COMMUNITY
Start: 2022-09-06 | End: 2022-09-14

## 2022-09-14 RX ORDER — TORSEMIDE 20 MG/1
TABLET ORAL
COMMUNITY
Start: 2022-09-06 | End: 2022-09-14

## 2022-09-14 ASSESSMENT — MINNESOTA LIVING WITH HEART FAILURE QUESTIONNAIRE (MLHF)
GIVING YOU SIDE EFFECTS FROM TREATMENTS: 5
MAKING YOU FEEL DEPRESSED: 4
WALKING ABOUT OR CLIMBING STAIRS DIFFICULT: 4
DIFFICULTY WORKING TO EARN A LIVING: 5
MAKING YOU SHORT OF BREATH: 5
TOTAL_SCORE: 91
DIFFICULTY SLEEPING WELL AT NIGHT: 4
LOSS OF SELF CONTROL IN YOUR LIFE: 3
DIFFICULTY SOCIALIZING WITH FAMILY OR FRIENDS: 5
FEELING LIKE A BURDEN TO FAMILY AND FRIENDS: 2
DIFFICULTY WITH RECREATIONAL PASTIMES, SPORTS, HOBBIES: 5
TIRED, FATIGUED OR LOW ON ENERGY: 5
SWELLING IN ANKLES OR LEGS: 5
DIFFICULTY WITH SEXUAL ACTIVITIES: 5
HAVING TO SIT OR LIE DOWN DURING THE DAY: 5
DIFFICULTY GOING AWAY FROM HOME: 3
DIFFICULTY TO CONCENTRATE OR REMEMBERING THINGS: 3
MAKING YOU STAY IN A HOSPITAL: 5
EATING LESS FOODS YOU LIKE: 4
WORKING AROUND THE HOUSE OR YARD DIFFICULT: 4
MAKING YOU WORRY: 5
COSTING YOU MONEY FOR MEDICAL CARE: 5

## 2022-09-14 ASSESSMENT — ENCOUNTER SYMPTOMS
CLAUDICATION: 0
BLURRED VISION: 1
ORTHOPNEA: 0
SHORTNESS OF BREATH: 1
PALPITATIONS: 0
ABDOMINAL PAIN: 0
FEVER: 0
DIZZINESS: 1
MYALGIAS: 0
PND: 0
COUGH: 0

## 2022-09-14 ASSESSMENT — FIBROSIS 4 INDEX: FIB4 SCORE: 7.88

## 2022-09-14 NOTE — LETTER
September 14, 2022    To Whom It May Concern:         This is confirmation that Maximino Percy Angelamarvin attended his scheduled appointment with SORAYA Strong on 9/14/22.         At this time, He will postpone starting work until October 3, 2022.         If you have any questions please do not hesitate to call me at the phone number listed below.    Sincerely,          PEPITO Strong.  [phone]

## 2022-09-14 NOTE — PROGRESS NOTES
"Chief Complaint   Patient presents with    Atrial Fibrillation     Follow up         Subjective     Maximino Green is a 62 y.o. male who presents today for follow-up on his heart failure and atrial fibrillation with his niece, Dunia and her daughter.    Previous patient of Dr. Negron.  He was last seen in 2019.    He had a recent hospitalization from 8/31/2022 through 9/6/2022.  He presented with leg swelling and rapid heartbeat.  He was found to be in A. fib with RVR and had an echocardiogram which showed an EF of 30%.  Cardiology was consulted.  He was diuresed.  He lost 40 pounds of water weight.  He was started on guideline directed medical therapy and discharged home.  Patient was also treated for lower extremity cellulitis.  He was recommended to follow-up as outpatient.    He reports having shortness of breath, occasional dizziness and an episode of blurred vision.  He denies chest pain, palpitations, orthopnea, PND, edema.    He is not weighing himself at home.    He is trying to increase his activity, currently is walking and plans on slowly increasing.    He is trying to monitor his sodium intake and he reports staying well-hydrated.    He was a former alcoholic, quit back in 2009, and quit smoking about 3 years ago.    Additonally, patient has the following medical problems:     -Patient had history of A. fib with RVR in the setting of an illness.    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  Past Medical History:   Diagnosis Date    Alcoholic (HCC)     stopped drinking in 2009    Cancer (HCC) 2019    throat    Dental disorder     uppers/ waiting for lower dentures t ocome in    Diabetes (HCC)     diet controlled; pt states that he was told he was \"pre-diabetic\" and has never been on any medication    Hypertension     stopped taking blood pressure medication on his own    Paroxysmal atrial fibrillation (HCC)     Tonsil cancer (HCC)     right tonsil - SCC, " chemo and radiation     Past Surgical History:   Procedure Laterality Date    OTHER  12/27/2018    rt tonsil biopsy     CHOLECYSTECTOMY  2003     Family History   Problem Relation Age of Onset    Cancer Mother         colon    Respiratory Disease Mother         emphysema- smoker    Alcohol abuse Mother     Heart Disease Father         Detials not known    Cancer Sister         brain tumor    GI Disease Sister      Social History     Socioeconomic History    Marital status:      Spouse name: Not on file    Number of children: Not on file    Years of education: Not on file    Highest education level: Not on file   Occupational History    Occupation:    Tobacco Use    Smoking status: Former     Packs/day: 0.10     Years: 40.00     Pack years: 4.00     Types: Cigarettes, Cigars     Quit date: 2/11/2019     Years since quitting: 3.5    Smokeless tobacco: Never   Vaping Use    Vaping Use: Never used   Substance and Sexual Activity    Alcohol use: No     Comment: Alchohlism quit 2008    Drug use: No    Sexual activity: Yes     Partners: Female   Other Topics Concern    Not on file   Social History Narrative    Not on file     Social Determinants of Health     Financial Resource Strain: Not on file   Food Insecurity: Not on file   Transportation Needs: Not on file   Physical Activity: Not on file   Stress: Not on file   Social Connections: Not on file   Intimate Partner Violence: Not on file   Housing Stability: Not on file     No Known Allergies  Outpatient Encounter Medications as of 9/14/2022   Medication Sig Dispense Refill    potassium chloride SA (KDUR) 20 MEQ Tab CR Take 1 Tablet by mouth 2 times a day. 60 Tablet 11    rosuvastatin (CRESTOR) 10 MG Tab Take 1 Tablet by mouth every evening. 30 Tablet 11    acetaminophen (TYLENOL) 325 MG Tab Take 2 Tablets by mouth every 6 hours as needed for Mild Pain, Moderate Pain or Fever. 30 Tablet 0    apixaban (ELIQUIS) 5mg Tab Take 1 Tablet by mouth 2 times a  "day. Indications: Thromboembolism secondary to Atrial Fibrillation 60 Tablet 0    dapagliflozin propanediol (FARXIGA) 10 MG Tab Take 1 Tablet by mouth every day. 30 Tablet 0    digoxin (LANOXIN) 125 MCG Tab Take 1 Tablet by mouth every day at 6 PM. 30 Tablet 0    metoprolol SR (TOPROL XL) 50 MG TABLET SR 24 HR Take 1 Tablet by mouth every day. 30 Tablet 0    spironolactone (ALDACTONE) 25 MG Tab Take 1 Tablet by mouth every day. 30 Tablet 3    torsemide 40 MG Tab Take 40 mg by mouth every day. 30 Tablet 3    sacubitril-valsartan (ENTRESTO) 24-26 MG Tab Take 1 Tablet by mouth 2 times a day. 60 Tablet 1    [DISCONTINUED] doxycycline (MONODOX) 100 MG capsule  (Patient not taking: Reported on 9/14/2022)      [DISCONTINUED] torsemide (DEMADEX) 20 MG Tab  (Patient not taking: Reported on 9/14/2022)       No facility-administered encounter medications on file as of 9/14/2022.     Review of Systems   Constitutional:  Negative for fever and malaise/fatigue.   Eyes:  Positive for blurred vision.   Respiratory:  Positive for shortness of breath. Negative for cough.    Cardiovascular:  Negative for chest pain, palpitations, orthopnea, claudication, leg swelling and PND.   Gastrointestinal:  Negative for abdominal pain.   Musculoskeletal:  Negative for myalgias.   Neurological:  Positive for dizziness (occ).   All other systems reviewed and are negative.           Objective     BP (!) 90/58 (BP Location: Left arm, Patient Position: Sitting, BP Cuff Size: Adult)   Pulse 60   Resp 16   Ht 1.854 m (6' 1\")   Wt 117 kg (259 lb)   SpO2 94%   BMI 34.17 kg/m²     Physical Exam  Vitals reviewed.   Constitutional:       Appearance: He is well-developed. He is obese.   HENT:      Head: Normocephalic and atraumatic.   Eyes:      Pupils: Pupils are equal, round, and reactive to light.   Neck:      Vascular: No JVD.   Cardiovascular:      Rate and Rhythm: Normal rate. Rhythm irregular.      Heart sounds: Normal heart sounds.   Pulmonary: "      Effort: Pulmonary effort is normal. No respiratory distress.      Breath sounds: Normal breath sounds. No wheezing or rales.   Abdominal:      General: Bowel sounds are normal.      Palpations: Abdomen is soft.   Musculoskeletal:         General: Normal range of motion.      Cervical back: Normal range of motion and neck supple.      Right lower leg: Edema (trace) present.      Left lower leg: Edema (trace) present.   Skin:     General: Skin is warm and dry.      Comments: Redness of LE    Neurological:      General: No focal deficit present.      Mental Status: He is alert and oriented to person, place, and time.   Psychiatric:         Behavior: Behavior normal.     Lab Results   Component Value Date/Time    CHOLSTRLTOT 215 (H) 03/18/2022 08:18 AM     (H) 03/18/2022 08:18 AM    HDL 82 03/18/2022 08:18 AM    TRIGLYCERIDE 68 03/18/2022 08:18 AM       Lab Results   Component Value Date/Time    SODIUM 135 09/06/2022 10:18 AM    POTASSIUM 3.5 (L) 09/06/2022 10:18 AM    CHLORIDE 89 (L) 09/06/2022 10:18 AM    CO2 39 (H) 09/06/2022 10:18 AM    GLUCOSE 146 (H) 09/06/2022 10:18 AM    BUN 24 (H) 09/06/2022 10:18 AM    CREATININE 1.18 09/06/2022 10:18 AM     Lab Results   Component Value Date/Time    ALKPHOSPHAT 55 09/06/2022 01:56 AM    ASTSGOT 41 09/06/2022 01:56 AM    ALTSGPT 19 09/06/2022 01:56 AM    TBILIRUBIN 1.4 09/06/2022 01:56 AM      Transthoracic Echo Report 2/23/2019  Normal chamber sizes. Mild concentric LVH. Normal LV and RV systolic function. LVEF 65% visually. There is grade I diastolic dydsfunction.   No signfiicant valvular disease. RVSP estimated at 45 mmHg. No prior study is available for comparison.     Transthoracic Echo Report 9/1/2022  No prior study is available for comparison.   Moderately reduced left ventricular systolic function.   The left ventricular ejection fraction is estimated to be 30%.   Wall motion is difficult to assess with the limitations of image   quality, even with the  use of echo contrast.  Reduced right ventricular systolic function.  Mild mitral regurgitation.  Estimated right ventricular systolic pressure is 30 mmHg.  Dilated inferior vena cava without inspiratory collapse.        Date 6MWT [facility]   9/14/2022 Not done d/t resources 91                       Assessment & Plan     1. ACC/AHA stage C systolic heart failure (HCC)  proBrain Natriuretic Peptide, NT    Basic Metabolic Panel      2. High risk medication use  proBrain Natriuretic Peptide, NT    Basic Metabolic Panel      3. SOB (shortness of breath)  proBrain Natriuretic Peptide, NT    Basic Metabolic Panel      4. Essential hypertension        5. Heart failure, NYHA class 2 (HCC)        6. Dyslipidemia            Medical Decision Making: Today's Assessment/Status/Plan:        HFrEF, Stage C, Class 2-3, LVEF 30%: Fairly euvolemic except for trace lower extremity edema  -Heart failure due likely due to A. fib, patient did not have ischemic work-up, discussed angiogram, he will discuss with his family and let us know at his next visit  -Discussed Heart failure trajectory and prognosis with patient. Will continue to optimize medical therapy as tolerated. Advanced HF treatment, need for remote monitoring consideration at every visit.   -ACE-I/ARB/ARNI: Continue Entresto 24-26 mg twice a day  -Evidence Based Beta-blocker: Continue metoprolol SR 50 mg daily  -Aldosterone Antagonist: Continue Spirolactone 25 mg daily   -Diuretic: Continue torsemide 40 mg daily with potassium 20 mEq twice a day  -SGLT2 inhibitor: Continue Farxiga 10 mg daily  -Other: Continue digoxin 125 mcg daily  -Labs: BMP, NT pro BNP- Labs Ordered, to be done in 2-3 weeks, Will continue to closely monitor for side effects of patient's high risk medication(s) including renal function, liver function NTproBNP/cardiac markers, and electrolytes as needed  -discussed importance of exercise and regular activity  -Discussed/reviewed maintaining a low Sodium and  hydration recommendations  -Repeat Echo in 3 months, if LVEF not >35%, then will discuss/consider ICD for primary Prevention.   -Reinforced s/sx of worsening heart failure with patient and weight monitoring. Pt verbalizes understanding. Pt to call office or RTC if present.    -PUMP line number [phone] (PUMP) reviewed with patient  -Heart Failure Education:  Discussed the Definition of heart failure (linking disease, symptoms, and treatment) and causes of heart failure  Recognition of escalating symptoms and concrete plan for response to particular symptoms  Discussed the indication for ACE-I/ARB/ARNI, Beta-Blocker, Aldosterone antagonist, beta-blocker, SGLT2 inhibitor  Risk modification for heart failure progression  Specific diet recommendations: Continue low-sodium diet, adequate hydration.  Patient plans on slowly increasing exercise and activity  Importance of treatment adherence  Behavioral strategies to promote treatment adherence  -Advanced care planning: Advance directive and POLST to be discussed at future visit  -Pharmacotherapy Referral: We will discuss this at follow-up  -Compliance Barriers: None  -Genetic testing Consideration: None  -Consideration for LVAD and/or Heart transplant as necessary      Atrial fibrillation:  -Did discuss sending patient for a DCCV, patient needs to be on OAC for 30 days  -Information given for patient to review and we will discuss further at follow-up visit  -Continue Eliquis 5 mg twice a day  -Continue digoxin 125 mcg daily  -Continue metoprolol SR 50 mg daily    Hypertension:  -BP borderline low  -Patient to monitor BP at home and notify our office if his BP continues to decrease for med changes    Dyslipidemia, last  on 3/18/2022  -Continue rosuvastatin 10 mg daily  -Not on aspirin due to taking Eliquis    FU in clinic in 2 to 3 weeks with labs. Sooner if needed.    Patient verbalizes understanding and agrees with the plan of care.     PLEASE NOTE: This Note was  created using voice recognition Software. I have made every reasonable attempt to correct obvious errors, but I expect that there are errors of grammar and possibly content that I did not discover before finalizing the note

## 2022-09-27 ENCOUNTER — OFFICE VISIT (OUTPATIENT)
Dept: MEDICAL GROUP | Facility: PHYSICIAN GROUP | Age: 62
End: 2022-09-27
Payer: COMMERCIAL

## 2022-09-27 ENCOUNTER — TELEPHONE (OUTPATIENT)
Dept: CARDIOLOGY | Facility: MEDICAL CENTER | Age: 62
End: 2022-09-27

## 2022-09-27 ENCOUNTER — HOSPITAL ENCOUNTER (OUTPATIENT)
Dept: LAB | Facility: MEDICAL CENTER | Age: 62
End: 2022-09-27
Attending: NURSE PRACTITIONER
Payer: COMMERCIAL

## 2022-09-27 VITALS
SYSTOLIC BLOOD PRESSURE: 118 MMHG | DIASTOLIC BLOOD PRESSURE: 70 MMHG | TEMPERATURE: 97.6 F | RESPIRATION RATE: 16 BRPM | WEIGHT: 246 LBS | BODY MASS INDEX: 32.6 KG/M2 | OXYGEN SATURATION: 97 % | HEART RATE: 60 BPM | HEIGHT: 73 IN

## 2022-09-27 DIAGNOSIS — I50.20 ACC/AHA STAGE C SYSTOLIC HEART FAILURE (HCC): ICD-10-CM

## 2022-09-27 DIAGNOSIS — I50.9 HEART FAILURE, NYHA CLASS 2 (HCC): ICD-10-CM

## 2022-09-27 DIAGNOSIS — Z23 NEED FOR VACCINATION: ICD-10-CM

## 2022-09-27 DIAGNOSIS — Z09 HOSPITAL DISCHARGE FOLLOW-UP: ICD-10-CM

## 2022-09-27 DIAGNOSIS — R06.02 SOB (SHORTNESS OF BREATH): ICD-10-CM

## 2022-09-27 DIAGNOSIS — Z79.899 HIGH RISK MEDICATION USE: ICD-10-CM

## 2022-09-27 LAB
ANION GAP SERPL CALC-SCNC: 13 MMOL/L (ref 7–16)
BUN SERPL-MCNC: 56 MG/DL (ref 8–22)
CALCIUM SERPL-MCNC: 10.7 MG/DL (ref 8.5–10.5)
CHLORIDE SERPL-SCNC: 98 MMOL/L (ref 96–112)
CO2 SERPL-SCNC: 26 MMOL/L (ref 20–33)
CREAT SERPL-MCNC: 4.45 MG/DL (ref 0.5–1.4)
GFR SERPLBLD CREATININE-BSD FMLA CKD-EPI: 14 ML/MIN/1.73 M 2
GLUCOSE SERPL-MCNC: 95 MG/DL (ref 65–99)
NT-PROBNP SERPL IA-MCNC: 375 PG/ML (ref 0–125)
POTASSIUM SERPL-SCNC: 5.3 MMOL/L (ref 3.6–5.5)
SODIUM SERPL-SCNC: 137 MMOL/L (ref 135–145)

## 2022-09-27 PROCEDURE — 99214 OFFICE O/P EST MOD 30 MIN: CPT | Mod: 25 | Performed by: FAMILY MEDICINE

## 2022-09-27 PROCEDURE — 83880 ASSAY OF NATRIURETIC PEPTIDE: CPT

## 2022-09-27 PROCEDURE — 36415 COLL VENOUS BLD VENIPUNCTURE: CPT

## 2022-09-27 PROCEDURE — 90471 IMMUNIZATION ADMIN: CPT | Performed by: FAMILY MEDICINE

## 2022-09-27 PROCEDURE — 90686 IIV4 VACC NO PRSV 0.5 ML IM: CPT | Performed by: FAMILY MEDICINE

## 2022-09-27 PROCEDURE — 80048 BASIC METABOLIC PNL TOTAL CA: CPT

## 2022-09-27 ASSESSMENT — FIBROSIS 4 INDEX: FIB4 SCORE: 7.88

## 2022-09-28 ENCOUNTER — PATIENT MESSAGE (OUTPATIENT)
Dept: CARDIOLOGY | Facility: MEDICAL CENTER | Age: 62
End: 2022-09-28
Payer: COMMERCIAL

## 2022-09-28 PROBLEM — Z09 HOSPITAL DISCHARGE FOLLOW-UP: Status: ACTIVE | Noted: 2022-09-28

## 2022-09-28 NOTE — PROGRESS NOTES
Subjective:   Maximino Green is a 62 y.o. male here today for evaluation and management of:     Hospital discharge follow-up  At previous clinic visit I had sent Maximino to the hospital for afibb with RVR. Maximino was discharged after 7 days inpatient and treated for afibb with rvr, chf, cellulitis.  Echo showed EF 30% compared to 65% in 2019. This will hopefully improve with getting stable on medication.  Today in clinic his bp and heart rate are back to normal. He is on eliquis 5 bid, farxiga, digoxin, spironolactone, torsemide, potassium, entresto, metoprolol, rosuvastatin. He has follow up with cardiology 10/10   He feels well, the cellulitis of felicity/l CARRIE has resolved.   Influenza vaccination provided in clinic today.                Current medicines (including changes today)  Current Outpatient Medications   Medication Sig Dispense Refill    potassium chloride SA (KDUR) 20 MEQ Tab CR Take 1 Tablet by mouth 2 times a day. 60 Tablet 11    rosuvastatin (CRESTOR) 10 MG Tab Take 1 Tablet by mouth every evening. 30 Tablet 11    acetaminophen (TYLENOL) 325 MG Tab Take 2 Tablets by mouth every 6 hours as needed for Mild Pain, Moderate Pain or Fever. 30 Tablet 0    apixaban (ELIQUIS) 5mg Tab Take 1 Tablet by mouth 2 times a day. Indications: Thromboembolism secondary to Atrial Fibrillation 60 Tablet 0    dapagliflozin propanediol (FARXIGA) 10 MG Tab Take 1 Tablet by mouth every day. 30 Tablet 0    digoxin (LANOXIN) 125 MCG Tab Take 1 Tablet by mouth every day at 6 PM. 30 Tablet 0    metoprolol SR (TOPROL XL) 50 MG TABLET SR 24 HR Take 1 Tablet by mouth every day. 30 Tablet 0    spironolactone (ALDACTONE) 25 MG Tab Take 1 Tablet by mouth every day. 30 Tablet 3    torsemide 40 MG Tab Take 40 mg by mouth every day. 30 Tablet 3    sacubitril-valsartan (ENTRESTO) 24-26 MG Tab Take 1 Tablet by mouth 2 times a day. 60 Tablet 1     No current facility-administered medications for this visit.     He  has a past medical history of  "Alcoholic (HCC), Cancer (HCC) (2019), Dental disorder, Diabetes (HCC), Hypertension, Paroxysmal atrial fibrillation (HCC), and Tonsil cancer (HCC).    ROS  No chest pain, no shortness of breath, no abdominal pain       Objective:     /70   Pulse 60   Temp 36.4 °C (97.6 °F) (Temporal)   Resp 16   Ht 1.854 m (6' 1\")   Wt 112 kg (246 lb)   SpO2 97%  Body mass index is 32.46 kg/m².   Physical Exam:  Constitutional: Alert, no distress.  Skin: Warm, dry, good turgor, dry skin on b/l LE   Eye: Equal, round and reactive, conjunctiva clear, lids normal.  ENMT: Lips without lesions, good dentition, oropharynx clear.  Neck: Trachea midline, no masses, no thyromegaly. No cervical or supraclavicular lymphadenopathy  Respiratory: Unlabored respiratory effort, lungs clear to auscultation, no wheezes, no ronchi.  Cardiovascular: irregular heart rate, no murmur, no edema.  Abdomen: Soft, non-tender, no masses, no hepatosplenomegaly.  Psych: Alert and oriented x3, normal affect and mood.        Assessment and Plan:   The following treatment plan was discussed    1. Need for vaccination  - INFLUENZA VACCINE QUAD INJ (PF)    2. Hospital discharge follow-up      Followup: Return in about 3 months (around 12/27/2022).           "

## 2022-09-28 NOTE — ASSESSMENT & PLAN NOTE
At previous clinic visit I had sent Maximino to the hospital for afibb with RVR. Maximino was discharged after 7 days inpatient and treated for afibb with rvr, chf, cellulitis.  Echo showed EF 30% compared to 65% in 2019. This will hopefully improve with getting stable on medication.  Today in clinic his bp and heart rate are back to normal. He is on eliquis 5 bid, farxiga, digoxin, spironolactone, torsemide, potassium, entresto, metoprolol, rosuvastatin. He has follow up with cardiology 10/10   He feels well, the cellulitis of felicity/l CARRIE has resolved.   Influenza vaccination provided in clinic today.

## 2022-09-28 NOTE — TELEPHONE ENCOUNTER
Called by lab for critical creatinine result. Noted creatinine increase from 1 to 4.4 in the setting of a couple weeks. I left a message on cell phone to come to [facility] Emergency room for evaluation. No answer on home phone. Can we try and contact him tomorrow as well? Thanks!

## 2022-09-28 NOTE — TELEPHONE ENCOUNTER
If he is adamant about not being able to go to ER and he is asymptomatic. Please have him stop Torsemide, Spironolactone, farxiga and Entresto. Please review with him s/sx of worsening heart failure andcontinue weight monitoring. If he starts to gain > 3lbs in 1 day or 5 lbs in 1 week , please have him call our pump line for recommendations. Repeat labs on Monday-BMP. If he has not taken those meds today, advise not to take. Mke sure he is drinking enough and on low sodium diet. Discuss ER precautions

## 2022-09-28 NOTE — TELEPHONE ENCOUNTER
Called patient mobile number and LM to callback.     Called patient home number and LM to callback or respond to knowNormal message.     ezTaxihart message sent.

## 2022-09-28 NOTE — TELEPHONE ENCOUNTER
"    Called patient back at number provided and spoke to the patient. Relayed JM recommendations and he stated he cannot afford to go to [facility], asked about an ER closer to him and he stated there is not one, only urgent care.   Medication list is updated.   Denies any symptoms, legs \"look like when I was back in high school, peeling like I had a sunburn\".   Vitals: 09/27 126/88 63   09/26 124/88 67  Still losing weight, 259 out of hospital, 246 now.  Advised will notify HK and see what recommendations she has and then I will let him know. Answered all questions and concerns, appreciative of call.     Routed to [facility].      "

## 2022-09-28 NOTE — TELEPHONE ENCOUNTER
Called patient home number and spoke to to the patient. Relayed all DOLORES recommendations, he has PUMP line card with him. His ambulance ride alone was $700.00 last time. Confirmed email for non-fasting labs to be completed Monday. Answered all questions and concerns, appreciative of call.     Lab ordered, printed and emailed to patient. Received completed status.

## 2022-10-03 ENCOUNTER — HOSPITAL ENCOUNTER (OUTPATIENT)
Dept: LAB | Facility: MEDICAL CENTER | Age: 62
End: 2022-10-03
Attending: NURSE PRACTITIONER
Payer: COMMERCIAL

## 2022-10-03 DIAGNOSIS — I50.9 HEART FAILURE, NYHA CLASS 2 (HCC): ICD-10-CM

## 2022-10-03 LAB
ANION GAP SERPL CALC-SCNC: 10 MMOL/L (ref 7–16)
BUN SERPL-MCNC: 31 MG/DL (ref 8–22)
CALCIUM SERPL-MCNC: 10.2 MG/DL (ref 8.5–10.5)
CHLORIDE SERPL-SCNC: 110 MMOL/L (ref 96–112)
CO2 SERPL-SCNC: 22 MMOL/L (ref 20–33)
CREAT SERPL-MCNC: 1.19 MG/DL (ref 0.5–1.4)
GFR SERPLBLD CREATININE-BSD FMLA CKD-EPI: 69 ML/MIN/1.73 M 2
GLUCOSE SERPL-MCNC: 170 MG/DL (ref 65–99)
POTASSIUM SERPL-SCNC: 4.8 MMOL/L (ref 3.6–5.5)
SODIUM SERPL-SCNC: 142 MMOL/L (ref 135–145)

## 2022-10-03 PROCEDURE — 80048 BASIC METABOLIC PNL TOTAL CA: CPT

## 2022-10-03 PROCEDURE — 36415 COLL VENOUS BLD VENIPUNCTURE: CPT

## 2022-10-10 ENCOUNTER — OFFICE VISIT (OUTPATIENT)
Dept: CARDIOLOGY | Facility: MEDICAL CENTER | Age: 62
End: 2022-10-10
Payer: COMMERCIAL

## 2022-10-10 VITALS
HEIGHT: 73 IN | RESPIRATION RATE: 16 BRPM | BODY MASS INDEX: 34.99 KG/M2 | WEIGHT: 264 LBS | DIASTOLIC BLOOD PRESSURE: 74 MMHG | OXYGEN SATURATION: 96 % | SYSTOLIC BLOOD PRESSURE: 104 MMHG | HEART RATE: 64 BPM

## 2022-10-10 DIAGNOSIS — E78.5 DYSLIPIDEMIA: ICD-10-CM

## 2022-10-10 DIAGNOSIS — Z79.899 HIGH RISK MEDICATION USE: ICD-10-CM

## 2022-10-10 DIAGNOSIS — I50.20 ACC/AHA STAGE C SYSTOLIC HEART FAILURE (HCC): ICD-10-CM

## 2022-10-10 DIAGNOSIS — I48.91 ATRIAL FIBRILLATION WITH RVR (HCC): ICD-10-CM

## 2022-10-10 DIAGNOSIS — I50.9 HEART FAILURE, NYHA CLASS 2 (HCC): ICD-10-CM

## 2022-10-10 DIAGNOSIS — I10 ESSENTIAL HYPERTENSION: ICD-10-CM

## 2022-10-10 PROCEDURE — 99214 OFFICE O/P EST MOD 30 MIN: CPT | Performed by: NURSE PRACTITIONER

## 2022-10-10 RX ORDER — METOPROLOL SUCCINATE 50 MG/1
50 TABLET, EXTENDED RELEASE ORAL DAILY
Qty: 90 TABLET | Refills: 3 | Status: SHIPPED | OUTPATIENT
Start: 2022-10-10 | End: 2023-01-18

## 2022-10-10 RX ORDER — DIGOXIN 125 MCG
125 TABLET ORAL DAILY
Qty: 90 TABLET | Refills: 3 | Status: SHIPPED | OUTPATIENT
Start: 2022-10-10 | End: 2023-10-12

## 2022-10-10 RX ORDER — POTASSIUM CHLORIDE 20 MEQ/1
20 TABLET, EXTENDED RELEASE ORAL DAILY
Qty: 90 TABLET | Refills: 3 | Status: SHIPPED | OUTPATIENT
Start: 2022-10-10

## 2022-10-10 RX ORDER — SACUBITRIL AND VALSARTAN 24; 26 MG/1; MG/1
1 TABLET, FILM COATED ORAL 2 TIMES DAILY
Qty: 180 TABLET | Refills: 3 | Status: SHIPPED | OUTPATIENT
Start: 2022-10-10 | End: 2022-10-25

## 2022-10-10 ASSESSMENT — ENCOUNTER SYMPTOMS
ABDOMINAL PAIN: 0
CLAUDICATION: 0
ORTHOPNEA: 0
FEVER: 0
BLURRED VISION: 0
COUGH: 0
PALPITATIONS: 0
PND: 0
MYALGIAS: 0
SHORTNESS OF BREATH: 0
DIZZINESS: 0

## 2022-10-10 ASSESSMENT — FIBROSIS 4 INDEX: FIB4 SCORE: 7.88

## 2022-10-10 NOTE — PROGRESS NOTES
"Chief Complaint   Patient presents with    CHF (Systolic)     F/V Dx: ACC/AHA stage C systolic heart failure (HCC)    Atrial Fibrillation    Hypertension     F/V Dx: Essential hypertension         Subjective     Maximino Green is a 62 y.o. male who presents today for follow-up on his heart failure and atrial fibrillation.    Previous patient of Dr. Negron.  Current patient of the heart failure clinic.  He was last seen in clinic on 9/14/2022.  During that visit, no changes were made to his regimen, he was recommended to get lab testing.    Patient did have some abnormal labs and initially was contacted, but patient was not reached.  Nursing to try patient the next day and was able to reach patient.  Patient felt okay and did not want to go to the emergency room.  Patient was recommended to stop Farxiga, Entresto, torsemide and spironolactone.  He was recommended to repeat labs after the weekend.    Patient reports during that time he felt tired or fatigued.  He otherwise denies chest pain, palpitations, orthopnea, PND, shortness of breath, dizziness or nausea or vomiting.  He does not mention having some lower extremity edema.    His home weights have been ranging between 248-251 pounds.    He has been active walking around his 2 acres with his dog twice a day.    He reports he is trying to be consistent with a low-sodium diet.    He was a former alcoholic, quit back in 2009, and quit smoking about 3 years ago.    Additonally, patient has the following medical problems:     -Patient had history of A. fib with RVR in the setting of an illness.    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  Past Medical History:   Diagnosis Date    Alcoholic (HCC)     stopped drinking in 2009    Cancer (HCC) 2019    throat    Dental disorder     uppers/ waiting for lower dentures t ocome in    Diabetes ([facility])     diet controlled; pt states that he was told he was \"pre-diabetic\" and has " never been on any medication    Hypertension     stopped taking blood pressure medication on his own    Paroxysmal atrial fibrillation (HCC)     Tonsil cancer (HCC)     right tonsil - SCC, chemo and radiation     Past Surgical History:   Procedure Laterality Date    OTHER  12/27/2018    rt tonsil biopsy     CHOLECYSTECTOMY  2003     Family History   Problem Relation Age of Onset    Cancer Mother         colon    Respiratory Disease Mother         emphysema- smoker    Alcohol abuse Mother     Heart Disease Father         Detials not known    Cancer Sister         brain tumor    GI Disease Sister      Social History     Socioeconomic History    Marital status:      Spouse name: Not on file    Number of children: Not on file    Years of education: Not on file    Highest education level: Not on file   Occupational History    Occupation:    Tobacco Use    Smoking status: Former     Packs/day: 0.10     Years: 40.00     Pack years: 4.00     Types: Cigarettes, Cigars     Quit date: 2/11/2019     Years since quitting: 3.6    Smokeless tobacco: Never   Vaping Use    Vaping Use: Never used   Substance and Sexual Activity    Alcohol use: No     Comment: Alchohlism quit 2008    Drug use: No    Sexual activity: Yes     Partners: Female   Other Topics Concern    Not on file   Social History Narrative    Not on file     Social Determinants of Health     Financial Resource Strain: Not on file   Food Insecurity: Not on file   Transportation Needs: Not on file   Physical Activity: Not on file   Stress: Not on file   Social Connections: Not on file   Intimate Partner Violence: Not on file   Housing Stability: Not on file     No Known Allergies  Outpatient Encounter Medications as of 10/10/2022   Medication Sig Dispense Refill    potassium chloride SA (KDUR) 20 MEQ Tab CR Take 1 Tablet by mouth every day. 90 Tablet 3    apixaban (ELIQUIS) 5mg Tab Take 1 Tablet by mouth 2 times a day. Indications: Thromboembolism  secondary to Atrial Fibrillation 180 Tablet 3    digoxin (LANOXIN) 125 MCG Tab Take 1 Tablet by mouth every day at 6 PM. 90 Tablet 3    metoprolol SR (TOPROL XL) 50 MG TABLET SR 24 HR Take 1 Tablet by mouth every day. 90 Tablet 3    sacubitril-valsartan (ENTRESTO) 24-26 MG Tab Take 1 Tablet by mouth 2 times a day. 180 Tablet 3    rosuvastatin (CRESTOR) 10 MG Tab Take 1 Tablet by mouth every evening. 30 Tablet 11    acetaminophen (TYLENOL) 325 MG Tab Take 2 Tablets by mouth every 6 hours as needed for Mild Pain, Moderate Pain or Fever. 30 Tablet 0    [DISCONTINUED] metoprolol SR (TOPROL XL) 50 MG TABLET SR 24 HR TAKE 1 TABLET BY MOUTH EVERY DAY. 90 Tablet 0    [DISCONTINUED] digoxin (LANOXIN) 125 MCG Tab TAKE 1 TABLET BY MOUTH EVERY DAY AT 6 PM. 90 Tablet 0    dapagliflozin propanediol (FARXIGA) 10 MG Tab Take 1 Tablet by mouth every day. (Patient not taking: Reported on 10/10/2022) 30 Tablet 0    spironolactone (ALDACTONE) 25 MG Tab Take 1 Tablet by mouth every day. (Patient not taking: Reported on 10/10/2022) 30 Tablet 3    torsemide 40 MG Tab Take 40 mg by mouth every day. (Patient not taking: Reported on 10/10/2022) 30 Tablet 3    [DISCONTINUED] potassium chloride SA (KDUR) 20 MEQ Tab CR Take 1 Tablet by mouth 2 times a day. 60 Tablet 11    [DISCONTINUED] apixaban (ELIQUIS) 5mg Tab Take 1 Tablet by mouth 2 times a day. Indications: Thromboembolism secondary to Atrial Fibrillation 60 Tablet 0    [DISCONTINUED] sacubitril-valsartan (ENTRESTO) 24-26 MG Tab Take 1 Tablet by mouth 2 times a day. 60 Tablet 1     No facility-administered encounter medications on file as of 10/10/2022.     Review of Systems   Constitutional:  Positive for malaise/fatigue. Negative for fever.   Eyes:  Negative for blurred vision.   Respiratory:  Negative for cough and shortness of breath.    Cardiovascular:  Positive for leg swelling. Negative for chest pain, palpitations, orthopnea, claudication and PND.   Gastrointestinal:  Negative for  "abdominal pain.   Musculoskeletal:  Negative for myalgias.   Neurological:  Negative for dizziness.   All other systems reviewed and are negative.           Objective     /74 (BP Location: Left arm, Patient Position: Sitting, BP Cuff Size: Adult)   Pulse 64   Resp 16   Ht 1.854 m (6' 1\")   Wt 120 kg (264 lb)   SpO2 96%   BMI 34.83 kg/m²     Physical Exam  Vitals reviewed.   Constitutional:       Appearance: He is well-developed. He is obese.   HENT:      Head: Normocephalic and atraumatic.   Eyes:      Pupils: Pupils are equal, round, and reactive to light.   Neck:      Vascular: No JVD.   Cardiovascular:      Rate and Rhythm: Normal rate. Rhythm irregular.      Heart sounds: Normal heart sounds.   Pulmonary:      Effort: Pulmonary effort is normal. No respiratory distress.      Breath sounds: Normal breath sounds. No wheezing or rales.   Abdominal:      General: Bowel sounds are normal.      Palpations: Abdomen is soft.   Musculoskeletal:         General: Normal range of motion.      Cervical back: Normal range of motion and neck supple.      Right lower leg: Edema present.      Left lower le+ Pitting Edema present.   Skin:     General: Skin is warm and dry.   Neurological:      General: No focal deficit present.      Mental Status: He is alert and oriented to person, place, and time.   Psychiatric:         Behavior: Behavior normal.     Lab Results   Component Value Date/Time    CHOLSTRLTOT 215 (H) 2022 08:18 AM     (H) 2022 08:18 AM    HDL 82 2022 08:18 AM    TRIGLYCERIDE 68 2022 08:18 AM       Lab Results   Component Value Date/Time    SODIUM 142 10/03/2022 09:30 AM    POTASSIUM 4.8 10/03/2022 09:30 AM    CHLORIDE 110 10/03/2022 09:30 AM    CO2 22 10/03/2022 09:30 AM    GLUCOSE 170 (H) 10/03/2022 09:30 AM    BUN 31 (H) 10/03/2022 09:30 AM    CREATININE 1.19 10/03/2022 09:30 AM     Lab Results   Component Value Date/Time    ALKPHOSPHAT 55 2022 01:56 AM    " ASTSGOT 41 09/06/2022 01:56 AM    ALTSGPT 19 09/06/2022 01:56 AM    TBILIRUBIN 1.4 09/06/2022 01:56 AM      Transthoracic Echo Report 2/23/2019  Normal chamber sizes. Mild concentric LVH. Normal LV and RV systolic function. LVEF 65% visually. There is grade I diastolic dydsfunction.   No signfiicant valvular disease. RVSP estimated at 45 mmHg. No prior study is available for comparison.     Transthoracic Echo Report 9/1/2022  No prior study is available for comparison.   Moderately reduced left ventricular systolic function.   The left ventricular ejection fraction is estimated to be 30%.   Wall motion is difficult to assess with the limitations of image   quality, even with the use of echo contrast.  Reduced right ventricular systolic function.  Mild mitral regurgitation.  Estimated right ventricular systolic pressure is 30 mmHg.  Dilated inferior vena cava without inspiratory collapse.        Date 6MWT [facility]   9/14/2022 Not done d/t resources 91                       Assessment & Plan     1. ACC/AHA stage C systolic heart failure (HCC)  Basic Metabolic Panel    sacubitril-valsartan (ENTRESTO) 24-26 MG Tab      2. Heart failure, NYHA class 2 (HCC)  Basic Metabolic Panel    sacubitril-valsartan (ENTRESTO) 24-26 MG Tab      3. High risk medication use  Basic Metabolic Panel    sacubitril-valsartan (ENTRESTO) 24-26 MG Tab      4. Essential hypertension        5. Dyslipidemia        6. Atrial fibrillation with RVR (HCC)            Medical Decision Making: Today's Assessment/Status/Plan:        HFrEF, Stage C, Class 1-2, LVEF 30%: Patient does exhibit some lower extremity edema  -Heart failure due likely due to A. fib, patient did not have ischemic work-up, discussed angiogram, he will discuss with his family and let us know at his next visit  -Discussed Heart failure trajectory and prognosis with patient. Will continue to optimize medical therapy as tolerated. Advanced HF treatment, need for remote monitoring  consideration at every visit.   -Also discussed with patient with his BREANNE, will will slowly add back in some medications with close follow-up.  -He would eventually like to go back to work.  -ACE-I/ARB/ARNI: Restart Entresto 24-26 mg twice a day  -Evidence Based Beta-blocker: Continue metoprolol SR 50 mg daily  -Aldosterone Antagonist: Hold spironolactone at this time due to BREANNE  -Diuretic: Hold torsemide at this time due to BREANNE, patient will continue to monitor need for this.  He will also decrease his potassium to 20 mEq daily  -SGLT2 inhibitor: Hold Farxiga for now  -Other: Continue digoxin 125 mcg daily  -Labs: BMP- Labs Ordered, to be done in 2 weeks, Will continue to closely monitor for side effects of patient's high risk medication(s) including renal function, liver function NTproBNP/cardiac markers, and electrolytes as needed  -discussed importance of exercise and regular activity  -Discussed/reviewed maintaining a low Sodium and hydration recommendations  -Repeat Echo in 3 months, if LVEF not >35%, then will discuss/consider ICD for primary Prevention.   -Reinforced s/sx of worsening heart failure with patient and weight monitoring. Pt verbalizes understanding. Pt to call office or RTC if present.    -PUMP line number [phone] (PUMP) reviewed with patient  -Heart Failure Education:  Discussed the Definition of heart failure (linking disease, symptoms, and treatment) and causes of heart failure  Recognition of escalating symptoms and concrete plan for response to particular symptoms  Discussed the indication for ARNI patient okay to try restarting  Risk modification for heart failure progression  Specific diet recommendations: Continue low-sodium diet, adequate hydration.  Additional diet information given to patient today  Continue walking exercise  Importance of treatment adherence  Behavioral strategies to promote treatment adherence  -Advanced care planning: Advance directive and POLST to be discussed at  future visit  -Pharmacotherapy Referral: We will discuss this at follow-up  -Compliance Barriers: None  -Genetic testing Consideration: None  -Consideration for LVAD and/or Heart transplant as necessary      Atrial fibrillation:  -Did discuss sending patient for a DCCV, patient needs to be on OAC for 30 days  -Will discuss at follow-up in 2 weeks  -Continue Eliquis 5 mg twice a day  -Continue digoxin 125 mcg daily  -Continue metoprolol SR 50 mg daily    Hypertension:  -BP borderline low  -Patient to monitor BP at home and notify our office if his BP continues to decrease for med changes    Dyslipidemia, last  on 3/18/2022  -Continue rosuvastatin 10 mg daily  -Not on aspirin due to taking Eliquis    FU in clinic in 2 weeks with labs. Sooner if needed.    Patient verbalizes understanding and agrees with the plan of care.     PLEASE NOTE: This Note was created using voice recognition Software. I have made every reasonable attempt to correct obvious errors, but I expect that there are errors of grammar and possibly content that I did not discover before finalizing the note

## 2022-10-10 NOTE — LETTER
[facility]  Giancarlo Lomeli M.D.  [address] [place] [address]  Fax: [phone]   Authorization for Release/Disclosure of   Protected Health Information   Name: MAXIMINO GREEN : 1960 SSN: [id number]   Address: [address] [place] [id number] Phone:    [phone] (home)    I authorize the entity listed below to release/disclose the PHI below to:   [facility]/Giancarlo Lomeli M.D. and SORAYA Strong   Provider or Entity Name:  {[facility] COLORECTAL SCREENING LOCATIONS:[id number]}   Reason for request: continuity of care   Information to be released:    [ X ] LAST COLONOSCOPY,  including any PATH REPORT and follow-up  [ X ] LAST FIT/COLOGUARD RESULT [  ] LAST DEXA  [  ] LAST MAMMOGRAM  [  ] LAST PAP  [  ] LAST LABS [  ] RETINA EXAM REPORT  [  ] IMMUNIZATION RECORDS  [  ] Release all info      [  ] Check here and initial the line next to each item to release ALL health information INCLUDING  _____ Care and treatment for drug and / or alcohol abuse  _____ HIV testing, infection status, or AIDS  _____ Genetic Testing    DATES OF SERVICE OR TIME PERIOD TO BE DISCLOSED: _____________  I understand and acknowledge that:  * This Authorization may be revoked at any time by you in writing, except if your health information has already been used or disclosed.  * Your health information that will be used or disclosed as a result of you signing this authorization could be re-disclosed by the recipient. If this occurs, your re-disclosed health information may no longer be protected by State or Federal laws.  * You may refuse to sign this Authorization. Your refusal will not affect your ability to obtain treatment.  * This Authorization becomes effective upon signing and will  on (date) __________.      If no date is indicated, this Authorization will  one (1) year from the signature date.    Name: Maximino Green    Signature:   Date:     10/10/2022       PLEASE FAX  REQUESTED RECORDS BACK TO: [phone]

## 2022-10-18 ENCOUNTER — TELEPHONE (OUTPATIENT)
Dept: CARDIOLOGY | Facility: MEDICAL CENTER | Age: 62
End: 2022-10-18
Payer: COMMERCIAL

## 2022-10-18 NOTE — TELEPHONE ENCOUNTER
Spoke with patient who confirmed labs, Patient stated that he will have labs done prior to his appointment scheduled with  MARI Spears.

## 2022-10-21 ENCOUNTER — HOSPITAL ENCOUNTER (OUTPATIENT)
Dept: LAB | Facility: MEDICAL CENTER | Age: 62
End: 2022-10-21
Attending: NURSE PRACTITIONER
Payer: COMMERCIAL

## 2022-10-21 DIAGNOSIS — Z79.899 HIGH RISK MEDICATION USE: ICD-10-CM

## 2022-10-21 DIAGNOSIS — I50.20 ACC/AHA STAGE C SYSTOLIC HEART FAILURE (HCC): ICD-10-CM

## 2022-10-21 DIAGNOSIS — I50.9 HEART FAILURE, NYHA CLASS 2 (HCC): ICD-10-CM

## 2022-10-21 LAB
ANION GAP SERPL CALC-SCNC: 7 MMOL/L (ref 7–16)
BUN SERPL-MCNC: 15 MG/DL (ref 8–22)
CALCIUM SERPL-MCNC: 9.4 MG/DL (ref 8.5–10.5)
CHLORIDE SERPL-SCNC: 112 MMOL/L (ref 96–112)
CO2 SERPL-SCNC: 26 MMOL/L (ref 20–33)
CREAT SERPL-MCNC: 0.73 MG/DL (ref 0.5–1.4)
GFR SERPLBLD CREATININE-BSD FMLA CKD-EPI: 103 ML/MIN/1.73 M 2
GLUCOSE SERPL-MCNC: 195 MG/DL (ref 65–99)
POTASSIUM SERPL-SCNC: 4 MMOL/L (ref 3.6–5.5)
SODIUM SERPL-SCNC: 145 MMOL/L (ref 135–145)

## 2022-10-21 PROCEDURE — 80048 BASIC METABOLIC PNL TOTAL CA: CPT

## 2022-10-21 PROCEDURE — 36415 COLL VENOUS BLD VENIPUNCTURE: CPT

## 2022-10-25 ENCOUNTER — OFFICE VISIT (OUTPATIENT)
Dept: CARDIOLOGY | Facility: MEDICAL CENTER | Age: 62
End: 2022-10-25
Payer: COMMERCIAL

## 2022-10-25 ENCOUNTER — TELEPHONE (OUTPATIENT)
Dept: CARDIOLOGY | Facility: MEDICAL CENTER | Age: 62
End: 2022-10-25

## 2022-10-25 VITALS
OXYGEN SATURATION: 96 % | BODY MASS INDEX: 35.39 KG/M2 | WEIGHT: 267 LBS | HEART RATE: 78 BPM | HEIGHT: 73 IN | SYSTOLIC BLOOD PRESSURE: 130 MMHG | DIASTOLIC BLOOD PRESSURE: 66 MMHG | RESPIRATION RATE: 16 BRPM

## 2022-10-25 DIAGNOSIS — I48.91 ATRIAL FIBRILLATION WITH RVR (HCC): ICD-10-CM

## 2022-10-25 DIAGNOSIS — I10 ESSENTIAL HYPERTENSION: ICD-10-CM

## 2022-10-25 DIAGNOSIS — I50.9 HEART FAILURE, NYHA CLASS 2 (HCC): ICD-10-CM

## 2022-10-25 DIAGNOSIS — E78.5 DYSLIPIDEMIA: ICD-10-CM

## 2022-10-25 DIAGNOSIS — I50.20 ACC/AHA STAGE C SYSTOLIC HEART FAILURE (HCC): ICD-10-CM

## 2022-10-25 DIAGNOSIS — Z79.899 HIGH RISK MEDICATION USE: ICD-10-CM

## 2022-10-25 PROCEDURE — 99214 OFFICE O/P EST MOD 30 MIN: CPT | Performed by: NURSE PRACTITIONER

## 2022-10-25 RX ORDER — SACUBITRIL AND VALSARTAN 49; 51 MG/1; MG/1
1 TABLET, FILM COATED ORAL 2 TIMES DAILY
Qty: 60 TABLET | Refills: 11 | Status: SHIPPED | OUTPATIENT
Start: 2022-10-25

## 2022-10-25 ASSESSMENT — ENCOUNTER SYMPTOMS
SHORTNESS OF BREATH: 0
FEVER: 0
DIZZINESS: 0
PND: 0
PALPITATIONS: 0
MYALGIAS: 0
ABDOMINAL PAIN: 0
BLURRED VISION: 0
ORTHOPNEA: 0
CLAUDICATION: 0
COUGH: 0

## 2022-10-25 ASSESSMENT — FIBROSIS 4 INDEX: FIB4 SCORE: 7.88

## 2022-10-25 NOTE — TELEPHONE ENCOUNTER
Isak Graham,      Patient was informed you will be reaching out to schedule: CL-CARDIOVERSION [[id number]]. Ordered per DOLORES, Thank you.

## 2022-10-25 NOTE — PROGRESS NOTES
"Chief Complaint   Patient presents with    CHF (Systolic)     F/V Dx: ACC/AHA stage C systolic heart failure (HCC)       Subjective     Maximino Green is a 62 y.o. male who presents today for follow-up on his heart failure and atrial fibrillation.    Previous patient of Dr. Negron.  Current patient of the heart failure clinic.  He was last seen in clinic on 10/10/2022.  During that visit, he was recommended to restart Entresto 24-26 mg twice a day and he we will continue to hold spironolactone, torsemide and Farxiga.    Patient did get follow-up lab testing.    He reports no problems restarting Entresto.    Patient reports he does have slight left ankle edema, but otherwise denies chest pain, palpitations, orthopnea, PND, shortness of breath or dizziness/lightheadedness.    He reports his home weights have been ranging between 255-258 pounds.    He has been active walking around his 2 acres with his dog twice a day.    He reports he is trying to be consistent with a low-sodium diet.    He was a former alcoholic, quit back in 2009, and quit smoking about 3 years ago.    Additonally, patient has the following medical problems:     -Patient had history of A. fib with RVR in the setting of an illness.    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  Past Medical History:   Diagnosis Date    Alcoholic (HCC)     stopped drinking in 2009    Cancer (HCC) 2019    throat    Dental disorder     uppers/ waiting for lower dentures t ocome in    Diabetes (HCC)     diet controlled; pt states that he was told he was \"pre-diabetic\" and has never been on any medication    Hypertension     stopped taking blood pressure medication on his own    Paroxysmal atrial fibrillation (HCC)     Tonsil cancer (HCC)     right tonsil - SCC, chemo and radiation     Past Surgical History:   Procedure Laterality Date    OTHER  12/27/2018    rt tonsil biopsy     CHOLECYSTECTOMY  2003     Family History "   Problem Relation Age of Onset    Cancer Mother         colon    Respiratory Disease Mother         emphysema- smoker    Alcohol abuse Mother     Heart Disease Father         Detials not known    Cancer Sister         brain tumor    GI Disease Sister      Social History     Socioeconomic History    Marital status:      Spouse name: Not on file    Number of children: Not on file    Years of education: Not on file    Highest education level: Not on file   Occupational History    Occupation:    Tobacco Use    Smoking status: Former     Packs/day: 0.10     Years: 40.00     Pack years: 4.00     Types: Cigarettes, Cigars     Quit date: 2/11/2019     Years since quitting: 3.7    Smokeless tobacco: Never   Vaping Use    Vaping Use: Never used   Substance and Sexual Activity    Alcohol use: No     Comment: Alchohlism quit 2008    Drug use: No    Sexual activity: Yes     Partners: Female   Other Topics Concern    Not on file   Social History Narrative    Not on file     Social Determinants of Health     Financial Resource Strain: Not on file   Food Insecurity: Not on file   Transportation Needs: Not on file   Physical Activity: Not on file   Stress: Not on file   Social Connections: Not on file   Intimate Partner Violence: Not on file   Housing Stability: Not on file     No Known Allergies  Outpatient Encounter Medications as of 10/25/2022   Medication Sig Dispense Refill    CINNAMON PO Take  by mouth.      sacubitril-valsartan (ENTRESTO) 49-51 MG Tab Take 1 Tablet by mouth 2 times a day. 60 Tablet 11    potassium chloride SA (KDUR) 20 MEQ Tab CR Take 1 Tablet by mouth every day. 90 Tablet 3    apixaban (ELIQUIS) 5mg Tab Take 1 Tablet by mouth 2 times a day. Indications: Thromboembolism secondary to Atrial Fibrillation 180 Tablet 3    digoxin (LANOXIN) 125 MCG Tab Take 1 Tablet by mouth every day at 6 PM. 90 Tablet 3    metoprolol SR (TOPROL XL) 50 MG TABLET SR 24 HR Take 1 Tablet by mouth every day. 90  "Tablet 3    rosuvastatin (CRESTOR) 10 MG Tab Take 1 Tablet by mouth every evening. 30 Tablet 11    [DISCONTINUED] sacubitril-valsartan (ENTRESTO) 24-26 MG Tab Take 1 Tablet by mouth 2 times a day. 180 Tablet 3    dapagliflozin propanediol (FARXIGA) 10 MG Tab Take 1 Tablet by mouth every day. (Patient not taking: No sig reported) 30 Tablet 0    spironolactone (ALDACTONE) 25 MG Tab Take 1 Tablet by mouth every day. (Patient not taking: No sig reported) 30 Tablet 3    torsemide 40 MG Tab Take 40 mg by mouth every day. (Patient not taking: No sig reported) 30 Tablet 3    [DISCONTINUED] acetaminophen (TYLENOL) 325 MG Tab Take 2 Tablets by mouth every 6 hours as needed for Mild Pain, Moderate Pain or Fever. (Patient not taking: Reported on 10/25/2022) 30 Tablet 0     No facility-administered encounter medications on file as of 10/25/2022.     Review of Systems   Constitutional:  Negative for fever and malaise/fatigue.   Eyes:  Negative for blurred vision.   Respiratory:  Negative for cough and shortness of breath.    Cardiovascular:  Positive for leg swelling (left). Negative for chest pain, palpitations, orthopnea, claudication and PND.   Gastrointestinal:  Negative for abdominal pain.   Musculoskeletal:  Negative for myalgias.   Neurological:  Negative for dizziness.   All other systems reviewed and are negative.           Objective     /66 (BP Location: Left arm, Patient Position: Sitting, BP Cuff Size: Adult)   Pulse 78   Resp 16   Ht 1.854 m (6' 1\")   Wt 121 kg (267 lb)   SpO2 96%   BMI 35.23 kg/m²     Physical Exam  Vitals reviewed.   Constitutional:       Appearance: He is well-developed. He is obese.   HENT:      Head: Normocephalic and atraumatic.   Eyes:      Pupils: Pupils are equal, round, and reactive to light.   Neck:      Vascular: No JVD.   Cardiovascular:      Rate and Rhythm: Normal rate. Rhythm irregular.      Heart sounds: Normal heart sounds.   Pulmonary:      Effort: Pulmonary effort is " normal. No respiratory distress.      Breath sounds: Normal breath sounds. No wheezing or rales.   Abdominal:      General: Bowel sounds are normal.      Palpations: Abdomen is soft.   Musculoskeletal:         General: Normal range of motion.      Cervical back: Normal range of motion and neck supple.      Right lower leg: Edema (trace) present.      Left lower le+ Pitting Edema present.   Skin:     General: Skin is warm and dry.   Neurological:      General: No focal deficit present.      Mental Status: He is alert and oriented to person, place, and time.   Psychiatric:         Behavior: Behavior normal.     Lab Results   Component Value Date/Time    CHOLSTRLTOT 215 (H) 2022 08:18 AM     (H) 2022 08:18 AM    HDL 82 2022 08:18 AM    TRIGLYCERIDE 68 2022 08:18 AM       Lab Results   Component Value Date/Time    SODIUM 145 10/21/2022 11:59 AM    POTASSIUM 4.0 10/21/2022 11:59 AM    CHLORIDE 112 10/21/2022 11:59 AM    CO2 26 10/21/2022 11:59 AM    GLUCOSE 195 (H) 10/21/2022 11:59 AM    BUN 15 10/21/2022 11:59 AM    CREATININE 0.73 10/21/2022 11:59 AM     Lab Results   Component Value Date/Time    ALKPHOSPHAT 55 2022 01:56 AM    ASTSGOT 41 2022 01:56 AM    ALTSGPT 19 2022 01:56 AM    TBILIRUBIN 1.4 2022 01:56 AM      Transthoracic Echo Report 2019  Normal chamber sizes. Mild concentric LVH. Normal LV and RV systolic function. LVEF 65% visually. There is grade I diastolic dydsfunction.   No signfiicant valvular disease. RVSP estimated at 45 mmHg. No prior study is available for comparison.     Transthoracic Echo Report 2022  No prior study is available for comparison.   Moderately reduced left ventricular systolic function.   The left ventricular ejection fraction is estimated to be 30%.   Wall motion is difficult to assess with the limitations of image   quality, even with the use of echo contrast.  Reduced right ventricular systolic function.  Mild  mitral regurgitation.  Estimated right ventricular systolic pressure is 30 mmHg.  Dilated inferior vena cava without inspiratory collapse.        Date 6MWT [facility]   9/14/2022 Not done d/t resources 91                       Assessment & Plan     1. Atrial fibrillation with RVR (HCC)  CL-CARDIOVERSION      2. High risk medication use  Basic Metabolic Panel      3. ACC/AHA stage C systolic heart failure (HCC)        4. Heart failure, NYHA class 2 (HCC)        5. Essential hypertension        6. Dyslipidemia            Medical Decision Making: Today's Assessment/Status/Plan:        HFrEF, Stage C, Class 1-2, LVEF 30%: Patient does exhibit some lower extremity edema  -Heart failure due likely due to A. fib, patient did not have ischemic work-up, discussed angiogram at last visit. Will follow up   -Discussed Heart failure trajectory and prognosis with patient. Will continue to optimize medical therapy as tolerated. Advanced HF treatment, need for remote monitoring consideration at every visit.   -Also discussed with patient with his BREANNE, will will slowly add back in some medications with close follow-up.  -He would eventually like to go back to work.  -ACE-I/ARB/ARNI: Increase Entresto 49-51 mg twice a day.  Patient given co-pay card Due to cost  -Evidence Based Beta-blocker: Continue metoprolol SR 50 mg daily  -Aldosterone Antagonist: Hold spironolactone at this time due to BREANNE  -Diuretic: Hold torsemide at this time due to BREANNE, patient will continue to monitor need for this.  Continue potassium to 20 mEq daily.  He can take A dose or half a dose of torsemide as needed for swelling   -SGLT2 inhibitor: Hold Farxiga for now  -Other: Continue digoxin 125 mcg daily  -Labs: BMP- Labs Ordered, to be done in 2 weeks, Will continue to closely monitor for side effects of patient's high risk medication(s) including renal function, liver function NTproBNP/cardiac markers, and electrolytes as needed  -discussed importance of exercise  and regular activity  -Discussed/reviewed maintaining a low Sodium and hydration recommendations  -Repeat Echo in 3 months, if LVEF not >35%, then will discuss/consider ICD for primary Prevention.  Will order at next visit  -Reinforced s/sx of worsening heart failure with patient and weight monitoring. Pt verbalizes understanding. Pt to call office or RTC if present.    -PUMP line number [phone] (PUMP) reviewed with patient  -Heart Failure Education:  Discussed the Definition of heart failure (linking disease, symptoms, and treatment) and causes of heart failure  Recognition of escalating symptoms and concrete plan for response to particular symptoms  Reviewed Cardioversion  Risk modification for heart failure progression  Specific diet recommendations: Continue low-sodium diet, adequate hydration.  Additional diet information given to patient today  Continue walking exercise  Importance of treatment adherence  Behavioral strategies to promote treatment adherence  -Advanced care planning: Advance directive and POLST to be discussed at future visit  -Pharmacotherapy Referral: Patient lives in [place]  -Compliance Barriers: None  -Genetic testing Consideration: None  -Consideration for LVAD and/or Heart transplant as necessary      Atrial fibrillation:  -Introduced idea of DCCV now that he has been taking OAC uninterrupted for >30 dAYS  -Risks and benefits reviewed, patient ready to proceed,  will reach out to patient to schedule procedure  -Continue Eliquis 5 mg twice a day  -Continue digoxin 125 mcg daily  -Continue metoprolol SR 50 mg daily    Hypertension:  -BP Stable  -Recommendations per above    Dyslipidemia, last  on 3/18/2022  -Continue rosuvastatin 10 mg daily  -Not on aspirin due to taking Eliquis    FU in clinic in 6 weeks with labs. Sooner if needed.    Patient verbalizes understanding and agrees with the plan of care.     PLEASE NOTE: This Note was created using voice recognition  Software. I have made every reasonable attempt to correct obvious errors, but I expect that there are errors of grammar and possibly content that I did not discover before finalizing the note

## 2022-10-25 NOTE — PATIENT INSTRUCTIONS
Increase Entresto to 49-51 mg Twice a day (can take 2 tabs of the 24-26 mg Tab twice a day until current prescription completed)    Labs due in 2 weeks     Schedule cardioversion

## 2022-10-25 NOTE — LETTER
October 25, 2022    To Whom It May Concern:         This is confirmation that Maximino Green attended his scheduled appointment with SORAYA Strong on 10/25/22.         Maximino can return to work at full duties as a .          If you have any questions please do not hesitate to call me at the phone number listed below.    Sincerely,          PEPITO Strong.  [phone]

## 2022-10-26 NOTE — TELEPHONE ENCOUNTER
Patient is scheduled on 11-4-22 for a cardioversion w/anesthesia with . No meds to stop and patient to check in at 8:00 for a 10:00 procedure. Pre admit to call patient and do a telephone pre admit due to patient living out of area.

## 2022-11-01 ENCOUNTER — PRE-ADMISSION TESTING (OUTPATIENT)
Dept: ADMISSIONS | Facility: MEDICAL CENTER | Age: 62
End: 2022-11-01
Attending: INTERNAL MEDICINE
Payer: COMMERCIAL

## 2022-11-01 DIAGNOSIS — Z01.812 PRE-OPERATIVE LABORATORY EXAMINATION: ICD-10-CM

## 2022-11-01 LAB
ANION GAP SERPL CALC-SCNC: 9 MMOL/L (ref 7–16)
BASOPHILS # BLD AUTO: 1.5 % (ref 0–1.8)
BASOPHILS # BLD: 0.07 K/UL (ref 0–0.12)
BUN SERPL-MCNC: 17 MG/DL (ref 8–22)
CALCIUM SERPL-MCNC: 9.2 MG/DL (ref 8.5–10.5)
CHLORIDE SERPL-SCNC: 108 MMOL/L (ref 96–112)
CO2 SERPL-SCNC: 25 MMOL/L (ref 20–33)
CREAT SERPL-MCNC: 0.74 MG/DL (ref 0.5–1.4)
EOSINOPHIL # BLD AUTO: 0.3 K/UL (ref 0–0.51)
EOSINOPHIL NFR BLD: 6.5 % (ref 0–6.9)
ERYTHROCYTE [DISTWIDTH] IN BLOOD BY AUTOMATED COUNT: 48.7 FL (ref 35.9–50)
GFR SERPLBLD CREATININE-BSD FMLA CKD-EPI: 102 ML/MIN/1.73 M 2
GLUCOSE SERPL-MCNC: 97 MG/DL (ref 65–99)
HCT VFR BLD AUTO: 35.7 % (ref 42–52)
HGB BLD-MCNC: 12.3 G/DL (ref 14–18)
IMM GRANULOCYTES # BLD AUTO: 0.02 K/UL (ref 0–0.11)
IMM GRANULOCYTES NFR BLD AUTO: 0.4 % (ref 0–0.9)
INR PPP: 1.2 (ref 0.87–1.13)
LYMPHOCYTES # BLD AUTO: 1.17 K/UL (ref 1–4.8)
LYMPHOCYTES NFR BLD: 25.3 % (ref 22–41)
MCH RBC QN AUTO: 31.4 PG (ref 27–33)
MCHC RBC AUTO-ENTMCNC: 34.5 G/DL (ref 33.7–35.3)
MCV RBC AUTO: 91.1 FL (ref 81.4–97.8)
MONOCYTES # BLD AUTO: 0.58 K/UL (ref 0–0.85)
MONOCYTES NFR BLD AUTO: 12.5 % (ref 0–13.4)
NEUTROPHILS # BLD AUTO: 2.49 K/UL (ref 1.82–7.42)
NEUTROPHILS NFR BLD: 53.8 % (ref 44–72)
NRBC # BLD AUTO: 0 K/UL
NRBC BLD-RTO: 0 /100 WBC
PLATELET # BLD AUTO: 139 K/UL (ref 164–446)
PMV BLD AUTO: 13.7 FL (ref 9–12.9)
POTASSIUM SERPL-SCNC: 4.3 MMOL/L (ref 3.6–5.5)
PROTHROMBIN TIME: 15 SEC (ref 12–14.6)
RBC # BLD AUTO: 3.92 M/UL (ref 4.7–6.1)
SODIUM SERPL-SCNC: 142 MMOL/L (ref 135–145)
WBC # BLD AUTO: 4.6 K/UL (ref 4.8–10.8)

## 2022-11-01 PROCEDURE — 85610 PROTHROMBIN TIME: CPT

## 2022-11-01 PROCEDURE — 36415 COLL VENOUS BLD VENIPUNCTURE: CPT

## 2022-11-01 PROCEDURE — 85025 COMPLETE CBC W/AUTO DIFF WBC: CPT

## 2022-11-01 PROCEDURE — 80048 BASIC METABOLIC PNL TOTAL CA: CPT

## 2022-11-01 ASSESSMENT — FIBROSIS 4 INDEX: FIB4 SCORE: 7.88

## 2022-11-04 ENCOUNTER — APPOINTMENT (OUTPATIENT)
Dept: CARDIOLOGY | Facility: MEDICAL CENTER | Age: 62
End: 2022-11-04
Attending: NURSE PRACTITIONER

## 2022-11-04 ENCOUNTER — HOSPITAL ENCOUNTER (OUTPATIENT)
Facility: MEDICAL CENTER | Age: 62
End: 2022-11-04
Attending: INTERNAL MEDICINE | Admitting: INTERNAL MEDICINE

## 2022-11-04 VITALS
RESPIRATION RATE: 16 BRPM | OXYGEN SATURATION: 95 % | TEMPERATURE: 97.2 F | SYSTOLIC BLOOD PRESSURE: 119 MMHG | WEIGHT: 257.72 LBS | BODY MASS INDEX: 34.16 KG/M2 | DIASTOLIC BLOOD PRESSURE: 58 MMHG | HEIGHT: 73 IN | HEART RATE: 62 BPM

## 2022-11-04 LAB — EKG IMPRESSION: NORMAL

## 2022-11-04 PROCEDURE — 93005 ELECTROCARDIOGRAM TRACING: CPT | Performed by: INTERNAL MEDICINE

## 2022-11-04 PROCEDURE — 93010 ELECTROCARDIOGRAM REPORT: CPT | Performed by: INTERNAL MEDICINE

## 2022-11-04 ASSESSMENT — FIBROSIS 4 INDEX: FIB4 SCORE: 4.2

## 2022-11-09 ENCOUNTER — PATIENT MESSAGE (OUTPATIENT)
Dept: CARDIOLOGY | Facility: MEDICAL CENTER | Age: 62
End: 2022-11-09
Payer: COMMERCIAL

## 2023-01-12 DIAGNOSIS — I10 ESSENTIAL HYPERTENSION: ICD-10-CM

## 2023-01-13 RX ORDER — METOPROLOL SUCCINATE 50 MG/1
50 TABLET, EXTENDED RELEASE ORAL DAILY
Qty: 90 TABLET | Refills: 0 | OUTPATIENT
Start: 2023-01-13

## 2023-01-13 NOTE — TELEPHONE ENCOUNTER
Is the patient due for a refill? No    Was the patient seen the past year? No    Date of last office visit: 10/25/22    Does the patient have an upcoming appointment?  No    Provider to refill:DOLORES    Does the patients insurance require a 100 day supply?  No    metoprolol SR (TOPROL XL) 50 MG TABLET SR 24 HR 90 Tablet 3/3 10/10/2022

## 2023-01-16 NOTE — TELEPHONE ENCOUNTER
Received request via: Pharmacy    Was the patient seen in the last year in this department? Yes    Does the patient have an active prescription (recently filled or refills available) for medication(s) requested? No    Does the patient have correction Plus and need 100 day supply (blood pressure, diabetes and cholesterol meds only)? Patient does not have SCP    Last Office Visit;09/27/2022  Last Labs:11/01/2022

## 2023-01-18 RX ORDER — METOPROLOL SUCCINATE 50 MG/1
50 TABLET, EXTENDED RELEASE ORAL DAILY
Qty: 90 TABLET | Refills: 0 | Status: SHIPPED | OUTPATIENT
Start: 2023-01-18 | End: 2023-11-25 | Stop reason: SDUPTHER

## 2023-10-04 DIAGNOSIS — E78.5 DYSLIPIDEMIA: ICD-10-CM

## 2023-10-04 NOTE — TELEPHONE ENCOUNTER
Is the patient due for a refill? Yes    Was the patient seen the past year? Yes    Date of last office visit: 10/25/22    Does the patient have an upcoming appointment?  No    Provider to refill:DOLORES    Does the patients insurance require a 100 day supply?  No

## 2023-10-09 RX ORDER — ROSUVASTATIN CALCIUM 10 MG/1
10 TABLET, COATED ORAL EVERY EVENING
Qty: 90 TABLET | Refills: 0 | Status: SHIPPED | OUTPATIENT
Start: 2023-10-09

## 2023-10-12 DIAGNOSIS — I48.91 ATRIAL FIBRILLATION WITH RVR (HCC): ICD-10-CM

## 2023-10-12 RX ORDER — DIGOXIN 125 MCG
TABLET ORAL
Qty: 90 TABLET | Refills: 0 | Status: SHIPPED | OUTPATIENT
Start: 2023-10-12

## 2023-10-18 ENCOUNTER — NON-PROVIDER VISIT (OUTPATIENT)
Dept: MEDICAL GROUP | Facility: PHYSICIAN GROUP | Age: 63
End: 2023-10-18
Payer: OTHER MISCELLANEOUS

## 2023-10-18 DIAGNOSIS — E11.65 CONTROLLED TYPE 2 DIABETES MELLITUS WITH HYPERGLYCEMIA, WITHOUT LONG-TERM CURRENT USE OF INSULIN (HCC): ICD-10-CM

## 2023-10-18 DIAGNOSIS — Z23 NEED FOR VACCINATION: ICD-10-CM

## 2023-10-18 PROCEDURE — 90686 IIV4 VACC NO PRSV 0.5 ML IM: CPT | Performed by: FAMILY MEDICINE

## 2023-10-18 PROCEDURE — 90471 IMMUNIZATION ADMIN: CPT | Performed by: FAMILY MEDICINE

## 2023-10-18 NOTE — PROGRESS NOTES
"Maximino Green is a 63 y.o. male here for a non-provider visit for:   FLU    Reason for immunization: Annual Flu Vaccine  Immunization records indicate need for vaccine: Yes, confirmed with Epic  Minimum interval has been met for this vaccine: Yes  ABN completed: Yes    VIS Dated  8/6/21 was given to patient: Yes  All IAC Questionnaire questions were answered \"No.\"    Patient tolerated injection and no adverse effects were observed or reported: Yes    Pt scheduled for next dose in series: Not Indicated  "

## 2023-11-27 NOTE — TELEPHONE ENCOUNTER
Received request via: Patient    Was the patient seen in the last year in this department? No, 9/27/2022    Does the patient have an active prescription (recently filled or refills available) for medication(s) requested? No    Does the patient have retirement Plus and need 100 day supply (blood pressure, diabetes and cholesterol meds only)? Patient does not have SCP

## 2023-11-28 RX ORDER — METOPROLOL SUCCINATE 50 MG/1
50 TABLET, EXTENDED RELEASE ORAL DAILY
Qty: 90 TABLET | Refills: 3 | Status: SHIPPED | OUTPATIENT
Start: 2023-11-28

## 2024-01-31 NOTE — TELEPHONE ENCOUNTER
I spoke with Caro in [facility], she reported that pts magnesium level is 1.7  She wants to know if she can give an extra gram of magnesium post infusion.  That was approved, per MARI Grimaldo.    
No

## 2024-06-19 ENCOUNTER — HOSPITAL ENCOUNTER (OUTPATIENT)
Facility: MEDICAL CENTER | Age: 64
End: 2024-06-19
Attending: FAMILY MEDICINE
Payer: COMMERCIAL

## 2024-06-19 ENCOUNTER — OFFICE VISIT (OUTPATIENT)
Dept: MEDICAL GROUP | Facility: PHYSICIAN GROUP | Age: 64
End: 2024-06-19
Payer: COMMERCIAL

## 2024-06-19 DIAGNOSIS — I10 ESSENTIAL HYPERTENSION: ICD-10-CM

## 2024-06-19 DIAGNOSIS — D69.6 THROMBOCYTOPENIA (HCC): ICD-10-CM

## 2024-06-19 DIAGNOSIS — Z23 NEED FOR VACCINATION: ICD-10-CM

## 2024-06-19 DIAGNOSIS — I48.11 LONGSTANDING PERSISTENT ATRIAL FIBRILLATION (HCC): ICD-10-CM

## 2024-06-19 DIAGNOSIS — D70.1 CHEMOTHERAPY-INDUCED NEUTROPENIA (HCC): ICD-10-CM

## 2024-06-19 DIAGNOSIS — I50.22 CHRONIC SYSTOLIC CONGESTIVE HEART FAILURE (HCC): ICD-10-CM

## 2024-06-19 DIAGNOSIS — E11.65 TYPE 2 DIABETES MELLITUS WITH HYPERGLYCEMIA, WITH LONG-TERM CURRENT USE OF INSULIN (HCC): ICD-10-CM

## 2024-06-19 DIAGNOSIS — T45.1X5A CHEMOTHERAPY-INDUCED NEUTROPENIA (HCC): ICD-10-CM

## 2024-06-19 DIAGNOSIS — Z79.4 TYPE 2 DIABETES MELLITUS WITH HYPERGLYCEMIA, WITH LONG-TERM CURRENT USE OF INSULIN (HCC): ICD-10-CM

## 2024-06-19 DIAGNOSIS — E11.65 CONTROLLED TYPE 2 DIABETES MELLITUS WITH HYPERGLYCEMIA, WITHOUT LONG-TERM CURRENT USE OF INSULIN (HCC): ICD-10-CM

## 2024-06-19 DIAGNOSIS — D64.9 ANEMIA, UNSPECIFIED TYPE: ICD-10-CM

## 2024-06-19 DIAGNOSIS — D61.818 PANCYTOPENIA (HCC): ICD-10-CM

## 2024-06-19 DIAGNOSIS — E78.5 DYSLIPIDEMIA: ICD-10-CM

## 2024-06-19 DIAGNOSIS — C09.9 MALIGNANT NEOPLASM OF TONSIL (HCC): ICD-10-CM

## 2024-06-19 PROCEDURE — 82570 ASSAY OF URINE CREATININE: CPT

## 2024-06-19 PROCEDURE — 82043 UR ALBUMIN QUANTITATIVE: CPT

## 2024-06-19 RX ORDER — ROSUVASTATIN CALCIUM 10 MG/1
10 TABLET, COATED ORAL EVERY EVENING
Qty: 90 TABLET | Refills: 3 | Status: SHIPPED | OUTPATIENT
Start: 2024-06-19

## 2024-06-19 ASSESSMENT — PATIENT HEALTH QUESTIONNAIRE - PHQ9: CLINICAL INTERPRETATION OF PHQ2 SCORE: 0

## 2024-06-19 NOTE — ASSESSMENT & PLAN NOTE
Had afibb with chf  Was back in sinus when he went for cardioversion  He is doing better, lost 12 lbs  He is only on metoprolol SR 50 mg bp is mildly elevated 138/  Will add lisinopril 5 mg  Has mild b/l LE edema, he notes this is only when he is at work and on the weekends he has no swelling of ankles.   Rx for lasix 20 mg with 20 meq potassium prn edema provided.   Bnp and follow up labs ordered.

## 2024-06-20 DIAGNOSIS — E11.65 CONTROLLED TYPE 2 DIABETES MELLITUS WITH HYPERGLYCEMIA, WITHOUT LONG-TERM CURRENT USE OF INSULIN (HCC): ICD-10-CM

## 2024-06-20 LAB
CREAT UR-MCNC: 132.8 MG/DL
HBA1C MFR BLD: 5.7 % (ref ?–5.8)
MICROALBUMIN UR-MCNC: 1.8 MG/DL
MICROALBUMIN/CREAT UR: 14 MG/G (ref 0–30)
POCT INT CON NEG: NEGATIVE
POCT INT CON POS: POSITIVE

## 2024-06-28 PROBLEM — Z79.4 TYPE 2 DIABETES MELLITUS WITH HYPERGLYCEMIA, WITH LONG-TERM CURRENT USE OF INSULIN (HCC): Status: ACTIVE | Noted: 2024-06-28

## 2024-06-28 PROBLEM — I50.22 CHRONIC SYSTOLIC CONGESTIVE HEART FAILURE (HCC): Status: ACTIVE | Noted: 2024-06-28

## 2024-06-28 PROBLEM — E11.65 TYPE 2 DIABETES MELLITUS WITH HYPERGLYCEMIA, WITH LONG-TERM CURRENT USE OF INSULIN (HCC): Status: ACTIVE | Noted: 2024-06-28

## 2024-06-28 RX ORDER — LISINOPRIL 5 MG/1
5 TABLET ORAL DAILY
Qty: 90 TABLET | Refills: 3 | Status: SHIPPED | OUTPATIENT
Start: 2024-06-28

## 2024-06-28 RX ORDER — POTASSIUM CHLORIDE 20 MEQ/1
20 TABLET, EXTENDED RELEASE ORAL DAILY
Qty: 30 TABLET | Refills: 3 | Status: SHIPPED | OUTPATIENT
Start: 2024-06-28

## 2024-06-28 RX ORDER — FUROSEMIDE 20 MG/1
20 TABLET ORAL
Qty: 30 TABLET | Refills: 3 | Status: SHIPPED | OUTPATIENT
Start: 2024-06-28

## 2024-06-29 NOTE — PROGRESS NOTES
Subjective:   Maximino Green is a 64 y.o. male here today for evaluation and management of:     Essential hypertension  Had afibb with chf  Was back in sinus when he went for cardioversion  He is doing better, lost 12 lbs  He is only on metoprolol SR 50 mg bp is mildly elevated 138/  Will add lisinopril 5 mg  Has mild b/l LE edema, he notes this is only when he is at work and on the weekends he has no swelling of ankles.   Rx for lasix 20 mg with 20 meq potassium prn edema provided.   Bnp and follow up labs ordered.        HCC Gap Form    Diagnosis to address: C09.9 - Malignant neoplasm of tonsil (HCC)  Assessment and plan: Chronic, stable, as based on today's assessment and impact on other conditions evaluated today. Continue with current treatment plan: surveillance by oncology/ent.Follow-up with specialist as directed, but at least annually.  Diagnosis: D70.1, T45.1X5A - Chemotherapy-induced neutropenia (HCC)  Assessment and plan: Chronic, stable, as based on today's assessment and impact on other conditions evaluated today. Continue with current treatment plan: follow up labs ordere.  Follow-up with specialist as directed, but at least annually.  Diagnosis: D61.818 - Pancytopenia (HCC)  Assessment and plan: Chronic, stable. Continue with current defined treatment plan: follow up labs ordered  Follow-up at least annually.  Diagnosis: D69.6 - Thrombocytopenia (HCC)  Assessment and plan: Chronic, stable. Continue with current defined treatment plan: follow up labs ordered Follow-up at least annually.  Diagnosis: I48.11 - Longstanding persistent atrial fibrillation (HCC)  Assessment and plan: Chronic, stable. Continue with current defined treatment plan: continue  Follow-up at least annually.  Diagnosis: E11.65 - Type 2 diabetes mellitus with hyperglycemia (HCC)  Assessment and plan: Chronic, stable. Continue with current defined treatment plan: continue diet low in sugars, regular activity, statin, acei.   Follow-up at least annually.  Diagnosis: I50.20 - Unspecified systolic (congestive) heart failure (HCC)  Assessment and plan: Chronic, stable. Continue with current defined treatment plan: HTN control adding lisinopril, lasix and potassium to metoprolol. . Follow-up at least annually.  Last edited 06/28/24 20:15 PDT by Giancarlo Lomeli M.D.         Current medicines (including changes today)  Current Outpatient Medications   Medication Sig Dispense Refill    lisinopril (PRINIVIL) 5 MG Tab Take 1 Tablet by mouth every day. For high blood pressure 90 Tablet 3    furosemide (LASIX) 20 MG Tab Take 1 Tablet by mouth 1 time a day as needed (swelling of legs with fluid). Take with potassium. 30 Tablet 3    potassium chloride SA (KDUR) 20 MEQ Tab CR Take 1 Tablet by mouth every day. When taking lasix 30 Tablet 3    rosuvastatin (CRESTOR) 10 MG Tab Take 1 Tablet by mouth every evening. 90 Tablet 3    metoprolol SR (TOPROL XL) 50 MG TABLET SR 24 HR Take 1 Tablet by mouth every day. 90 Tablet 3     No current facility-administered medications for this visit.     He  has a past medical history of Alcoholic (HCC), Cancer (HCC) (2019), Dental disorder, Diabetes (HCC), High cholesterol (11/01/2022), Hypertension, Paroxysmal atrial fibrillation (HCC), and Tonsil cancer (HCC).    ROS  No chest pain, no shortness of breath, no abdominal pain       Objective:     There were no vitals taken for this visit. There is no height or weight on file to calculate BMI.   Physical Exam:  Constitutional: Alert, no distress.  Skin: Warm, dry, good turgor, no rashes in visible areas.  Eye: Equal, round and reactive, conjunctiva clear, lids normal.  ENMT: Lips without lesions, good dentition, oropharynx clear.  Neck: Trachea midline, no masses, no thyromegaly. No cervical or supraclavicular lymphadenopathy  Respiratory: Unlabored respiratory effort, lungs clear to auscultation, no wheezes, no ronchi.  Cardiovascular: Normal S1, S2, no murmur, no  edema.  Abdomen: Soft, non-tender, no masses, no hepatosplenomegaly.  Psych: Alert and oriented x3, normal affect and mood.    Assessment and Plan:   The following treatment plan was discussed    1. Controlled type 2 diabetes mellitus with hyperglycemia, without long-term current use of insulin (HCC)  - POCT A1C  - HEMOGLOBIN A1C; Future  - Comp Metabolic Panel; Future  - Lipid Profile; Future  - MICROALBUMIN CREAT RATIO URINE; Future  - MICROALBUMIN CREAT RATIO URINE; Future    2. Essential hypertension  - proBrain Natriuretic Peptide, NT; Future    3. Anemia, unspecified type  - CBC WITH DIFFERENTIAL; Future    4. Need for vaccination  - Pneumococcal Conjugate Vaccine 20-Valent (6 wks+)    5. Dyslipidemia  - rosuvastatin (CRESTOR) 10 MG Tab; Take 1 Tablet by mouth every evening.  Dispense: 90 Tablet; Refill: 3    6. Type 2 diabetes mellitus with hyperglycemia, with long-term current use of insulin (HCC)    7. Chronic systolic congestive heart failure (HCC)    8. Malignant neoplasm of tonsil (HCC)    9. Chemotherapy-induced neutropenia (HCC)    10. Pancytopenia (HCC)    11. Thrombocytopenia (HCC)    12. Longstanding persistent atrial fibrillation (HCC)    Other orders  - lisinopril (PRINIVIL) 5 MG Tab; Take 1 Tablet by mouth every day. For high blood pressure  Dispense: 90 Tablet; Refill: 3  - furosemide (LASIX) 20 MG Tab; Take 1 Tablet by mouth 1 time a day as needed (swelling of legs with fluid). Take with potassium.  Dispense: 30 Tablet; Refill: 3  - potassium chloride SA (KDUR) 20 MEQ Tab CR; Take 1 Tablet by mouth every day. When taking lasix  Dispense: 30 Tablet; Refill: 3      Followup: Return in about 3 months (around 9/19/2024) for Lab Review, Diabetes.

## 2024-08-13 ENCOUNTER — PATIENT MESSAGE (OUTPATIENT)
Dept: HEALTH INFORMATION MANAGEMENT | Facility: OTHER | Age: 64
End: 2024-08-13

## 2024-09-27 ENCOUNTER — TELEPHONE (OUTPATIENT)
Dept: HEALTH INFORMATION MANAGEMENT | Facility: OTHER | Age: 64
End: 2024-09-27
Payer: COMMERCIAL

## 2024-12-02 NOTE — TELEPHONE ENCOUNTER
Received request via: Patient    Was the patient seen in the last year in this department? Yes    Does the patient have an active prescription (recently filled or refills available) for medication(s) requested? No    Pharmacy Name: [facility]     Does the patient have [facility] and need 100-day supply? (This applies to ALL medications) Patient does not have SCP

## 2024-12-05 RX ORDER — METOPROLOL SUCCINATE 50 MG/1
50 TABLET, EXTENDED RELEASE ORAL DAILY
Qty: 90 TABLET | Refills: 3 | Status: SHIPPED | OUTPATIENT
Start: 2024-12-05

## 2025-02-06 ENCOUNTER — OFFICE VISIT (OUTPATIENT)
Dept: URGENT CARE | Facility: PHYSICIAN GROUP | Age: 65
End: 2025-02-06
Payer: COMMERCIAL

## 2025-02-06 ENCOUNTER — HOSPITAL ENCOUNTER (EMERGENCY)
Facility: MEDICAL CENTER | Age: 65
End: 2025-02-06
Attending: EMERGENCY MEDICINE
Payer: COMMERCIAL

## 2025-02-06 VITALS
OXYGEN SATURATION: 94 % | WEIGHT: 279.76 LBS | SYSTOLIC BLOOD PRESSURE: 123 MMHG | TEMPERATURE: 97.2 F | DIASTOLIC BLOOD PRESSURE: 63 MMHG | HEIGHT: 73 IN | RESPIRATION RATE: 18 BRPM | HEART RATE: 67 BPM | BODY MASS INDEX: 37.08 KG/M2

## 2025-02-06 VITALS
OXYGEN SATURATION: 97 % | BODY MASS INDEX: 36.98 KG/M2 | DIASTOLIC BLOOD PRESSURE: 58 MMHG | WEIGHT: 279 LBS | HEIGHT: 73 IN | HEART RATE: 67 BPM | SYSTOLIC BLOOD PRESSURE: 110 MMHG | TEMPERATURE: 97.5 F | RESPIRATION RATE: 15 BRPM

## 2025-02-06 DIAGNOSIS — M62.830 MUSCLE SPASM OF BACK: Primary | ICD-10-CM

## 2025-02-06 DIAGNOSIS — M54.9 UPPER BACK PAIN: ICD-10-CM

## 2025-02-06 LAB — EKG IMPRESSION: NORMAL

## 2025-02-06 PROCEDURE — 94760 N-INVAS EAR/PLS OXIMETRY 1: CPT

## 2025-02-06 PROCEDURE — 93005 ELECTROCARDIOGRAM TRACING: CPT | Mod: TC | Performed by: EMERGENCY MEDICINE

## 2025-02-06 PROCEDURE — 93005 ELECTROCARDIOGRAM TRACING: CPT | Mod: TC

## 2025-02-06 PROCEDURE — 93000 ELECTROCARDIOGRAM COMPLETE: CPT

## 2025-02-06 PROCEDURE — 99214 OFFICE O/P EST MOD 30 MIN: CPT

## 2025-02-06 PROCEDURE — 99283 EMERGENCY DEPT VISIT LOW MDM: CPT

## 2025-02-06 PROCEDURE — 3078F DIAST BP <80 MM HG: CPT

## 2025-02-06 PROCEDURE — 3074F SYST BP LT 130 MM HG: CPT

## 2025-02-06 RX ORDER — IBUPROFEN 800 MG/1
800 TABLET, FILM COATED ORAL EVERY 8 HOURS PRN
Qty: 9 TABLET | Refills: 0 | Status: SHIPPED | OUTPATIENT
Start: 2025-02-06 | End: 2025-02-09

## 2025-02-06 RX ORDER — LIDOCAINE 50 MG/G
1 PATCH TOPICAL EVERY 24 HOURS
Qty: 5 PATCH | Refills: 0 | Status: ON HOLD | OUTPATIENT
Start: 2025-02-06 | End: 2025-02-14

## 2025-02-06 RX ORDER — ACETAMINOPHEN 500 MG
1000 TABLET ORAL EVERY 6 HOURS PRN
Qty: 20 TABLET | Refills: 0 | Status: SHIPPED | OUTPATIENT
Start: 2025-02-06 | End: 2025-02-14

## 2025-02-06 RX ORDER — CYCLOBENZAPRINE HCL 10 MG
10 TABLET ORAL 3 TIMES DAILY PRN
Qty: 10 TABLET | Refills: 0 | Status: SHIPPED | OUTPATIENT
Start: 2025-02-06 | End: 2025-02-09

## 2025-02-06 ASSESSMENT — ENCOUNTER SYMPTOMS
HEADACHES: 0
EYES NEGATIVE: 1
SHORTNESS OF BREATH: 0
NAUSEA: 0
ABDOMINAL PAIN: 0
BACK PAIN: 1
FEVER: 0
COUGH: 0
VOMITING: 0
MYALGIAS: 0
DIARRHEA: 0
CHILLS: 0
DIZZINESS: 0

## 2025-02-06 NOTE — PROGRESS NOTES
Subjective:     Chief Complaint   Patient presents with    Back Pain     Pain between shoulder blades radiates into ribs cannot get comfortable. Not sleeping from pain sx 1 wk       HPI:  Maximino Green is a 64 y.o. male who presents for 5 days of upper back pain. Reports the pain starts in left shoulder and radiates to bilateral axilla. Worse with movement, position changed. Not improved by ibuprofen. He states the pain ranges from 4/10-10/10. Denies fever, chills, malaise, hematuria, saddle anesthesia, numbness or tingling, unilateral weakness, or loss of bowel or bladder. No trauma, IV drug use, or history of cancer/malignancy.         ROS:  Review of Systems   Constitutional:  Negative for chills and fever.   HENT: Negative.     Eyes: Negative.    Respiratory:  Negative for cough and shortness of breath.    Cardiovascular:  Negative for chest pain.   Gastrointestinal:  Negative for abdominal pain, diarrhea, nausea and vomiting.   Genitourinary:  Negative for dysuria, frequency and urgency.   Musculoskeletal:  Positive for back pain. Negative for myalgias.   Skin:  Negative for rash.   Neurological:  Negative for dizziness and headaches.   All other systems reviewed and are negative.       CURRENT MEDICATIONS:  Current Outpatient Medications   Medication Sig Refill Last Dispense    furosemide (LASIX) 20 MG Tab Take 1 Tablet by mouth 1 time a day as needed (swelling of legs with fluid). Take with potassium. 3 Unknown (outside pharmacy)    lisinopril (PRINIVIL) 5 MG Tab Take 1 Tablet by mouth every day. For high blood pressure 3 Unknown (outside pharmacy)    metoprolol SR (TOPROL XL) 50 MG TABLET SR 24 HR Take 1 Tablet by mouth every day. 3 Unknown (outside pharmacy)    potassium chloride SA (KDUR) 20 MEQ Tab CR Take 1 Tablet by mouth every day. When taking lasix 3 Unknown (outside pharmacy)    rosuvastatin (CRESTOR) 10 MG Tab Take 1 Tablet by mouth every evening. 3 Unknown (outside pharmacy)       ALLERGIES:  "  No Known Allergies    PROBLEM LIST:    does not have any pertinent problems on file.    Allergies, Medications, & Tobacco/Substance Use were reconciled by the Medical Assistant and reviewed by myself.     Objective:   /58   Pulse 67   Temp 36.4 °C (97.5 °F) (Temporal)   Resp 15   Ht 1.854 m (6' 1\")   Wt (!) 127 kg (279 lb)   SpO2 97%   BMI 36.81 kg/m²     Physical Exam  Constitutional:       General: He is not in acute distress.     Appearance: He is not ill-appearing or toxic-appearing.   HENT:      Right Ear: Tympanic membrane normal.      Left Ear: Tympanic membrane normal.   Cardiovascular:      Rate and Rhythm: Normal rate and regular rhythm.   Pulmonary:      Effort: Pulmonary effort is normal. No respiratory distress.      Breath sounds: Normal breath sounds. No wheezing.   Abdominal:      General: There is no distension.      Palpations: Abdomen is soft.      Tenderness: There is no abdominal tenderness.   Musculoskeletal:      Right shoulder: Normal. No swelling, laceration or tenderness. Normal strength.      Left shoulder: Normal. No swelling, laceration or tenderness. Normal strength.   Skin:     General: Skin is warm and dry.   Neurological:      General: No focal deficit present.      Mental Status: He is alert and oriented to person, place, and time.      Sensory: No sensory deficit.      Motor: No weakness.      Gait: Gait normal.         Assessment/Plan:   Pt's history and physical exam consistent with severe back pain with unknown cause. Prognosis is uncertain. Pt appears uncomfortable.  Extensive hx including afib, HF, DM2. Because of my concern for cardiac cause, I feel the patient requires a higher level of care in the ED for closer monitoring, stat lab work and/or imaging for further evaluation. This has been discussed with the patient and he states agreement and understanding. The patient is stable to leave POV at this time and will go directly to ED without delay.     12-lead " study EKG interpretation, interpreted by myself.  The rhythm is SR with a rate of 62.  There is no ectopy. There are no acute ST segment changes and no T wave abnormalities.  Interpretation: No ST segment elevation myocardial infarction.    Assessment & Plan  Upper back pain           Discussed differential diagnosis, management options, risks/benefits, and alternatives to planned treatment. Pt expressed understanding and the treatment plan was agreed upon. Questions were encouraged and answered. Pt encouraged to return to urgent care as needed if new or worsening symptoms or if there is no improvement in condition. Pt educated in red flags and indications to immediately call 911 or present to the Emergency Department. Advised the patient to follow-up with the primary care physician for recheck, reevaluation, and further management.    I personally reviewed prior external notes and test results pertinent to today's visit. I have independently reviewed and interpreted all diagnostics ordered during this visit.    Please note that this dictation was created using voice recognition software. I have made a reasonable attempt to correct obvious errors, but I expect that there are errors of grammar and possibly content that I did not discover before finalizing the note.    This note was electronically signed by MARI Jacobson

## 2025-02-06 NOTE — ED TRIAGE NOTES
"Maximino Green  64 y.o.  Chief Complaint   Patient presents with    Back Pain     Upper Back     Pt reports he has had back pain - from his L scapula \"down to my pits\" x 6 days.  Pt reports he can't sleep and he hasn't been eating due to the pain.  Pt was seen in an urgent care in [place] and was told to come to the ER for further evaluation.  "

## 2025-02-06 NOTE — ED PROVIDER NOTES
ED Provider Note    Scribed for Steven Nieto by Steven Nieto. 2/6/2025  1:18 PM    Primary care provider: Giancarlo Lomeli M.D.  Means of arrival: private vehicle   History obtained from: Patient  History limited by: None    CHIEF COMPLAINT  Chief Complaint   Patient presents with    Back Pain     Upper Back    Sent from Urgent Care     EXTERNAL RECORDS REVIEWED  Outpatient Notes patient was seen at urgent care today for back pain for last 6 days and sent here for further evaluation    HPI/ROS  LIMITATION TO HISTORY   Select: : None  OUTSIDE HISTORIAN(S):  None    HPI  Maximino Green is a 64 y.o. male who presents to the Emergency Department with evaluation of 6 days of atraumatic bilateral upper back neck and shoulder pain that radiates to his lateral aspect of his back.  Patient denies any chest pain, shortness of breath, lightheadedness, dizziness.  No injuries.  History of some mild back discomfort but nothing like this previously.  Denies alcohol drug or tobacco abuse.  Remote history of A-fib but no longer on blood thinners for this as he has been well-controlled.  Takes a statin and metoprolol only for medications.  Otherwise he is fairly healthy.  Denies any IV drug use, fevers, loss of bladder or bowel continence, or paresthesias of the lower extremities.     REVIEW OF SYSTEMS  As above, all other systems reviewed and are negative.   See HPI for further details.     PAST MEDICAL HISTORY   has a past medical history of Alcoholic (HCC), Cancer (HCC) (2019), Dental disorder, Diabetes (HCC), High cholesterol (11/01/2022), Hypertension, Paroxysmal atrial fibrillation (HCC), and Tonsil cancer (HCC).  SURGICAL HISTORY   has a past surgical history that includes cholecystectomy (2003) and other (12/27/2018).  SOCIAL HISTORY  Social History     Tobacco Use    Smoking status: Former     Current packs/day: 0.00     Average packs/day: 0.1 packs/day for 40.0 years (4.0 ttl pk-yrs)     Types:  "Cigarettes, Cigars     Start date: 1979     Quit date: 2019     Years since quittin.9    Smokeless tobacco: Never   Vaping Use    Vaping status: Never Used   Substance Use Topics    Alcohol use: No     Comment: Alchohlism quit     Drug use: No      Social History     Substance and Sexual Activity   Drug Use No     FAMILY HISTORY  Family History   Problem Relation Age of Onset    Cancer Mother         colon    Respiratory Disease Mother         emphysema- smoker    Alcohol abuse Mother     Heart Disease Father         Detials not known    Cancer Sister         brain tumor    GI Disease Sister      CURRENT MEDICATIONS  Home Medications    **Home medications have not yet been reviewed for this encounter**       ALLERGIES  No Known Allergies    PHYSICAL EXAM    VITAL SIGNS:   Vitals:    25 1209 25 1211   BP:  123/63   Pulse:  67   Resp:  18   Temp:  36.2 °C (97.2 °F)   TempSrc:  Temporal   SpO2:  94%   Weight: (!) 127 kg (279 lb 12.2 oz)    Height: 1.854 m (6' 1\")      Vitals: My interpretation: normotensive, not tachycardic, afebrile, not hypoxic    Reinterpretation of vitals: unchanged and unremarkable     PE:   Gen: sitting comfortably, speaking clearly, appears in no acute distress   ENT: Mucous membranes moist, posterior pharynx clear, uvula midline, nares patent bilaterally   Neck: Supple, FROM  Pulmonary: Lungs are clear to auscultation bilaterally. No tachypnea  CV:  RRR, no murmur appreciated, pulses 2+ in both upper and lower extremities  Abdomen: soft, NT/ND; no rebound/guarding  : no CVA or suprapubic tenderness   Neuro: A&Ox4 (person, place, time, situation), speech fluent, gait steady, no focal deficits appreciated  Skin: No rash or lesions.  No pallor or jaundice.  No cyanosis.  Warm and dry.   Back Exam: Patient has obvious reproducible tenderness in the thoracic bilateral paraspinal muscles that when palpated reproduces his pain.  Also tenderness in the bilateral shoulder " girdle and scapular regions and lateral  as well as latissimus dorsi bilaterally.  No midline tenderness or signs of trauma injury or infection are present.    DIAGNOSTIC STUDIES / PROCEDURES    LABS  Results for orders placed or performed during the hospital encounter of 25   EKG    Collection Time: 25  1:11 PM   Result Value Ref Range    Report       [facility] Emergency Dept.    Test Date:  2025  Pt Name:    MAXIMINO GREEN                 Department: [facility]  MRN:        [id number]                      Room:  Gender:     Male                         Technician: [id number]  :        1960                   Requested By:ER TRIAGE PROTOCOL  Order #:    [id number]                    Reading MD: Steven Nieto    Measurements  Intervals                                Axis  Rate:       62                           P:          62  ND:         137                          QRS:        46  QRSD:       105                          T:          64  QT:         493  QTc:        501    Interpretive Statements  Sinus rhythm  Prolonged QT interval  Compared to ECG 2022 08:23:07  Sinus bradycardia no longer present  Electronically Signed On 2025 13:11:43 PST by Steven Nieto       *Note: Due to a large number of results and/or encounters for the requested time period, some results have not been displayed. A complete set of results can be found in Results Review.        COURSE & MEDICAL DECISION MAKING  Nursing notes, VS, PMSFHx, labs, imaging, EKG reviewed in chart.    ED Observation Status? No; Patient does not meet criteria for ED Observation.     Ddx: Strain, sprain, fracture, dislocation, infectious, epidural abscess, spinal abscess, muscle spasm    MDM: 1:18 PM Maximino Green is a 64 y.o. male who presents to the Emergency Department with evaluation of 6 days of atraumatic bilateral upper back neck and shoulder pain that radiates to his lateral aspect of his back.   Patient denies any chest pain, shortness of breath, lightheadedness, dizziness.  No injuries.  History of some mild back discomfort but nothing like this previously.  Denies alcohol drug or tobacco abuse.  Remote history of A-fib but no longer on blood thinners for this as he has been well-controlled.  Takes a statin and metoprolol only for medications.  Otherwise he is fairly healthy.  Denies any IV drug use, fevers, loss of bladder or bowel continence, or paresthesias of the lower extremities.  Patient arrives here under his own accord with normal vital signs.  He is well-appearing.  Conversant.  Back Exam shows Patient has obvious reproducible tenderness in the thoracic bilateral paraspinal muscles that when palpated reproduces his pain.  Also tenderness in the bilateral shoulder girdle and scapular regions and lateral  as well as latissimus dorsi bilaterally.  No midline tenderness or signs of trauma injury or infection are present.  He had an EKG done which was unremarkable.  Overall he appears quite well and he has reproducible pain in the back bilaterally and is very consistent with likely muscle spasm due to his obesity and poor general posture at baseline.  I considered whether he would need imaging but there is a history of trauma or possibility of infection he is afebrile denies a history of fever.  No red flags for back pain.  He is neurologically intact.  I do think he benefit from physical therapy and a referral was placed for him as well as Tylenol Motrin, Flexeril and lidocaine patches and stretching exercises discussed.  He verbalized understand strict return precautions outpatient follow-up plan is amenable.    ADDITIONAL PROBLEM LIST AND DISPOSITION    I have discussed management of the patient with the following physicians and RENAY's:  None    Discussion of management with other Q or appropriate source(s): None     Escalation of care considered, and ultimately not performed:IV fluids, Laboratory  analysis, and diagnostic imaging    Barriers to care at this time, including but not limited to:  none .     Decision tools and prescription drugs considered including, but not limited to: Pain Medications Tylenol and Motrin .    FINAL IMPRESSION  1. Muscle spasm of back Acute        The note accurately reflects work and decisions made by me.  Steven Nieto  2/6/2025  1:18 PM

## 2025-02-10 ENCOUNTER — HOSPITAL ENCOUNTER (INPATIENT)
Facility: MEDICAL CENTER | Age: 65
LOS: 3 days | DRG: 518 | End: 2025-02-14
Attending: STUDENT IN AN ORGANIZED HEALTH CARE EDUCATION/TRAINING PROGRAM | Admitting: INTERNAL MEDICINE
Payer: COMMERCIAL

## 2025-02-10 ENCOUNTER — APPOINTMENT (OUTPATIENT)
Dept: RADIOLOGY | Facility: MEDICAL CENTER | Age: 65
DRG: 518 | End: 2025-02-10
Attending: STUDENT IN AN ORGANIZED HEALTH CARE EDUCATION/TRAINING PROGRAM
Payer: COMMERCIAL

## 2025-02-10 DIAGNOSIS — S22.030A COMPRESSION FRACTURE OF T3 VERTEBRA, INITIAL ENCOUNTER (HCC): ICD-10-CM

## 2025-02-10 DIAGNOSIS — G95.20 SPINAL CORD COMPRESSION (HCC): ICD-10-CM

## 2025-02-10 DIAGNOSIS — R53.1 ACUTE WEAKNESS: ICD-10-CM
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
## 2025-02-10 PROCEDURE — 99291 CRITICAL CARE FIRST HOUR: CPT

## 2025-02-10 RX ORDER — LIDOCAINE 4 G/G
1 PATCH TOPICAL EVERY 24 HOURS
Status: DISCONTINUED | OUTPATIENT
Start: 2025-02-11 | End: 2025-02-14 | Stop reason: HOSPADM

## 2025-02-10 RX ORDER — ACETAMINOPHEN 500 MG
1000 TABLET ORAL ONCE
Status: DISCONTINUED | OUTPATIENT
Start: 2025-02-11 | End: 2025-02-11

## 2025-02-11 ENCOUNTER — APPOINTMENT (OUTPATIENT)
Dept: RADIOLOGY | Facility: MEDICAL CENTER | Age: 65
DRG: 518 | End: 2025-02-11
Attending: STUDENT IN AN ORGANIZED HEALTH CARE EDUCATION/TRAINING PROGRAM
Payer: COMMERCIAL

## 2025-02-11 ENCOUNTER — ANESTHESIA (OUTPATIENT)
Dept: SURGERY | Facility: MEDICAL CENTER | Age: 65
DRG: 518 | End: 2025-02-11
Payer: COMMERCIAL

## 2025-02-11 ENCOUNTER — APPOINTMENT (OUTPATIENT)
Dept: RADIOLOGY | Facility: MEDICAL CENTER | Age: 65
DRG: 518 | End: 2025-02-11
Attending: NEUROLOGICAL SURGERY
Payer: COMMERCIAL

## 2025-02-11 ENCOUNTER — ANESTHESIA EVENT (OUTPATIENT)
Dept: SURGERY | Facility: MEDICAL CENTER | Age: 65
DRG: 518 | End: 2025-02-11
Payer: COMMERCIAL

## 2025-02-11 PROBLEM — R91.1 PULMONARY NODULE: Status: ACTIVE | Noted: 2025-02-11

## 2025-02-11 PROBLEM — R91.8 PULMONARY NODULES/LESIONS, MULTIPLE: Status: ACTIVE | Noted: 2025-02-11

## 2025-02-11 PROBLEM — G95.20 SPINAL CORD COMPRESSION (HCC): Status: ACTIVE | Noted: 2025-02-11

## 2025-02-11 PROBLEM — R91.1 PULMONARY NODULE: Status: RESOLVED | Noted: 2025-02-11 | Resolved: 2025-02-11

## 2025-02-11 LAB
ABO + RH BLD: NORMAL
ABO GROUP BLD: NORMAL
ALBUMIN SERPL BCP-MCNC: 3.5 G/DL (ref 3.2–4.9)
ALBUMIN SERPL BCP-MCNC: 3.5 G/DL (ref 3.2–4.9)
ALBUMIN/GLOB SERPL: 1.2 G/DL
ALBUMIN/GLOB SERPL: 1.2 G/DL
ALP SERPL-CCNC: 92 U/L (ref 30–99)
ALP SERPL-CCNC: 95 U/L (ref 30–99)
ALT SERPL-CCNC: 29 U/L (ref 2–50)
ALT SERPL-CCNC: 30 U/L (ref 2–50)
ANION GAP SERPL CALC-SCNC: 12 MMOL/L (ref 7–16)
ANION GAP SERPL CALC-SCNC: 12 MMOL/L (ref 7–16)
APPEARANCE UR: CLEAR
APTT PPP: 28.2 SEC (ref 24.7–36)
APTT PPP: 28.3 SEC (ref 24.7–36)
AST SERPL-CCNC: 53 U/L (ref 12–45)
AST SERPL-CCNC: 57 U/L (ref 12–45)
BACTERIA #/AREA URNS HPF: NORMAL /HPF
BASOPHILS # BLD AUTO: 0.1 % (ref 0–1.8)
BASOPHILS # BLD AUTO: 0.9 % (ref 0–1.8)
BASOPHILS # BLD: 0.01 K/UL (ref 0–0.12)
BASOPHILS # BLD: 0.05 K/UL (ref 0–0.12)
BILIRUB SERPL-MCNC: 0.7 MG/DL (ref 0.1–1.5)
BILIRUB SERPL-MCNC: 0.9 MG/DL (ref 0.1–1.5)
BILIRUB UR QL STRIP.AUTO: NEGATIVE
BLD GP AB SCN SERPL QL: NORMAL
BUN SERPL-MCNC: 17 MG/DL (ref 8–22)
BUN SERPL-MCNC: 19 MG/DL (ref 8–22)
CALCIUM ALBUM COR SERPL-MCNC: 10.2 MG/DL (ref 8.5–10.5)
CALCIUM ALBUM COR SERPL-MCNC: 9.9 MG/DL (ref 8.5–10.5)
CALCIUM SERPL-MCNC: 9.5 MG/DL (ref 8.5–10.5)
CALCIUM SERPL-MCNC: 9.8 MG/DL (ref 8.5–10.5)
CASTS URNS QL MICRO: NORMAL /LPF (ref 0–2)
CHLORIDE SERPL-SCNC: 104 MMOL/L (ref 96–112)
CHLORIDE SERPL-SCNC: 107 MMOL/L (ref 96–112)
CO2 SERPL-SCNC: 22 MMOL/L (ref 20–33)
CO2 SERPL-SCNC: 23 MMOL/L (ref 20–33)
COLOR UR: YELLOW
CREAT SERPL-MCNC: 0.77 MG/DL (ref 0.5–1.4)
CREAT SERPL-MCNC: 0.86 MG/DL (ref 0.5–1.4)
EKG IMPRESSION: NORMAL
EOSINOPHIL # BLD AUTO: 0 K/UL (ref 0–0.51)
EOSINOPHIL # BLD AUTO: 0.18 K/UL (ref 0–0.51)
EOSINOPHIL NFR BLD: 0 % (ref 0–6.9)
EOSINOPHIL NFR BLD: 3.1 % (ref 0–6.9)
EPITHELIAL CELLS 1715: NORMAL /HPF (ref 0–5)
ERYTHROCYTE [DISTWIDTH] IN BLOOD BY AUTOMATED COUNT: 39.3 FL (ref 35.9–50)
ERYTHROCYTE [DISTWIDTH] IN BLOOD BY AUTOMATED COUNT: 41.6 FL (ref 35.9–50)
GFR SERPLBLD CREATININE-BSD FMLA CKD-EPI: 100 ML/MIN/1.73 M 2
GFR SERPLBLD CREATININE-BSD FMLA CKD-EPI: 96 ML/MIN/1.73 M 2
GLOBULIN SER CALC-MCNC: 3 G/DL (ref 1.9–3.5)
GLOBULIN SER CALC-MCNC: 3 G/DL (ref 1.9–3.5)
GLUCOSE BLD STRIP.AUTO-MCNC: 139 MG/DL (ref 65–99)
GLUCOSE BLD STRIP.AUTO-MCNC: 145 MG/DL (ref 65–99)
GLUCOSE BLD STRIP.AUTO-MCNC: 157 MG/DL (ref 65–99)
GLUCOSE SERPL-MCNC: 133 MG/DL (ref 65–99)
GLUCOSE SERPL-MCNC: 164 MG/DL (ref 65–99)
GLUCOSE UR STRIP.AUTO-MCNC: NEGATIVE MG/DL
HCT VFR BLD AUTO: 36.4 % (ref 42–52)
HCT VFR BLD AUTO: 37.6 % (ref 42–52)
HGB BLD-MCNC: 12.2 G/DL (ref 14–18)
HGB BLD-MCNC: 12.5 G/DL (ref 14–18)
IMM GRANULOCYTES # BLD AUTO: 0.02 K/UL (ref 0–0.11)
IMM GRANULOCYTES # BLD AUTO: 0.03 K/UL (ref 0–0.11)
IMM GRANULOCYTES NFR BLD AUTO: 0.3 % (ref 0–0.9)
IMM GRANULOCYTES NFR BLD AUTO: 0.3 % (ref 0–0.9)
INR PPP: 1.14 (ref 0.87–1.13)
KETONES UR STRIP.AUTO-MCNC: NEGATIVE MG/DL
LACTATE SERPL-SCNC: 2 MMOL/L (ref 0.5–2)
LEUKOCYTE ESTERASE UR QL STRIP.AUTO: NEGATIVE
LYMPHOCYTES # BLD AUTO: 0.42 K/UL (ref 1–4.8)
LYMPHOCYTES # BLD AUTO: 0.65 K/UL (ref 1–4.8)
LYMPHOCYTES NFR BLD: 11.1 % (ref 22–41)
LYMPHOCYTES NFR BLD: 4.7 % (ref 22–41)
MAGNESIUM SERPL-MCNC: 1.7 MG/DL (ref 1.5–2.5)
MCH RBC QN AUTO: 28 PG (ref 27–33)
MCH RBC QN AUTO: 28.3 PG (ref 27–33)
MCHC RBC AUTO-ENTMCNC: 33.2 G/DL (ref 32.3–36.5)
MCHC RBC AUTO-ENTMCNC: 33.5 G/DL (ref 32.3–36.5)
MCV RBC AUTO: 83.5 FL (ref 81.4–97.8)
MCV RBC AUTO: 85.1 FL (ref 81.4–97.8)
MICRO URNS: ABNORMAL
MONOCYTES # BLD AUTO: 0.36 K/UL (ref 0–0.85)
MONOCYTES # BLD AUTO: 0.58 K/UL (ref 0–0.85)
MONOCYTES NFR BLD AUTO: 4 % (ref 0–13.4)
MONOCYTES NFR BLD AUTO: 9.9 % (ref 0–13.4)
NEUTROPHILS # BLD AUTO: 4.35 K/UL (ref 1.82–7.42)
NEUTROPHILS # BLD AUTO: 8.18 K/UL (ref 1.82–7.42)
NEUTROPHILS NFR BLD: 74.7 % (ref 44–72)
NEUTROPHILS NFR BLD: 90.9 % (ref 44–72)
NITRITE UR QL STRIP.AUTO: NEGATIVE
NRBC # BLD AUTO: 0 K/UL
NRBC # BLD AUTO: 0 K/UL
NRBC BLD-RTO: 0 /100 WBC (ref 0–0.2)
NRBC BLD-RTO: 0 /100 WBC (ref 0–0.2)
PATHOLOGY CONSULT NOTE: NORMAL
PH UR STRIP.AUTO: 6 [PH] (ref 5–8)
PLATELET # BLD AUTO: 124 K/UL (ref 164–446)
PLATELET # BLD AUTO: 139 K/UL (ref 164–446)
PMV BLD AUTO: 14.1 FL (ref 9–12.9)
PMV BLD AUTO: 14.2 FL (ref 9–12.9)
POTASSIUM SERPL-SCNC: 3.6 MMOL/L (ref 3.6–5.5)
POTASSIUM SERPL-SCNC: 4.2 MMOL/L (ref 3.6–5.5)
PROT SERPL-MCNC: 6.5 G/DL (ref 6–8.2)
PROT SERPL-MCNC: 6.5 G/DL (ref 6–8.2)
PROT UR QL STRIP: 30 MG/DL
PROTHROMBIN TIME: 14.6 SEC (ref 12–14.6)
RBC # BLD AUTO: 4.36 M/UL (ref 4.7–6.1)
RBC # BLD AUTO: 4.42 M/UL (ref 4.7–6.1)
RBC # URNS HPF: NORMAL /HPF (ref 0–2)
RBC UR QL AUTO: NEGATIVE
RH BLD: NORMAL
SODIUM SERPL-SCNC: 138 MMOL/L (ref 135–145)
SODIUM SERPL-SCNC: 142 MMOL/L (ref 135–145)
SP GR UR STRIP.AUTO: >1.045
UROBILINOGEN UR STRIP.AUTO-MCNC: 1 EU/DL
WBC # BLD AUTO: 5.8 K/UL (ref 4.8–10.8)
WBC # BLD AUTO: 9 K/UL (ref 4.8–10.8)
WBC #/AREA URNS HPF: NORMAL /HPF

## 2025-02-11 PROCEDURE — 72131 CT LUMBAR SPINE W/O DYE: CPT

## 2025-02-11 PROCEDURE — 700117 HCHG RX CONTRAST REV CODE 255: Performed by: STUDENT IN AN ORGANIZED HEALTH CARE EDUCATION/TRAINING PROGRAM

## 2025-02-11 PROCEDURE — 96374 THER/PROPH/DIAG INJ IV PUSH: CPT

## 2025-02-11 PROCEDURE — A9270 NON-COVERED ITEM OR SERVICE: HCPCS | Performed by: NURSE PRACTITIONER

## 2025-02-11 PROCEDURE — 85730 THROMBOPLASTIN TIME PARTIAL: CPT

## 2025-02-11 PROCEDURE — 700111 HCHG RX REV CODE 636 W/ 250 OVERRIDE (IP): Mod: JZ | Performed by: STUDENT IN AN ORGANIZED HEALTH CARE EDUCATION/TRAINING PROGRAM

## 2025-02-11 PROCEDURE — 700101 HCHG RX REV CODE 250: Performed by: NEUROLOGICAL SURGERY

## 2025-02-11 PROCEDURE — 700101 HCHG RX REV CODE 250: Performed by: STUDENT IN AN ORGANIZED HEALTH CARE EDUCATION/TRAINING PROGRAM

## 2025-02-11 PROCEDURE — 700102 HCHG RX REV CODE 250 W/ 637 OVERRIDE(OP): Performed by: NURSE PRACTITIONER

## 2025-02-11 PROCEDURE — 700102 HCHG RX REV CODE 250 W/ 637 OVERRIDE(OP)

## 2025-02-11 PROCEDURE — C1713 ANCHOR/SCREW BN/BN,TIS/BN: HCPCS | Performed by: NEUROLOGICAL SURGERY

## 2025-02-11 PROCEDURE — 700111 HCHG RX REV CODE 636 W/ 250 OVERRIDE (IP): Performed by: STUDENT IN AN ORGANIZED HEALTH CARE EDUCATION/TRAINING PROGRAM

## 2025-02-11 PROCEDURE — 88342 IMHCHEM/IMCYTCHM 1ST ANTB: CPT | Performed by: PATHOLOGY

## 2025-02-11 PROCEDURE — 160031 HCHG SURGERY MINUTES - 1ST 30 MINS LEVEL 5: Performed by: NEUROLOGICAL SURGERY

## 2025-02-11 PROCEDURE — 72070 X-RAY EXAM THORAC SPINE 2VWS: CPT

## 2025-02-11 PROCEDURE — 88341 IMHCHEM/IMCYTCHM EA ADD ANTB: CPT | Mod: 26 | Performed by: PATHOLOGY

## 2025-02-11 PROCEDURE — 96375 TX/PRO/DX INJ NEW DRUG ADDON: CPT

## 2025-02-11 PROCEDURE — A9579 GAD-BASE MR CONTRAST NOS,1ML: HCPCS | Mod: JZ | Performed by: NEUROLOGICAL SURGERY

## 2025-02-11 PROCEDURE — 700102 HCHG RX REV CODE 250 W/ 637 OVERRIDE(OP): Performed by: STUDENT IN AN ORGANIZED HEALTH CARE EDUCATION/TRAINING PROGRAM

## 2025-02-11 PROCEDURE — 80053 COMPREHEN METABOLIC PANEL: CPT

## 2025-02-11 PROCEDURE — 160035 HCHG PACU - 1ST 60 MINS PHASE I: Performed by: NEUROLOGICAL SURGERY

## 2025-02-11 PROCEDURE — 95939 C MOTOR EVOKED UPR&LWR LIMBS: CPT | Performed by: NEUROLOGICAL SURGERY

## 2025-02-11 PROCEDURE — 502000 HCHG MISC OR IMPLANTS RC 0278: Performed by: NEUROLOGICAL SURGERY

## 2025-02-11 PROCEDURE — 700111 HCHG RX REV CODE 636 W/ 250 OVERRIDE (IP): Mod: JZ | Performed by: NURSE PRACTITIONER

## 2025-02-11 PROCEDURE — 700105 HCHG RX REV CODE 258: Performed by: NURSE PRACTITIONER

## 2025-02-11 PROCEDURE — 88342 IMHCHEM/IMCYTCHM 1ST ANTB: CPT | Mod: 26 | Performed by: PATHOLOGY

## 2025-02-11 PROCEDURE — C1755 CATHETER, INTRASPINAL: HCPCS | Performed by: NEUROLOGICAL SURGERY

## 2025-02-11 PROCEDURE — 700105 HCHG RX REV CODE 258: Performed by: STUDENT IN AN ORGANIZED HEALTH CARE EDUCATION/TRAINING PROGRAM

## 2025-02-11 PROCEDURE — 85610 PROTHROMBIN TIME: CPT

## 2025-02-11 PROCEDURE — 0PH404Z INSERTION OF INTERNAL FIXATION DEVICE INTO THORACIC VERTEBRA, OPEN APPROACH: ICD-10-PCS | Performed by: NEUROLOGICAL SURGERY

## 2025-02-11 PROCEDURE — 110371 HCHG SHELL REV 272: Performed by: NEUROLOGICAL SURGERY

## 2025-02-11 PROCEDURE — 85025 COMPLETE CBC W/AUTO DIFF WBC: CPT

## 2025-02-11 PROCEDURE — A9270 NON-COVERED ITEM OR SERVICE: HCPCS | Performed by: NEUROLOGICAL SURGERY

## 2025-02-11 PROCEDURE — 302242 IV POLE: Performed by: STUDENT IN AN ORGANIZED HEALTH CARE EDUCATION/TRAINING PROGRAM

## 2025-02-11 PROCEDURE — 700117 HCHG RX CONTRAST REV CODE 255: Mod: JZ | Performed by: NEUROLOGICAL SURGERY

## 2025-02-11 PROCEDURE — 160002 HCHG RECOVERY MINUTES (STAT): Performed by: NEUROLOGICAL SURGERY

## 2025-02-11 PROCEDURE — 95861 NEEDLE EMG 2 EXTREMITIES: CPT | Performed by: NEUROLOGICAL SURGERY

## 2025-02-11 PROCEDURE — 700111 HCHG RX REV CODE 636 W/ 250 OVERRIDE (IP): Performed by: NEUROLOGICAL SURGERY

## 2025-02-11 PROCEDURE — 306637 HCHG MISC ORTHO ITEM RC 0274

## 2025-02-11 PROCEDURE — 93010 ELECTROCARDIOGRAM REPORT: CPT | Performed by: INTERNAL MEDICINE

## 2025-02-11 PROCEDURE — 72157 MRI CHEST SPINE W/O & W/DYE: CPT

## 2025-02-11 PROCEDURE — 88307 TISSUE EXAM BY PATHOLOGIST: CPT | Performed by: PATHOLOGY

## 2025-02-11 PROCEDURE — 770022 HCHG ROOM/CARE - ICU (200)

## 2025-02-11 PROCEDURE — 72128 CT CHEST SPINE W/O DYE: CPT

## 2025-02-11 PROCEDURE — 0PH304Z INSERTION OF INTERNAL FIXATION DEVICE INTO CERVICAL VERTEBRA, OPEN APPROACH: ICD-10-PCS | Performed by: NEUROLOGICAL SURGERY

## 2025-02-11 PROCEDURE — 700102 HCHG RX REV CODE 250 W/ 637 OVERRIDE(OP): Performed by: NEUROLOGICAL SURGERY

## 2025-02-11 PROCEDURE — A9270 NON-COVERED ITEM OR SERVICE: HCPCS

## 2025-02-11 PROCEDURE — 160048 HCHG OR STATISTICAL LEVEL 1-5: Performed by: NEUROLOGICAL SURGERY

## 2025-02-11 PROCEDURE — 70450 CT HEAD/BRAIN W/O DYE: CPT

## 2025-02-11 PROCEDURE — 71260 CT THORAX DX C+: CPT

## 2025-02-11 PROCEDURE — 95938 SOMATOSENSORY TESTING: CPT | Performed by: NEUROLOGICAL SURGERY

## 2025-02-11 PROCEDURE — 160009 HCHG ANES TIME/MIN: Performed by: NEUROLOGICAL SURGERY

## 2025-02-11 PROCEDURE — L0200 CERV COL SUPP ADJ BAR & THOR: HCPCS

## 2025-02-11 PROCEDURE — 88307 TISSUE EXAM BY PATHOLOGIST: CPT | Mod: 26 | Performed by: PATHOLOGY

## 2025-02-11 PROCEDURE — 88311 DECALCIFY TISSUE: CPT | Performed by: PATHOLOGY

## 2025-02-11 PROCEDURE — 83735 ASSAY OF MAGNESIUM: CPT

## 2025-02-11 PROCEDURE — 86900 BLOOD TYPING SEROLOGIC ABO: CPT

## 2025-02-11 PROCEDURE — 160042 HCHG SURGERY MINUTES - EA ADDL 1 MIN LEVEL 5: Performed by: NEUROLOGICAL SURGERY

## 2025-02-11 PROCEDURE — 110454 HCHG SHELL REV 250: Performed by: NEUROLOGICAL SURGERY

## 2025-02-11 PROCEDURE — 95937 NEUROMUSCULAR JUNCTION TEST: CPT | Performed by: NEUROLOGICAL SURGERY

## 2025-02-11 PROCEDURE — 86850 RBC ANTIBODY SCREEN: CPT

## 2025-02-11 PROCEDURE — 36415 COLL VENOUS BLD VENIPUNCTURE: CPT

## 2025-02-11 PROCEDURE — 88341 IMHCHEM/IMCYTCHM EA ADD ANTB: CPT | Mod: 91 | Performed by: PATHOLOGY

## 2025-02-11 PROCEDURE — 700111 HCHG RX REV CODE 636 W/ 250 OVERRIDE (IP): Performed by: NURSE PRACTITIONER

## 2025-02-11 PROCEDURE — 93005 ELECTROCARDIOGRAM TRACING: CPT | Mod: TC | Performed by: NURSE PRACTITIONER

## 2025-02-11 PROCEDURE — 72125 CT NECK SPINE W/O DYE: CPT

## 2025-02-11 PROCEDURE — 83605 ASSAY OF LACTIC ACID: CPT

## 2025-02-11 PROCEDURE — 00NX0ZZ RELEASE THORACIC SPINAL CORD, OPEN APPROACH: ICD-10-PCS | Performed by: NEUROLOGICAL SURGERY

## 2025-02-11 PROCEDURE — 81001 URINALYSIS AUTO W/SCOPE: CPT

## 2025-02-11 PROCEDURE — A9270 NON-COVERED ITEM OR SERVICE: HCPCS | Performed by: STUDENT IN AN ORGANIZED HEALTH CARE EDUCATION/TRAINING PROGRAM

## 2025-02-11 PROCEDURE — 86901 BLOOD TYPING SEROLOGIC RH(D): CPT

## 2025-02-11 PROCEDURE — 82962 GLUCOSE BLOOD TEST: CPT

## 2025-02-11 PROCEDURE — 88311 DECALCIFY TISSUE: CPT | Mod: 26 | Performed by: PATHOLOGY

## 2025-02-11 PROCEDURE — 95940 IONM IN OPERATNG ROOM 15 MIN: CPT | Performed by: NEUROLOGICAL SURGERY

## 2025-02-11 DEVICE — SET SCREW AND MAS HEAD PACK OF 2 (1EA): Type: IMPLANTABLE DEVICE | Site: BACK | Status: FUNCTIONAL

## 2025-02-11 DEVICE — IMPLANTABLE DEVICE: Type: IMPLANTABLE DEVICE | Site: BACK | Status: FUNCTIONAL

## 2025-02-11 DEVICE — SEALANT DURAL AUTOSPRAY ADHERUS (5EA/PK): Type: IMPLANTABLE DEVICE | Site: BACK | Status: FUNCTIONAL

## 2025-02-11 DEVICE — SET SCREW MAS HEAD (4EA/PK): Type: IMPLANTABLE DEVICE | Site: BACK | Status: FUNCTIONAL

## 2025-02-11 DEVICE — GRAFT DURAMATRIX ONLAY PLUS 1IN X 1IN OR 2.7CM X 2.75CM: Type: IMPLANTABLE DEVICE | Site: BACK | Status: FUNCTIONAL

## 2025-02-11 DEVICE — GRAFT BONE 15CC OSTEOAMP FIBER SELECT: Type: IMPLANTABLE DEVICE | Site: BACK | Status: FUNCTIONAL

## 2025-02-11 RX ORDER — HYDROMORPHONE HYDROCHLORIDE 1 MG/ML
0.4 INJECTION, SOLUTION INTRAMUSCULAR; INTRAVENOUS; SUBCUTANEOUS
Status: DISCONTINUED | OUTPATIENT
Start: 2025-02-11 | End: 2025-02-11 | Stop reason: HOSPADM

## 2025-02-11 RX ORDER — BUPIVACAINE HYDROCHLORIDE AND EPINEPHRINE 2.5; 5 MG/ML; UG/ML
INJECTION, SOLUTION EPIDURAL; INFILTRATION; INTRACAUDAL; PERINEURAL
Status: DISCONTINUED | OUTPATIENT
Start: 2025-02-11 | End: 2025-02-11 | Stop reason: HOSPADM

## 2025-02-11 RX ORDER — PHENYLEPHRINE HCL IN 0.9% NACL 1 MG/10 ML
SYRINGE (ML) INTRAVENOUS PRN
Status: DISCONTINUED | OUTPATIENT
Start: 2025-02-11 | End: 2025-02-11 | Stop reason: SURG

## 2025-02-11 RX ORDER — MAGNESIUM SULFATE HEPTAHYDRATE 40 MG/ML
2 INJECTION, SOLUTION INTRAVENOUS ONCE
Status: COMPLETED | OUTPATIENT
Start: 2025-02-11 | End: 2025-02-11

## 2025-02-11 RX ORDER — ALBUTEROL SULFATE 5 MG/ML
2.5 SOLUTION RESPIRATORY (INHALATION)
Status: DISCONTINUED | OUTPATIENT
Start: 2025-02-11 | End: 2025-02-11 | Stop reason: HOSPADM

## 2025-02-11 RX ORDER — OXYCODONE HCL 5 MG/5 ML
5 SOLUTION, ORAL ORAL
Status: COMPLETED | OUTPATIENT
Start: 2025-02-11 | End: 2025-02-11

## 2025-02-11 RX ORDER — EPHEDRINE SULFATE 50 MG/ML
5 INJECTION, SOLUTION INTRAVENOUS
Status: DISCONTINUED | OUTPATIENT
Start: 2025-02-11 | End: 2025-02-11 | Stop reason: HOSPADM

## 2025-02-11 RX ORDER — DIPHENHYDRAMINE HYDROCHLORIDE 50 MG/ML
12.5 INJECTION INTRAMUSCULAR; INTRAVENOUS
Status: DISCONTINUED | OUTPATIENT
Start: 2025-02-11 | End: 2025-02-11 | Stop reason: HOSPADM

## 2025-02-11 RX ORDER — ONDANSETRON 2 MG/ML
4 INJECTION INTRAMUSCULAR; INTRAVENOUS
Status: DISCONTINUED | OUTPATIENT
Start: 2025-02-11 | End: 2025-02-11 | Stop reason: HOSPADM

## 2025-02-11 RX ORDER — METHOCARBAMOL 750 MG/1
750 TABLET, FILM COATED ORAL EVERY 8 HOURS PRN
Status: DISCONTINUED | OUTPATIENT
Start: 2025-02-11 | End: 2025-02-14 | Stop reason: HOSPADM

## 2025-02-11 RX ORDER — SODIUM CHLORIDE, SODIUM LACTATE, POTASSIUM CHLORIDE, CALCIUM CHLORIDE 600; 310; 30; 20 MG/100ML; MG/100ML; MG/100ML; MG/100ML
INJECTION, SOLUTION INTRAVENOUS
Status: DISCONTINUED | OUTPATIENT
Start: 2025-02-11 | End: 2025-02-11 | Stop reason: SURG

## 2025-02-11 RX ORDER — ONDANSETRON 2 MG/ML
INJECTION INTRAMUSCULAR; INTRAVENOUS PRN
Status: DISCONTINUED | OUTPATIENT
Start: 2025-02-11 | End: 2025-02-11 | Stop reason: SURG

## 2025-02-11 RX ORDER — ESMOLOL HYDROCHLORIDE 10 MG/ML
INJECTION INTRAVENOUS PRN
Status: DISCONTINUED | OUTPATIENT
Start: 2025-02-11 | End: 2025-02-11 | Stop reason: SURG

## 2025-02-11 RX ORDER — ACETAMINOPHEN 325 MG/1
650 TABLET ORAL EVERY 6 HOURS
Status: DISCONTINUED | OUTPATIENT
Start: 2025-02-11 | End: 2025-02-13

## 2025-02-11 RX ORDER — SODIUM CHLORIDE, SODIUM LACTATE, POTASSIUM CHLORIDE, CALCIUM CHLORIDE 600; 310; 30; 20 MG/100ML; MG/100ML; MG/100ML; MG/100ML
INJECTION, SOLUTION INTRAVENOUS CONTINUOUS
Status: DISCONTINUED | OUTPATIENT
Start: 2025-02-11 | End: 2025-02-11 | Stop reason: HOSPADM

## 2025-02-11 RX ORDER — ACETAMINOPHEN 500 MG
500-1000 TABLET ORAL EVERY 6 HOURS PRN
Status: ON HOLD | COMMUNITY
End: 2025-02-14

## 2025-02-11 RX ORDER — CYCLOBENZAPRINE HCL 10 MG
10 TABLET ORAL 3 TIMES DAILY PRN
Status: ON HOLD | COMMUNITY
End: 2025-02-14

## 2025-02-11 RX ORDER — MORPHINE SULFATE 4 MG/ML
4 INJECTION INTRAVENOUS ONCE
Status: COMPLETED | OUTPATIENT
Start: 2025-02-11 | End: 2025-02-11

## 2025-02-11 RX ORDER — DIAZEPAM 5 MG/1
5 TABLET ORAL EVERY 4 HOURS PRN
Status: DISCONTINUED | OUTPATIENT
Start: 2025-02-11 | End: 2025-02-14 | Stop reason: HOSPADM

## 2025-02-11 RX ORDER — DEXTROSE MONOHYDRATE 25 G/50ML
25 INJECTION, SOLUTION INTRAVENOUS
Status: DISCONTINUED | OUTPATIENT
Start: 2025-02-11 | End: 2025-02-14 | Stop reason: HOSPADM

## 2025-02-11 RX ORDER — AMOXICILLIN 250 MG
2 CAPSULE ORAL EVERY EVENING
Status: DISCONTINUED | OUTPATIENT
Start: 2025-02-11 | End: 2025-02-11

## 2025-02-11 RX ORDER — DOCUSATE SODIUM 100 MG/1
100 CAPSULE, LIQUID FILLED ORAL 2 TIMES DAILY
Status: DISCONTINUED | OUTPATIENT
Start: 2025-02-11 | End: 2025-02-13

## 2025-02-11 RX ORDER — INSULIN LISPRO 100 [IU]/ML
2-9 INJECTION, SOLUTION INTRAVENOUS; SUBCUTANEOUS EVERY 6 HOURS
Status: DISCONTINUED | OUTPATIENT
Start: 2025-02-11 | End: 2025-02-14 | Stop reason: HOSPADM

## 2025-02-11 RX ORDER — DEXAMETHASONE SODIUM PHOSPHATE 4 MG/ML
4 INJECTION, SOLUTION INTRA-ARTICULAR; INTRALESIONAL; INTRAMUSCULAR; INTRAVENOUS; SOFT TISSUE EVERY 4 HOURS
Status: DISCONTINUED | OUTPATIENT
Start: 2025-02-11 | End: 2025-02-13

## 2025-02-11 RX ORDER — LISINOPRIL 5 MG/1
5 TABLET ORAL DAILY
Status: DISCONTINUED | OUTPATIENT
Start: 2025-02-11 | End: 2025-02-11

## 2025-02-11 RX ORDER — AMOXICILLIN 250 MG
1 CAPSULE ORAL
Status: DISCONTINUED | OUTPATIENT
Start: 2025-02-11 | End: 2025-02-12

## 2025-02-11 RX ORDER — LABETALOL HYDROCHLORIDE 5 MG/ML
5 INJECTION, SOLUTION INTRAVENOUS
Status: DISCONTINUED | OUTPATIENT
Start: 2025-02-11 | End: 2025-02-11 | Stop reason: HOSPADM

## 2025-02-11 RX ORDER — ROSUVASTATIN CALCIUM 5 MG/1
10 TABLET, COATED ORAL EVERY EVENING
Status: DISCONTINUED | OUTPATIENT
Start: 2025-02-11 | End: 2025-02-14 | Stop reason: HOSPADM

## 2025-02-11 RX ORDER — METOPROLOL SUCCINATE 50 MG/1
50 TABLET, EXTENDED RELEASE ORAL DAILY
Status: DISCONTINUED | OUTPATIENT
Start: 2025-02-11 | End: 2025-02-11

## 2025-02-11 RX ORDER — IBUPROFEN 800 MG/1
800 TABLET, FILM COATED ORAL EVERY 8 HOURS PRN
Status: ON HOLD | COMMUNITY
End: 2025-02-14

## 2025-02-11 RX ORDER — HALOPERIDOL 5 MG/ML
1 INJECTION INTRAMUSCULAR
Status: DISCONTINUED | OUTPATIENT
Start: 2025-02-11 | End: 2025-02-11 | Stop reason: HOSPADM

## 2025-02-11 RX ORDER — LABETALOL HYDROCHLORIDE 5 MG/ML
10 INJECTION, SOLUTION INTRAVENOUS EVERY 4 HOURS PRN
Status: DISCONTINUED | OUTPATIENT
Start: 2025-02-11 | End: 2025-02-13

## 2025-02-11 RX ORDER — POLYETHYLENE GLYCOL 3350 17 G/17G
1 POWDER, FOR SOLUTION ORAL 2 TIMES DAILY PRN
Status: DISCONTINUED | OUTPATIENT
Start: 2025-02-11 | End: 2025-02-12

## 2025-02-11 RX ORDER — OXYCODONE HCL 5 MG/5 ML
10 SOLUTION, ORAL ORAL
Status: COMPLETED | OUTPATIENT
Start: 2025-02-11 | End: 2025-02-11

## 2025-02-11 RX ORDER — LIDOCAINE HYDROCHLORIDE 20 MG/ML
INJECTION, SOLUTION EPIDURAL; INFILTRATION; INTRACAUDAL; PERINEURAL PRN
Status: DISCONTINUED | OUTPATIENT
Start: 2025-02-11 | End: 2025-02-11 | Stop reason: SURG

## 2025-02-11 RX ORDER — ROCURONIUM BROMIDE 10 MG/ML
INJECTION, SOLUTION INTRAVENOUS PRN
Status: DISCONTINUED | OUTPATIENT
Start: 2025-02-11 | End: 2025-02-11 | Stop reason: SURG

## 2025-02-11 RX ORDER — BISACODYL 10 MG
10 SUPPOSITORY, RECTAL RECTAL
Status: DISCONTINUED | OUTPATIENT
Start: 2025-02-11 | End: 2025-02-13

## 2025-02-11 RX ORDER — OXYCODONE HYDROCHLORIDE 5 MG/1
2.5 TABLET ORAL
Status: DISCONTINUED | OUTPATIENT
Start: 2025-02-11 | End: 2025-02-11

## 2025-02-11 RX ORDER — ACETAMINOPHEN 500 MG
1000 TABLET ORAL EVERY 6 HOURS PRN
Status: DISCONTINUED | OUTPATIENT
Start: 2025-02-16 | End: 2025-02-11

## 2025-02-11 RX ORDER — HYDRALAZINE HYDROCHLORIDE 20 MG/ML
5 INJECTION INTRAMUSCULAR; INTRAVENOUS
Status: DISCONTINUED | OUTPATIENT
Start: 2025-02-11 | End: 2025-02-11 | Stop reason: HOSPADM

## 2025-02-11 RX ORDER — CEFAZOLIN SODIUM 1 G/3ML
INJECTION, POWDER, FOR SOLUTION INTRAMUSCULAR; INTRAVENOUS
Status: DISCONTINUED | OUTPATIENT
Start: 2025-02-11 | End: 2025-02-11 | Stop reason: HOSPADM

## 2025-02-11 RX ORDER — TRANEXAMIC ACID 100 MG/ML
INJECTION, SOLUTION INTRAVENOUS PRN
Status: DISCONTINUED | OUTPATIENT
Start: 2025-02-11 | End: 2025-02-11 | Stop reason: SURG

## 2025-02-11 RX ORDER — ACETAMINOPHEN 325 MG/1
650 TABLET ORAL EVERY 6 HOURS PRN
Status: DISCONTINUED | OUTPATIENT
Start: 2025-02-11 | End: 2025-02-11

## 2025-02-11 RX ORDER — DEXAMETHASONE SODIUM PHOSPHATE 4 MG/ML
INJECTION, SOLUTION INTRA-ARTICULAR; INTRALESIONAL; INTRAMUSCULAR; INTRAVENOUS; SOFT TISSUE PRN
Status: DISCONTINUED | OUTPATIENT
Start: 2025-02-11 | End: 2025-02-11 | Stop reason: SURG

## 2025-02-11 RX ORDER — HYDROMORPHONE HYDROCHLORIDE 1 MG/ML
0.2 INJECTION, SOLUTION INTRAMUSCULAR; INTRAVENOUS; SUBCUTANEOUS
Status: DISCONTINUED | OUTPATIENT
Start: 2025-02-11 | End: 2025-02-11 | Stop reason: HOSPADM

## 2025-02-11 RX ORDER — SODIUM PHOSPHATE,MONO-DIBASIC 19G-7G/118
1 ENEMA (ML) RECTAL
Status: DISCONTINUED | OUTPATIENT
Start: 2025-02-11 | End: 2025-02-12

## 2025-02-11 RX ORDER — MORPHINE SULFATE 4 MG/ML
2 INJECTION INTRAVENOUS
Status: DISCONTINUED | OUTPATIENT
Start: 2025-02-11 | End: 2025-02-11

## 2025-02-11 RX ORDER — METHOCARBAMOL 500 MG/1
1000 TABLET, FILM COATED ORAL
Status: DISCONTINUED | OUTPATIENT
Start: 2025-02-11 | End: 2025-02-11 | Stop reason: HOSPADM

## 2025-02-11 RX ORDER — NOREPINEPHRINE BITARTRATE 0.03 MG/ML
0-1 INJECTION, SOLUTION INTRAVENOUS CONTINUOUS
Status: DISCONTINUED | OUTPATIENT
Start: 2025-02-11 | End: 2025-02-13

## 2025-02-11 RX ORDER — CYCLOBENZAPRINE HCL 10 MG
10 TABLET ORAL EVERY 8 HOURS PRN
Status: DISCONTINUED | OUTPATIENT
Start: 2025-02-11 | End: 2025-02-14 | Stop reason: HOSPADM

## 2025-02-11 RX ORDER — INSULIN LISPRO 100 [IU]/ML
2-9 INJECTION, SOLUTION INTRAVENOUS; SUBCUTANEOUS EVERY 6 HOURS
Status: DISCONTINUED | OUTPATIENT
Start: 2025-02-11 | End: 2025-02-11 | Stop reason: HOSPADM

## 2025-02-11 RX ORDER — HYDROMORPHONE HYDROCHLORIDE 2 MG/ML
INJECTION, SOLUTION INTRAMUSCULAR; INTRAVENOUS; SUBCUTANEOUS PRN
Status: DISCONTINUED | OUTPATIENT
Start: 2025-02-11 | End: 2025-02-11 | Stop reason: SURG

## 2025-02-11 RX ORDER — DEXTROSE MONOHYDRATE 25 G/50ML
25 INJECTION, SOLUTION INTRAVENOUS
Status: DISCONTINUED | OUTPATIENT
Start: 2025-02-11 | End: 2025-02-11 | Stop reason: HOSPADM

## 2025-02-11 RX ORDER — POLYETHYLENE GLYCOL 3350 17 G/17G
1 POWDER, FOR SOLUTION ORAL
Status: DISCONTINUED | OUTPATIENT
Start: 2025-02-11 | End: 2025-02-11

## 2025-02-11 RX ORDER — HYDROMORPHONE HYDROCHLORIDE 1 MG/ML
0.1 INJECTION, SOLUTION INTRAMUSCULAR; INTRAVENOUS; SUBCUTANEOUS
Status: DISCONTINUED | OUTPATIENT
Start: 2025-02-11 | End: 2025-02-11 | Stop reason: HOSPADM

## 2025-02-11 RX ORDER — OXYCODONE HYDROCHLORIDE 5 MG/1
5 TABLET ORAL
Status: DISCONTINUED | OUTPATIENT
Start: 2025-02-11 | End: 2025-02-11

## 2025-02-11 RX ORDER — BACITRACIN ZINC 500 [USP'U]/G
OINTMENT TOPICAL
Status: DISCONTINUED | OUTPATIENT
Start: 2025-02-11 | End: 2025-02-11 | Stop reason: HOSPADM

## 2025-02-11 RX ORDER — DIAZEPAM 10 MG/2ML
5 INJECTION, SOLUTION INTRAMUSCULAR; INTRAVENOUS
Status: DISCONTINUED | OUTPATIENT
Start: 2025-02-11 | End: 2025-02-11 | Stop reason: HOSPADM

## 2025-02-11 RX ORDER — AMOXICILLIN 250 MG
1 CAPSULE ORAL NIGHTLY
Status: DISCONTINUED | OUTPATIENT
Start: 2025-02-11 | End: 2025-02-12

## 2025-02-11 RX ADMIN — Medication 100 MCG: at 10:36

## 2025-02-11 RX ADMIN — Medication 100 MCG: at 10:15

## 2025-02-11 RX ADMIN — Medication 100 MCG: at 10:29

## 2025-02-11 RX ADMIN — LIDOCAINE 1 PATCH: 4 PATCH TOPICAL at 03:29

## 2025-02-11 RX ADMIN — MORPHINE SULFATE 4 MG: 4 INJECTION, SOLUTION INTRAMUSCULAR; INTRAVENOUS at 01:52

## 2025-02-11 RX ADMIN — PROPOFOL 50 MG: 10 INJECTION, EMULSION INTRAVENOUS at 09:47

## 2025-02-11 RX ADMIN — Medication 100 MCG: at 09:05

## 2025-02-11 RX ADMIN — LIDOCAINE HYDROCHLORIDE 2 ML: 20 INJECTION, SOLUTION EPIDURAL; INFILTRATION; INTRACAUDAL; PERINEURAL at 07:29

## 2025-02-11 RX ADMIN — INSULIN LISPRO 2 UNITS: 100 INJECTION, SOLUTION INTRAVENOUS; SUBCUTANEOUS at 17:59

## 2025-02-11 RX ADMIN — FENTANYL CITRATE 50 MCG: 50 INJECTION, SOLUTION INTRAMUSCULAR; INTRAVENOUS at 09:47

## 2025-02-11 RX ADMIN — MAGNESIUM SULFATE HEPTAHYDRATE 2 G: 2 INJECTION, SOLUTION INTRAVENOUS at 05:34

## 2025-02-11 RX ADMIN — Medication 100 MCG: at 08:07

## 2025-02-11 RX ADMIN — FENTANYL CITRATE 50 MCG: 50 INJECTION, SOLUTION INTRAMUSCULAR; INTRAVENOUS at 13:10

## 2025-02-11 RX ADMIN — Medication 200 MCG: at 09:42

## 2025-02-11 RX ADMIN — ESMOLOL HYDROCHLORIDE 30 MG: 100 INJECTION, SOLUTION INTRAVENOUS at 07:55

## 2025-02-11 RX ADMIN — GADOTERIDOL 20 ML: 279.3 INJECTION, SOLUTION INTRAVENOUS at 04:12

## 2025-02-11 RX ADMIN — Medication 100 MG: at 07:36

## 2025-02-11 RX ADMIN — PROPOFOL 100 MCG/KG/MIN: 10 INJECTION, EMULSION INTRAVENOUS at 07:37

## 2025-02-11 RX ADMIN — HYDROMORPHONE HYDROCHLORIDE 1 MG: 2 INJECTION INTRAMUSCULAR; INTRAVENOUS; SUBCUTANEOUS at 08:16

## 2025-02-11 RX ADMIN — Medication 200 MCG: at 08:15

## 2025-02-11 RX ADMIN — CEFAZOLIN 3 G: 1 INJECTION, POWDER, FOR SOLUTION INTRAMUSCULAR; INTRAVENOUS at 08:04

## 2025-02-11 RX ADMIN — HYDROMORPHONE HYDROCHLORIDE 1 MG: 2 INJECTION INTRAMUSCULAR; INTRAVENOUS; SUBCUTANEOUS at 08:29

## 2025-02-11 RX ADMIN — IOHEXOL 100 ML: 350 INJECTION, SOLUTION INTRAVENOUS at 20:36

## 2025-02-11 RX ADMIN — OXYCODONE 5 MG: 5 TABLET ORAL at 17:45

## 2025-02-11 RX ADMIN — Medication 100 MCG: at 08:50

## 2025-02-11 RX ADMIN — ESMOLOL HYDROCHLORIDE 20 MG: 100 INJECTION, SOLUTION INTRAVENOUS at 09:32

## 2025-02-11 RX ADMIN — Medication 100 MCG: at 09:17

## 2025-02-11 RX ADMIN — PROPOFOL 80 MG: 10 INJECTION, EMULSION INTRAVENOUS at 07:36

## 2025-02-11 RX ADMIN — IOHEXOL 25 ML: 240 INJECTION, SOLUTION INTRATHECAL; INTRAVASCULAR; INTRAVENOUS; ORAL at 20:36

## 2025-02-11 RX ADMIN — FENTANYL CITRATE 100 MCG: 50 INJECTION, SOLUTION INTRAMUSCULAR; INTRAVENOUS at 07:55

## 2025-02-11 RX ADMIN — DEXAMETHASONE SODIUM PHOSPHATE 4 MG: 4 INJECTION INTRA-ARTICULAR; INTRALESIONAL; INTRAMUSCULAR; INTRAVENOUS; SOFT TISSUE at 22:20

## 2025-02-11 RX ADMIN — Medication 100 MCG: at 10:00

## 2025-02-11 RX ADMIN — SENNOSIDES AND DOCUSATE SODIUM 1 TABLET: 50; 8.6 TABLET ORAL at 23:51

## 2025-02-11 RX ADMIN — DEXAMETHASONE SODIUM PHOSPHATE 4 MG: 4 INJECTION INTRA-ARTICULAR; INTRALESIONAL; INTRAMUSCULAR; INTRAVENOUS; SOFT TISSUE at 17:45

## 2025-02-11 RX ADMIN — Medication 200 MCG: at 08:18

## 2025-02-11 RX ADMIN — SUGAMMADEX 200 MG: 100 INJECTION, SOLUTION INTRAVENOUS at 09:35

## 2025-02-11 RX ADMIN — Medication 100 MCG: at 10:43

## 2025-02-11 RX ADMIN — CEFAZOLIN 2 G: 2 INJECTION, POWDER, FOR SOLUTION INTRAMUSCULAR; INTRAVENOUS at 23:57

## 2025-02-11 RX ADMIN — LIDOCAINE 1 PATCH: 4 PATCH TOPICAL at 23:51

## 2025-02-11 RX ADMIN — ONDANSETRON 4 MG: 2 INJECTION INTRAMUSCULAR; INTRAVENOUS at 10:55

## 2025-02-11 RX ADMIN — FENTANYL CITRATE 25 MCG: 50 INJECTION, SOLUTION INTRAMUSCULAR; INTRAVENOUS at 13:30

## 2025-02-11 RX ADMIN — ROCURONIUM BROMIDE 50 MG: 50 INJECTION, SOLUTION INTRAVENOUS at 08:18

## 2025-02-11 RX ADMIN — DEXAMETHASONE SODIUM PHOSPHATE 8 MG: 4 INJECTION INTRA-ARTICULAR; INTRALESIONAL; INTRAMUSCULAR; INTRAVENOUS; SOFT TISSUE at 08:16

## 2025-02-11 RX ADMIN — Medication 200 MCG: at 08:00

## 2025-02-11 RX ADMIN — OXYCODONE HYDROCHLORIDE 10 MG: 5 SOLUTION ORAL at 13:10

## 2025-02-11 RX ADMIN — Medication 100 MCG: at 09:55

## 2025-02-11 RX ADMIN — DOCUSATE SODIUM 100 MG: 100 CAPSULE, LIQUID FILLED ORAL at 23:50

## 2025-02-11 RX ADMIN — Medication 100 MCG: at 08:33

## 2025-02-11 RX ADMIN — FENTANYL CITRATE 50 MCG: 50 INJECTION, SOLUTION INTRAMUSCULAR; INTRAVENOUS at 09:48

## 2025-02-11 RX ADMIN — PROPOFOL 50 MG: 10 INJECTION, EMULSION INTRAVENOUS at 07:55

## 2025-02-11 RX ADMIN — Medication 100 MCG: at 10:23

## 2025-02-11 RX ADMIN — FENTANYL CITRATE 25 MCG: 50 INJECTION, SOLUTION INTRAMUSCULAR; INTRAVENOUS at 13:00

## 2025-02-11 RX ADMIN — ROSUVASTATIN CALCIUM 10 MG: 5 TABLET, FILM COATED ORAL at 17:45

## 2025-02-11 RX ADMIN — SODIUM CHLORIDE, POTASSIUM CHLORIDE, SODIUM LACTATE AND CALCIUM CHLORIDE: 600; 310; 30; 20 INJECTION, SOLUTION INTRAVENOUS at 07:19

## 2025-02-11 RX ADMIN — TRANEXAMIC ACID 1000 MG: 100 INJECTION, SOLUTION INTRAVENOUS at 08:05

## 2025-02-11 RX ADMIN — ACETAMINOPHEN 650 MG: 325 TABLET ORAL at 17:45

## 2025-02-11 RX ADMIN — Medication 200 MCG: at 09:26

## 2025-02-11 RX ADMIN — DEXAMETHASONE SODIUM PHOSPHATE 4 MG: 4 INJECTION INTRA-ARTICULAR; INTRALESIONAL; INTRAMUSCULAR; INTRAVENOUS; SOFT TISSUE at 01:48

## 2025-02-11 RX ADMIN — Medication 100 MCG: at 10:54

## 2025-02-11 RX ADMIN — ACETAMINOPHEN 650 MG: 325 TABLET ORAL at 23:51

## 2025-02-11 RX ADMIN — Medication 100 MCG: at 09:09

## 2025-02-11 ASSESSMENT — ENCOUNTER SYMPTOMS
SHORTNESS OF BREATH: 0
NAUSEA: 0
ABDOMINAL PAIN: 0
LOSS OF CONSCIOUSNESS: 1
MYALGIAS: 0
HEADACHES: 0
FALLS: 1
PALPITATIONS: 0
CONSTIPATION: 0
DIARRHEA: 0
SEIZURES: 0
TINGLING: 1
VOMITING: 0
CHILLS: 0
COUGH: 0
BACK PAIN: 1
SENSORY CHANGE: 1
WEIGHT LOSS: 0
DIZZINESS: 0
FOCAL WEAKNESS: 1
SPEECH CHANGE: 0
FEVER: 0

## 2025-02-11 ASSESSMENT — PAIN SCALES - GENERAL: PAIN_LEVEL: 4

## 2025-02-11 ASSESSMENT — PAIN DESCRIPTION - PAIN TYPE
TYPE: ACUTE PAIN
TYPE: ACUTE PAIN
TYPE: SURGICAL PAIN
TYPE: ACUTE PAIN

## 2025-02-11 ASSESSMENT — FIBROSIS 4 INDEX: FIB4 SCORE: 4.87

## 2025-02-11 ASSESSMENT — LIFESTYLE VARIABLES: SUBSTANCE_ABUSE: 0

## 2025-02-11 NOTE — ASSESSMENT & PLAN NOTE
Noted on CT spine to have multiple pulmonary nodules >6 mm in b/l apices.   CT chest abdomen pelvis demonstrating multiple, diffuse central and peripheral consolidated pulmonary nodules highly suspicious for metastatic disease  Pathology from surgical procedure pending, then will need follow-up with oncology

## 2025-02-11 NOTE — ASSESSMENT & PLAN NOTE
Significant history noted.  Significant pulmonary nodules noted on CT scan concerning for metastasis.  Consider oncology consult versus outpatient follow-up

## 2025-02-11 NOTE — ED TRIAGE NOTES
"Maximino Percy Green  64 y.o. male  Chief Complaint   Patient presents with    T-5000 FALL     BIB [facility] unit 32 from home a GLF. Was walking to get his dog out, next thing he remembers was being stuck between the door and coffee table. Abrasion on the on occipital area. Denies thinners.  mg/dL.       Weakness     Bilateral lower extremity weakness with decreased sensation on the left thigh. Reports muscle relaxant given to him for his back pain make him feel \"weird\".      Pt is GCS 15, speaking in full sentences, follows commands and responds appropriately to questions. Resp are even and unlabored.    Vitals:    02/10/25 2304   BP: (!) 174/86   Pulse: 84   Resp: 12   Temp: 36.7 °C (98 °F)   SpO2: 97%     "

## 2025-02-11 NOTE — CONSULTS
DATE OF SERVICE:  02/11/2025     CHIEF COMPLAINT:  Sudden onset of thoracic pain with lower extremity weakness.     HISTORY OF PRESENT ILLNESS:  A 64-year-old male with previous history of   tonsil cancer, post-radiation and chemotherapy in 2022, who remained in   remission.  He started having some upper thoracic pain last week, went to   [place] [facility], but really was found to have no positive workup.  He   was worked up from a cardiac standpoint as he has a previous history of AFib,   and this was apparently unremarkable.     Today, he was getting up to walk his dog before he went to bed, twisted to put   his coat on and then felt something in his back as well as weakness in his   legs and was unable to move.     He complains of severe back pain between the shoulder blades.     Although he has had a history of AFib, he is not on any anticoagulation.     PAST MEDICAL HISTORY:  Includes diabetes, hypertension, dyslipidemia, and   obesity.     He also has again the history of stage II tonsillar cancer.     PAST SURGICAL HISTORY:  Remarkable for cholecystectomy.     MEDICATIONS PRIOR TO ADMISSION:  Lidocaine patch, lisinopril, metoprolol,   Crestor.     ALLERGIES:  No known allergies.     PHYSICAL EXAMINATION:    GENERAL:  He is alert, he is oriented x4.  Mental status intact.  EYES:  Pupils are equal, round, reactive to light.  Sclerae nonicteric.  NECK:  Supple.  No carotid bruits appreciated.  No jugular venous distention.  CHEST:  Clear to auscultation.  No rales or rhonchi.  HEART:  Regular rate and rhythm.  No murmurs or gallop.  ABDOMEN:  Moderately obese, good bowel sounds.  No tenderness to palpation.  NEUROLOGIC:  Alert and oriented x4.  Cranial nerves are intact.  Speech is   fluent.  Motor strength is 5/5 in the upper extremities.  In the left lower   extremity, his iliopsoas is graded at trace as well as his quad.  He may have   2+ dorsiflexion and 0 plantar flexion.  On the right iliopsoas is 3/5,  quad is   4/5, dorsiflexion is 4+/5 and plantar flexion is 4/5.  Sensory; loss of   sensation in nipple line.  He has no proprioception on the left and does have   preserved proprioception on the right.     RADIOGRAPHIC REVIEW:  CT and MRI confirmed T3 compressed vertebrae.  This   looks pathologic.  Also seen multiple pulmonary nodules, looking as if he has   metastatic disease.     MRI examination of the thoracic spine shows compression of the spinal cord,   signal change within the cord itself, and deviation and compression of the   cord at the T3 level.     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  Our plan is to take   him to surgery for decompression and then stabilization from T1 down to T5.  I   have discussed the indications, possible complications of surgery, including   but not limited to infection, hemorrhage, injury to the spinal cord,   paralysis, and he is aware of possibility of pulmonary or cardiac   complications.  This includes death.  Consent will be signed and has been   filled out.        ______________________________  MD JHON CASTILLO/WESLEY    DD:  02/11/2025 06:32  DT:  02/11/2025 07:21    Job#:  [id number]

## 2025-02-11 NOTE — ANESTHESIA PROCEDURE NOTES
Arterial Line    Performed by: Ralf Moya M.D.  Authorized by: Ralf Moya M.D.    Start Time:  2/11/2025 7:28 AM  End Time:  2/11/2025 7:31 AM  Localization: surface landmarks    Patient Location:  OR  Indication: continuous blood pressure monitoring        Catheter Size:  20 G  Seldinger Technique?: Yes    Laterality:  Left  Site:  Radial artery  Line Secured:  Antimicrobial disc, tape and transparent dressing  Events: patient tolerated procedure well with no complications     1 attempt, easy placement, pre induction w/skin local

## 2025-02-11 NOTE — H&P
"[facility] Internal Medicine History & Physical Note    Date of Service  2/11/2025    [facility] Team: [facility] ICU Gold Team   Attending: Jaron Theodore M.d.  Senior Resident: Dr. Steven Oneil  Contact Number: [phone]    Primary Care Physician  Giancarlo Lomeli M.D.    Consultants  critical care and neurosurgery    Code Status  Full Code    Chief Complaint  Chief Complaint   Patient presents with    T-5000 FALL     BIB [facility] from home a GLF. Was walking to get his dog out, next thing he remembers was being stuck between the door and coffee table. Abrasion on the on occipital area. Denies thinners.  mg/dL.       Weakness     Bilateral lower extremity weakness with decreased sensation on the left thigh. Reports muscle relaxant given to him for his back pain make him feel \"weird\".        History of Presenting Illness (HPI):  Maximino Green is a 64 y.o. male who presented 2/10/2025 with  significant back pain secondary to a T3 vertebral body anterior wedge compression fracture with 5 mm of retropulsion and narrowing of the central canal.  Neurosurgery has been consulted, appreciate recommendations and further management.  He has a past medical history of paroxysmal atrial fibrillation, history of systolic heart failure, hypertension, history of tonsillar cancer.  He has a more recent past medical history of atraumatic bilateral upper back pain and shoulder pain that radiated laterally; he appreciated this for 6 days prior to going to an urgent care and then [facility] emergency room on February 6.  They discharged him from that encounter with a diagnosis of a back strain/muscle spasm.  They prescribed Tylenol, Motrin, Flexeril and lidocaine patches at that time.  He stated that the Flexeril helped him, but he did not like how it made him feel.  However earlier February 10 he was describing that he was placing on a coat in order to go walk his dog when he tripped on a rug causing a fall.  He is " unclear if he hurt his head or if he lost consciousness.  He denies any bowel or urinary incontinence, denies groin numbness.  Prior to this event he denied any n dizziness or other presyncopal symptoms.  However patient was trapped on the ground and was unable to get up stating that his legs became weak and numb.  He therefore needed to call EMS who presented to his home and also struggled to be placed him up for similar reasons.    ER course is significant for patient's significant hypertension likely secondary to pain and acute event.  CT scan of T-spine showing lytic changes of the T3 vertebral body and anterior wedge compression deformity with approximately 5.3 mm of retropulsion and narrowing of central canal.  Neurosurgery was consulted from the emergency room.  Continuing to follow.    I discussed the plan of care with patient.    Review of Systems  Review of Systems   Constitutional:  Negative for fever and weight loss.   Respiratory:  Negative for cough and shortness of breath.    Cardiovascular:  Negative for chest pain and leg swelling.   Gastrointestinal:  Negative for abdominal pain, constipation, diarrhea, nausea and vomiting.   Genitourinary:  Negative for dysuria and urgency.   Musculoskeletal:  Positive for back pain and falls. Negative for myalgias.   Neurological:  Positive for tingling, sensory change, focal weakness and loss of consciousness. Negative for dizziness, speech change, seizures and headaches.   Psychiatric/Behavioral:  Negative for substance abuse.        Past Medical History   has a past medical history of Alcoholic (HCC), Cancer (HCC) (2019), Dental disorder, Diabetes (HCC), High cholesterol (11/01/2022), Hypertension, Paroxysmal atrial fibrillation (HCC), and Tonsil cancer (HCC).    Surgical History   has a past surgical history that includes cholecystectomy (2003) and other (12/27/2018).     Family History  family history includes Alcohol abuse in his mother; Cancer in his  mother and sister; GI Disease in his sister; Heart Disease in his father; Respiratory Disease in his mother.   Family history reviewed with patient.     Social History  Social History     Socioeconomic History    Marital status:      Spouse name: Not on file    Number of children: Not on file    Years of education: Not on file    Highest education level: Not on file   Occupational History    Occupation:    Tobacco Use    Smoking status: Former     Current packs/day: 0.00     Average packs/day: 0.1 packs/day for 40.0 years (4.0 ttl pk-yrs)     Types: Cigarettes, Cigars     Start date: 1979     Quit date: 2019     Years since quittin.0    Smokeless tobacco: Never   Vaping Use    Vaping status: Never Used   Substance and Sexual Activity    Alcohol use: No     Comment: Alchohlism quit     Drug use: No    Sexual activity: Yes     Partners: Female   Other Topics Concern    Not on file   Social History Narrative    Not on file       Allergies  No Known Allergies    Medications  Prior to Admission Medications   Prescriptions Last Dose Informant Patient Reported? Taking?   acetaminophen (TYLENOL) 500 MG Tab   No No   Sig: Take 2 Tablets by mouth every 6 hours as needed for Moderate Pain for up to 4 days.   lidocaine (LIDODERM) 5 % Patch   No No   Sig: Place 1 Patch on the skin every 24 hours for 5 days.   lisinopril (PRINIVIL) 5 MG Tab   No No   Sig: Take 1 Tablet by mouth every day. For high blood pressure   metoprolol SR (TOPROL XL) 50 MG TABLET SR 24 HR   No No   Sig: Take 1 Tablet by mouth every day.   rosuvastatin (CRESTOR) 10 MG Tab   No No   Sig: Take 1 Tablet by mouth every evening.      Facility-Administered Medications: None       Physical Exam  Temp:  [36.7 °C (98 °F)] 36.7 °C (98 °F)  Pulse:  [76-84] 76  Resp:  [12] 12  BP: (150-174)/(72-86) 150/72  SpO2:  [92 %-97 %] 92 %  Blood Pressure: (!) 174/86   Temperature: 36.7 °C (98 °F)   Pulse: 84   Respiration: 12   Pulse  Oximetry: 97 %       Physical Exam  Vitals and nursing note reviewed.   Constitutional:       General: He is not in acute distress.     Appearance: Normal appearance. He is not ill-appearing.   HENT:      Head: Normocephalic and atraumatic.      Right Ear: External ear normal.      Left Ear: External ear normal.   Cardiovascular:      Rate and Rhythm: Normal rate and regular rhythm.      Pulses: Normal pulses.      Heart sounds: Normal heart sounds. No murmur heard.  Pulmonary:      Effort: Pulmonary effort is normal. No respiratory distress.      Breath sounds: No stridor. No wheezing, rhonchi or rales.   Chest:      Chest wall: No tenderness.   Abdominal:      General: There is no distension.      Tenderness: There is no abdominal tenderness.   Musculoskeletal:         General: No deformity or signs of injury.      Comments: Patient laying on back, straight position.  Deferring point tenderness exam due to pain and contraindications.   Skin:     General: Skin is warm and dry.      Coloration: Skin is not jaundiced.      Findings: No erythema.   Neurological:      General: No focal deficit present.      Mental Status: He is alert and oriented to person, place, and time. Mental status is at baseline.      Cranial Nerves: Cranial nerves 2-12 are intact. No cranial nerve deficit, dysarthria or facial asymmetry.      Sensory: Sensory deficit present.      Motor: Weakness present. No tremor or seizure activity.      Coordination: Coordination normal.      Comments: Loss of sensation bilateral lower extremities especially left greater than right.  Left lower extremity shows decreased sensation inferior to the thigh, the right shows decreased sensation at about the the knee.  In the right lower extremity he is able to show good strength 4 out of 5 in foot dorsi and plantar flexions.  0 out of 5 in the left lower extremity and dorsi and plantarflexion's         Laboratory:  Recent Labs     02/11/25  0117   WBC 5.8   RBC  "4.36*   HEMOGLOBIN 12.2*   HEMATOCRIT 36.4*   MCV 83.5   MCH 28.0   MCHC 33.5   RDW 39.3   PLATELETCT 124*   MPV 14.1*     Recent Labs     02/11/25  0117   SODIUM 142   POTASSIUM 3.6   CHLORIDE 107   CO2 23   GLUCOSE 133*   BUN 17   CREATININE 0.77   CALCIUM 9.8     Recent Labs     02/11/25 0117   ALTSGPT 30   ASTSGOT 53*   ALKPHOSPHAT 95   TBILIRUBIN 0.9   GLUCOSE 133*     Recent Labs     02/11/25 0117   APTT 28.2   INR 1.14*     No results for input(s): \"NTPROBNP\" in the last 72 hours.      No results for input(s): \"TROPONINT\" in the last 72 hours.    Imaging:  CT-LSPINE W/O PLUS RECONS   Final Result         1.  No acute traumatic bony injury of the lumbar spine.   2.  Moderate bilateral neural foraminal stenosis at L5/S1   3.  Right nephrolithiasis without visualized obstructive changes   4.  Retroperitoneal adenopathy, consider causes of adenopathy with additional workup as clinically appropriate.   5.  Diverticulosis   6.  Atherosclerotic changes are seen      CT-TSPINE W/O PLUS RECONS   Final Result         1.  Lytic changes of the T3 vertebral body with anterior wedge compression deformity at 5.3 mm retropulsion, appearance favoring compression fracture with retropulsion and narrowing of the central canal.   2.  Pulmonary nodules, given history concerning for metastatic disease. Additional workup as clinically appropriate.   3.  Right nephrolithiasis without visualized obstructive changes   4.  Atherosclerosis and atherosclerotic coronary artery disease      These findings were discussed with the patient's clinician, Yamil Howell, on 2/11/2025 1:17 AM.      CT-CSPINE WITHOUT PLUS RECONS   Final Result         1.  Multilevel degenerative changes of the cervical spine limit diagnostic sensitivity of this examination, otherwise no acute traumatic bony injury of the cervical spine is apparent.   2.  See dedicated CT thoracic spine for thoracic spine findings.   3.  Pulmonary nodules, appearance concerning " for metastatic disease given history of malignancy, see nodule follow-up recommendations below.      Fleischner Society pulmonary nodule recommendations:   Low Risk: CT at 3-6 months, then consider CT at 18-24 months      High Risk: CT at 3-6 months, then at 18-24 months      Comments: Use most suspicious nodule as guide to management. Follow-up intervals may vary according to size and risk.      Low Risk - Minimal or absent history of smoking and of other known risk factors.      High Risk - History of smoking or of other known risk factors.      Note: These recommendations do not apply to lung cancer screening, patients with immunosuppression, or patients with known primary cancer.      Fleischner Society 2017 Guidelines for Management of Incidentally Detected Pulmonary Nodules in Adults      CT-HEAD W/O   Final Result         1.  No acute intracranial abnormality.   2.  Atherosclerosis.               MR-THORACIC SPINE-WITH & W/O    (Results Pending)       no X-Ray or EKG requiring interpretation    Assessment/Plan:  Problem Representation:   Seth Green is a 64-year-old gentleman presenting with significant back pain secondary to a T3 vertebral body anterior wedge compression fracture with 5 mm of retropulsion and narrowing of the central canal.  Neurosurgery has been consulted, appreciate recommendations and further management.   I anticipate this patient will require at least two midnights for appropriate medical management, necessitating inpatient admission because neurosurgical management, frequent neurological examinations    Patient will need a ICU (Adult and Pediatrics) bed on MEDICAL service .  The need is secondary to frequent neurological examinations.    * Spinal cord compression (HCC)- (present on admission)  Assessment & Plan  T3 vertebral body anterior wedge compression fracture with 5 mm of retropulsion and narrowing of the central canal.    Neurosurgery consulted; appreciate  recommendations.  N.p.o., holding DVT prophylaxis, blood thinners.  Due to future procedure  MRI ordered, pending at this time.  Dexamethasone  Levophed with map goal of 85 per neurosurgical recommendations.  Holding antihypertensives at this time  Pain management protocol.  Frequent neurological assessments    Tonsil cancer (HCC)- (present on admission)  Assessment & Plan  Significant history noted.  Significant pulmonary nodules noted on CT scan concerning for metastasis.  Consider oncology consult versus outpatient follow-up    Longstanding persistent atrial fibrillation (HCC)- (present on admission)  Assessment & Plan  Holding home medications for rate control.  Does not appear to be on any blood thinners.        VTE prophylaxis: SCDs/TEDs and pharmacologic prophylaxis contraindicated due to future procedure

## 2025-02-11 NOTE — ANESTHESIA PROCEDURE NOTES
Airway    Date/Time: 2/11/2025 7:38 AM    Performed by: Ralf Moya M.D.  Authorized by: Ralf Moya M.D.    Location:  OR  Urgency:  Elective  Difficult Airway: No    Indications for Airway Management:  Anesthesia      Spontaneous Ventilation: absent    Sedation Level:  Deep  Preoxygenated: Yes    Patient Position:  Sniffing  Mask Difficulty Assessment:  0 - not attempted  Final Airway Type:  Endotracheal airway  Final Endotracheal Airway:  ETT  Cuffed: Yes    Technique Used for Successful ETT Placement:  Direct laryngoscopy  Devices/Methods Used in Placement:  Cricoid pressure    Insertion Site:  Oral  Blade Type:  Cecily  Laryngoscope Blade/Videolaryngoscope Blade Size:  4  ETT Size (mm):  8.0  Measured from:  Teeth  ETT to Teeth (cm):  24  Placement Verified by: auscultation and capnometry    Cormack-Lehane Classification:  Grade I - full view of glottis  Number of Attempts at Approach:  1

## 2025-02-11 NOTE — ASSESSMENT & PLAN NOTE
Hx of HFrEF - last TTE in 9/2022 demonstarting moderately reduced left ventricular systolic function.  LVEF 30%, reduced RVSF, eRVSP 30 mmHg, mild MR  On metoprolol, lisinopril at home. Holding here to support high MAP goal  -TTE ordered  - be judicious with mIVF

## 2025-02-11 NOTE — PROGRESS NOTES
Custom  brace order has been faxed to [facility]. Any questions or concerns please call [facility] at

## 2025-02-11 NOTE — ASSESSMENT & PLAN NOTE
T3 vertebral body anterior wedge compression fracture with 5 mm of retropulsion and narrowing of the central canal.    Neurosurgery consulted; appreciate recommendations.  N.p.o., holding DVT prophylaxis, blood thinners.  Due to future procedure  MRI ordered, pending at this time.  Dexamethasone  Levophed with map goal of 85 per neurosurgical recommendations.  Holding antihypertensives at this time  Pain management protocol.  Frequent neurological assessments

## 2025-02-11 NOTE — ANESTHESIA TIME REPORT
Anesthesia Start and Stop Event Times       Date Time Event    2/11/2025 0711 Ready for Procedure     0719 Anesthesia Start     1200 Anesthesia Stop          Responsible Staff  02/11/25      Name Role Begin End    Ralf Moya M.D. Anesth 0719 1200          Overtime Reason:  overtime    Comments:                                                 Called in early to take over at 0700 but case had not started yet

## 2025-02-11 NOTE — ED NOTES
Med Rec completed per patient     Allergies reviewed: yes     Antibiotics in the past 30 days: no    Anticoagulant in past 14 days: no    Pharmacy patient utilizes: [facility]  [phone]

## 2025-02-11 NOTE — ASSESSMENT & PLAN NOTE
S/p cardioversion. Not currently on AC. On metoprolol for rate control (however currently holding giving MAP parameters)  In NSR here. Continue on tele monitoring especially in light of presumed syncopal event  F/u repeat TTE

## 2025-02-11 NOTE — PROGRESS NOTES
Neurosurgery    64 male presenting with acute paraparesis and upper thoracic pain. Has a T4 pathologic fx with compression of the spinal cord and weakness in LE left >>right. Sensory level just below nipple line.     MRI confirms lesion with compressive effects on the spinal cord and signal change.     On exam has on the right 3/5 strength in is, and 4/5 quad and df, pf  On the left has trace movement .     Plan for T2 -T4 laminectomy with T1- 5 fusion with O arm.   I have discussed the indications and possible complications with the patient, including but not limited to infection, hemorrhage, failure of fusion, paralysis.    Informed consent obtained.     Full consult to follow.

## 2025-02-11 NOTE — DISCHARGE PLANNING
[facility] Transitional Care Coordination    Referral from: Dr. Roosevelt Hadley    Insurance Provider on Facesheet: [facility]    Potential Rehab Diagnosis: TSCI    Chart review indicates patient may have on going medical management and may have therapy needs to possibly meet inpatient rehab facility criteria with the goal of returning to community.    D/C support will need to be verified: Friend    Physiatry consultation pended per protocol.  Operative day.  Following for TX evals.      Thank you for the referral.

## 2025-02-11 NOTE — PROGRESS NOTES
"Critical care medicine Progress Note    Date of admission  2/10/2025    Chief Complaint  64 y.o. 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  Per patient he was putting on a jacket to take his dog out for a walk and the next thing he remembers was being on the floor.  EMS was called who brought the patient to the emergency room.  No history of head injury reported but noted an abrasion on the occipital area.  No history of chest pain or palpitations prior to the fall.  Now complains of numbness and weakness in his lower extremities.  This started after the fall.  Complains of back pain between the shoulder blades.  Patient has a history of atrial fibrillation but not on any anticoagulation.\" per Dr. Theodore.    Of note, patient presented to ED 2/6 for upper back pain, however without red flag sxs at that time and was treated conservatively.    His other medical hx includes HTN, Afib (not on AC), type 2 DM, chronic normocytic anemia, chronic HFrEF, hx of chemotherapy induced pancytopenia.    In ED, found to have compression fx of T3 vertebra. Thus, given decadron, and NSGY consulted. Taken this am to OR for T1-T5 decompression and stabilization.     Upon arrival from OR - Patient hemodynamically stable, without pain. Slightly hypertensive at baseline, not currently requiring NE gtt to maintain MAP goal >85. LR currently running at 50 c/hr. TCO brace being placed    Hospital Course  2/11 - admitted to ICU, taken to OR for decompression    Interval Problem Update  Reviewed last 24 hour events:  Neuro: acute lower extremity weakness and paraparesis d/t compression fx, still residual but improved  HR: hx of Afib, 70-80s NSR  Tmax: afebrile  GI: NPO--> will advance cardiac diet  UOP: 600 cc  Lines: PIVs, saunders  Resp: RA   VTE: SCDs  PPI/H2: not indicated  Antibx: received ancef x 1 in " OR      Review of Systems  Review of Systems   Constitutional:  Negative for chills and fever.   Respiratory:  Negative for cough and shortness of breath.    Cardiovascular:  Negative for chest pain, palpitations and leg swelling.   Gastrointestinal:  Negative for nausea and vomiting.   Musculoskeletal:  Positive for back pain.   Neurological:  Positive for sensory change.       Vital Signs for last 24 hours   Temp:  [36.2 °C (97.1 °F)-36.7 °C (98 °F)] 36.2 °C (97.1 °F)  Pulse:  [69-92] 85  Resp:  [10-35] 17  BP: (148-177)/(68-86) 155/72  SpO2:  [92 %-99 %] 96 %        Physical Exam   Physical Exam  Constitutional:       General: He is not in acute distress.     Appearance: He is obese.   Eyes:      Extraocular Movements: Extraocular movements intact.      Pupils: Pupils are equal, round, and reactive to light.   Cardiovascular:      Rate and Rhythm: Normal rate and regular rhythm.   Pulmonary:      Effort: Pulmonary effort is normal.      Breath sounds: Normal breath sounds.   Abdominal:      General: There is no distension.      Palpations: Abdomen is soft.      Tenderness: There is no abdominal tenderness.   Musculoskeletal:      Right lower leg: No edema.      Left lower leg: No edema.      Comments: Hemovac in place   Skin:     General: Skin is warm and dry.   Neurological:      Mental Status: He is alert.      Sensory: Sensory deficit present.      Motor: Weakness present.      Comments: Notable sensory deficit L>R, groin and below.  Profound weakness in LLE, 3/5 strength in RLE flexion and extension        - after OR    Medications  Current Facility-Administered Medications   Medication Dose Route Frequency Provider Last Rate Last Admin    dexamethasone (Decadron) injection 4 mg  4 mg Intravenous Q4HRS Yamil Howell M.D.   4 mg at 02/11/25 0148    senna-docusate (Pericolace Or Senokot S) 8.6-50 MG per tablet 2 Tablet  2 Tablet Oral Q EVENING Steven Oneil D.O.        And    polyethylene glycol/lytes  (Miralax) Packet 1 Packet  1 Packet Oral QDAY PRN NORA AdrianO.        [Held by provider] labetalol (Normodyne/Trandate) injection 10 mg  10 mg Intravenous Q4HRS PRN NORA AdrianO.        rosuvastatin (Crestor) tablet 10 mg  10 mg Oral Q EVENING JAY JAY Adrian.O.        norepinephrine (Levophed) 8 mg in 250 mL NS infusion (premix)  0-1 mcg/kg/min (Ideal) Intravenous Continuous Eliza Matta   Held at 02/11/25 0345    acetaminophen (Tylenol) tablet 650 mg  650 mg Oral Q6HRS JAY JAY Adrian.O.        oxyCODONE immediate-release (Roxicodone) tablet 2.5 mg  2.5 mg Oral Q3HRS PRN NORA AdrianO.        Or    oxyCODONE immediate-release (Roxicodone) tablet 5 mg  5 mg Oral Q3HRS PRN NORA AdrianO.        Or    morphine 4 MG/ML injection 2 mg  2 mg Intravenous Q3HRS PRN JAY JAY Adrian.O.        insulin lispro (HumaLOG,AdmeLOG) subcutaneous injection  2-9 Units Subcutaneous Q6HRS Eliza Matta        And    dextrose 50% (D50W) injection 25 g  25 g Intravenous Q15 MIN PRN Eliza Matta        lidocaine (Asperflex) 4 % patch 1 Patch  1 Patch Transdermal Q24HR Yamil Howell M.D.   1 Patch at 02/11/25 0329       Fluids    Intake/Output Summary (Last 24 hours) at 2/11/2025 1604  Last data filed at 2/11/2025 1330  Gross per 24 hour   Intake 1600 ml   Output 850 ml   Net 750 ml       Laboratory          Recent Labs     02/11/25  0117   SODIUM 142   POTASSIUM 3.6   CHLORIDE 107   CO2 23   BUN 17   CREATININE 0.77   MAGNESIUM 1.7   CALCIUM 9.8     Recent Labs     02/11/25  0117   ALTSGPT 30   ASTSGOT 53*   ALKPHOSPHAT 95   TBILIRUBIN 0.9   GLUCOSE 133*     Recent Labs     02/11/25  0117   WBC 5.8   NEUTSPOLYS 74.70*   LYMPHOCYTES 11.10*   MONOCYTES 9.90   EOSINOPHILS 3.10   BASOPHILS 0.90   ASTSGOT 53*   ALTSGPT 30   ALKPHOSPHAT 95   TBILIRUBIN 0.9     Recent Labs     02/11/25  0117   RBC 4.36*   HEMOGLOBIN 12.2*   HEMATOCRIT 36.4*   PLATELETCT 124*   PROTHROMBTM 14.6   APTT 28.2   INR  1.14*       Imaging  CTH 2/11 - neg for acute process  CT -CTL spine 2/11 - 1.  Lytic changes of the T3 vertebral body with anterior wedge compression deformity at 5.3 mm retropulsion, appearance favoring compression fracture with retropulsion and narrowing of the central canal.  2.  Pulmonary nodules, given history concerning for metastatic disease. Additional workup as clinically appropriate.  3.  Right nephrolithiasis without visualized obstructive changes  4.  Atherosclerosis and atherosclerotic coronary artery disease    MR thoracic spine w/ and w/o 2/11 -   IMPRESSION:  1.  T3 and T4 metastasis with pathological collapse of T4 vertebral body.  2.  Epidural tumor spread at the levels of T2, T3 and T4.  3.  Severe canal compromise at the level of T3.  4.  Spinal cord edema at the levels of T3, T4 and T5.  5.  Multiple enhancing pulmonary nodules consistent with metastasis.    Assessment/Plan  * Spinal cord compression (HCC)- (present on admission)  Assessment & Plan  MRI demonstrating T3 and T4 metastasis with pathological collapse of T4 vertebral body.  Epidural tumor spread at the levels of T2, T3 and T4. Severe canal compromise at the level of T3. Spinal cord edema at the levels of T3, T4 and T5.  NGSY to take for T2-T4, C7-T5 laminectomy and fusion with partial resection of presumed metastatic tumor.   Receiving Dexamethasone q4h  MAP goal >85, receiving NE gtt for spinal cord perfusion    Follow-up neurosurgical recommendations postop. Continue with MAP>85, no DVT pharmacological ppx. Continue TCO brace.  F/u CT C/A/P with contrast        Pulmonary nodules/lesions, multiple  Assessment & Plan  Noted on CT spine to have multiple pulmonary nodules >6 mm in b/l apices. Will need dedicated CT C/A/P during hospitalization and PET-CT to see extent of underlying presumed metastatic dx.    Type 2 diabetes mellitus with hyperglycemia, with long-term current use of insulin (HCC)- (present on admission)  Assessment &  Plan  hgbA1c 5.7,   Continue on ISS  Hypoglycemic protocol    Hypothyroidism- (present on admission)  Assessment & Plan  Subclinical. Currently not on synthroid  Last TSH 11 with nl free T4 in 2022     Thrombocytopenia (HCC)- (present on admission)  Assessment & Plan  Stable and chronic.     Acute systolic heart failure (HCC)- (present on admission)  Assessment & Plan  Hx of HFrEF - last TTE in 9/2022 demonstarting moderately reduced left ventricular systolic function.  LVEF 30%, reduced RVSF, eRVSP 30 mmHg, mild MR  On metoprolol, lisinopril at home. Holding here to support high MAP goal  -TTE ordered  - be judicious with mIVF    Longstanding persistent atrial fibrillation (HCC)- (present on admission)  Assessment & Plan  S/p cardioversion. Not currently on AC. On metoprolol for rate control (however currently holding giving MAP parameters)  In NSR here. 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    Discussed patient condition and risk of morbidity and/or mortality with other critical care provider, neurosurgery, RN.  The patient remains critically ill.  Critical care time = 60 minutes in directly providing and coordinating critical care and extensive data review.  No time overlap and excludes procedures.

## 2025-02-11 NOTE — ED PROVIDER NOTES
"  ER Provider Note    Scribed for Branden Howell M.D. by Parth Hastings. 2/10/2025   11:30 PM    Primary Care Provider: Giancarlo Lomeli M.D.    CHIEF COMPLAINT  Chief Complaint   Patient presents with    T-5000 FALL     BIB [facility] unit 32 from home a GLF. Was walking to get his dog out, next thing he remembers was being stuck between the door and coffee table. Abrasion on the on occipital area. Denies thinners.  mg/dL.       Weakness     Bilateral lower extremity weakness with decreased sensation on the left thigh. Reports muscle relaxant given to him for his back pain make him feel \"weird\".      EXTERNAL RECORDS REVIEWED  The patient's records show that the patient was seen at [facility] ED on 2/6/25 for evaluation of upper back pain. He received a referral for physical therapy and was started on Flexeril.     HPI/ROS  LIMITATION TO HISTORY   Select: : None  OUTSIDE HISTORIAN(S):  None    Maximino Green is a 64 y.o. male who presents to the ED via EMS for evaluation of injuries secondary to a fall onset earlier this evening. The patient explains that he was putting on a jacket tonight when he fell. He next remembers being on the floor unable to move his legs, prompting EMS transport to the ED. Patient believes he may have on tripped on his rug, causing the fall. The patient is unsure if he hit his head during the fall, but an occipital abrasion is noted. Denies any head pain, bowel/urinary incontinence, or pelvic numbness. Patient also denies any chest pain or arrhythmia prior to his fall. He is also complaining of bilateral numbness and weakness from his abdomen down to his extremities which began after the fall. He further describes that he feels like he is wearing a suit with air in it. States associated hamstring tightness, lower neck pain, back pain, and right shoulder blade pain. Furthermore the patient notes that he has a history of this shoulder pain and he explains that the ambulance " "ride may have exacerbated this pain. He was recently seen at the [facility] ED for this shoulder pain and he was given muscle relaxants for treatment.  He notes his pain had nearly resolved after the initial evaluation for back pain.  he decided to skip his dosage this morning. Denies using any blood thinners. He notes a history of a tonsil malignancy which was treated with radiation and chemotherapy. Also reports a history of A-fib and the patient adds that he takes daily Rosuvastatin and Metoprolol. The patient is unsure when he last received his tetanus vaccine.     PAST MEDICAL HISTORY  Past Medical History:   Diagnosis Date    Alcoholic (HCC)     stopped drinking in 2009    Cancer (HCC) 2019    throat    Dental disorder     uppers/ waiting for lower dentures t ocome in    Diabetes (HCC)     diet controlled; pt states that he was told he was \"pre-diabetic\" and has never been on any medication    High cholesterol 11/01/2022    on medication    Hypertension     stopped taking blood pressure medication on his own    Paroxysmal atrial fibrillation (HCC)     Tonsil cancer ([facility])     right tonsil - SCC, chemo and radiation     SURGICAL HISTORY  Past Surgical History:   Procedure Laterality Date    OTHER  12/27/2018    rt tonsil biopsy     CHOLECYSTECTOMY  2003     FAMILY HISTORY  Family History   Problem Relation Age of Onset    Cancer Mother         colon    Respiratory Disease Mother         emphysema- smoker    Alcohol abuse Mother     Heart Disease Father         Detials not known    Cancer Sister         brain tumor    GI Disease Sister      SOCIAL HISTORY   reports that he quit smoking about 6 years ago. His smoking use included cigarettes and cigars. He started smoking about 46 years ago. He has a 4 pack-year smoking history. He has never used smokeless tobacco. He reports that he does not drink alcohol and does not use drugs.    CURRENT MEDICATIONS  Previous Medications    LIDOCAINE (LIDODERM) 5 % PATCH    " Place 1 Patch on the skin every 24 hours for 5 days.    LISINOPRIL (PRINIVIL) 5 MG TAB    Take 1 Tablet by mouth every day. For high blood pressure    METOPROLOL SR (TOPROL XL) 50 MG TABLET SR 24 HR    Take 1 Tablet by mouth every day.    ROSUVASTATIN (CRESTOR) 10 MG TAB    Take 1 Tablet by mouth every evening.     ALLERGIES  No Known Allergies     PHYSICAL EXAM  BP (!) 174/86   Pulse 84   Temp 36.7 °C (98 °F)   Resp 12   Ht 1.829 m (6')   Wt (!) 127 kg (279 lb)   SpO2 97%   BMI 37.84 kg/m²      General: Male laying in bed in no acute distress  Head: Normocephalic atraumatic  Eyes: Extraocular motion intact  Face: No cephalohematoma.  Dentition intact.  No malalignment.  Neck: Supple, no rigidity.  No midline tenderness.    Cardiovascular: Regular rate and rhythm no murmurs rubs or gallops  Respiratory: Clear to auscultation bilaterally, equal chest rise and fall, no increased work of breathing  Abdomen: Soft nontender no guarding  Spine: No lumbar spine tenderness, tenderness to the mid thoracic region without obvious step off, right paraspinal tenderness.   Musculoskeletal: Warm and well perfused, no peripheral edema.  Neuro: Alert, cranial nerves II through XII intact via passive observation.  5/5  strength bilaterally, wrist flexion/extension strength, elbow flexion/extension strength.  Left foot drop and decreased sensation from ~T5 down, able to flex and extend ankle on right side 4 out of 5 strength and toes on left side 4 out of 5.  2 out of 5 strength in the hips flexion/extension.  Integumentary: No wounds or rashes  Diff: Fx of spine, cranial hemorrhage,     DIAGNOSTIC STUDIES    Radiology:     Radiologist interpretation:   CT-LSPINE W/O PLUS RECONS   Final Result         1.  No acute traumatic bony injury of the lumbar spine.   2.  Moderate bilateral neural foraminal stenosis at L5/S1   3.  Right nephrolithiasis without visualized obstructive changes   4.  Retroperitoneal adenopathy, consider  causes of adenopathy with additional workup as clinically appropriate.   5.  Diverticulosis   6.  Atherosclerotic changes are seen      CT-TSPINE W/O PLUS RECONS   Final Result         1.  Lytic changes of the T3 vertebral body with anterior wedge compression deformity at 5.3 mm retropulsion, appearance favoring compression fracture with retropulsion and narrowing of the central canal.   2.  Pulmonary nodules, given history concerning for metastatic disease. Additional workup as clinically appropriate.   3.  Right nephrolithiasis without visualized obstructive changes   4.  Atherosclerosis and atherosclerotic coronary artery disease      These findings were discussed with the patient's clinician, Yamil Howell, on 2/11/2025 1:17 AM.      CT-CSPINE WITHOUT PLUS RECONS   Final Result         1.  Multilevel degenerative changes of the cervical spine limit diagnostic sensitivity of this examination, otherwise no acute traumatic bony injury of the cervical spine is apparent.   2.  See dedicated CT thoracic spine for thoracic spine findings.   3.  Pulmonary nodules, appearance concerning for metastatic disease given history of malignancy, see nodule follow-up recommendations below.      Fleischner Society pulmonary nodule recommendations:   Low Risk: CT at 3-6 months, then consider CT at 18-24 months      High Risk: CT at 3-6 months, then at 18-24 months      Comments: Use most suspicious nodule as guide to management. Follow-up intervals may vary according to size and risk.      Low Risk - Minimal or absent history of smoking and of other known risk factors.      High Risk - History of smoking or of other known risk factors.      Note: These recommendations do not apply to lung cancer screening, patients with immunosuppression, or patients with known primary cancer.      Fleischner Society 2017 Guidelines for Management of Incidentally Detected Pulmonary Nodules in Adults      CT-HEAD W/O   Final Result         1.  No  acute intracranial abnormality.   2.  Atherosclerosis.               MR-THORACIC SPINE-WITH & W/O    (Results Pending)     INITIAL ASSESSMENT COURSE AND PLAN  Care Narrative     11:30 PM - Patient was evaluated at bedside. They present for injuries secondary to a fall. Pertinent PE findings include: tenderness to the mid thoracic region without obvious step off, right paraspinal tenderness, left foot drop, and decreased sensation from T8 down. Differential diagnoses include but not limited to: Spinal fracture versus intracranial hemorrhage      Likely pathologic fracture.  Discussed with Estevan Aranda critical findings.    1:22 AM - Paged neurosurgery.     1:26 AM - Spoke with Dr. Berkowitz (Neurosurgery) about the patient's condition. He requests MRI thoracic spine with and without contrast and admission at the MICU. He also wants the patient to be medicated with dexamethasone every 4 hours. Paged intensivist.  I repeated neuroexam, patient does report some sensation of increased strength in the right lower extremity.  He is informed of his diagnosis and likely need for surgery.  He is aware he needs to remain NPO.    Screening labs are notable for relative thrombocytopenia however they are greater than 100.  Slightly elevated INR.  Patient reports to me that he is not on anticoagulation/Eliquis.  Patient maintains adequate blood pressure for spinal perfusion.    1:47 AM - I discussed the patient's case and the above findings with Dr. Theodore (intensivist) who agrees to evaluate the patient for hospitalization.    DISPOSITION AND DISCUSSIONS    I have discussed management of the patient with the following physicians and RENAY's:  Dr. Berkowitz (Neurosurgery) and Dr. Theodore (Hospitalist)    Discussion of management with other [facility] or appropriate source(s): None       Barriers to care at this time, including but not limited to:  None .     Decision tools and prescription drugs considered including, but not limited to:  Pain Medications Multimodal pain control .    DISPOSITION:  Patient will be hospitalized by Dr. Agustin (Hospitalist) in guarded condition.    FINAL DIAGNOSIS  1. Compression fracture of T3 vertebra, initial encounter (HCC)    2. Spinal cord compression (HCC)    3. Acute weakness    4. Ground-level fall    CRITICAL CARE  The very real possibilty of a deterioration of this patient's condition required the highest level of my preparedness for sudden, emergent intervention.  I provided critical care services, which included medication orders, frequent reevaluations of the patient's condition and response to treatment, ordering and reviewing test results, and discussing the case with various consultants.  The critical care time associated with the care of the patient was 45 minutes. Review chart for interventions. This time is exclusive of any other billable procedures.      Parth WILCOX (Scribe), am scribing for, and in the presence of, Yamil Howell M.D..    Electronically signed by: Parth Hastings (Scribe), 2/10/2025    IYamil M.D. personally performed the services described in this documentation, as scribed by Parth Hastings in my presence, and it is both accurate and complete.      The note accurately reflects work and decisions made by me.  Yamil Howell M.D.  2/11/2025  5:14 AM

## 2025-02-11 NOTE — OR NURSING
"1156: Pt arrived from OR, handoff received from anesthesiologist and RN. Patient asleep, breathing even and unlabored. Mid back dressing c/d/I. Hemoovac to compression. Rivero catheter in place, draining to gravity.      1300: Patient awake, oriented x4, sommer to move RLE extremity, sensation decreased. Was able to slightly wiggle left toes, sensation decreased, able to feel touch at shin level. Patient states \"I feel a difference overall, sensation is better since surgery\".     1310: Mediated for pain.     1325: Patient's sister updated on status. No answer when wife called.     1330: Continuing to medicate for pain.     1345: Report give to ticu RN, Ti.     1400: Dressing remains c/d/I. Patient resting, denies needs.     1418: Patient transported to T914 in stable condition, on monitor. No belongings in pacu.   "

## 2025-02-11 NOTE — ANESTHESIA PREPROCEDURE EVALUATION
" Case: [id number] Date/Time: 02/11/25 0630    Procedures:       LAMINECTOMY, SPINE, THORACIC - T2-T4 Decompressive (Back)      FUSION, SPINE, THORACIC, POSTERIOR APPROACH, WITH O-ARM IMAGING GUIDANCE (Back)    Anesthesia type: General    Pre-op diagnosis: T4 pathologic fx    Location: [facility] OR 04 / SURGERY [facility]    Surgeons: Gorge Berkowitz M.D.          64M poor historian tells me no heart history other than some afib \"years ago\" but chart review reveals EF 30% in 2022 around the time he was having afib. He describes mets normally >4  Relevant Problems   CARDIAC   (positive) Chronic systolic congestive heart failure (HCC)   (positive) Essential hypertension   (positive) Longstanding persistent atrial fibrillation (HCC)      ENDO   (positive) Hypothyroidism   (positive) Type 2 diabetes mellitus with hyperglycemia, with long-term current use of insulin (HCC)      Other   (positive) Tonsil cancer (HCC)     Lab Results   Component Value Date/Time    WBC 5.8 02/11/2025 01:17 AM    RBC 4.36 (L) 02/11/2025 01:17 AM    HEMOGLOBIN 12.2 (L) 02/11/2025 01:17 AM    HEMATOCRIT 36.4 (L) 02/11/2025 01:17 AM    MCV 83.5 02/11/2025 01:17 AM    MCH 28.0 02/11/2025 01:17 AM    MCHC 33.5 02/11/2025 01:17 AM    RDW 39.3 02/11/2025 01:17 AM    PLATELETCT 124 (L) 02/11/2025 01:17 AM    MPV 14.1 (H) 02/11/2025 01:17 AM    NEUTSPOLYS 74.70 (H) 02/11/2025 01:17 AM    LYMPHOCYTES 11.10 (L) 02/11/2025 01:17 AM    MONOCYTES 9.90 02/11/2025 01:17 AM    EOSINOPHILS 3.10 02/11/2025 01:17 AM    BASOPHILS 0.90 02/11/2025 01:17 AM    ANISOCYTOSIS 1+ 06/15/2019 07:36 AM        Lab Results   Component Value Date/Time    SODIUM 142 02/11/2025 01:17 AM    POTASSIUM 3.6 02/11/2025 01:17 AM    CHLORIDE 107 02/11/2025 01:17 AM    CO2 23 02/11/2025 01:17 AM    GLUCOSE 133 (H) 02/11/2025 01:17 AM    BUN 17 02/11/2025 01:17 AM    CREATININE 0.77 02/11/2025 01:17 AM    eGFR 100    A1c 5.7    EKG  Results for orders placed or performed during the hospital " encounter of 02/10/25   EKG (IP)   Result Value Ref Range    Report       [facility]    Test Date:  2025  Pt Name:    VALENTIN YADAV                 Department: [facility]  MRN:        [id number]                      Room:        OR [place]  Gender:     Male                         Technician: TAMARA  :        1960                   Requested By:SHERMAN ORTIZ  Order #:    [id number]                    Reading MD: Fredo Ashby MD    Measurements  Intervals                                Axis  Rate:       81                           P:          56  MS:         141                          QRS:        47  QRSD:       109                          T:          65  QT:         443  QTc:        515    Interpretive Statements  Sinus rhythm  Abnormal R-wave progression, early transition  Inferior infarct, old  Probable anterolateral infarct, old  Prolonged QT interval  Compared to ECG 2025 12:20:10  NO SIGNIFICANT CHANGES  Electronically Signed On 2025 07:37:33 PST by Fredo Ashby MD       *Note: Due to a large number of results and/or encounters for the requested time period, some results have not been displayed. A complete set of results can be found in Results Review.      Echo   No prior study is available for comparison.   Moderately reduced left ventricular systolic function.   The left ventricular ejection fraction is estimated to be 30%.   Wall motion is difficult to assess with the limitations of image   quality, even with the use of echo contrast.  Reduced right ventricular systolic function.  Mild mitral regurgitation.  Estimated right ventricular systolic pressure is 30 mmHg.  Dilated inferior vena cava without inspiratory collapse.    Physical Exam    Airway   Mallampati: III  TM distance: >3 FB  Neck ROM: limited       Cardiovascular - normal exam  Rhythm: regular  Rate: normal     Dental   (+) upper dentures, lower dentures           Pulmonary - normal exam  Breath sounds  clear to auscultation     Abdominal   (+) obese     Neurological - normal exam                   Anesthesia Plan    ASA 3- EMERGENT   ASA physical status 3 criteria: CAD (>3 months)ASA physical status emergent criteria: neurologic compromise requiring immediate intervention    Plan - general       Airway plan will be ETT          Induction: intravenous    Postoperative Plan: Postoperative administration of opioids is intended.    Pertinent diagnostic labs and testing reviewed    Informed Consent:    Anesthetic plan and risks discussed with patient.    Use of blood products discussed with: patient whom consented to blood products.       Risks of general anesthesia discussed including sore throat, damage to lips/teeth, anaphylaxis/drug reaction, aspiration, awareness, post-op nausea/vomiting, post-op delirium, myocardial infarction, cerebral vascular accident up to and including death.    Risks of arterial catheter discussed including infection, bleeding, damage to surrounding structures, occlusion of vessel and ischemia distally.

## 2025-02-11 NOTE — ASSESSMENT & PLAN NOTE
MRI demonstrating T3 and T4 metastasis with pathological collapse of T4 vertebral body.  Epidural tumor spread at the levels of T2, T3 and T4. Severe canal compromise at the level of T3. Spinal cord edema at the levels of T3, T4 and T5.  NGSY to take for T2-T4, C7-T5 laminectomy and fusion with partial resection of presumed metastatic tumor.   Receiving Dexamethasone q4h  MAP goal >85 for spinal cord perfusion  OOB with TCO brace, PT/OT/ PMR  Follow-up surgical pathology  Continue pain management with PCA  Bowel and bladder protocol (saunders, suppository/PABLO)    Follow-up neurosurgical recommendations postop. Continue with MAP>85 (not requiring norepinephrine), no DVT pharmacological ppx. Continue TCO brace.  CT chest abdomen pelvis demonstrating concerns for metastatic disease within lung parenchyma and has evidence of enlarged retroperitoneal lymph nodes measuring up to 12 mm in short axis.

## 2025-02-11 NOTE — PROGRESS NOTES
Pulmonary Critical Care H&P        Chief Complaint: Fall, weakness    History of Present Illness: 64 y.o. male with past medical history significant for stage II (T3, N2, M0, p16 positive) tonsil cancer status post radiation/chemo treatment in  and remained in remission presented with fall.  Per patient he was putting on a jacket to take his dog out for a walk and the next thing he remembers was being on the floor.  EMS was called who brought the patient to the emergency room.  No history of head injury reported but noted an abrasion on the occipital area.  No history of chest pain or palpitations prior to the fall.  Now complains of numbness and weakness in his lower extremities.  This started after the fall.  Complains of back pain between the shoulder blades.  Patient has a history of atrial fibrillation but not on any anticoagulation.  Other medical conditions include diabetes, hypertension, dyslipidemia.  Review of system was otherwise negative.      PFSH:  No change.    Physical exam:  Alert.  Not in distress.   Normocephalic.   Normal rate.   NO added heart sounds.   No added resp sounds. Decreased breath sounds at bases.   Loss of sensations bilaterally L>R.     Weight: (!) 127 kg (279 lb)  Recent Labs     25   SODIUM 142   POTASSIUM 3.6   CHLORIDE 107   CO2 23   BUN 17   CREATININE 0.77   CALCIUM 9.8       Liver Function  Recent Labs     25   ALTSGPT 30   ASTSGOT 53*   ALKPHOSPHAT 95   TBILIRUBIN 0.9   GLUCOSE 133*       Heme:  Recent Labs     25   RBC 4.36*   HEMOGLOBIN 12.2*   HEMATOCRIT 36.4*   PLATELETCT 124*   PROTHROMBTM 14.6   APTT 28.2   INR 1.14*       Infectious Disease:  Temp  Av.7 °C (98 °F)  Min: 36.7 °C (98 °F)  Max: 36.7 °C (98 °F)    Recent Labs     25   WBC 5.8   NEUTSPOLYS 74.70*   LYMPHOCYTES 11.10*   MONOCYTES 9.90   EOSINOPHILS 3.10   BASOPHILS 0.90   ASTSGOT 53*   ALTSGPT 30   ALKPHOSPHAT 95   TBILIRUBIN 0.9     Current  Facility-Administered Medications   Medication Dose Frequency Provider Last Rate Last Admin    dexamethasone (Decadron) injection 4 mg  4 mg Q4HRS Yamil Howell M.D.   4 mg at 02/11/25 0148    lidocaine (Asperflex) 4 % patch 1 Patch  1 Patch Q24HR Yamil Howell M.D.       Last reviewed on 2/10/2025 11:13 PM by Natalia Hernandez R.N.       Problems:  Anemia of chronic disease - normocytic normochromic  Compression fracture of T3 vertebra  Pulmonary nodules  History of hypertension  History of Afib  History of DM  History of chronic anemia  History of DLD  History of chronic HFrEF  History of malignant neoplasm of tonsil (Stage II -T3, N2 - SP radiation 2022)  History of chemotherapy induced neutropenia  History of chronic pancytopenia    Assessment / Plan    Admit to ICU.   Frequent neuro monitoring.   NPO.   Decadron.   MAP goal of >85.   Hold anti-hypertensive.  Needs tissue diagnosis for cancer recurrence. Will need CT chest with contrast.   PET CT and Oncology follow up.    Appreciate surgery recommendations.       Chart and note reviewed. Data reviewed. Seen on rounds earlier today. I have independently evaluated and examined the patient. I have personally reviewed all pertinent data including labs, imaging and recommendations of treatment team providers. I agree with the exam, assessment and plans outlined by the resident physician. Please refer to the resident note for details and diagnostic/treatment plans per the resident physician note.      Total of 57 min critical care time spent at bedside during the course of care providing   evaluation, management and care decisions and ordering appropriate treatment related to critical care problems exclusive of procedures.     Jaron Theodore MD  Pulmonary and Critical Care Physician      Discussed patient condition and risk of morbidity and/or mortality with RN, RT, Therapies, Pharmacy, Dietary, and .

## 2025-02-11 NOTE — PROGRESS NOTES
Patient arrives to T914 with ACLS RN    HR 75  /72  O2 92% RA  RR 18  96.6F    4 Eyes Skin Assessment Completed by Elba RN and DARRIAN Hi.    Head WDL  Ears WDL  Nose WDL  Mouth WDL  Neck WDL  Breast/Chest WDL  Shoulder Blades Redness and Blanching  Spine WDL  (R) Arm/Elbow/Hand WDL  (L) Arm/Elbow/Hand Bruising hand, bruise to left armpit  Abdomen WDL  Groin: dryness  Scrotum/Coccyx/Buttocks Redness and Blanching  (R) Leg: scattered blanchable discoloration to shins  (L) Leg: scattered blanchable discoloration to shins   (R) Heel/Foot/Toe: dryness  (L) Heel/Foot/Toe: dryness, 2nd left toe purple yvon       Devices In Places ECG, Blood Pressure Cuff, Pulse Ox, and SCD's      Interventions In Place Sacral Mepilex, Pillows, Q2 Turns, and Pressure Redistribution Mattress    Possible Skin Injury No    Pictures Uploaded Into Epic Yes  Wound Consult Placed Yes  RN Wound Prevention Protocol Ordered Yes

## 2025-02-12 PROBLEM — K74.69 OTHER CIRRHOSIS OF LIVER (HCC): Status: ACTIVE | Noted: 2025-02-12

## 2025-02-12 LAB
ANION GAP SERPL CALC-SCNC: 9 MMOL/L (ref 7–16)
BUN SERPL-MCNC: 22 MG/DL (ref 8–22)
CALCIUM SERPL-MCNC: 9.2 MG/DL (ref 8.5–10.5)
CHLORIDE SERPL-SCNC: 105 MMOL/L (ref 96–112)
CO2 SERPL-SCNC: 23 MMOL/L (ref 20–33)
CREAT SERPL-MCNC: 0.89 MG/DL (ref 0.5–1.4)
ERYTHROCYTE [DISTWIDTH] IN BLOOD BY AUTOMATED COUNT: 41.6 FL (ref 35.9–50)
GFR SERPLBLD CREATININE-BSD FMLA CKD-EPI: 95 ML/MIN/1.73 M 2
GLUCOSE BLD STRIP.AUTO-MCNC: 144 MG/DL (ref 65–99)
GLUCOSE BLD STRIP.AUTO-MCNC: 150 MG/DL (ref 65–99)
GLUCOSE BLD STRIP.AUTO-MCNC: 150 MG/DL (ref 65–99)
GLUCOSE BLD STRIP.AUTO-MCNC: 154 MG/DL (ref 65–99)
GLUCOSE SERPL-MCNC: 145 MG/DL (ref 65–99)
HBV SURFACE AB SERPL IA-ACNC: <3.5 MIU/ML (ref 0–10)
HBV SURFACE AG SER QL: NORMAL
HCT VFR BLD AUTO: 33.7 % (ref 42–52)
HCV AB SER QL: REACTIVE
HGB BLD-MCNC: 10.9 G/DL (ref 14–18)
INR PPP: 1.18 (ref 0.87–1.13)
MCH RBC QN AUTO: 27.8 PG (ref 27–33)
MCHC RBC AUTO-ENTMCNC: 32.3 G/DL (ref 32.3–36.5)
MCV RBC AUTO: 86 FL (ref 81.4–97.8)
PLATELET # BLD AUTO: 132 K/UL (ref 164–446)
POTASSIUM SERPL-SCNC: 4.6 MMOL/L (ref 3.6–5.5)
PROTHROMBIN TIME: 15 SEC (ref 12–14.6)
RBC # BLD AUTO: 3.92 M/UL (ref 4.7–6.1)
SODIUM SERPL-SCNC: 137 MMOL/L (ref 135–145)
WBC # BLD AUTO: 12.6 K/UL (ref 4.8–10.8)

## 2025-02-12 PROCEDURE — 700102 HCHG RX REV CODE 250 W/ 637 OVERRIDE(OP): Performed by: NURSE PRACTITIONER

## 2025-02-12 PROCEDURE — 97167 OT EVAL HIGH COMPLEX 60 MIN: CPT

## 2025-02-12 PROCEDURE — A9270 NON-COVERED ITEM OR SERVICE: HCPCS

## 2025-02-12 PROCEDURE — 85027 COMPLETE CBC AUTOMATED: CPT

## 2025-02-12 PROCEDURE — 85610 PROTHROMBIN TIME: CPT

## 2025-02-12 PROCEDURE — 87522 HEPATITIS C REVRS TRNSCRPJ: CPT

## 2025-02-12 PROCEDURE — 97535 SELF CARE MNGMENT TRAINING: CPT

## 2025-02-12 PROCEDURE — 86706 HEP B SURFACE ANTIBODY: CPT

## 2025-02-12 PROCEDURE — 80048 BASIC METABOLIC PNL TOTAL CA: CPT

## 2025-02-12 PROCEDURE — 770001 HCHG ROOM/CARE - MED/SURG/GYN PRIV*

## 2025-02-12 PROCEDURE — 700102 HCHG RX REV CODE 250 W/ 637 OVERRIDE(OP)

## 2025-02-12 PROCEDURE — A9270 NON-COVERED ITEM OR SERVICE: HCPCS | Performed by: STUDENT IN AN ORGANIZED HEALTH CARE EDUCATION/TRAINING PROGRAM

## 2025-02-12 PROCEDURE — 86803 HEPATITIS C AB TEST: CPT

## 2025-02-12 PROCEDURE — 97530 THERAPEUTIC ACTIVITIES: CPT

## 2025-02-12 PROCEDURE — 97163 PT EVAL HIGH COMPLEX 45 MIN: CPT

## 2025-02-12 PROCEDURE — A9270 NON-COVERED ITEM OR SERVICE: HCPCS | Performed by: NURSE PRACTITIONER

## 2025-02-12 PROCEDURE — 700102 HCHG RX REV CODE 250 W/ 637 OVERRIDE(OP): Performed by: STUDENT IN AN ORGANIZED HEALTH CARE EDUCATION/TRAINING PROGRAM

## 2025-02-12 PROCEDURE — 700111 HCHG RX REV CODE 636 W/ 250 OVERRIDE (IP): Performed by: NURSE PRACTITIONER

## 2025-02-12 PROCEDURE — 97602 WOUND(S) CARE NON-SELECTIVE: CPT

## 2025-02-12 PROCEDURE — 700105 HCHG RX REV CODE 258: Performed by: NURSE PRACTITIONER

## 2025-02-12 PROCEDURE — 700111 HCHG RX REV CODE 636 W/ 250 OVERRIDE (IP): Performed by: STUDENT IN AN ORGANIZED HEALTH CARE EDUCATION/TRAINING PROGRAM

## 2025-02-12 PROCEDURE — 87340 HEPATITIS B SURFACE AG IA: CPT

## 2025-02-12 PROCEDURE — 87350 HEPATITIS BE AG IA: CPT

## 2025-02-12 PROCEDURE — 82962 GLUCOSE BLOOD TEST: CPT | Mod: 91

## 2025-02-12 RX ORDER — OXYCODONE HYDROCHLORIDE 10 MG/1
10 TABLET ORAL EVERY 4 HOURS PRN
Refills: 0 | Status: DISCONTINUED | OUTPATIENT
Start: 2025-02-12 | End: 2025-02-12

## 2025-02-12 RX ORDER — BISACODYL 10 MG
10 SUPPOSITORY, RECTAL RECTAL DAILY
Status: DISCONTINUED | OUTPATIENT
Start: 2025-02-12 | End: 2025-02-14 | Stop reason: HOSPADM

## 2025-02-12 RX ORDER — AMOXICILLIN 250 MG
2 CAPSULE ORAL 2 TIMES DAILY
Status: DISCONTINUED | OUTPATIENT
Start: 2025-02-12 | End: 2025-02-14 | Stop reason: HOSPADM

## 2025-02-12 RX ORDER — OXYCODONE HYDROCHLORIDE 10 MG/1
10 TABLET ORAL EVERY 4 HOURS PRN
Refills: 0 | Status: DISCONTINUED | OUTPATIENT
Start: 2025-02-12 | End: 2025-02-14

## 2025-02-12 RX ORDER — POLYETHYLENE GLYCOL 3350 17 G/17G
1 POWDER, FOR SOLUTION ORAL
Status: DISCONTINUED | OUTPATIENT
Start: 2025-02-12 | End: 2025-02-14 | Stop reason: HOSPADM

## 2025-02-12 RX ADMIN — BISACODYL 10 MG: 10 SUPPOSITORY RECTAL at 17:06

## 2025-02-12 RX ADMIN — ACETAMINOPHEN 650 MG: 325 TABLET ORAL at 17:05

## 2025-02-12 RX ADMIN — OXYCODONE HYDROCHLORIDE 10 MG: 10 TABLET ORAL at 13:24

## 2025-02-12 RX ADMIN — DEXAMETHASONE SODIUM PHOSPHATE 4 MG: 4 INJECTION INTRA-ARTICULAR; INTRALESIONAL; INTRAMUSCULAR; INTRAVENOUS; SOFT TISSUE at 02:00

## 2025-02-12 RX ADMIN — DEXAMETHASONE SODIUM PHOSPHATE 4 MG: 4 INJECTION INTRA-ARTICULAR; INTRALESIONAL; INTRAMUSCULAR; INTRAVENOUS; SOFT TISSUE at 05:41

## 2025-02-12 RX ADMIN — ACETAMINOPHEN 650 MG: 325 TABLET ORAL at 11:36

## 2025-02-12 RX ADMIN — SENNOSIDES AND DOCUSATE SODIUM 2 TABLET: 50; 8.6 TABLET ORAL at 17:10

## 2025-02-12 RX ADMIN — SENNOSIDES AND DOCUSATE SODIUM 2 TABLET: 50; 8.6 TABLET ORAL at 08:32

## 2025-02-12 RX ADMIN — DEXAMETHASONE SODIUM PHOSPHATE 4 MG: 4 INJECTION INTRA-ARTICULAR; INTRALESIONAL; INTRAMUSCULAR; INTRAVENOUS; SOFT TISSUE at 13:24

## 2025-02-12 RX ADMIN — ROSUVASTATIN CALCIUM 10 MG: 5 TABLET, FILM COATED ORAL at 17:06

## 2025-02-12 RX ADMIN — DEXAMETHASONE SODIUM PHOSPHATE 4 MG: 4 INJECTION INTRA-ARTICULAR; INTRALESIONAL; INTRAMUSCULAR; INTRAVENOUS; SOFT TISSUE at 22:16

## 2025-02-12 RX ADMIN — DEXAMETHASONE SODIUM PHOSPHATE 4 MG: 4 INJECTION INTRA-ARTICULAR; INTRALESIONAL; INTRAMUSCULAR; INTRAVENOUS; SOFT TISSUE at 11:36

## 2025-02-12 RX ADMIN — ACETAMINOPHEN 650 MG: 325 TABLET ORAL at 05:41

## 2025-02-12 RX ADMIN — DEXAMETHASONE SODIUM PHOSPHATE 4 MG: 4 INJECTION INTRA-ARTICULAR; INTRALESIONAL; INTRAMUSCULAR; INTRAVENOUS; SOFT TISSUE at 17:06

## 2025-02-12 RX ADMIN — DOCUSATE SODIUM 100 MG: 100 CAPSULE, LIQUID FILLED ORAL at 05:42

## 2025-02-12 RX ADMIN — CEFAZOLIN 2 G: 2 INJECTION, POWDER, FOR SOLUTION INTRAMUSCULAR; INTRAVENOUS at 08:30

## 2025-02-12 RX ADMIN — Medication: at 00:08

## 2025-02-12 RX ADMIN — BISACODYL 10 MG: 10 SUPPOSITORY RECTAL at 17:07

## 2025-02-12 RX ADMIN — INSULIN LISPRO 2 UNITS: 100 INJECTION, SOLUTION INTRAVENOUS; SUBCUTANEOUS at 00:00

## 2025-02-12 RX ADMIN — DOCUSATE SODIUM 100 MG: 100 CAPSULE, LIQUID FILLED ORAL at 17:06

## 2025-02-12 SDOH — ECONOMIC STABILITY: TRANSPORTATION INSECURITY
IN THE PAST 12 MONTHS, HAS LACK OF RELIABLE TRANSPORTATION KEPT YOU FROM MEDICAL APPOINTMENTS, MEETINGS, WORK OR FROM GETTING THINGS NEEDED FOR DAILY LIVING?: NO

## 2025-02-12 SDOH — ECONOMIC STABILITY: TRANSPORTATION INSECURITY
IN THE PAST 12 MONTHS, HAS THE LACK OF TRANSPORTATION KEPT YOU FROM MEDICAL APPOINTMENTS OR FROM GETTING MEDICATIONS?: NO

## 2025-02-12 ASSESSMENT — COGNITIVE AND FUNCTIONAL STATUS - GENERAL
EATING MEALS: A LITTLE
DRESSING REGULAR UPPER BODY CLOTHING: A LOT
HELP NEEDED FOR BATHING: A LOT
DRESSING REGULAR LOWER BODY CLOTHING: A LOT
MOBILITY SCORE: 9
MOVING FROM LYING ON BACK TO SITTING ON SIDE OF FLAT BED: A LOT
DAILY ACTIVITIY SCORE: 14
TURNING FROM BACK TO SIDE WHILE IN FLAT BAD: A LOT
SUGGESTED CMS G CODE MODIFIER DAILY ACTIVITY: CK
STANDING UP FROM CHAIR USING ARMS: TOTAL
SUGGESTED CMS G CODE MODIFIER MOBILITY: CM
MOVING TO AND FROM BED TO CHAIR: A LOT
CLIMB 3 TO 5 STEPS WITH RAILING: TOTAL
PERSONAL GROOMING: A LITTLE
WALKING IN HOSPITAL ROOM: TOTAL
TOILETING: A LOT

## 2025-02-12 ASSESSMENT — LIFESTYLE VARIABLES
TOTAL SCORE: 0
HOW MANY TIMES IN THE PAST YEAR HAVE YOU HAD 5 OR MORE DRINKS IN A DAY: 0
EVER HAD A DRINK FIRST THING IN THE MORNING TO STEADY YOUR NERVES TO GET RID OF A HANGOVER: NO
ALCOHOL_USE: NO
ON A TYPICAL DAY WHEN YOU DRINK ALCOHOL HOW MANY DRINKS DO YOU HAVE: 0
TOTAL SCORE: 0
DOES PATIENT WANT TO STOP DRINKING: NO
HAVE PEOPLE ANNOYED YOU BY CRITICIZING YOUR DRINKING: NO
TOTAL SCORE: 0
CONSUMPTION TOTAL: NEGATIVE
AVERAGE NUMBER OF DAYS PER WEEK YOU HAVE A DRINK CONTAINING ALCOHOL: 0
EVER FELT BAD OR GUILTY ABOUT YOUR DRINKING: NO
HAVE YOU EVER FELT YOU SHOULD CUT DOWN ON YOUR DRINKING: NO

## 2025-02-12 ASSESSMENT — PATIENT HEALTH QUESTIONNAIRE - PHQ9
1. LITTLE INTEREST OR PLEASURE IN DOING THINGS: NOT AT ALL
SUM OF ALL RESPONSES TO PHQ9 QUESTIONS 1 AND 2: 0
2. FEELING DOWN, DEPRESSED, IRRITABLE, OR HOPELESS: NOT AT ALL

## 2025-02-12 ASSESSMENT — SOCIAL DETERMINANTS OF HEALTH (SDOH)
WITHIN THE LAST YEAR, HAVE YOU BEEN AFRAID OF YOUR PARTNER OR EX-PARTNER?: NO
WITHIN THE PAST 12 MONTHS, YOU WORRIED THAT YOUR FOOD WOULD RUN OUT BEFORE YOU GOT THE MONEY TO BUY MORE: NEVER TRUE
IN THE PAST 12 MONTHS, HAS THE ELECTRIC, GAS, OIL, OR WATER COMPANY THREATENED TO SHUT OFF SERVICE IN YOUR HOME?: NO
WITHIN THE LAST YEAR, HAVE YOU BEEN HUMILIATED OR EMOTIONALLY ABUSED IN OTHER WAYS BY YOUR PARTNER OR EX-PARTNER?: NO
WITHIN THE PAST 12 MONTHS, THE FOOD YOU BOUGHT JUST DIDN'T LAST AND YOU DIDN'T HAVE MONEY TO GET MORE: NEVER TRUE
WITHIN THE LAST YEAR, HAVE YOU BEEN KICKED, HIT, SLAPPED, OR OTHERWISE PHYSICALLY HURT BY YOUR PARTNER OR EX-PARTNER?: NO
WITHIN THE LAST YEAR, HAVE TO BEEN RAPED OR FORCED TO HAVE ANY KIND OF SEXUAL ACTIVITY BY YOUR PARTNER OR EX-PARTNER?: NO

## 2025-02-12 ASSESSMENT — ACTIVITIES OF DAILY LIVING (ADL): TOILETING: INDEPENDENT

## 2025-02-12 ASSESSMENT — GAIT ASSESSMENTS: GAIT LEVEL OF ASSIST: UNABLE TO PARTICIPATE

## 2025-02-12 NOTE — THERAPY
"Physical Therapy   Initial Evaluation     Patient Name: Maximino Green  Age:  64 y.o., Sex:  male  Medical Record #: [id number]  Today's Date: 2/12/2025     Precautions  Precautions: Fall Risk;Spinal / Back Precautions   Comments:  donned while EOB; OOB >5 min; MAP >85    Assessment  Patient is 64 y.o. male presenting to acute setting following upper thoracic pain leading to fall with inability to return to standing; PMH significant for stage II tonsil CA, s/p chemo/radiation in 2002. On MRI, pt found to have \"T3 and T4 metastasis with pathological collapse of T4 vertebral body. Epidural tumor spread at the levels of T2, T3 and T4. Severe canal compromise at the level of T3. Spinal cord edema at the levels of T3, T4 and T5. Multiple enhancing pulmonary nodules consistent with metastasis.\" Pt underwent C7 - T5 fusion with  when OOB.     Pt presents to physical therapy with impaired LE strength, sensation, balance, and activity tolerance impacting pt's ability to perform bed mobility, transfer or ambulate at PLOF baseline. Today, pt required Max A to complete mobility as detailed below. Physical therapy treatment interventions included seated LE exercises (LAQ) and neuro re-education for seated balance and identifying EDELMIRA limits. Pt HIGHLY motivated to work with physical therapy to optimize function and is very receptive to education. Pt will benefit from acute PT interventions to address impairments noted below.     Plan    Physical Therapy Initial Treatment Plan   Treatment Plan : Bed Mobility, Equipment, Gait Training, Family / Caregiver Training, Neuro Re-Education / Balance, Orthotics Training , Self Care / Home Evaluation, Therapeutic Activities, Stair Training, Therapeutic Exercise  Treatment Frequency: 5 Times per Week  Duration: Until Therapy Goals Met       Discharge Recommendations: Recommend post-acute placement for additional physical therapy services prior to discharge home (Consider PM&R consult " "to support needs)       Subjective    \"I don't know what I can do, I was about ready to wallow in self pity before you came in\"     Objective     02/12/25 1109   Precautions   Precautions Fall Risk;Spinal / Back Precautions    Comments  donned while EOB; OOB >5 min; MAP >85   Vitals   Vitals Comments VSS throughout session   Pain 0 - 10 Group   Therapist Pain Assessment During Activity;Nurse Notified  (mod pain, thoracic)   Prior Living Situation   Prior Services Home-Independent   Housing / Facility Mobile Home   Steps Into Home 2  (wider platform steps)   Steps In Home 0   Bathroom Set up Walk In Shower   Equipment Owned Tub / Shower Seat   Lives with - Patient's Self Care Capacity Spouse   Comments Pt resides in [place]. Reports spouse just returned from from Rehab following femur fracture. Pt is a  but is off during winter months.   Prior Level of Functional Mobility   Bed Mobility Independent   Transfer Status Independent   Ambulation Independent   Ambulation Distance community   Assistive Devices Used None   Stairs Independent   Cognition    Cognition / Consciousness WDL   Level of Consciousness Alert   Comments pleasant and highly motivated to work with PT. expressed some despair regarding CLOF   Passive ROM Lower Body   Passive ROM Lower Body WDL   Active ROM Lower Body    Active ROM Lower Body  X   Comments limited by weakness R > L   Strength Lower Body   Lower Body Strength  X   Comments R LE grossly 3+ to 4- /5 with exception of hip ABDuction 1/5. L LE grossly 3- to 3+/5 at knee extension / flexion with 2/5 ankle DF, hip flexion, hip ADDuction. 0/5 ABDuction   Sensation Lower Body   Lower Extremity Sensation   X   Comments reports impaired sensation below mid trunk/ belly button. Able to feel buttocks while sitting EOB   Other Treatments   Other Treatments Provided See PT evaluation for details regarding specific treatment interventions   Balance Assessment   Sitting Balance " (Static) Poor +   Sitting Balance (Dynamic) Poor   Weight Shift Sitting Poor   Comments able to self correct seated balance with verbal cues. standing deferred due to pain and weakness   Bed Mobility    Supine to Sit Maximal Assist   Sit to Supine Maximal Assist   Scooting Maximal Assist   Rolling Maximal Assist to Rt.;Maximum Assist to Lt.   Comments x2 people for safety and pain management   Gait Analysis   Gait Level Of Assist Unable to Participate   Functional Mobility   Sit to Stand Unable to Participate   6 Clicks Assessment - How much HELP from from another person do you currently need... (If the patient hasn't done an activity recently, how much help from another person do you think he/she would need if he/she tried?)   Turning from your back to your side while in a flat bed without using bedrails? 2   Moving from lying on your back to sitting on the side of a flat bed without using bedrails? 2   Moving to and from a bed to a chair (including a wheelchair)? 2   Standing up from a chair using your arms (e.g., wheelchair, or bedside chair)? 1   Walking in hospital room? 1   Climbing 3-5 steps with a railing? 1   6 clicks Mobility Score 9   Activity Tolerance   Sitting Edge of Bed 12-15 min   Short Term Goals    Short Term Goal # 1 pt will perform supine <> sit with Min A in 6 visits   Short Term Goal # 2 Pt will maintain Fair seated balance with intermittent verbal cues x3 minutes to progress functional activity in 6 visits   Short Term Goal # 3 pt will perform sit <> stand and functional transfers with FWW and Mod A in 6 visits   Short Term Goal # 4 pt will ambulate 10 ft with FWW and Mod A in 6 visits   Education Group   Education Provided Role of Physical Therapist;Spine Precautions;Brace Wear and Care   Spine Precautions Patient Response Patient;Acceptance;Explanation;Verbal Demonstration   Role of Physical Therapist Patient Response Patient;Acceptance;Explanation;Verbal Demonstration   Brace Wear & Care  Patient Response Patient;Acceptance;Explanation;Verbal Demonstration   Physical Therapy Initial Treatment Plan    Treatment Plan  Bed Mobility;Equipment;Gait Training;Family / Caregiver Training;Neuro Re-Education / Balance;Orthotics Training ;Self Care / Home Evaluation;Therapeutic Activities;Stair Training;Therapeutic Exercise   Treatment Frequency 5 Times per Week   Duration Until Therapy Goals Met   Problem List    Problems Pain;Impaired Bed Mobility;Impaired Transfers;Impaired Ambulation;Functional Strength Deficit;Impaired Balance;Decreased Activity Tolerance   Anticipated Discharge Equipment and Recommendations   Discharge Recommendations Recommend post-acute placement for additional physical therapy services prior to discharge home

## 2025-02-12 NOTE — THERAPY
"Occupational Therapy   Initial Evaluation     Patient Name: Maximino Green  Age:  64 y.o., Sex:  male  Medical Record #: [id number]  Today's Date: 2/12/2025     Precautions  Precautions: Fall Risk, Spinal / Back Precautions   Comments:  donned while EOB; OOB >5 min; MAP >85    Assessment  Patient is 64 y.o. male admitted after GLF that happened after he twisted to put on his coat, found to have a T3 pathologic fracture, compression of his cord, and BLE weakness (LLE>RLE). He is now s/p C7-T5 fusion and decompression.     He has a Hx pulmonary nodules, HTN, afib, DM, anemia, DLD, HFrEF, chemo induced neutropenia, malignant neoplasm of tonsil, chronic pancytopenia.    Began patient education on spinal precautions, SCI recovery, and brace management. Patient  verbalized understanding and demonstrated understanding    Pt is currently limited by weakness, fatigue, impaired balance, and pain, which impacts ability to complete ADLs, ADL txfs, and functional mobility.      Plan    Occupational Therapy Initial Treatment Plan   Treatment Interventions: Self Care / Activities of Daily Living, Adaptive Equipment, Neuro Re-Education / Balance, Therapeutic Exercises, Therapeutic Activity  Treatment Frequency: 5 Times per Week  Duration: Until Therapy Goals Met    DC Equipment Recommendations: Unable to determine at this time  Discharge Recommendations: Recommend post-acute placement for additional occupational therapy services prior to discharge home     Subjective    \"I'm glad you guys are here, I was feeling pretty worthless.\"     Objective       02/12/25 1112   Prior Living Situation   Prior Services Home-Independent   Housing / Facility Mobile Home   Steps Into Home 2   Bathroom Set up Walk In Shower;Shower Chair   Equipment Owned Tub / Shower Seat   Lives with - Patient's Self Care Capacity Spouse   Comments Wife just got out of rehab after a femur fracture. He was helping her.   Prior Level of ADL Function   Self " Feeding Independent   Grooming / Hygiene Independent   Bathing Independent   Dressing Independent   Toileting Independent   Prior Level of IADL Function   Medication Management Independent   Laundry Independent   Kitchen Mobility Independent   Finances Independent   Home Management Independent   Shopping Independent   Prior Level Of Mobility Independent Without Device in Community;Independent Without Device in Home   Driving / Transportation Driving Independent   Occupation (Pre-Hospital Vocational) Employed Full Time  (; in his off season)   History of Falls   History of Falls Yes   Precautions   Precautions Fall Risk;Spinal / Back Precautions    Comments  donned while EOB; OOB >5 min; MAP >85   Vitals   Vitals Comments vitals were stable; MAP >85 throughout   Pain 0 - 10 Group   Therapist Pain Assessment Nurse Notified;During Activity  (mod c/o mid thoracic pain)   Cognition    Cognition / Consciousness WDL   Level of Consciousness Alert   Comments pleasant and cooperative, feeling down about his situation. receptive to education, motivated to work with therapy.   Active ROM Upper Body   Active ROM Upper Body  X   Dominant Hand Right   Comments RUE grossly impaired shoulder flexion <3/4 range; otherwise BUE WFL   Strength Upper Body   Upper Body Strength  WDL   Balance Assessment   Sitting Balance (Static) Poor +   Sitting Balance (Dynamic) Poor   Weight Shift Sitting Poor   Comments standing deferred due to fatigue   Bed Mobility    Supine to Sit Maximal Assist   Sit to Supine Maximal Assist   Scooting Maximal Assist   Rolling Maximal Assist to Rt.   Comments x2 person for safety   ADL Assessment   Grooming Minimal Assist;Seated  (oral care; assist for sitting balance)   Upper Body Dressing Maximal Assist  ( mgmt)   Lower Body Dressing Maximal Assist  (socks)   Toileting Total Assist  (saunders)   Comments began edu on spinal precautions,  mgmt, d/c dispo, role of OT, recovery   How much help  from another person does the patient currently need...   Putting on and taking off regular lower body clothing? 2   Bathing (including washing, rinsing, and drying)? 2   Toileting, which includes using a toilet, bedpan, or urinal? 2   Putting on and taking off regular upper body clothing? 2   Taking care of personal grooming such as brushing teeth? 3   Eating meals? 3   6 Clicks Daily Activity Score 14   Functional Mobility   Sit to Stand Unable to Participate   Bed, Chair, Wheelchair Transfer Unable to Participate   Toilet Transfers Unable to Participate   Mobility EOB only today   Short Term Goals   Short Term Goal # 1 pt will demo ADL txfs w/ Deidre   Short Term Goal # 2 pt will dress UB including  w/ supv   Short Term Goal # 3 pt will dress LB w/ Deidre and AE prn   Short Term Goal # 4 pt will groom seated EOB unsupported w/ supv   Patient seen for team evaluation with Physical Therapist for the following reason(s):  Patient required 2 person assistance for safety and to provide effective interventions. Each discipline assisted patient with appropriate and separate goals. Due to the medical complexity, the skill of both practitioners is needed to monitor vitals, patient status, and adjust the intervention to fit the patient's needs and goals.

## 2025-02-12 NOTE — WOUND TEAM
"[facility]  Inpatient Services  Wound and Skin Care Brief Evaluation    Admission Date: 2/10/2025     Last order of IP CONSULT TO WOUND CARE was found on 2/11/2025 from Hospital Encounter on 2/10/2025     HPI, PMH, SH: Reviewed    Chief Complaint   Patient presents with    T-5000 FALL     BIB [facility] unit 32 from home a GLF. Was walking to get his dog out, next thing he remembers was being stuck between the door and coffee table. Abrasion on the on occipital area. Denies thinners.  mg/dL.       Weakness     Bilateral lower extremity weakness with decreased sensation on the left thigh. Reports muscle relaxant given to him for his back pain make him feel \"weird\".      Diagnosis: Spinal cord compression (HCC) [G95.20]    Unit where seen by Wound Team: T914/01     Wound consult placed regarding L 2nd toe. Chart and images reviewed. This discussed with bedside RN. This clinician in to assess patient. Patient pleasant and agreeable.  L 2nd toe with ecchymosis and blood blister. Patient stated he recently stubbed his toe on a door. Area can remain PAULA, will likely resolve without further intervention.    No pressure injuries or advanced wound care needs identified. Wound consult completed. Wound team signing off, re-consult if patient has further advanced wound care needs.         PREVENTATIVE INTERVENTIONS:    Q shift Hugh - performed per nursing policy  Q shift pressure point assessments - performed per nursing policy    Surface/Positioning  ICU Low Airloss - Currently in Place  Reposition q 2 hours - Currently in Place  TAPs Turning system - Currently in Place    Offloading/Redistribution  Sacral offloading dressing (Silicone dressing) - Currently in Place  "

## 2025-02-12 NOTE — ASSESSMENT & PLAN NOTE
Suspect secondary to congestive hepatopathy given evidence of biventricular dysfunction.  There is evidence of retroperitoneal varices, appearance favoring changes related to portal hypertension   Ordered hep serologies  Follow-up echocardiogram

## 2025-02-12 NOTE — PROGRESS NOTES
"Critical Care Medicine Progress Note    Date of admission  2/10/2025    Chief Complaint  64 y.o. 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  Per patient he was putting on a jacket to take his dog out for a walk and the next thing he remembers was being on the floor.  EMS was called who brought the patient to the emergency room.  No history of head injury reported but noted an abrasion on the occipital area.  No history of chest pain or palpitations prior to the fall.  Now complains of numbness and weakness in his lower extremities.  This started after the fall.  Complains of back pain between the shoulder blades.  Patient has a history of atrial fibrillation but not on any anticoagulation.\" per Dr. Theodore.     Of note, patient presented to ED 2/6 for upper back pain, however without red flag sxs at that time and was treated conservatively.     His other medical hx includes HTN, Afib (not on AC), type 2 DM, chronic normocytic anemia, chronic HFrEF, hx of chemotherapy induced pancytopenia.     In ED, found to have compression fx of T3 vertebra. Thus, given decadron, and NSGY consulted. Taken this am to OR for T1-T5 decompression and stabilization.      Upon arrival from OR - Patient hemodynamically stable, without pain. Slightly hypertensive at baseline, not currently requiring NE gtt to maintain MAP goal >85. LR currently running at 50 c/hr. TCO brace being placed    Hospital Course  2/11 - s/p decompressive laminectomy L3-L4, Partial pediculectomy and facetectomy on the left and right with   decompression of metastatic tumor with compression of spinal cord, stabilization C7-T5.    Interval Problem Update  Reviewed last 24 hour events:  Neuro: PCA pump (morphine 1.5 mg q6min, max dose 40 mg in 4 hours), received   HR: 70s to 80s , MAP >80 on NE gtt, f/u TTE  Tmax: afebrile  GI: cardiac " diet  UOP: 1525 ov, NA: 137, K: 4.6, Cr: 0.89  Drains: hemovac: 110 cc  Lines: PIVs, saunders  Resp: 2L NC  VTE: SCDs  PPI/H2: none  Antibx: ancef, WBC 12.6k on decadron    Review of Systems  Constitutional:  Negative for chills and fever.   Respiratory:  Negative for cough and shortness of breath.    Cardiovascular:  Negative for chest pain, palpitations and leg swelling.   Gastrointestinal:  Negative for nausea and vomiting.   Musculoskeletal:  Positive for back pain.   Neurological:  Positive for sensory change.     Vital Signs for last 24 hours   Temp:  [36 °C (96.8 °F)-36.7 °C (98.1 °F)] 36.5 °C (97.7 °F)  Pulse:  [69-96] 85  Resp:  [3-31] 22  BP: (111-175)/() 155/67  SpO2:  [93 %-97 %] 95 %      Physical Exam   Physical Exam  Constitutional:       Appearance: He is ill-appearing.   HENT:      Head: Normocephalic and atraumatic.   Eyes:      Extraocular Movements: Extraocular movements intact.      Pupils: Pupils are equal, round, and reactive to light.   Neck:      Comments: Collar in place  Cardiovascular:      Rate and Rhythm: Normal rate and regular rhythm.   Pulmonary:      Effort: Pulmonary effort is normal.      Breath sounds: Normal breath sounds.   Abdominal:      Palpations: Abdomen is soft.   Neurological:      Mental Status: He is alert and oriented to person, place, and time.      Comments: Decreased sensation T10 below, however slightly improved since yesterday.   LLE 1/5, RLE 3+/5         Medications  Current Facility-Administered Medications   Medication Dose Route Frequency Provider Last Rate Last Admin    bisacodyl (Dulcolax) suppository 10 mg  10 mg Rectal DAILY AT 1800 Chanel Welch M.D.        senna-docusate (Pericolace Or Senokot S) 8.6-50 MG per tablet 2 Tablet  2 Tablet Oral BID Chanel Welch M.D.   2 Tablet at 02/12/25 0832    polyethylene glycol/lytes (Miralax) Packet 1 Packet  1 Packet Oral QDAY PRN Chanel Welch M.D.        magnesium  hydroxide (Milk Of Magnesia) suspension 30 mL  30 mL Oral QDAY PRN Chanel DIO Welch M.D.        oxyCODONE immediate release (Roxicodone) tablet 10 mg  10 mg Oral Q4HRS PRN SALO CliftonPKATHRYN        oxycodone (Oxy-IR) immediate release tablet 15 mg  15 mg Oral Q4HRS PRN SALO CliftonPKATHRYN        dexamethasone (Decadron) injection 4 mg  4 mg Intravenous Q4HRS Yamil Howell M.D.   4 mg at 02/12/25 1136    [Held by provider] labetalol (Normodyne/Trandate) injection 10 mg  10 mg Intravenous Q4HRS PRN Steven Oneil D.OBebeto        rosuvastatin (Crestor) tablet 10 mg  10 mg Oral Q EVENING JAY JAY Adrian.O.   10 mg at 02/11/25 1745    norepinephrine (Levophed) 8 mg in 250 mL NS infusion (premix)  0-1 mcg/kg/min (Ideal) Intravenous Continuous Eliza L. Latona   Held at 02/11/25 0345    acetaminophen (Tylenol) tablet 650 mg  650 mg Oral Q6HRS Steven Oneil, D.O.   650 mg at 02/12/25 1136    insulin lispro (HumaLOG,AdmeLOG) subcutaneous injection  2-9 Units Subcutaneous Q6HRS Eliza L. Latona   2 Units at 02/12/25 0000    And    dextrose 50% (D50W) injection 25 g  25 g Intravenous Q15 MIN PRN Eliza JAMESON Latjohn        Pharmacy Consult Request ...Pain Management Review 1 Each  1 Each Other PHARMACY TO DOSE PEPITO Xie.        MD ALERT...DO NOT ADMINISTER NSAIDS or ASPIRIN unless ORDERED By Neurosurgery 1 Each  1 Each Other PRN PEPITO Xie.        docusate sodium (Colace) capsule 100 mg  100 mg Oral BID ANTHONY XieRKATHRYN   100 mg at 02/12/25 0542    bisacodyl (Dulcolax) suppository 10 mg  10 mg Rectal Q24HRS PRN ANTHONY XieRKATHRYN        methocarbamol (Robaxin) tablet 750 mg  750 mg Oral Q8HRS PRN Roosevelt Hadley, A.P.R.N.        Or    cyclobenzaprine (Flexeril) tablet 10 mg  10 mg Oral Q8HRS PRN Roosevelt Hadley, A.P.R.N.        Or    diazePAM (Valium) tablet 5 mg  5 mg Oral Q4HRS PRN Roosevelt Hadley, A.P.R.N.        lidocaine (Asperflex) 4 % patch 1 Patch  1 Patch Transdermal Q24HR Yamil Howell,  M.D.   1 Patch at 02/11/25 2351       Fluids    Intake/Output Summary (Last 24 hours) at 2/12/2025 1310  Last data filed at 2/12/2025 1200  Gross per 24 hour   Intake 940 ml   Output 1645 ml   Net -705 ml       Laboratory          Recent Labs     02/11/25  0117 02/11/25  1530 02/12/25  0500   SODIUM 142 138 137   POTASSIUM 3.6 4.2 4.6   CHLORIDE 107 104 105   CO2 23 22 23   BUN 17 19 22   CREATININE 0.77 0.86 0.89   MAGNESIUM 1.7  --   --    CALCIUM 9.8 9.5 9.2     Recent Labs     02/11/25  0117 02/11/25  1530 02/12/25  0500   ALTSGPT 30 29  --    ASTSGOT 53* 57*  --    ALKPHOSPHAT 95 92  --    TBILIRUBIN 0.9 0.7  --    GLUCOSE 133* 164* 145*     Recent Labs     02/11/25 0117 02/11/25  1530 02/12/25  0427   WBC 5.8 9.0 12.6*   NEUTSPOLYS 74.70* 90.90*  --    LYMPHOCYTES 11.10* 4.70*  --    MONOCYTES 9.90 4.00  --    EOSINOPHILS 3.10 0.00  --    BASOPHILS 0.90 0.10  --    ASTSGOT 53* 57*  --    ALTSGPT 30 29  --    ALKPHOSPHAT 95 92  --    TBILIRUBIN 0.9 0.7  --      Recent Labs     02/11/25 0117 02/11/25  1530 02/12/25  0427   RBC 4.36* 4.42* 3.92*   HEMOGLOBIN 12.2* 12.5* 10.9*   HEMATOCRIT 36.4* 37.6* 33.7*   PLATELETCT 124* 139* 132*   PROTHROMBTM 14.6  --  15.0*   APTT 28.2 28.3  --    INR 1.14*  --  1.18*       Imaging  CTH 2/11 - neg for acute process  CT -CTL spine 2/11 - 1.  Lytic changes of the T3 vertebral body with anterior wedge compression deformity at 5.3 mm retropulsion, appearance favoring compression fracture with retropulsion and narrowing of the central canal.  2.  Pulmonary nodules, given history concerning for metastatic disease. Additional workup as clinically appropriate.  3.  Right nephrolithiasis without visualized obstructive changes  4.  Atherosclerosis and atherosclerotic coronary artery disease     MR thoracic spine w/ and w/o 2/11 -   IMPRESSION:  1.  T3 and T4 metastasis with pathological collapse of T4 vertebral body.  2.  Epidural tumor spread at the levels of T2, T3 and T4.  3.   Severe canal compromise at the level of T3.  4.  Spinal cord edema at the levels of T3, T4 and T5.  5.  Multiple enhancing pulmonary nodules consistent with metastasis.    CT C/A/P 2/11:  1.  Extensive bilateral pulmonary nodules with areas of confluent nodular consolidation, appearance compatible with metastatic disease.  2.  Retroperitoneal adenopathy, consider causes of adenopathy with additional workup as clinically appropriate  3.  Common bile duct dilatation, commonly associated with postcholecystectomy physiology, consider causes of biliary obstruction with additional workup as clinically appropriate  4.  Irregular hepatic contour, compatible changes of cirrhosis  5.  Retroperitoneal varices, appearance favoring changes related to portal hypertension  6.  Right nephrolithiasis without visualized obstructive changes  7.  Fat-containing left inguinal hernia  8.  Atherosclerosis and atherosclerotic coronary artery disease    TTE 9/2022:  Moderately reduced left ventricular systolic function.   The left ventricular ejection fraction is estimated to be 30%.   Wall motion is difficult to assess with the limitations of image   quality, even with the use of echo contrast.  Reduced right ventricular systolic function.  Mild mitral regurgitation.  Estimated right ventricular systolic pressure is 30 mmHg.  Dilated inferior vena cava without inspiratory collapse.    Assessment/Plan  * Spinal cord compression (HCC)- (present on admission)  Assessment & Plan  MRI demonstrating T3 and T4 metastasis with pathological collapse of T4 vertebral body.  Epidural tumor spread at the levels of T2, T3 and T4. Severe canal compromise at the level of T3. Spinal cord edema at the levels of T3, T4 and T5.  NGSY to take for T2-T4, C7-T5 laminectomy and fusion with partial resection of presumed metastatic tumor.   Receiving Dexamethasone q4h  MAP goal >85 for spinal cord perfusion  OOB with TCO brace, PT/OT/ PMR  Follow-up surgical  pathology  Continue pain management with PCA  Bowel and bladder protocol (saunders, suppository/PABLO)    Follow-up neurosurgical recommendations postop. Continue with MAP>85 (not requiring norepinephrine), no DVT pharmacological ppx. Continue TCO brace.  CT chest abdomen pelvis demonstrating concerns for metastatic disease within lung parenchyma and has evidence of enlarged retroperitoneal lymph nodes measuring up to 12 mm in short axis.         Other cirrhosis of liver (HCC)  Assessment & Plan  Suspect secondary to congestive hepatopathy given evidence of biventricular dysfunction.  There is evidence of retroperitoneal varices, appearance favoring changes related to portal hypertension   Ordered hep serologies  Follow-up echocardiogram    Pulmonary nodules/lesions, multiple  Assessment & Plan  Noted on CT spine to have multiple pulmonary nodules >6 mm in b/l apices.   CT chest abdomen pelvis demonstrating multiple, diffuse central and peripheral consolidated pulmonary nodules highly suspicious for metastatic disease  Pathology from surgical procedure pending, then will need follow-up with oncology      Type 2 diabetes mellitus with hyperglycemia, with long-term current use of insulin (HCC)- (present on admission)  Assessment & Plan  hgbA1c 5.7,   Continue on ISS  Hypoglycemic protocol    Hypothyroidism- (present on admission)  Assessment & Plan  Subclinical. Currently not on synthroid  Last TSH 11 with nl free T4 in 2022     Thrombocytopenia (HCC)- (present on admission)  Assessment & Plan  Stable and chronic.     Acute systolic heart failure (HCC)- (present on admission)  Assessment & Plan  Hx of HFrEF - last TTE in 9/2022 demonstarting moderately reduced left ventricular systolic function.  LVEF 30%, reduced RVSF, eRVSP 30 mmHg, mild MR  On metoprolol, lisinopril at home. Holding here to support high MAP goal  -TTE ordered  - be judicious with mIVF    Longstanding persistent atrial fibrillation (HCC)- (present on  admission)  Assessment & Plan  S/p cardioversion. Not currently on AC. On metoprolol for rate control (however currently holding giving MAP parameters)  In NSR here. 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 In review of yesterday's note (2/11/2025), there are no changes except as documented above.     Discussed patient condition and risk of morbidity and/or mortality with RN, RT, Therapies, and Charge nurse / hot rounds  The patient remains critically ill.  Critical care time = 60 minutes in directly providing and coordinating critical care and extensive data review.  No time overlap and excludes procedures.    Discussed care with Dr. Painter, expanded to q4h neuro checks, stable for transfer to neuroscience

## 2025-02-12 NOTE — OP REPORT
DATE OF SERVICE:  02/11/2025     PREOPERATIVE DIAGNOSES:  T3 pathologic compression fracture with myelopathy   and paraparesis.     POSTOPERATIVE DIAGNOSES:  T3 pathologic compression fracture with myelopathy   and paraparesis.     PROCEDURES:  1.  Decompressive laminectomy L3-L4.  2.  Partial pediculectomy and facetectomy on the left and right with   decompression of metastatic tumor with compression of spinal cord.  3.  Instrumented stabilization C7 through T5 with the use of Medtronic ModuLex   instrumentation.  4.  Posterolateral arthrodesis with use of 15 mL of OsteoAMP fibers.  5.  O-arm -- Stealth navigated placement of pedicle screws at C7, T1, T2, T4,   and T5.     SURGEON:  Gorge Berkowitz MD     ASSISTANT:  MARI Xie     ANESTHESIA:  General anesthetic.     ANESTHESIOLOGIST:  Ralf Moya MD     PREPARATION:  The patient had somatosensory evoked potentials, motor evokes   and EMGs monitored as well as Rivero catheter.     REASON FOR ASSISTANCE:  During this procedure, protection of the dura as we   dissected underneath, and removed tumor tissue from around the dura itself was   required.  Opening up on this large incision was also facilitated by   coagulation, and hemostasis during this to maintain appropriate hemostasis and   limiting blood loss.     DESCRIPTION OF PROCEDURE:  The patient was brought to the operating room and   following induction, the patient was intubated and placed under general   anesthetic.  He was prepared for somatosensory evoked potentials, motor evokes   and EMGs.  Rivero catheter was sterilely placed.  Rolled prone onto the   operating table using the chest, hip, thigh pads.  All pressure points were   padded.  Sequential stockings were in place and his head had been secured in a   Bass head marques with pins.  We secured this to the Levo system and   placed the head and neck in a neutral position.  Shoulders were taped down to   side.  Suboccipital area was shaved and we  prepped the neck and thorax   sterilely.  Sterile draping was performed and timeout was performed.  We   injected with 30 mL of Marcaine 0.25% with epinephrine in the midline from   approximately C5 down to T5-T6.  A midline incision was made with the   PlasmaBlade and subperiosteal dissection was made after opening the dorsal   fascia at C7-T1, T1-T2, T2-T3, T3-T4, T4-T5 and T5-T6.  We got out lateral,   placed retractors to maintain exposure and obtained meticulous hemostasis.  We   placed a fiducial clamp onto the spinous process of T1 and did an O-arm spin.    This allowed us to visualize the spine and use Stealth navigation to drill   down the axis of the pedicle of C7 on the right and the left, followed by   drilling with the navigated 4.0 tap and placement of 4.5 x 25 mm navigated   screws into C7.  Into T1, we were able to drill down the axis of the pedicle   with the navigated Midas Luis, followed by the navigated 4.0 tap and placement   of 4.5 x 35 mm screw on the left and the right at T1.  The pedicles at T2 were   slightly larger.  We were able to drill down the axis of the pedicle   navigating with the Midas Luis, followed by the 4.5 tap and placement of 5.5 x   40 mm screw on the left and a 4.5 x 35 mm screw on the right.  We skipped T3   as it was pathologically involved.  We then went to T4 and T5 and drilled down   the axis of the pedicles in each case with the Midas Luis, followed by the 4.0   tap and then placement of 5.5 x 40 mm screw on the left at T4 and a 4.5 x 45   mm screw on the right at T4 and to T5 using this technique, we placed 5.5 x 45   mm screw on the left and right.  These were the ModuLex screws without the   tulips in place.  We dissected over the transverse processes and lateral   facets at each of these levels decorticated and obtained meticulous   hemostasis.     We then utilizing navigation identified T3 and T4 and removed the spinous   processes and then drilled the junction of  lamina facet to a thin shelf and   removed the center portion of bone with Leksell and Kerrisons decompressing   the thecal sac.  We decompressed widely, and removed a portion of the pedicle   by drilling this down to the floor and then decompressing the medial facet   going far lateral at T3-T4. We found epidural tumor compressing on the thecal   sac on the left than the right.  This was constrained by the posterior   longitudinal ligament.  We curetted this free and then removed the tumor   involvement of the bone, decompressing the thecal sac, first on the left and   then on the right.  We used ultrasound guidance.  There was a large fragment   of bone that was compressing the thecal sac centrally and we were able to pull   this to the midline with downbiting curettes.  We removed this with pituitary   rongeurs.  This was sent off for specimen.  At the end, the spinal cord was   to its original side and size in both the axial and sagittal planes and well   decompressed.     We used the reamers around the multiaxial heads and then placed our tulips   sequentially from C7-T5, skipping T3.  We then used 140 mm rods, which were   prebent, we had to take some of the lordotic bent out to fit into the   multiaxial heads, but once this fit appropriately, setscrews were secured at   T1 bilaterally, C7 bilaterally, T2 bilaterally, T4 bilaterally, and T5   bilaterally.  Set screws were broken off at appropriate torque.  7.5 mL of   OsteoAMP fibers were packed into the posterolateral gutters.     In our decompression, there had been a small dural tear on the right hand   side, although CSF was not seen.  So I wrapped on the right Duragen around   this area followed by Adherus glue.  A medium size Hemovac was placed and we   closed the fascia with #1 Vicryl suture, subcutaneous tissue with 2-0 Vicryl   and skin with staples.  All sponge and needle counts were correct.  Estimated   blood loss _____ mL. Due to the fact that we  had tumor involvement, we did not   utilize Cell Saver.        ______________________________  MD JHON CASTILLO/SANGEETHA    DD:  02/11/2025 18:02  DT:  02/11/2025 18:29    Job#:  [id number]    CC:HANG SHULTZ MD

## 2025-02-12 NOTE — DISCHARGE PLANNING
NOK/Emergency Contact is Pt's wife-Aurelio [phone] NO ACP Docs    Met with Pt at bedside, A&Ox4. Pt lives in a modular home in [place] with his wife who is currently WC bound. There are two steps up onto the deck of the home. PTA Pt used no AD with ambulation, no other DMW. Independent with all self care. He is a  for [employer] na makes approximately $2500.00 monthly. There is a paid Caregiver that helps his wife out at least four days a week. Pt has a wpghrm-Ecmtbx-mnu can provide transportation home on discharge. Pt also has a good friend who lives in [place] who can offer support. He feels he has adequate support. Pt is agreeable to post acute placement.    [facility] coverage is active.    Care Transition Team Assessment    Information Source  Orientation Level: Oriented X4  Information Given By: Patient  Who is responsible for making decisions for patient? : Patient    Readmission Evaluation  Is this a readmission?: No    Elopement Risk  Legal Hold: No  Ambulatory or Self Mobile in Wheelchair: No-Not an Elopement Risk  Elopement Risk: Not at Risk for Elopement      Discharge Preparedness  What is your plan after discharge?: Uncertain - pending medical team collaboration  What are your discharge supports?: Spouse, Child  Prior Functional Level: Ambulatory, Drives Self, Independent with Activities of Daily Living, Independent with Medication Management  Difficulity with ADLs: None  Difficulity with IADLs: None    Functional Assesment  Prior Functional Level: Ambulatory, Drives Self, Independent with Activities of Daily Living, Independent with Medication Management    Finances  Financial Barriers to Discharge: No  Prescription Coverage: Yes      Advance Directive  Advance Directive?: None  Advance Directive offered?: AD Booklet refused    Domestic Abuse  Have you ever been the victim of abuse or violence?: No    Psychological Assessment  History of Substance  Abuse: None  History of Psychiatric Problems: No  Non-compliant with Treatment: No  Newly Diagnosed Illness: Yes    Discharge Risks or Barriers  Patient risk factors: Cognitive / sensory / physical deficit    Anticipated Discharge Information  Discharge Disposition: Disch to IP rehab facility or distinct part unit (62)  Discharge Contact Phone Number: Tatiana Carey [phone]

## 2025-02-12 NOTE — PROGRESS NOTES
Neurosurgery Progress Note    Subjective:  AAO, oob earlierwith therapy - only able to sit at eob. Bilat thighs with decreased sensation, also noted lateral aspect of left leg to above ankle    Exam:  AAO, cooperative, fc's  Right df/pf 5/5, IP 4-  Left df/pf 4-, IP 4-  Drain 30cc    BP  Min: 111/58  Max: 175/65  Pulse  Av.3  Min: 69  Max: 96  Resp  Av.5  Min: 3  Max: 27  Temp  Av.4 °C (97.5 °F)  Min: 36 °C (96.8 °F)  Max: 36.7 °C (98.1 °F)  SpO2  Av.7 %  Min: 93 %  Max: 99 %    No data recorded    Recent Labs     25   WBC 5.8 9.0 12.6*   RBC 4.36* 4.42* 3.92*   HEMOGLOBIN 12.2* 12.5* 10.9*   HEMATOCRIT 36.4* 37.6* 33.7*   MCV 83.5 85.1 86.0   MCH 28.0 28.3 27.8   MCHC 33.5 33.2 32.3   RDW 39.3 41.6 41.6   PLATELETCT 124* 139* 132*   MPV 14.1* 14.2*  --      Recent Labs     25  15325  0500   SODIUM 142 138 137   POTASSIUM 3.6 4.2 4.6   CHLORIDE 107 104 105   CO2 23 22 23   GLUCOSE 133* 164* 145*   BUN 17 19 22   CREATININE 0.77 0.86 0.89   CALCIUM 9.8 9.5 9.2     Recent Labs     250 25  0427   APTT 28.2 28.3  --    INR 1.14*  --  1.18*           Intake/Output                         25 - 2559 25 07 - 25 0659     0721-9805 0262-3165 Total 4412-6896 8066-7469 Total                 Intake    P.O.  360  480 840  --  -- --    P.O. 360 480 840 -- -- --    I.V.  1655  -- 1655  --  -- --    Magnesium Sulfate Volume 55 -- 55 -- -- --    Volume (mL) (Lactated Ringers) 1600 -- 1600 -- -- --    IV Piggyback  --  -- --  100  -- 100    Volume (mL) (ceFAZolin (Ancef) 2 g in  mL IVPB) -- -- -- 100 -- 100    Total Intake  480 2495 100 -- 100       Output    Urine  1025  500 1525  120  -- 120    Urine 300 -- 300 -- -- --    Output (mL) (Urethral Catheter Temperature probe;Non-latex 16 Fr.)  120 -- 120    Drains  110  130 240  --  -- --    Output (mL)  (Closed/Suction Drain Superior Back Hemovac) 110 130 240 -- -- --    Blood  400  -- 400  --  -- --    Est. Blood Loss 400 -- 400 -- -- --    Total Output 5826 868 3371 120 -- 120       Net I/O     480 -150 330 -20 -- -20              Intake/Output Summary (Last 24 hours) at 2/12/2025 1134  Last data filed at 2/12/2025 1000  Gross per 24 hour   Intake 2540 ml   Output 1585 ml   Net 955 ml             bisacodyl  10 mg DAILY AT 1800    senna-docusate  2 Tablet BID    polyethylene glycol/lytes  1 Packet QDAY PRN    magnesium hydroxide  30 mL QDAY PRN    dexamethasone  4 mg Q4HRS    [Held by provider] labetalol  10 mg Q4HRS PRN    rosuvastatin  10 mg Q EVENING    NORepinephrine  0-1 mcg/kg/min (Ideal) Continuous    acetaminophen  650 mg Q6HRS    insulin lispro  2-9 Units Q6HRS    And    dextrose bolus  25 g Q15 MIN PRN    Pharmacy Consult Request  1 Each PHARMACY TO DOSE    MD ALERT...DO NOT ADMINISTER NSAIDS or ASPIRIN unless ORDERED By Neurosurgery  1 Each PRN    docusate sodium  100 mg BID    bisacodyl  10 mg Q24HRS PRN    morphine   Continuous    methocarbamol  750 mg Q8HRS PRN    Or    cyclobenzaprine  10 mg Q8HRS PRN    Or    diazePAM  5 mg Q4HRS PRN    lidocaine  1 Patch Q24HR       Assessment and Plan:  POD # 1 C7 - T5 fusion  NM as above  Mobilize with therapies  Brace when oob   Transition to oral meds  Monitor drain  Prophylactic anticoagulation: no         Start date/time: tbd     Brain Compression: No

## 2025-02-12 NOTE — PROGRESS NOTES
Paged Elizabeth NP to inquire if she wants patient to transfer out of ICU. Patient said that this was communicated to him however there is no mention in the note.

## 2025-02-12 NOTE — PROGRESS NOTES
Clarified with Elizabeth FIGUEROA that from the NS standpoint patient is okay to transfer out of ICU. Updated Dr. Welch.

## 2025-02-12 NOTE — ANESTHESIA POSTPROCEDURE EVALUATION
Patient: Maximino Green    Procedure Summary       Date: 02/11/25 Room / Location: [facility] 04 / SURGERY [facility]    Anesthesia Start: 0719 Anesthesia Stop: 1200    Procedures:       LAMINECTOMY, SPINE, THORACIC - T2-T4 Decompressive (Back)      FUSION, SPINE, THORACIC, POSTERIOR APPROACH, WITH O-ARM IMAGING GUIDANCE (Back) Diagnosis: (T4 pathologic fx)    Surgeons: Gorge Berkowitz M.D. Responsible Provider: Ralf Moya M.D.    Anesthesia Type: general ASA Status: 3 - Emergent            Final Anesthesia Type: general  Last vitals  BP   Blood Pressure: (!) 155/72    Temp   36.2 °C (97.1 °F)    Pulse   85   Resp   17    SpO2   96 %      Anesthesia Post Evaluation    Patient location during evaluation: PACU  Patient participation: complete - patient participated  Level of consciousness: awake and alert  Pain score: 4    Airway patency: patent  Anesthetic complications: no  Cardiovascular status: hemodynamically stable  Respiratory status: acceptable  Hydration status: euvolemic    PONV: none          No notable events documented.     Nurse Pain Score: 4 (NPRS)

## 2025-02-13 LAB
ANION GAP SERPL CALC-SCNC: 7 MMOL/L (ref 7–16)
BUN SERPL-MCNC: 33 MG/DL (ref 8–22)
CALCIUM SERPL-MCNC: 9 MG/DL (ref 8.5–10.5)
CHLORIDE SERPL-SCNC: 106 MMOL/L (ref 96–112)
CO2 SERPL-SCNC: 26 MMOL/L (ref 20–33)
CREAT SERPL-MCNC: 0.88 MG/DL (ref 0.5–1.4)
ERYTHROCYTE [DISTWIDTH] IN BLOOD BY AUTOMATED COUNT: 40.2 FL (ref 35.9–50)
GFR SERPLBLD CREATININE-BSD FMLA CKD-EPI: 96 ML/MIN/1.73 M 2
GLUCOSE BLD STRIP.AUTO-MCNC: 141 MG/DL (ref 65–99)
GLUCOSE BLD STRIP.AUTO-MCNC: 155 MG/DL (ref 65–99)
GLUCOSE BLD STRIP.AUTO-MCNC: 159 MG/DL (ref 65–99)
GLUCOSE BLD STRIP.AUTO-MCNC: 159 MG/DL (ref 65–99)
GLUCOSE SERPL-MCNC: 186 MG/DL (ref 65–99)
HCT VFR BLD AUTO: 31 % (ref 42–52)
HGB BLD-MCNC: 10.1 G/DL (ref 14–18)
MCH RBC QN AUTO: 27.4 PG (ref 27–33)
MCHC RBC AUTO-ENTMCNC: 32.6 G/DL (ref 32.3–36.5)
MCV RBC AUTO: 84 FL (ref 81.4–97.8)
PLATELET # BLD AUTO: 142 K/UL (ref 164–446)
PMV BLD AUTO: 14.4 FL (ref 9–12.9)
POTASSIUM SERPL-SCNC: 5.3 MMOL/L (ref 3.6–5.5)
RBC # BLD AUTO: 3.69 M/UL (ref 4.7–6.1)
SODIUM SERPL-SCNC: 139 MMOL/L (ref 135–145)
WBC # BLD AUTO: 12.5 K/UL (ref 4.8–10.8)

## 2025-02-13 PROCEDURE — A9270 NON-COVERED ITEM OR SERVICE: HCPCS | Performed by: STUDENT IN AN ORGANIZED HEALTH CARE EDUCATION/TRAINING PROGRAM

## 2025-02-13 PROCEDURE — 700111 HCHG RX REV CODE 636 W/ 250 OVERRIDE (IP): Performed by: STUDENT IN AN ORGANIZED HEALTH CARE EDUCATION/TRAINING PROGRAM

## 2025-02-13 PROCEDURE — A9270 NON-COVERED ITEM OR SERVICE: HCPCS | Performed by: NURSE PRACTITIONER

## 2025-02-13 PROCEDURE — 700102 HCHG RX REV CODE 250 W/ 637 OVERRIDE(OP)

## 2025-02-13 PROCEDURE — 82962 GLUCOSE BLOOD TEST: CPT

## 2025-02-13 PROCEDURE — 99254 IP/OBS CNSLTJ NEW/EST MOD 60: CPT | Performed by: STUDENT IN AN ORGANIZED HEALTH CARE EDUCATION/TRAINING PROGRAM

## 2025-02-13 PROCEDURE — 700102 HCHG RX REV CODE 250 W/ 637 OVERRIDE(OP): Performed by: NURSE PRACTITIONER

## 2025-02-13 PROCEDURE — 36415 COLL VENOUS BLD VENIPUNCTURE: CPT

## 2025-02-13 PROCEDURE — 770006 HCHG ROOM/CARE - MED/SURG/GYN SEMI*

## 2025-02-13 PROCEDURE — 700102 HCHG RX REV CODE 250 W/ 637 OVERRIDE(OP): Performed by: STUDENT IN AN ORGANIZED HEALTH CARE EDUCATION/TRAINING PROGRAM

## 2025-02-13 PROCEDURE — 97530 THERAPEUTIC ACTIVITIES: CPT

## 2025-02-13 PROCEDURE — A9270 NON-COVERED ITEM OR SERVICE: HCPCS

## 2025-02-13 PROCEDURE — 97535 SELF CARE MNGMENT TRAINING: CPT

## 2025-02-13 PROCEDURE — 80048 BASIC METABOLIC PNL TOTAL CA: CPT

## 2025-02-13 PROCEDURE — 99255 IP/OBS CONSLTJ NEW/EST HI 80: CPT | Performed by: PHYSICAL MEDICINE & REHABILITATION

## 2025-02-13 PROCEDURE — 85027 COMPLETE CBC AUTOMATED: CPT

## 2025-02-13 RX ORDER — METOPROLOL SUCCINATE 25 MG/1
50 TABLET, EXTENDED RELEASE ORAL DAILY
Status: DISCONTINUED | OUTPATIENT
Start: 2025-02-13 | End: 2025-02-14 | Stop reason: HOSPADM

## 2025-02-13 RX ORDER — ACETAMINOPHEN 500 MG
1000 TABLET ORAL 3 TIMES DAILY
Status: DISCONTINUED | OUTPATIENT
Start: 2025-02-13 | End: 2025-02-14 | Stop reason: HOSPADM

## 2025-02-13 RX ORDER — DEXAMETHASONE 4 MG/1
4 TABLET ORAL EVERY 6 HOURS
Status: DISCONTINUED | OUTPATIENT
Start: 2025-02-13 | End: 2025-02-14 | Stop reason: HOSPADM

## 2025-02-13 RX ORDER — HYDRALAZINE HYDROCHLORIDE 20 MG/ML
20 INJECTION INTRAMUSCULAR; INTRAVENOUS EVERY 6 HOURS PRN
Status: DISCONTINUED | OUTPATIENT
Start: 2025-02-13 | End: 2025-02-14 | Stop reason: HOSPADM

## 2025-02-13 RX ORDER — LISINOPRIL 5 MG/1
5 TABLET ORAL DAILY
Status: DISCONTINUED | OUTPATIENT
Start: 2025-02-13 | End: 2025-02-14 | Stop reason: HOSPADM

## 2025-02-13 RX ADMIN — DEXAMETHASONE 4 MG: 4 TABLET ORAL at 18:03

## 2025-02-13 RX ADMIN — OXYCODONE HYDROCHLORIDE 10 MG: 10 TABLET ORAL at 13:54

## 2025-02-13 RX ADMIN — ACETAMINOPHEN 1000 MG: 500 TABLET ORAL at 18:01

## 2025-02-13 RX ADMIN — DEXAMETHASONE SODIUM PHOSPHATE 4 MG: 4 INJECTION INTRA-ARTICULAR; INTRALESIONAL; INTRAMUSCULAR; INTRAVENOUS; SOFT TISSUE at 09:59

## 2025-02-13 RX ADMIN — INSULIN LISPRO 2 UNITS: 100 INJECTION, SOLUTION INTRAVENOUS; SUBCUTANEOUS at 13:45

## 2025-02-13 RX ADMIN — DEXAMETHASONE SODIUM PHOSPHATE 4 MG: 4 INJECTION INTRA-ARTICULAR; INTRALESIONAL; INTRAMUSCULAR; INTRAVENOUS; SOFT TISSUE at 06:19

## 2025-02-13 RX ADMIN — BISACODYL 10 MG: 10 SUPPOSITORY RECTAL at 17:56

## 2025-02-13 RX ADMIN — INSULIN LISPRO 2 UNITS: 100 INJECTION, SOLUTION INTRAVENOUS; SUBCUTANEOUS at 00:45

## 2025-02-13 RX ADMIN — ACETAMINOPHEN 650 MG: 325 TABLET ORAL at 06:20

## 2025-02-13 RX ADMIN — ACETAMINOPHEN 650 MG: 325 TABLET ORAL at 00:45

## 2025-02-13 RX ADMIN — SENNOSIDES AND DOCUSATE SODIUM 2 TABLET: 50; 8.6 TABLET ORAL at 18:01

## 2025-02-13 RX ADMIN — ROSUVASTATIN CALCIUM 10 MG: 5 TABLET, FILM COATED ORAL at 18:04

## 2025-02-13 RX ADMIN — OXYCODONE HYDROCHLORIDE 10 MG: 10 TABLET ORAL at 00:45

## 2025-02-13 RX ADMIN — OXYCODONE HYDROCHLORIDE 10 MG: 10 TABLET ORAL at 09:58

## 2025-02-13 RX ADMIN — INSULIN LISPRO 2 UNITS: 100 INJECTION, SOLUTION INTRAVENOUS; SUBCUTANEOUS at 06:20

## 2025-02-13 RX ADMIN — DOCUSATE SODIUM 100 MG: 100 CAPSULE, LIQUID FILLED ORAL at 06:20

## 2025-02-13 RX ADMIN — DEXAMETHASONE SODIUM PHOSPHATE 4 MG: 4 INJECTION INTRA-ARTICULAR; INTRALESIONAL; INTRAMUSCULAR; INTRAVENOUS; SOFT TISSUE at 02:43

## 2025-02-13 RX ADMIN — ACETAMINOPHEN 1000 MG: 500 TABLET ORAL at 13:40

## 2025-02-13 RX ADMIN — SENNOSIDES AND DOCUSATE SODIUM 2 TABLET: 50; 8.6 TABLET ORAL at 06:20

## 2025-02-13 RX ADMIN — DEXAMETHASONE SODIUM PHOSPHATE 4 MG: 4 INJECTION INTRA-ARTICULAR; INTRALESIONAL; INTRAMUSCULAR; INTRAVENOUS; SOFT TISSUE at 13:40

## 2025-02-13 ASSESSMENT — COGNITIVE AND FUNCTIONAL STATUS - GENERAL
SUGGESTED CMS G CODE MODIFIER MOBILITY: CM
STANDING UP FROM CHAIR USING ARMS: TOTAL
DRESSING REGULAR UPPER BODY CLOTHING: A LOT
MOVING FROM LYING ON BACK TO SITTING ON SIDE OF FLAT BED: A LOT
MOVING TO AND FROM BED TO CHAIR: TOTAL
SUGGESTED CMS G CODE MODIFIER DAILY ACTIVITY: CK
EATING MEALS: A LITTLE
DRESSING REGULAR LOWER BODY CLOTHING: A LOT
TOILETING: A LOT
WALKING IN HOSPITAL ROOM: TOTAL
TURNING FROM BACK TO SIDE WHILE IN FLAT BAD: A LOT
DAILY ACTIVITIY SCORE: 14
CLIMB 3 TO 5 STEPS WITH RAILING: TOTAL
MOBILITY SCORE: 8
HELP NEEDED FOR BATHING: A LOT
PERSONAL GROOMING: A LITTLE

## 2025-02-13 ASSESSMENT — ENCOUNTER SYMPTOMS
CARDIOVASCULAR NEGATIVE: 1
SENSORY CHANGE: 1
CONSTITUTIONAL NEGATIVE: 1
GASTROINTESTINAL NEGATIVE: 1
EYES NEGATIVE: 1
RESPIRATORY NEGATIVE: 1
PSYCHIATRIC NEGATIVE: 1
BACK PAIN: 1

## 2025-02-13 ASSESSMENT — GAIT ASSESSMENTS: GAIT LEVEL OF ASSIST: UNABLE TO PARTICIPATE

## 2025-02-13 NOTE — THERAPY
Physical Therapy   Daily Treatment     Patient Name: Maximino Green  Age:  64 y.o., Sex:  male  Medical Record #: [id number]  Today's Date: 2/13/2025     Precautions  Precautions: Fall Risk;Spinal / Back Precautions ;Cervical Collar  ;TLSO (Thoracolumbosacral orthosis)  Comments:  donned EOB    Assessment    Patient seen for PT treatment session. Patient in bed, agreeable for the session. Able to participate with bed mobility,  EOB sitting, sit-stand attempt within divina steady as detailed below. Will continue to benefit from PT services and recommend post-acute placement at this time.      Plan    Treatment Plan Status: Continue Current Treatment Plan  Type of Treatment: Bed Mobility, Equipment, Gait Training, Family / Caregiver Training, Neuro Re-Education / Balance, Orthotics Training , Self Care / Home Evaluation, Therapeutic Activities, Stair Training, Therapeutic Exercise  Treatment Frequency: 5 Times per Week  Treatment Duration: Until Therapy Goals Met    DC Equipment Recommendations: Unable to determine at this time  Discharge Recommendations: Recommend post-acute placement for additional physical therapy services prior to discharge home    Objective     02/13/25 0957   Time In/Time Out   Therapy Start Time 0925   Therapy End Time 0957   Total Therapy Time 32   Precautions   Precautions Fall Risk;Spinal / Back Precautions ;Cervical Collar  ;TLSO (Thoracolumbosacral orthosis);Other (See Comments)   Comments  brace, Don EOB, OOB more than 5 mins, Hemovac (+), MAP goal >85, EF 30%   Vitals   O2 Delivery Device Nasal Cannula   Pain   Pain Scales 0 to 10 Scale    Intervention Rest;Repositioned;Nurse Notified   Pain 0 - 10 Group   Location Back   Therapist Pain Assessment Prior to Activity;During Activity;Post Activity;Nurse Notified   Cognition    Level of Consciousness Alert   Passive ROM Lower Body   Passive ROM Lower Body WDL   Active ROM Lower Body    Active ROM Lower Body  X   Comments Seated EOB: L  hip flexion <90 degree, L knee extension-about 30 degree, L ankle DF/PF-very minimal; R hip flexion < 100 degree, R knee extensio-partial ROM, R ankle DF/PF-partial ROM   Strength Lower Body   Lower Body Strength  X   Comments Grossly L hip flexor 2-/5, L knee flexor 2-/5, L knee extensor 3-/5, L ankle DF/PF 2-/5; R hip flexor 3-/5, R knee flexor/R knee extensor 3+/5, R ankle DF/PF 3+/5   Sensation Lower Body   Lower Extremity Sensation   X   Comments Reports of decreased sensation below mid trunk, worse on LLE as compared to RLE, worse in medial thigh, lateral thigh.   Other Treatments   Other Treatments Provided Reviewed spinal/back precautions; assisted with don & doff of  brace; discussed regarding short term and long term goals-being able to stand upright with assistance and being able to walk with assistance and assistive device; reinforced importance of daily & frequent mobility, frequent position changes every 1-2 hours to prevent skin breakdown. Discussed about DC recommendations, patient agreeable for rehab placement. Assisted with appropriate positioning in bed with use of wedges and pillows at end of session.   Balance   Sitting Balance (Static) Poor +   Sitting Balance (Dynamic) Poor -   Standing Balance (Static) Dependent   Weight Shift Sitting Poor   Weight Shift Standing Absent   Skilled Intervention Verbal Cuing;Tactile Cuing;Sequencing;Facilitation;Compensatory Strategies   Comments Seated EOB: BUE support on bed rail; Partial Standing within divina steady   Bed Mobility    Supine to Sit Maximal Assist   Sit to Supine Maximal Assist   Scooting Maximal Assist   Rolling Maximal Assist to Rt.;Maximum Assist to Lt.   Skilled Intervention Verbal Cuing;Sequencing;Facilitation;Compensatory Strategies   Comments HOB raised, use of bed rail, towards R side; x2 person for safety   Gait Analysis   Gait Level Of Assist Unable to Participate   Functional Mobility   Sit to Stand Total Assist  (x2 person assist)    Mobility Bed-EOB sitting-sit-stand attempt within divina steady-EOB-bed-HOB raised   Skilled Intervention Verbal Cuing;Tactile Cuing;Sequencing;Facilitation;Compensatory Strategies   Comments Sit-stand: able to clear his pelvis off the bed with assistance, increased difficulty with trunk extension, LE extension and pelvic tilt.   6 Clicks Assessment - How much HELP from from another person do you currently need... (If the patient hasn't done an activity recently, how much help from another person do you think he/she would need if he/she tried?)   Turning from your back to your side while in a flat bed without using bedrails? 2   Moving from lying on your back to sitting on the side of a flat bed without using bedrails? 2   Moving to and from a bed to a chair (including a wheelchair)? 1   Standing up from a chair using your arms (e.g., wheelchair, or bedside chair)? 1   Walking in hospital room? 1   Climbing 3-5 steps with a railing? 1   6 clicks Mobility Score 8   Patient / Family Goals    Patient / Family Goal #1 To be able to stand and walk   Goal #1 Outcome Progressing slower than expected   Short Term Goals    Short Term Goal # 1 pt will perform supine <> sit with Min A in 6 visits   Goal Outcome # 1 Progressing slower than expected   Short Term Goal # 2 Pt will maintain Fair seated balance with intermittent verbal cues x3 minutes to progress functional activity in 6 visits   Goal Outcome # 2 Progressing slower than expected   Short Term Goal # 3 pt will perform sit <> stand and functional transfers with FWW and Mod A in 6 visits   Goal Outcome # 3 Progressing slower than expected   Short Term Goal # 4 pt will ambulate 10 ft with FWW and Mod A in 6 visits   Goal Outcome # 4 Goal not met   Physical Therapy Treatment Plan   Physical Therapy Treatment Plan Continue Current Treatment Plan   Anticipated Discharge Equipment and Recommendations   DC Equipment Recommendations Unable to determine at this time   Discharge  Recommendations Recommend post-acute placement for additional physical therapy services prior to discharge home   Interdisciplinary Plan of Care Collaboration   IDT Collaboration with  Nursing;Occupational Therapist   Patient Position at End of Therapy In Bed;Bed Alarm On;Call Light within Reach;Tray Table within Reach   Session Information   Date / Session Number  2/13-2(2/5, 2/18)     Patient seen for team treatment with Occupational Therapist for the following reason(s):  Patient required 2 person assistance for safety and to provide effective interventions. Each discipline assisted patient with appropriate and separate goals. Due to the medical complexity, the skill of both practitioners is needed to monitor vitals, patient status, and adjust the intervention to fit the patient's needs and goals. Physical Therapist facilitated bed mobility, EOB sitting posture & balance, sit-stand attempt while Occupational Therapist simultaneously treated pt according to POC.

## 2025-02-13 NOTE — CONSULTS
Hospital Medicine Consultation    Date of Service  2/13/2025    Referring Physician  Chanel Welch M.D.     Consulting Physician  Vinicio Awad M.D.    Reason for Consultation  ICU downgrade     History of Presenting Illness  64 y.o. male with a past medical history of HTN, Afib (not on AC), type 2 DM, chronic normocytic anemia, chronic HFrEF, hx of chemotherapy induced pancytopenia and stage II (T3, N2, M0, p16 positive) tonsil cancer status post radiation/chemo treatment in 2022 was admitted to the ICU on 2/11/2025 for T3 and T4 metastasis with pathological collapse of T4 vertebral body and epidural tumor spread levels of T2-T4 with severe canal compromise at T3 noted on MRI.  He was started on intravenous systemic steroids.  Neurosurgery following for which patient underwent decompressive laminectomy L3-L4, and instrumented stabilization of C7-T5 on 2/11/2025.  Patient requiring PCA pain pump.  He was ultimately transferred to the neurological floor on 2/12/2025.    On bedside interview, patient enjoying meal and in no acute distress.  Reported marked improvement with back pain.  Stable vitals and on 1 L nasal cannula.  Leukocytosis secondary to steroids and chemistry stable.  Hospital medicine consulted for continuity of care.    Review of Systems  Review of Systems   Constitutional: Negative.    HENT: Negative.     Eyes: Negative.    Respiratory: Negative.     Cardiovascular: Negative.    Gastrointestinal: Negative.    Genitourinary: Negative.    Musculoskeletal:  Positive for back pain.   Skin: Negative.    Neurological:  Positive for sensory change.   Endo/Heme/Allergies: Negative.    Psychiatric/Behavioral: Negative.         Past Medical History   has a past medical history of Alcoholic (HCC), Cancer (HCC) (2019), Dental disorder, Diabetes (HCC), High cholesterol (11/01/2022), Hypertension, Paroxysmal atrial fibrillation (HCC), and Tonsil cancer (HCC).    Surgical History   has a  past surgical history that includes cholecystectomy (2003); other (12/27/2018); thoracic laminectomy (2/11/2025); and thoracic fusion o-arm (2/11/2025).    Family History  family history includes Alcohol abuse in his mother; Cancer in his mother and sister; GI Disease in his sister; Heart Disease in his father; Respiratory Disease in his mother.    Social History   reports that he quit smoking about 6 years ago. His smoking use included cigarettes and cigars. He started smoking about 46 years ago. He has a 4 pack-year smoking history. He has never used smokeless tobacco. He reports that he does not drink alcohol and does not use drugs.    Medications  Prior to Admission Medications   Prescriptions Last Dose Informant Patient Reported? Taking?   acetaminophen (TYLENOL) 500 MG Tab   No No   Sig: Take 2 Tablets by mouth every 6 hours as needed for Moderate Pain for up to 4 days.   acetaminophen (TYLENOL) 500 MG Tab 2/10/2025 Morning Patient Yes Yes   Sig: Take 500-1,000 mg by mouth every 6 hours as needed for Mild Pain.   cyclobenzaprine (FLEXERIL) 10 mg Tab 2/10/2025 Evening Patient Yes Yes   Sig: Take 10 mg by mouth 3 times a day as needed for Mild Pain.   ibuprofen (MOTRIN) 800 MG Tab 2/10/2025 Morning Patient Yes Yes   Sig: Take 800 mg by mouth every 8 hours as needed for Mild Pain or Moderate Pain.   lidocaine (LIDODERM) 5 % Patch  Patient No No   Sig: Place 1 Patch on the skin every 24 hours for 5 days.   lisinopril (PRINIVIL) 5 MG Tab 2/10/2025 Evening Patient No Yes   Sig: Take 1 Tablet by mouth every day. For high blood pressure   metoprolol SR (TOPROL XL) 50 MG TABLET SR 24 HR 2/10/2025 Evening Patient No Yes   Sig: Take 1 Tablet by mouth every day.   rosuvastatin (CRESTOR) 10 MG Tab 2/10/2025 Evening Patient No Yes   Sig: Take 1 Tablet by mouth every evening.      Facility-Administered Medications: None       Allergies  No Known Allergies    Physical Exam  Temp:  [36.1 °C (97 °F)-37 °C (98.6 °F)] 36.9 °C  (98.4 °F)  Pulse:  [66-88] 66  Resp:  [11-36] 18  BP: (134-164)/(56-74) 136/56  SpO2:  [90 %-95 %] 91 %    Physical Exam  Constitutional:       General: He is not in acute distress.     Appearance: Normal appearance.   HENT:      Head: Normocephalic and atraumatic.      Nose: Nose normal. No congestion.      Mouth/Throat:      Mouth: Mucous membranes are moist.   Eyes:      Extraocular Movements: Extraocular movements intact.      Pupils: Pupils are equal, round, and reactive to light.   Neck:      Comments: Collar in place  Cardiovascular:      Rate and Rhythm: Normal rate and regular rhythm.      Pulses: Normal pulses.      Heart sounds: Normal heart sounds.   Pulmonary:      Effort: Pulmonary effort is normal.      Breath sounds: Normal breath sounds.   Abdominal:      General: Bowel sounds are normal.      Palpations: Abdomen is soft.      Tenderness: There is no abdominal tenderness.   Musculoskeletal:         General: No swelling. Normal range of motion.      Cervical back: Normal range of motion and neck supple.   Skin:     General: Skin is warm.      Coloration: Skin is not jaundiced.   Neurological:      General: No focal deficit present.      Mental Status: He is alert and oriented to person, place, and time. Mental status is at baseline.      Cranial Nerves: No cranial nerve deficit.   Psychiatric:         Mood and Affect: Mood normal.         Behavior: Behavior normal.         Thought Content: Thought content normal.         Judgment: Judgment normal.         Fluids      Laboratory  Recent Labs     02/11/25  0117 02/11/25  1530 02/12/25  0427 02/13/25  0504   WBC 5.8 9.0 12.6* 12.5*   RBC 4.36* 4.42* 3.92* 3.69*   HEMOGLOBIN 12.2* 12.5* 10.9* 10.1*   HEMATOCRIT 36.4* 37.6* 33.7* 31.0*   MCV 83.5 85.1 86.0 84.0   MCH 28.0 28.3 27.8 27.4   MCHC 33.5 33.2 32.3 32.6   RDW 39.3 41.6 41.6 40.2   PLATELETCT 124* 139* 132* 142*   MPV 14.1* 14.2*  --  14.4*     Recent Labs     02/11/25  1530 02/12/25  0500  02/13/25  0504   SODIUM 138 137 139   POTASSIUM 4.2 4.6 5.3   CHLORIDE 104 105 106   CO2 22 23 26   GLUCOSE 164* 145* 186*   BUN 19 22 33*   CREATININE 0.86 0.89 0.88   CALCIUM 9.5 9.2 9.0     Recent Labs     02/11/25  0117 02/11/25  1530 02/12/25  0427   APTT 28.2 28.3  --    INR 1.14*  --  1.18*                 Imaging  CT-CHEST,ABDOMEN,PELVIS WITH   Final Result         1.  Extensive bilateral pulmonary nodules with areas of confluent nodular consolidation, appearance compatible with metastatic disease.   2.  Retroperitoneal adenopathy, consider causes of adenopathy with additional workup as clinically appropriate   3.  Common bile duct dilatation, commonly associated with postcholecystectomy physiology, consider causes of biliary obstruction with additional workup as clinically appropriate   4.  Irregular hepatic contour, compatible changes of cirrhosis   5.  Retroperitoneal varices, appearance favoring changes related to portal hypertension   6.  Right nephrolithiasis without visualized obstructive changes   7.  Fat-containing left inguinal hernia   8.  Atherosclerosis and atherosclerotic coronary artery disease      DX-O-ARM   Final Result      Portable O-arm utilized for 6 seconds.      INTERPRETING LOCATION: [address] , [place], [address]      DX-THORACIC SPINE-2 VIEWS   Final Result      Digitized intraoperative radiograph is submitted for review. This examination is not for diagnostic purpose but for guidance during a surgical procedure. Please see the patient's chart for full procedural details.         INTERPRETING LOCATION: [address] , [place], [address]      DX-PORTABLE FLUORO > 1 HOUR   Final Result      Portable fluoroscopy utilized for 3 seconds.      INTERPRETING LOCATION: [address] , [place], [address]      MR-THORACIC SPINE-WITH & W/O   Final Result      1.  T3 and T4 metastasis with pathological collapse of T4 vertebral body.   2.  Epidural tumor spread at the levels of T2, T3 and T4.   3.  Severe  canal compromise at the level of T3.   4.  Spinal cord edema at the levels of T3, T4 and T5.   5.  Multiple enhancing pulmonary nodules consistent with metastasis.      CT-LSPINE W/O PLUS RECONS   Final Result         1.  No acute traumatic bony injury of the lumbar spine.   2.  Moderate bilateral neural foraminal stenosis at L5/S1   3.  Right nephrolithiasis without visualized obstructive changes   4.  Retroperitoneal adenopathy, consider causes of adenopathy with additional workup as clinically appropriate.   5.  Diverticulosis   6.  Atherosclerotic changes are seen      CT-TSPINE W/O PLUS RECONS   Final Result         1.  Lytic changes of the T3 vertebral body with anterior wedge compression deformity at 5.3 mm retropulsion, appearance favoring compression fracture with retropulsion and narrowing of the central canal.   2.  Pulmonary nodules, given history concerning for metastatic disease. Additional workup as clinically appropriate.   3.  Right nephrolithiasis without visualized obstructive changes   4.  Atherosclerosis and atherosclerotic coronary artery disease      These findings were discussed with the patient's clinician, Yamil Howell, on 2/11/2025 1:17 AM.      CT-CSPINE WITHOUT PLUS RECONS   Final Result         1.  Multilevel degenerative changes of the cervical spine limit diagnostic sensitivity of this examination, otherwise no acute traumatic bony injury of the cervical spine is apparent.   2.  See dedicated CT thoracic spine for thoracic spine findings.   3.  Pulmonary nodules, appearance concerning for metastatic disease given history of malignancy, see nodule follow-up recommendations below.      Fleischner Society pulmonary nodule recommendations:   Low Risk: CT at 3-6 months, then consider CT at 18-24 months      High Risk: CT at 3-6 months, then at 18-24 months      Comments: Use most suspicious nodule as guide to management. Follow-up intervals may vary according to size and risk.      Low  Risk - Minimal or absent history of smoking and of other known risk factors.      High Risk - History of smoking or of other known risk factors.      Note: These recommendations do not apply to lung cancer screening, patients with immunosuppression, or patients with known primary cancer.      Fleischner Society 2017 Guidelines for Management of Incidentally Detected Pulmonary Nodules in Adults      CT-HEAD W/O   Final Result         1.  No acute intracranial abnormality.   2.  Atherosclerosis.               EC-ECHOCARDIOGRAM COMPLETE W/O CONT    (Results Pending)       Assessment/Plan  * Spinal cord compression (HCC)- (present on admission)  Assessment & Plan  T3 vertebral body anterior wedge compression fracture with 5 mm of retropulsion and narrowing of the central canal.    Neurosurgery consulted; appreciate recommendations.  N.p.o., holding DVT prophylaxis, blood thinners.  Due to future procedure  MRI ordered, pending at this time.  Dexamethasone  Levophed with map goal of 85 per neurosurgical recommendations.  Holding antihypertensives at this time  Pain management protocol.  Frequent neurological assessments    Other cirrhosis of liver (HCC)  Assessment & Plan  Suspect secondary to congestive hepatopathy given evidence of biventricular dysfunction.  There is evidence of retroperitoneal varices, appearance favoring changes related to portal hypertension   Ordered hep serologies  Follow-up echocardiogram    Pulmonary nodules/lesions, multiple  Assessment & Plan  Noted on CT spine to have multiple pulmonary nodules >6 mm in b/l apices.   CT chest abdomen pelvis demonstrating multiple, diffuse central and peripheral consolidated pulmonary nodules highly suspicious for metastatic disease  Pathology from surgical procedure pending, then will need follow-up with oncology    Longstanding persistent atrial fibrillation (HCC)- (present on admission)  Assessment & Plan  Holding home medications for rate control.  Does  not appear to be on any blood thinners.    Tonsil cancer (HCC)- (present on admission)  Assessment & Plan  Significant history noted.  Significant pulmonary nodules noted on CT scan concerning for metastasis.  Consider oncology consult versus outpatient follow-up    Hypothyroidism- (present on admission)  Assessment & Plan  Subclinical. Currently not on synthroid  Last TSH 11 with nl free T4 in 2022     Acute systolic heart failure (HCC)- (present on admission)  Assessment & Plan  Hold beta-blocker therapy and lisinopril for now    Essential hypertension- (present on admission)  Assessment & Plan  Continue home lisinopril  As needed antihypertensives      DVT prophylaxis SCDs  Time spent coordinating and providing care 63 minutes

## 2025-02-13 NOTE — PROGRESS NOTES
4 Eyes Skin Assessment Completed by DARRIAN Quinn and DARRIAN Arcos.    Head WDL  Ears WDL right ear scab  Nose WDL  Mouth WDL  Neck WDL  Breast/Chest WDL  Shoulder Blades WDL  Spine Incision surgical site, dressing clean, dry, and intact, hemovac drain in place  (R) Arm/Elbow/Hand Bruising  (L) Arm/Elbow/Hand Bruising  Abdomen Scar and Scab  Groin WDL  Scrotum/Coccyx/Buttocks WDL  (R) Leg WDL  (L) Leg WDL  (R) Heel/Foot/Toe Redness and Blanching to toe   (L) Heel/Foot/Toe Bruising on second toe           Devices In Places Blood Pressure Cuff, Pulse Ox, Rivero, SCD's, and Nasal Cannula      Interventions In Place NC W/Ear Foams, Heel Mepilex, Sacral Mepilex, TAP System, Pillows, Q2 Turns, and Heels Loaded W/Pillows    Possible Skin Injury No    Pictures Uploaded Into Epic N/A  Wound Consult Placed N/A  RN Wound Prevention Protocol Ordered No

## 2025-02-13 NOTE — PROGRESS NOTES
Neurosurgery Progress Note    Subjective:  AAO, laying flat on side, ongoing numbness lower abd down legs & left > right wkns which he feels is improving      Exam:  AAO, cooperative, fc's  Right df/pf 5-/5, IP 4-  Left df/pf 4-, IP 3  Drain 70cc    BP  Min: 116/81  Max: 164/67  Pulse  Av.7  Min: 66  Max: 88  Resp  Av.6  Min: 11  Max: 36  Temp  Av.4 °C (97.6 °F)  Min: 36.1 °C (97 °F)  Max: 37 °C (98.6 °F)  SpO2  Av.5 %  Min: 90 %  Max: 95 %    No data recorded    Recent Labs     25  0504   WBC 5.8 9.0 12.6* 12.5*   RBC 4.36* 4.42* 3.92* 3.69*   HEMOGLOBIN 12.2* 12.5* 10.9* 10.1*   HEMATOCRIT 36.4* 37.6* 33.7* 31.0*   MCV 83.5 85.1 86.0 84.0   MCH 28.0 28.3 27.8 27.4   MCHC 33.5 33.2 32.3 32.6   RDW 39.3 41.6 41.6 40.2   PLATELETCT 124* 139* 132* 142*   MPV 14.1* 14.2*  --  14.4*     Recent Labs     25  0500 25  0504   SODIUM 138 137 139   POTASSIUM 4.2 4.6 5.3   CHLORIDE 104 105 106   CO2 22 23 26   GLUCOSE 164* 145* 186*   BUN 19 22 33*   CREATININE 0.86 0.89 0.88   CALCIUM 9.5 9.2 9.0     Recent Labs     25  042   APTT 28.2 28.3  --    INR 1.14*  --  1.18*           Intake/Output                         25 0700 - 25 0659 25 0700 - 25 0659     3447-5329 9931-4183 Total 9830-9024 3149-2348 Total                 Intake    P.O.  640  100 740  --  -- --    P.O. 640 100 740 -- -- --    IV Piggyback  100  -- 100  --  -- --    Volume (mL) (ceFAZolin (Ancef) 2 g in  mL IVPB) 100 -- 100 -- -- --    Total Intake 740 100 840 -- -- --       Output    Urine  410  600 1010  --  -- --    Output (mL) (Urethral Catheter Temperature probe;Non-latex 16 Fr.)  -- -- --    Drains  100  70 170  --  -- --    Output (mL) (Closed/Suction Drain Superior Back Hemovac) 100 70 170 -- -- --    Stool  --  -- --  --  -- --    Number of Times Stooled -- 1 x 1 x -- -- --     Total Output  -- -- --       Net I/O     230 -564 -340 -- -- --              Intake/Output Summary (Last 24 hours) at 2/13/2025 0930  Last data filed at 2/13/2025 0600  Gross per 24 hour   Intake 620 ml   Output 1120 ml   Net -500 ml             bisacodyl  10 mg DAILY AT 1800    senna-docusate  2 Tablet BID    polyethylene glycol/lytes  1 Packet QDAY PRN    magnesium hydroxide  30 mL QDAY PRN    oxyCODONE immediate-release  10 mg Q4HRS PRN    oxyCODONE immediate-release  15 mg Q4HRS PRN    dexamethasone  4 mg Q4HRS    [Held by provider] labetalol  10 mg Q4HRS PRN    rosuvastatin  10 mg Q EVENING    NORepinephrine  0-1 mcg/kg/min (Ideal) Continuous    acetaminophen  650 mg Q6HRS    insulin lispro  2-9 Units Q6HRS    And    dextrose bolus  25 g Q15 MIN PRN    Pharmacy Consult Request  1 Each PHARMACY TO DOSE    MD ALERT...DO NOT ADMINISTER NSAIDS or ASPIRIN unless ORDERED By Neurosurgery  1 Each PRN    docusate sodium  100 mg BID    bisacodyl  10 mg Q24HRS PRN    methocarbamol  750 mg Q8HRS PRN    Or    cyclobenzaprine  10 mg Q8HRS PRN    Or    diazePAM  5 mg Q4HRS PRN    lidocaine  1 Patch Q24HR       Assessment and Plan:  POD # 2 C7 - T5 fusion & L3-4 Laminectomy  NM as above  Pathology pending - sent to [facility]  Continue to mobilize with therapies  Brace when oob   Continue with oral meds  Monitor drain  Prophylactic anticoagulation: no         Start date/time: 2/15/2025     Brain Compression: No

## 2025-02-13 NOTE — THERAPY
"Occupational Therapy  Daily Treatment     Patient Name: Maximino Green  Age:  64 y.o., Sex:  male  Medical Record #: [id number]  Today's Date: 2/13/2025     Precautions  Precautions: Fall Risk, Spinal / Back Precautions , Cervical Collar  , TLSO (Thoracolumbosacral orthosis)  Comments:  donned EOB    Assessment    Pt seen for follow up OT tx session, pt making steady progress with functional mobility and ADLs limited by BLE weakness and trunk weakness but shows good BUE strength and great motivation to participate. Will continue to see for skilled therapy while admitted as well as recommend post-acute placement.    Plan    Treatment Plan Status: (P) Continue Current Treatment Plan  Type of Treatment: Self Care / Activities of Daily Living, Adaptive Equipment, Neuro Re-Education / Balance, Therapeutic Exercises, Therapeutic Activity  Treatment Frequency: 5 Times per Week  Treatment Duration: Until Therapy Goals Met    DC Equipment Recommendations: (P) Unable to determine at this time  Discharge Recommendations: (P) Recommend post-acute placement for additional occupational therapy services prior to discharge home    Subjective    \"I want to do as much for myself as I can\"     Objective       02/13/25 1001   Precautions   Precautions Fall Risk;Spinal / Back Precautions ;Cervical Collar  ;TLSO (Thoracolumbosacral orthosis)   Comments  donned EOB   Pain 0 - 10 Group   Therapist Pain Assessment Post Activity Pain Same as Prior to Activity;Nurse Notified   Strength Upper Body   Upper Body Strength  WDL   Sensation Upper Body   Upper Extremity Sensation  WDL   Balance   Sitting Balance (Static) Poor +   Sitting Balance (Dynamic) Poor   Standing Balance (Static) Trace   Weight Shift Sitting Poor   Skilled Intervention Verbal Cuing;Facilitation   Bed Mobility    Supine to Sit Maximal Assist   Sit to Supine Maximal Assist   Scooting Maximal Assist   Rolling Maximal Assist to Rt.;Maximum Assist to Lt.   Skilled " Intervention Verbal Cuing;Facilitation   Activities of Daily Living   Eating Supervision   Upper Body Dressing Maximal Assist   Lower Body Dressing Maximal Assist   Skilled Intervention Verbal Cuing;Facilitation   How much help from another person does the patient currently need...   Putting on and taking off regular lower body clothing? 2   Bathing (including washing, rinsing, and drying)? 2   Toileting, which includes using a toilet, bedpan, or urinal? 2   Putting on and taking off regular upper body clothing? 2   Taking care of personal grooming such as brushing teeth? 3   Eating meals? 3   6 Clicks Daily Activity Score 14   Functional Mobility   Sit to Stand Total Assist  (attempted)   Mobility EOB, seated balance/assessment, attempted stand with divina steady, returned back to bed, set up for breakfast   Skilled Intervention Verbal Cuing;Facilitation   Activity Tolerance   Sitting Edge of Bed 15 min  (supported)   Short Term Goals   Short Term Goal # 1 pt will demo ADL txfs w/ Deidre   Goal Outcome # 1 Progressing as expected   Short Term Goal # 2 pt will dress UB including  w/ supv   Goal Outcome # 2 Progressing as expected   Short Term Goal # 3 pt will dress LB w/ Deidre and AE prn   Goal Outcome # 3 Progressing slower than expected   Short Term Goal # 4 pt will groom seated EOB unsupported w/ supv   Goal Outcome # 4 Progressing as expected   Education Group   Education Provided Role of Occupational Therapist;Spinal Precautions;Brace Wear and Care   Role of Occupational Therapist Patient Response Patient;Acceptance;Explanation   Spinal Precautions Patient Response Patient;Acceptance;Explanation;Reinforcement Needed   Brace Wear & Care Patient Response Patient;Acceptance;Explanation;Verbal Demonstration   Occupational Therapy Treatment Plan    O.T. Treatment Plan Continue Current Treatment Plan   Anticipated Discharge Equipment and Recommendations   DC Equipment Recommendations Unable to determine at this time    Discharge Recommendations Recommend post-acute placement for additional occupational therapy services prior to discharge home   Interdisciplinary Plan of Care Collaboration   IDT Collaboration with  Nursing;Physical Therapist   Patient Position at End of Therapy In Bed;Bed Alarm On;Call Light within Reach;Tray Table within Reach;Phone within Reach   Collaboration Comments RN updated and in room at end of session for medication

## 2025-02-13 NOTE — CONSULTS
Physical Medicine and Rehabilitation Consultation          Date of initial consultation: 2/13/2025  Consulting provider: SORAYA Xie   Reason for consultation: assess for acute inpatient rehab appropriateness  LOS: 2 Day(s)    Chief complaint: Paraplegia    HPI: The patient is a 64 y.o. 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  Patient is unsure what happened.  Workup on arrival to the hospital with MR T-spine found T3 and T4 metastasis with pathological collapse of T4 vertebral body and epidural tumor spread levels of T2-T4 with severe canal compromise at T3.  Patient was seen by Dr. Berkowitz with neurosurgery and taken to the OR on 2/11 for decompressive laminectomy at L3-4 and instrumented stabilization of C7-T5    The patient currently reports bilateral lower extremity weakness, left worse than right, some numbness and tingling, some heartburn, on 1 L nasal cannula oxygen.  Patient reports his wife is retired but she is in not very good shape she broke her hip 4 to 6 weeks ago and was discharged from [facility] 1 week ago.  They have hired help at home.    ROS  Pertinent positives are mentioned in the HPI, all others reviewed and are negative.    Social Hx:  1 SH  2 JASON  With: Spouse    THERAPY:  Restrictions: Fall risk   PT: Functional mobility   2/12: Unable to participate in gait or sit to stand, max assist bed mobility    OT: ADLs  2/12: Total assist toileting, max assist lower body dressing and upper body dressing    SLP:   None    IMAGING:  MR T-spine 2/11/2025    1.  T3 and T4 metastasis with pathological collapse of T4 vertebral body.  2.  Epidural tumor spread at the levels of T2, T3 and T4.  3.  Severe canal compromise at the level of T3.  4.  Spinal cord edema at the levels of T3, T4 and T5.  5.  Multiple enhancing  "pulmonary nodules consistent with metastasis.    PROCEDURES:  MICHAEL Berkowitz MD 2/11/2025  1.  Decompressive laminectomy L3-L4.  2.  Partial pediculectomy and facetectomy on the left and right with   decompression of metastatic tumor with compression of spinal cord.  3.  Instrumented stabilization C7 through T5 with the use of Medtronic ModuLex  instrumentation.  4.  Posterolateral arthrodesis with use of 15 mL of OsteoAMP fibers.  5.  O-arm -- Stealth navigated placement of pedicle screws at C7, T1, T2, T4, and T5.       PMH:  Past Medical History:   Diagnosis Date    Alcoholic (HCC)     stopped drinking in 2009    Cancer (HCC) 2019    throat    Dental disorder     uppers/ waiting for lower dentures t ocome in    Diabetes (HCC)     diet controlled; pt states that he was told he was \"pre-diabetic\" and has never been on any medication    High cholesterol 11/01/2022    on medication    Hypertension     stopped taking blood pressure medication on his own    Paroxysmal atrial fibrillation (HCC)     Tonsil cancer (HCC)     right tonsil - SCC, chemo and radiation       PSH:  Past Surgical History:   Procedure Laterality Date    THORACIC LAMINECTOMY  2/11/2025    Procedure: LAMINECTOMY, SPINE, THORACIC - T2-T4 Decompressive;  Surgeon: Gorge Berkowitz M.D.;  Location: SURGERY [facility];  Service: Neurosurgery    THORACIC FUSION O-ARM  2/11/2025    Procedure: FUSION, SPINE, THORACIC, POSTERIOR APPROACH, WITH O-ARM IMAGING GUIDANCE;  Surgeon: Gorge Berkowitz M.D.;  Location: SURGERY [facility];  Service: Neurosurgery    OTHER  12/27/2018    rt tonsil biopsy     CHOLECYSTECTOMY  2003       FHX:  Family History   Problem Relation Age of Onset    Cancer Mother         colon    Respiratory Disease Mother         emphysema- smoker    Alcohol abuse Mother     Heart Disease Father         Detials not known    Cancer Sister         brain tumor    GI Disease Sister        Medications:  Current Facility-Administered Medications   Medication " Dose    bisacodyl (Dulcolax) suppository 10 mg  10 mg    senna-docusate (Pericolace Or Senokot S) 8.6-50 MG per tablet 2 Tablet  2 Tablet    polyethylene glycol/lytes (Miralax) Packet 1 Packet  1 Packet    magnesium hydroxide (Milk Of Magnesia) suspension 30 mL  30 mL    oxyCODONE immediate release (Roxicodone) tablet 10 mg  10 mg    oxyCODONE immediate-release (Roxicodone) tablet 15 mg  15 mg    dexamethasone (Decadron) injection 4 mg  4 mg    [Held by provider] labetalol (Normodyne/Trandate) injection 10 mg  10 mg    rosuvastatin (Crestor) tablet 10 mg  10 mg    norepinephrine (Levophed) 8 mg in 250 mL NS infusion (premix)  0-1 mcg/kg/min (Ideal)    acetaminophen (Tylenol) tablet 650 mg  650 mg    insulin lispro (HumaLOG,AdmeLOG) subcutaneous injection  2-9 Units    And    dextrose 50% (D50W) injection 25 g  25 g    Pharmacy Consult Request ...Pain Management Review 1 Each  1 Each    MD ALERT...DO NOT ADMINISTER NSAIDS or ASPIRIN unless ORDERED By Neurosurgery 1 Each  1 Each    docusate sodium (Colace) capsule 100 mg  100 mg    bisacodyl (Dulcolax) suppository 10 mg  10 mg    methocarbamol (Robaxin) tablet 750 mg  750 mg    Or    cyclobenzaprine (Flexeril) tablet 10 mg  10 mg    Or    diazePAM (Valium) tablet 5 mg  5 mg    lidocaine (Asperflex) 4 % patch 1 Patch  1 Patch       Allergies:  No Known Allergies      Physical Exam:  Vitals: /56   Pulse 66   Temp 36.9 °C (98.4 °F) (Temporal)   Resp 18   Ht 1.829 m (6')   Wt 122 kg (269 lb 6.4 oz)   SpO2 91%   Gen: NAD  Head: NC/AT  Eyes/ Nose/ Mouth: PERRLA, moist mucous membranes  Cardio: RRR, good distal perfusion, warm extremities  Pulm: normal respiratory effort, no cyanosis   Abd: Soft NTND, negative borborygmi   Ext: No peripheral edema. No calf tenderness. No clubbing.    Mental status:  A&Ox4 (person, place, date, situation) answers questions appropriately follows commands  Speech: fluent, no aphasia or dysarthria    Motor:      Upper Extremity   Myotome R L   Shoulder flexion C5 5 5   Elbow flexion C5 5 5   Wrist extension C6 5 5   Elbow extension C7 5 5   Finger flexion C8 5 5   Finger abduction T1 5 5     Lower Extremity Myotome R L   Hip flexion L2 2/5 1/5   Knee extension L3 3/5 3/5   Ankle dorsiflexion L4 4/5 0/5   Toe extension L5 4/5 2/5   Ankle plantarflexion S1 4/5 0/5     Skin:    2/12 sacrum       2/11 head      Labs: Reviewed and significant for   Recent Labs     02/11/25  0117 02/11/25  1530 02/12/25  0427 02/13/25  0504   RBC 4.36* 4.42* 3.92* 3.69*   HEMOGLOBIN 12.2* 12.5* 10.9* 10.1*   HEMATOCRIT 36.4* 37.6* 33.7* 31.0*   PLATELETCT 124* 139* 132* 142*   PROTHROMBTM 14.6  --  15.0*  --    APTT 28.2 28.3  --   --    INR 1.14*  --  1.18*  --      Recent Labs     02/11/25  1530 02/12/25  0500 02/13/25  0504   SODIUM 138 137 139   POTASSIUM 4.2 4.6 5.3   CHLORIDE 104 105 106   CO2 22 23 26   GLUCOSE 164* 145* 186*   BUN 19 22 33*   CREATININE 0.86 0.89 0.88   CALCIUM 9.5 9.2 9.0     Recent Results (from the past 24 hours)   POCT glucose device results    Collection Time: 02/12/25 11:54 AM   Result Value Ref Range    POC Glucose, Blood 144 (H) 65 - 99 mg/dL   HEP C VIRUS ANTIBODY    Collection Time: 02/12/25  1:40 PM   Result Value Ref Range    Hepatitis C Antibody Reactive (A) Non-Reactive   HEP B SURFACE AB    Collection Time: 02/12/25  1:40 PM   Result Value Ref Range    Hep B Surface Antibody Quant <3.50 0.00 - 10.00 mIU/mL   HEP B SURFACE ANTIGEN    Collection Time: 02/12/25  1:40 PM   Result Value Ref Range    Hepatitis B Surface Antigen Non-Reactive Non-Reactive   POCT glucose device results    Collection Time: 02/12/25  5:41 PM   Result Value Ref Range    POC Glucose, Blood 150 (H) 65 - 99 mg/dL   POCT glucose device results    Collection Time: 02/13/25 12:43 AM   Result Value Ref Range    POC Glucose, Blood 159 (H) 65 - 99 mg/dL   Basic Metabolic Panel (BMP)    Collection Time: 02/13/25  5:04 AM   Result Value Ref Range    Sodium 139 135  - 145 mmol/L    Potassium 5.3 3.6 - 5.5 mmol/L    Chloride 106 96 - 112 mmol/L    Co2 26 20 - 33 mmol/L    Glucose 186 (H) 65 - 99 mg/dL    Bun 33 (H) 8 - 22 mg/dL    Creatinine 0.88 0.50 - 1.40 mg/dL    Calcium 9.0 8.5 - 10.5 mg/dL    Anion Gap 7.0 7.0 - 16.0   CBC without Differential    Collection Time: 02/13/25  5:04 AM   Result Value Ref Range    WBC 12.5 (H) 4.8 - 10.8 K/uL    RBC 3.69 (L) 4.70 - 6.10 M/uL    Hemoglobin 10.1 (L) 14.0 - 18.0 g/dL    Hematocrit 31.0 (L) 42.0 - 52.0 %    MCV 84.0 81.4 - 97.8 fL    MCH 27.4 27.0 - 33.0 pg    MCHC 32.6 32.3 - 36.5 g/dL    RDW 40.2 35.9 - 50.0 fL    Platelet Count 142 (L) 164 - 446 K/uL    MPV 14.4 (H) 9.0 - 12.9 fL   ESTIMATED GFR    Collection Time: 02/13/25  5:04 AM   Result Value Ref Range    GFR (CKD-EPI) 96 >60 mL/min/1.73 m 2   POCT glucose device results    Collection Time: 02/13/25  6:19 AM   Result Value Ref Range    POC Glucose, Blood 155 (H) 65 - 99 mg/dL       ASSESSMENT:  Patient is a 64 y.o. male admitted with paraplegia now s/p C7-T5 fusion and L3-4 laminectomy    Rehabilitation: Impaired ADLs and mobility  Barriers to transfer include: Insurance authorization, TCCs to verify disposition, medical clearance and bed availability. All cases are subject to administrative review.     [facility] Code / Diagnosis to Support: 0004.111 - Spinal Cord Dysfunction, Non-Traumatic: Paraplegia, Incomplete    Disposition recommendations:  -Following for rehab appropriateness and SCI needs.  Patient's discharge support appears to be insufficient, however his rehab candidacy largely depends on what he is able to do on his own.  -Discharge support: Recommending 24/7 physical assistance with transfers, help with ADLs and IADLs  -PMR to follow in the periphery for rehab appropriateness, please reach out with questions or request for medical management      Medical Complexity:    AIS C motor incomplete nontraumatic paraplegia  -Secondary to epidural tumor and pathologic fracture  at T4  -S/p C7-T5 fusion by Dr. MICHAEL Berkowitz MD on 2/11  -Dexamethasone 4 mg injection every 4  -Continue PT OT while in house  -Brace when OOB  -Will likely need SNF due to lack of discharge support, unless patient can show ability to transfer at min to mod assist    Epidural tumor  -Seen on MRI from T2-T4  -Surgical path pending  -Unclear if patient will be pursuing additional treatment such as chemotherapy or radiation    Lumbar laminectomy  -Addition of L3-4 laminectomy during surgery    Leukocytosis  -WBC 12.5  -Likely leukemoid reaction from steroids  -Following with serial labs    Thrombocytopenia  -Platelets 142  -Serial labs    DVT PPX: SCDs      Thank you for allowing us to participate in the care of this patient.     Total time: >80 minutes. This includes time spent reviewing patient's history, notes, labs, imaging, vitals, medications, examining patient, discussing care with patient and family including prognosis, risk reduction, benefits of treatment, and options for next stage of care, and communicating recommendations to primary team.     Mian Cummings, DO   Physical Medicine and Rehabilitation     Please note that this dictation was created using voice recognition software. I have made every reasonable attempt to correct obvious errors, but there may be errors of grammar and possibly content that I did not discover before finalizing the note.

## 2025-02-14 ENCOUNTER — APPOINTMENT (OUTPATIENT)
Dept: CARDIOLOGY | Facility: MEDICAL CENTER | Age: 65
DRG: 518 | End: 2025-02-14
Attending: STUDENT IN AN ORGANIZED HEALTH CARE EDUCATION/TRAINING PROGRAM
Payer: COMMERCIAL

## 2025-02-14 VITALS
RESPIRATION RATE: 18 BRPM | SYSTOLIC BLOOD PRESSURE: 143 MMHG | BODY MASS INDEX: 36.49 KG/M2 | OXYGEN SATURATION: 92 % | HEIGHT: 72 IN | WEIGHT: 269.4 LBS | HEART RATE: 69 BPM | DIASTOLIC BLOOD PRESSURE: 71 MMHG | TEMPERATURE: 98.4 F

## 2025-02-14 LAB
ALBUMIN SERPL BCP-MCNC: 3.1 G/DL (ref 3.2–4.9)
ALBUMIN/GLOB SERPL: 1.3 G/DL
ALP SERPL-CCNC: 99 U/L (ref 30–99)
ALT SERPL-CCNC: 16 U/L (ref 2–50)
ANION GAP SERPL CALC-SCNC: 6 MMOL/L (ref 7–16)
AST SERPL-CCNC: 31 U/L (ref 12–45)
BILIRUB SERPL-MCNC: 0.4 MG/DL (ref 0.1–1.5)
BUN SERPL-MCNC: 38 MG/DL (ref 8–22)
CALCIUM ALBUM COR SERPL-MCNC: 9.9 MG/DL (ref 8.5–10.5)
CALCIUM SERPL-MCNC: 9.2 MG/DL (ref 8.5–10.5)
CHLORIDE SERPL-SCNC: 105 MMOL/L (ref 96–112)
CO2 SERPL-SCNC: 24 MMOL/L (ref 20–33)
CREAT SERPL-MCNC: 0.93 MG/DL (ref 0.5–1.4)
GFR SERPLBLD CREATININE-BSD FMLA CKD-EPI: 91 ML/MIN/1.73 M 2
GLOBULIN SER CALC-MCNC: 2.4 G/DL (ref 1.9–3.5)
GLUCOSE BLD STRIP.AUTO-MCNC: 158 MG/DL (ref 65–99)
GLUCOSE BLD STRIP.AUTO-MCNC: 166 MG/DL (ref 65–99)
GLUCOSE BLD STRIP.AUTO-MCNC: 166 MG/DL (ref 65–99)
GLUCOSE SERPL-MCNC: 152 MG/DL (ref 65–99)
HBV E AG SERPL QL IA: NEGATIVE
HCV RNA SERPL NAA+PROBE-ACNC: 1340 IU/ML
HCV RNA SERPL NAA+PROBE-LOG IU: 3.13 LOG IU/ML
HCV RNA SERPL QL NAA+PROBE: DETECTED
LV EJECT FRACT MOD 2C 99903: 55.53
LV EJECT FRACT MOD 4C 99902: 59.88
LV EJECT FRACT MOD BP 99901: 58.98
MAGNESIUM SERPL-MCNC: 2.2 MG/DL (ref 1.5–2.5)
PHOSPHATE SERPL-MCNC: 2.8 MG/DL (ref 2.5–4.5)
POTASSIUM SERPL-SCNC: 5.1 MMOL/L (ref 3.6–5.5)
PROT SERPL-MCNC: 5.5 G/DL (ref 6–8.2)
SODIUM SERPL-SCNC: 135 MMOL/L (ref 135–145)

## 2025-02-14 PROCEDURE — A9270 NON-COVERED ITEM OR SERVICE: HCPCS | Performed by: NURSE PRACTITIONER

## 2025-02-14 PROCEDURE — 51798 US URINE CAPACITY MEASURE: CPT

## 2025-02-14 PROCEDURE — 700102 HCHG RX REV CODE 250 W/ 637 OVERRIDE(OP): Performed by: NURSE PRACTITIONER

## 2025-02-14 PROCEDURE — 36415 COLL VENOUS BLD VENIPUNCTURE: CPT

## 2025-02-14 PROCEDURE — A9270 NON-COVERED ITEM OR SERVICE: HCPCS | Performed by: STUDENT IN AN ORGANIZED HEALTH CARE EDUCATION/TRAINING PROGRAM

## 2025-02-14 PROCEDURE — 93306 TTE W/DOPPLER COMPLETE: CPT | Mod: 26 | Performed by: INTERNAL MEDICINE

## 2025-02-14 PROCEDURE — 80053 COMPREHEN METABOLIC PANEL: CPT

## 2025-02-14 PROCEDURE — 97530 THERAPEUTIC ACTIVITIES: CPT

## 2025-02-14 PROCEDURE — 99239 HOSP IP/OBS DSCHRG MGMT >30: CPT | Performed by: STUDENT IN AN ORGANIZED HEALTH CARE EDUCATION/TRAINING PROGRAM

## 2025-02-14 PROCEDURE — 82962 GLUCOSE BLOOD TEST: CPT | Mod: 91

## 2025-02-14 PROCEDURE — 700102 HCHG RX REV CODE 250 W/ 637 OVERRIDE(OP): Performed by: STUDENT IN AN ORGANIZED HEALTH CARE EDUCATION/TRAINING PROGRAM

## 2025-02-14 PROCEDURE — 93306 TTE W/DOPPLER COMPLETE: CPT

## 2025-02-14 PROCEDURE — 83735 ASSAY OF MAGNESIUM: CPT

## 2025-02-14 PROCEDURE — 84100 ASSAY OF PHOSPHORUS: CPT

## 2025-02-14 RX ORDER — LIDOCAINE 4 G/G
1 PATCH TOPICAL EVERY 24 HOURS
Status: ON HOLD
Start: 2025-02-15

## 2025-02-14 RX ORDER — POLYETHYLENE GLYCOL 3350 17 G/17G
17 POWDER, FOR SOLUTION ORAL
Status: ON HOLD
Start: 2025-02-14

## 2025-02-14 RX ORDER — OXYCODONE HYDROCHLORIDE 10 MG/1
10 TABLET ORAL EVERY 8 HOURS PRN
Refills: 0 | Status: DISCONTINUED | OUTPATIENT
Start: 2025-02-14 | End: 2025-02-14 | Stop reason: HOSPADM

## 2025-02-14 RX ORDER — OXYCODONE HYDROCHLORIDE 10 MG/1
10 TABLET ORAL EVERY 8 HOURS PRN
Qty: 12 TABLET | Refills: 0 | Status: SHIPPED | OUTPATIENT
Start: 2025-02-14 | End: 2025-02-18

## 2025-02-14 RX ORDER — METHYLPREDNISOLONE 4 MG/1
TABLET ORAL
Qty: 21 TABLET | Refills: 0 | Status: ON HOLD | OUTPATIENT
Start: 2025-02-14

## 2025-02-14 RX ORDER — ACETAMINOPHEN 500 MG
1000 TABLET ORAL 3 TIMES DAILY
Status: ON HOLD
Start: 2025-02-14

## 2025-02-14 RX ORDER — OXYCODONE HYDROCHLORIDE 10 MG/1
10 TABLET ORAL EVERY 8 HOURS PRN
Qty: 12 TABLET | Refills: 0 | Status: SHIPPED | OUTPATIENT
Start: 2025-02-14 | End: 2025-02-14

## 2025-02-14 RX ADMIN — DEXAMETHASONE 4 MG: 4 TABLET ORAL at 01:04

## 2025-02-14 RX ADMIN — INSULIN LISPRO 2 UNITS: 100 INJECTION, SOLUTION INTRAVENOUS; SUBCUTANEOUS at 06:24

## 2025-02-14 RX ADMIN — INSULIN LISPRO 2 UNITS: 100 INJECTION, SOLUTION INTRAVENOUS; SUBCUTANEOUS at 01:04

## 2025-02-14 RX ADMIN — OXYCODONE HYDROCHLORIDE 10 MG: 10 TABLET ORAL at 08:23

## 2025-02-14 RX ADMIN — INSULIN LISPRO 2 UNITS: 100 INJECTION, SOLUTION INTRAVENOUS; SUBCUTANEOUS at 12:54

## 2025-02-14 RX ADMIN — DEXAMETHASONE 4 MG: 4 TABLET ORAL at 12:52

## 2025-02-14 RX ADMIN — ACETAMINOPHEN 1000 MG: 500 TABLET ORAL at 06:24

## 2025-02-14 RX ADMIN — DEXAMETHASONE 4 MG: 4 TABLET ORAL at 06:24

## 2025-02-14 RX ADMIN — SENNOSIDES AND DOCUSATE SODIUM 2 TABLET: 50; 8.6 TABLET ORAL at 06:24

## 2025-02-14 RX ADMIN — OXYCODONE HYDROCHLORIDE 10 MG: 10 TABLET ORAL at 15:18

## 2025-02-14 RX ADMIN — ACETAMINOPHEN 1000 MG: 500 TABLET ORAL at 12:52

## 2025-02-14 ASSESSMENT — COGNITIVE AND FUNCTIONAL STATUS - GENERAL
WALKING IN HOSPITAL ROOM: TOTAL
MOVING TO AND FROM BED TO CHAIR: TOTAL
SUGGESTED CMS G CODE MODIFIER MOBILITY: CM
STANDING UP FROM CHAIR USING ARMS: TOTAL
MOBILITY SCORE: 8
CLIMB 3 TO 5 STEPS WITH RAILING: TOTAL
TURNING FROM BACK TO SIDE WHILE IN FLAT BAD: A LOT
MOVING FROM LYING ON BACK TO SITTING ON SIDE OF FLAT BED: A LOT

## 2025-02-14 ASSESSMENT — GAIT ASSESSMENTS: GAIT LEVEL OF ASSIST: UNABLE TO PARTICIPATE

## 2025-02-14 NOTE — DISCHARGE PLANNING
Path pending. Unclear if patient will be pursuing additional treatment such as chemotherapy or radiation. Will need a definitive POC.  Following for progression with TX.

## 2025-02-14 NOTE — DISCHARGE PLANNING
[facility] [id number]    Care Transition Team Final Discharge Disposition    Actual Discharge Information  Discharge Disposition: D/T to SNF with Medicare cert in anticipation of skilled care (03)        Case Management Discharge Planning    Admission Date: 2/10/2025  GMLOS: 5.4  ALOS: 3    6-Clicks ADL Score: 14  6-Clicks Mobility Score: 8  PT and/or OT Eval ordered: Yes  Post-acute Referrals Ordered: Yes  Post-acute Choice Obtained: Yes  Has referral(s) been sent to post-acute provider:  Yes      Anticipated Discharge Dispo: Discharge Disposition: D/T to SNF with Medicare cert in anticipation of skilled care (03)  Discharge Contact Phone Number: Tatiana Carey [phone]    DME Needed: No    Action(s) Taken: Updated Provider/Nurse on Discharge Plan, Acceptance Received, Transport Arranged , Completed PASSR/LOC, and OTHER    Obtained SNF choice, faxed to [facility] for processing.  Bed available at [facility] pt's first choice.  [employer] transportation arranged for 1600.  Pt confirmed will notify spouse.  [place]ra packet provided to bedside RN.     Escalations Completed: None    Medically Clear: Yes      Barriers to Discharge: None    Is the patient up for discharge tomorrow: No

## 2025-02-14 NOTE — DISCHARGE SUMMARY
"Discharge Summary    CHIEF COMPLAINT ON ADMISSION  Chief Complaint   Patient presents with    T-5000 FALL     BIB [facility] unit 32 from home a GLF. Was walking to get his dog out, next thing he remembers was being stuck between the door and coffee table. Abrasion on the on occipital area. Denies thinners.  mg/dL.       Weakness     Bilateral lower extremity weakness with decreased sensation on the left thigh. Reports muscle relaxant given to him for his back pain make him feel \"weird\".        Reason for Admission  ems    Admission Date  2/10/2025     CODE STATUS  Full Code    HPI & HOSPITAL COURSE  64 y.o. male with a past medical history of HTN, Afib (not on AC), type 2 DM, chronic normocytic anemia, chronic HFrEF, hx of chemotherapy induced pancytopenia and stage II (T3, N2, M0, p16 positive) tonsil cancer status post radiation/chemo treatment in 2022 was admitted to the ICU on 2/11/2025 for T3 and T4 metastasis with pathological collapse of T4 vertebral body and epidural tumor spread levels of T2-T4 with severe canal compromise at T3 noted on MRI.  He was started on intravenous systemic steroids.  Neurosurgery following for which patient underwent decompressive laminectomy L3-L4, and instrumented stabilization of C7-T5 on 2/11/2025.  Patient requiring PCA pain pump.  He was ultimately transferred to the neurological floor on 2/12/2025.  Patient had marked improvement with back pain and he was transition to an oral steroid and opiate regimen which she is to continue after discharge.  Surgical pathology still pending and patient is to follow-up with oncology and neurosurgery in the outpatient setting to discuss pathology results and plan of care    Patient was eval by physical therapy and Occupational Therapy who recommended continuation of therapy at postacute care.  Patient is to be discharged to skilled nursing facility for continued care    Therefore, he is discharged in good and stable condition to " skilled nursing facility.    The patient met 2-midnight criteria for an inpatient stay at the time of discharge.      FOLLOW UP ITEMS POST DISCHARGE  SNF to follow posthospital discharge care  Surgical pathology still pending and patient is to follow-up with oncology and neurosurgery in the outpatient setting to discuss pathology results and plan of care        DISCHARGE DIAGNOSES  Principal Problem:    Spinal cord compression (HCC) (POA: Yes)  Active Problems:    Tonsil cancer (HCC) (POA: Yes)      Overview: IMO load March 2020    Longstanding persistent atrial fibrillation (HCC) (POA: Yes)    Thrombocytopenia (HCC) (POA: Yes)    Type 2 diabetes mellitus with hyperglycemia, with long-term current use of insulin (HCC) (POA: Yes)    Pulmonary nodules/lesions, multiple (POA: Unknown)    Other cirrhosis of liver (HCC) (POA: Unknown)    Essential hypertension (POA: Yes)    Acute systolic heart failure (HCC) (POA: Yes)    Hypothyroidism (POA: Yes)  Resolved Problems:    * No resolved hospital problems. *      FOLLOW UP  Future Appointments   Date Time Provider Department Center   2/25/2025  8:20 AM E[facility] [place]     No follow-up provider specified.    MEDICATIONS ON DISCHARGE     Medication List        START taking these medications        Instructions   lidocaine 4 % Ptch  Start taking on: February 15, 2025  Commonly known as: Asperflex  Replaces: lidocaine 5 % Ptch   Place 1 Patch on the skin every 24 hours.  Dose: 1 Patch     methylPREDNISolone 4 MG Tbpk  Commonly known as: Medrol Dosepak   Follow schedule on package instructions.     oxyCODONE immediate release 10 MG immediate release tablet  Commonly known as: Roxicodone   Take 1 Tablet by mouth every 8 hours as needed for Moderate Pain for up to 4 days.  Dose: 10 mg     polyethylene glycol/lytes Pack  Commonly known as: Miralax   Take 1 Packet by mouth 1 time a day as needed (if sennosides and docusate ineffective after 24 hours).  Dose: 17 g             CHANGE how you take these medications        Instructions   acetaminophen 500 MG Tabs  What changed:   when to take this  reasons to take this  Another medication with the same name was removed. Continue taking this medication, and follow the directions you see here.  Commonly known as: Tylenol   Take 2 Tablets by mouth 3 times a day.  Dose: 1,000 mg            CONTINUE taking these medications        Instructions   lisinopril 5 MG Tabs  Commonly known as: Prinivil   Take 1 Tablet by mouth every day. For high blood pressure  Dose: 5 mg     metoprolol SR 50 MG Tb24  Commonly known as: Toprol XL   Take 1 Tablet by mouth every day.  Dose: 50 mg     rosuvastatin 10 MG Tabs  Commonly known as: Crestor   Take 1 Tablet by mouth every evening.  Dose: 10 mg            STOP taking these medications      cyclobenzaprine 10 mg Tabs  Commonly known as: Flexeril     ibuprofen 800 MG Tabs  Commonly known as: Motrin     lidocaine 5 % Ptch  Commonly known as: Lidoderm  Replaced by: lidocaine 4 % Ptch              Allergies  No Known Allergies    DIET  Orders Placed This Encounter   Procedures    Diet Order Diet: Level 6 - Soft and Bite Sized; Liquid level: Level 0 - Thin     Standing Status:   Standing     Number of Occurrences:   1     Diet::   Level 6 - Soft and Bite Sized [23]     Liquid level:   Level 0 - Thin       ACTIVITY  As tolerated.  Weight bearing as tolerated    LINES, DRAINS, AND WOUNDS  This is an automated list. Peripheral IVs will be removed prior to discharge.  Peripheral IV 02/12/25 20 G Anterior;Right Forearm (Active)   Site Assessment Clean;Dry;Intact 02/14/25 0800   Dressing Type Transparent 02/14/25 0800   Line Status Flushed;Scrubbed the hub prior to access;Saline locked 02/14/25 0800   Dressing Status Clean;Dry;Intact 02/14/25 0800   Dressing Intervention N/A 02/13/25 2000   Dressing Change Due 02/15/25 02/13/25 0000   Date Primary Tubing Changed 02/12/25 02/12/25 1100   Date Secondary Tubing  Changed 02/12/25 02/12/25 1100   Infiltration Grading ([facility], [facility]) 0 02/14/25 0800   Phlebitis Scale ([facility] Only) 0 02/14/25 0800       Wound 02/11/25 Toe, 2nd Anterior Left (Active)   Site Assessment Purple;Pink 02/13/25 2000   Periwound Assessment Clean;Dry;Intact 02/13/25 2000   Dressing Status Open to Air 02/13/25 2000       Wound 02/11/25 Incision Back Mepilex Ag (Active)   Site Assessment JESSICA 02/13/25 2000   Periwound Assessment JESSICA 02/13/25 2000   Margins JESSICA 02/13/25 2000   Closure JESSICA 02/13/25 2000   Drainage Amount None 02/13/25 2000   Dressing Status Clean;Dry;Intact 02/13/25 2000   Dressing Changed Observed 02/13/25 2000       Peripheral IV 02/12/25 20 G Anterior;Right Forearm (Active)   Site Assessment Clean;Dry;Intact 02/14/25 0800   Dressing Type Transparent 02/14/25 0800   Line Status Flushed;Scrubbed the hub prior to access;Saline locked 02/14/25 0800   Dressing Status Clean;Dry;Intact 02/14/25 0800   Dressing Intervention N/A 02/13/25 2000   Dressing Change Due 02/15/25 02/13/25 0000   Date Primary Tubing Changed 02/12/25 02/12/25 1100   Date Secondary Tubing Changed 02/12/25 02/12/25 1100   Infiltration Grading ([facility]) 0 02/14/25 0800   Phlebitis Scale ([facility] Only) 0 02/14/25 0800               MENTAL STATUS ON TRANSFER  At baseline    CONSULTATIONS  Neurosurgery  Physiatry    PROCEDURES  2/11/2025  PROCEDURES:  1.  Decompressive laminectomy L3-L4.  2.  Partial pediculectomy and facetectomy on the left and right with   decompression of metastatic tumor with compression of spinal cord.  3.  Instrumented stabilization C7 through T5 with the use of Medtronic ModuLex   instrumentation.  4.  Posterolateral arthrodesis with use of 15 mL of OsteoAMP fibers.  5.  O-arm -- Stealth navigated placement of pedicle screws at C7, T1, T2, T4,   and T5.    LABORATORY  Lab Results   Component Value Date    SODIUM 135 02/14/2025    POTASSIUM 5.1 02/14/2025    CHLORIDE 105 02/14/2025    CO2 24 02/14/2025     GLUCOSE 152 (H) 02/14/2025    BUN 38 (H) 02/14/2025    CREATININE 0.93 02/14/2025        Lab Results   Component Value Date    WBC 12.5 (H) 02/13/2025    HEMOGLOBIN 10.1 (L) 02/13/2025    HEMATOCRIT 31.0 (L) 02/13/2025    PLATELETCT 142 (L) 02/13/2025        Total time of the discharge process exceeds 35 minutes.

## 2025-02-14 NOTE — THERAPY
Physical Therapy   Daily Treatment     Patient Name: Maximino Green  Age:  64 y.o., Sex:  male  Medical Record #: [id number]  Today's Date: 2/14/2025     Precautions  Precautions: Fall Risk;Cervical Collar  ;TLSO (Thoracolumbosacral orthosis);Spinal / Back Precautions ;Other (See Comments)  Comments:  brace, Don EOB, OOB more than 5 mins, MAP goal >85, EF 30%    Assessment    Patient seen for PT treatment session. Patient in bed, agreeable for the session. Able to participate with functional mobility tasks as detailed below. Will continue to benefit from PT services and recommend post-acute placement at this time.      Plan    Treatment Plan Status: Continue Current Treatment Plan  Type of Treatment: Bed Mobility, Equipment, Gait Training, Family / Caregiver Training, Neuro Re-Education / Balance, Orthotics Training , Self Care / Home Evaluation, Therapeutic Activities, Stair Training, Therapeutic Exercise  Treatment Frequency: 5 Times per Week  Treatment Duration: Until Therapy Goals Met    DC Equipment Recommendations: Unable to determine at this time  Discharge Recommendations: Recommend post-acute placement for additional physical therapy services prior to discharge home    Objective     02/14/25 1024   Time In/Time Out   Therapy Start Time 0926   Therapy End Time 1024   Total Therapy Time 58   Precautions   Precautions Fall Risk;Cervical Collar  ;TLSO (Thoracolumbosacral orthosis);Spinal / Back Precautions ;Other (See Comments)   Comments  brace, Don EOB, OOB more than 5 mins, MAP goal >85, EF 30%   Vitals   O2 (LPM) 1   O2 Delivery Device Nasal Cannula   Vitals Comments Placed back on continuous O2 monitoring at end of session   Pain   Pain Scales 0 to 10 Scale    Intervention Rest;Repositioned   Pain 0 - 10 Group   Location Back;Other (Comments)  (Upper back, between shoulder blades)   Therapist Pain Assessment Prior to Activity;During Activity;Post Activity  (Pre-medicated prior to session)    Cognition    Level of Consciousness Alert   Active ROM Lower Body    Active ROM Lower Body  X   Comments Seated EOB: R hip flexion-95 degree, R knee extension-WFL, R knee flexion-WFL, R ankle DF/PF-WFL, R hip abduction/adduction-WFL; L hip flexion, L hip abduction/adduction-minimal initiation, L knee extension-45 degree, L knee flexion-10 degree, L ankle DF/PF-minimal initiation   Sensation Lower Body   Lower Extremity Sensation   X   Comments Continues to have decreased sensation below mid trunk, worse on LLE as compared to RLE, worse in medial thigh, lateral thigh.   Other Treatments   Other Treatments Provided Assisted with set-up of breakfast tray towards end of session. Assisted with don & doff of gomes boots and appropriate position of BUE with pillows for additional support. Educated on importance of daily & frequent mobility, frequent change of positions and offloading bony prominences to prevent skin breakdown.   Balance   Sitting Balance (Static) Poor +   Sitting Balance (Dynamic) Poor +   Standing Balance (Static) Trace   Weight Shift Sitting Poor   Weight Shift Standing Absent   Skilled Intervention Verbal Cuing;Tactile Cuing;Sequencing;Facilitation;Compensatory Strategies   Comments Seated EOB: BUE support on bed rail, improved sitting balance and tolerance this session, minimal posterior sway/loss of balance, occurs mainly with pain; Partial Standing within divina steady   Bed Mobility    Supine to Sit Maximal Assist   Sit to Supine Maximal Assist   Scooting Maximal Assist   Rolling Maximal Assist to Rt.;Maximum Assist to Lt.   Skilled Intervention Verbal Cuing;Tactile Cuing;Sequencing;Facilitation;Compensatory Strategies   Comments HOB raised upto 30 degree, use of bed rail, towards R side; cues for log roll technique   Gait Analysis   Gait Level Of Assist Unable to Participate   Functional Mobility   Sit to Stand Maximal Assist  (x2 person assist, height of bed elevated)   Bed, Chair, Wheelchair  Transfer Unable to Participate   Mobility Bed-EOB sitting-sit-stand reps (x3 times) within diivna steady-EOB sitting-bed-HOB raised   Skilled Intervention Verbal Cuing;Tactile Cuing;Sequencing;Facilitation;Compensatory Strategies   Comments Cues for weight shift, sequencing; able to perform partial standing, increased difficulty with trunk and LE extension. Attempted lateral scooting while seated EOB, required total assist at this time.   6 Clicks Assessment - How much HELP from from another person do you currently need... (If the patient hasn't done an activity recently, how much help from another person do you think he/she would need if he/she tried?)   Turning from your back to your side while in a flat bed without using bedrails? 2   Moving from lying on your back to sitting on the side of a flat bed without using bedrails? 2   Moving to and from a bed to a chair (including a wheelchair)? 1   Standing up from a chair using your arms (e.g., wheelchair, or bedside chair)? 1   Walking in hospital room? 1   Climbing 3-5 steps with a railing? 1   6 clicks Mobility Score 8   Activity Tolerance   Sitting Edge of Bed <40 minutes   Patient / Family Goals    Patient / Family Goal #1 To be able to stand and walk   Goal #1 Outcome Progressing slower than expected   Short Term Goals    Short Term Goal # 1 pt will perform supine <> sit with Min A in 6 visits   Goal Outcome # 1 Progressing slower than expected   Short Term Goal # 2 Pt will maintain Fair seated balance with intermittent verbal cues x3 minutes to progress functional activity in 6 visits   Goal Outcome # 2 Progressing slower than expected   Short Term Goal # 3 pt will perform sit <> stand and functional transfers with FWW and Mod A in 6 visits   Goal Outcome # 3 Progressing slower than expected   Short Term Goal # 4 pt will ambulate 10 ft with FWW and Mod A in 6 visits   Goal Outcome # 4 Goal not met   Physical Therapy Treatment Plan   Physical Therapy Treatment  Plan Continue Current Treatment Plan   Anticipated Discharge Equipment and Recommendations   DC Equipment Recommendations Unable to determine at this time   Discharge Recommendations Recommend post-acute placement for additional physical therapy services prior to discharge home   Interdisciplinary Plan of Care Collaboration   IDT Collaboration with  Nursing;Certified Nursing Assistant;Therapy Tech   Patient Position at End of Therapy In Bed;Bed Alarm On;Call Light within Reach;Tray Table within Reach   Session Information   Date / Session Number  2/14-3(3/5, 2/18)

## 2025-02-14 NOTE — THERAPY
Occupational Therapy Contact Note    Patient Name: Maximino Green  Age:  64 y.o., Sex:  male  Medical Record #: [id number]  Today's Date: 2/14/2025    Attempted to see patient for OT tx session, pt just finished with PT and requesting to rest until afternoon. Came back for PM session, pt actively discharging to SNF.       Nathaniel Hernandes OTD, OTR/L

## 2025-02-14 NOTE — PROGRESS NOTES
Neurosurgery Progress Note    Subjective:  AAO, semi upright in bed,ongoing numbness lower abd down legs & left > right wkns which he feels is improving      Exam:  AAO, cooperative, fc's  Right df/pf 5-/5, IP 4-  Left df/pf 3+, IP 3-  Drain 40cc    BP  Min: 133/60  Max: 136/56  Pulse  Av  Min: 66  Max: 81  Resp  Av.8  Min: 16  Max: 20  Temp  Av.9 °C (98.5 °F)  Min: 36.9 °C (98.4 °F)  Max: 37.1 °C (98.8 °F)  SpO2  Av.5 %  Min: 91 %  Max: 96 %    No data recorded    Recent Labs     25  1530 25  0427 25  0504   WBC 9.0 12.6* 12.5*   RBC 4.42* 3.92* 3.69*   HEMOGLOBIN 12.5* 10.9* 10.1*   HEMATOCRIT 37.6* 33.7* 31.0*   MCV 85.1 86.0 84.0   MCH 28.3 27.8 27.4   MCHC 33.2 32.3 32.6   RDW 41.6 41.6 40.2   PLATELETCT 139* 132* 142*   MPV 14.2*  --  14.4*     Recent Labs     25  0500 25  0504 25  0505   SODIUM 137 139 135   POTASSIUM 4.6 5.3 5.1   CHLORIDE 105 106 105   CO2 23 26 24   GLUCOSE 145* 186* 152*   BUN 22 33* 38*   CREATININE 0.89 0.88 0.93   CALCIUM 9.2 9.0 9.2     Recent Labs     25  1530 25  0427   APTT 28.3  --    INR  --  1.18*           Intake/Output                         25 0700 - 25 0659 25 07 - 02/15/25 0659     4943-2295 3595-6627 Total 5349-1301 0857-1923 Total                 Intake    P.O.  --  370 370  --  -- --    P.O. -- 370 370 -- -- --    Total Intake -- 370 370 -- -- --       Output    Urine  --  1000 1000  --  -- --    Number of Times Voided -- 1 x 1 x -- -- --    Output (mL) ([REMOVED] Urethral Catheter Temperature probe;Non-latex 16 Fr. 25 0649) -- 1000 1000 -- -- --    Drains  60  70 130  --  -- --    Output (mL) (Closed/Suction Drain Superior Back Hemovac) 60 70 130 -- -- --    Stool  --  -- --  --  -- --    Number of Times Stooled 1 x 1 x 2 x -- -- --    Total Output 60 1070 1130 -- -- --       Net I/O     -60 -700 -760 -- -- --              Intake/Output Summary (Last 24 hours) at 2025  0728  Last data filed at 2/14/2025 0530  Gross per 24 hour   Intake 370 ml   Output 1130 ml   Net -760 ml             acetaminophen  1,000 mg TID    dexamethasone  4 mg Q6HRS    [Held by provider] lisinopril  5 mg DAILY    [Held by provider] metoprolol SR  50 mg DAILY    hydrALAZINE  20 mg Q6HRS PRN    bisacodyl  10 mg DAILY AT 1800    senna-docusate  2 Tablet BID    polyethylene glycol/lytes  1 Packet QDAY PRN    magnesium hydroxide  30 mL QDAY PRN    oxyCODONE immediate-release  10 mg Q4HRS PRN    oxyCODONE immediate-release  15 mg Q4HRS PRN    rosuvastatin  10 mg Q EVENING    insulin lispro  2-9 Units Q6HRS    And    dextrose bolus  25 g Q15 MIN PRN    Pharmacy Consult Request  1 Each PHARMACY TO DOSE    MD ALERT...DO NOT ADMINISTER NSAIDS or ASPIRIN unless ORDERED By Neurosurgery  1 Each PRN    methocarbamol  750 mg Q8HRS PRN    Or    cyclobenzaprine  10 mg Q8HRS PRN    Or    diazePAM  5 mg Q4HRS PRN    lidocaine  1 Patch Q24HR       Assessment and Plan:  POD # 3 C7 - T5 fusion & L3-4 Laminectomy  NM as above  Pathology pending - sent to [facility]  Continue to mobilize with therapies  Brace when oob   Continue with oral meds  DC drain  OK to rehab once cleared from IM   F/U @ [facility] in 6 wks (our office to contact)  Prophylactic anticoagulation: no         Start date/time: 2/15/2025     Brain Compression: No

## 2025-02-14 NOTE — DISCHARGE PLANNING
DC Transport Scheduled    Transport Company Scheduled:  [facility]    Scheduled Date: 2/14/2025  Scheduled Time: 1600    Transport Type: [place]  Destination: [facility] at [address]     Notified care team of scheduled transport via Voalte.     If there are any changes needed to the DC transportation scheduled, please contact [facility] at ext. [phone] between the hours of 0637-1447. If outside those hours, contact the ED Case Manager at ext. [phone].

## 2025-02-15 NOTE — DOCUMENTATION QUERY
[facility]                                                                       Query Response Note      PATIENT:               VALENTIN YADAV  ACCT #:                  [id number]  MRN:                     [id number]  :                      1960  ADMIT DATE:       2/10/2025 10:51 PM  DISCH DATE:        2025 3:21 PM  RESPONDING  PROVIDER #:        [id number]           QUERY TEXT:    Acute systolic CHF is documented in the Medical Record.  After further review of the clinical indicators and provided treatment, can the acuity of CHF be clarified?    The patient's Clinical Indicators include:   Hospitalist Consult:  Acute systolic heart failure POA: hold beta blocker and lisinopril for now  ED Provider Note: no peripheral edema  Risk Factors: htn, atrial fib, chronic HFrEF  Treatment: home lisinopril and beta blocker held    Important Note:  if new diagnosis or change to documentation made via query please include in daily documentation and/or DC Summary    Thank you,  Eliane Phillip RN, BSN, CCDS  Clinical   Connect via [employer]  Options provided:   -- Chronic HFrEF   -- Acute on Chronic HFrEF, please clarify treatment for acute   -- Other explanation, (please specify other explanation)   -- Unable to determine      Query created by: Eliane Phillip on 2025 11:13 AM    RESPONSE TEXT:    Chronic HFrEF          Electronically signed by:  GRECIA GILLIAM MD 2025 5:11 PM

## 2025-02-19 ENCOUNTER — APPOINTMENT (OUTPATIENT)
Dept: RADIOLOGY | Facility: MEDICAL CENTER | Age: 65
DRG: 309 | End: 2025-02-19
Attending: STUDENT IN AN ORGANIZED HEALTH CARE EDUCATION/TRAINING PROGRAM
Payer: COMMERCIAL

## 2025-02-19 ENCOUNTER — APPOINTMENT (OUTPATIENT)
Dept: RADIOLOGY | Facility: MEDICAL CENTER | Age: 65
DRG: 309 | End: 2025-02-19
Attending: EMERGENCY MEDICINE
Payer: COMMERCIAL

## 2025-02-19 ENCOUNTER — HOSPITAL ENCOUNTER (INPATIENT)
Facility: MEDICAL CENTER | Age: 65
End: 2025-02-19
Attending: EMERGENCY MEDICINE | Admitting: STUDENT IN AN ORGANIZED HEALTH CARE EDUCATION/TRAINING PROGRAM
Payer: COMMERCIAL

## 2025-02-19 DIAGNOSIS — C79.9 METASTATIC CARCINOMA (HCC): ICD-10-CM

## 2025-02-19 DIAGNOSIS — R53.1 GENERALIZED WEAKNESS: ICD-10-CM

## 2025-02-19 DIAGNOSIS — E86.0 DEHYDRATION: ICD-10-CM

## 2025-02-19 DIAGNOSIS — R53.81 PHYSICAL DECONDITIONING: ICD-10-CM

## 2025-02-19 DIAGNOSIS — I48.91 ATRIAL FIBRILLATION WITH RAPID VENTRICULAR RESPONSE (HCC): ICD-10-CM

## 2025-02-19 PROBLEM — G93.40 ACUTE ENCEPHALOPATHY: Status: ACTIVE | Noted: 2025-02-19

## 2025-02-19 LAB
ANION GAP SERPL CALC-SCNC: 13 MMOL/L (ref 7–16)
APPEARANCE UR: CLEAR
BACTERIA #/AREA URNS HPF: NORMAL /HPF
BASOPHILS # BLD AUTO: 0.1 % (ref 0–1.8)
BASOPHILS # BLD: 0.02 K/UL (ref 0–0.12)
BILIRUB UR QL STRIP.AUTO: NEGATIVE
BUN SERPL-MCNC: 29 MG/DL (ref 8–22)
CALCIUM SERPL-MCNC: 8.8 MG/DL (ref 8.4–10.2)
CASTS URNS QL MICRO: NORMAL /LPF (ref 0–2)
CHLORIDE SERPL-SCNC: 109 MMOL/L (ref 96–112)
CO2 SERPL-SCNC: 20 MMOL/L (ref 20–33)
COLOR UR: ABNORMAL
CREAT SERPL-MCNC: 0.9 MG/DL (ref 0.5–1.4)
EKG IMPRESSION: NORMAL
EOSINOPHIL # BLD AUTO: 0.05 K/UL (ref 0–0.51)
EOSINOPHIL NFR BLD: 0.4 % (ref 0–6.9)
EPITHELIAL CELLS 1715: NORMAL /HPF (ref 0–5)
ERYTHROCYTE [DISTWIDTH] IN BLOOD BY AUTOMATED COUNT: 39.7 FL (ref 35.9–50)
GFR SERPLBLD CREATININE-BSD FMLA CKD-EPI: 95 ML/MIN/1.73 M 2
GLUCOSE BLD STRIP.AUTO-MCNC: 109 MG/DL (ref 65–99)
GLUCOSE BLD STRIP.AUTO-MCNC: 111 MG/DL (ref 65–99)
GLUCOSE SERPL-MCNC: 121 MG/DL (ref 65–99)
GLUCOSE UR STRIP.AUTO-MCNC: NEGATIVE MG/DL
HCT VFR BLD AUTO: 36.3 % (ref 42–52)
HGB BLD-MCNC: 12 G/DL (ref 14–18)
IMM GRANULOCYTES # BLD AUTO: 0.17 K/UL (ref 0–0.11)
IMM GRANULOCYTES NFR BLD AUTO: 1.2 % (ref 0–0.9)
INR PPP: 1.2 (ref 0.87–1.13)
KETONES UR STRIP.AUTO-MCNC: ABNORMAL MG/DL
LACTATE SERPL-SCNC: 2 MMOL/L (ref 0.5–2)
LACTATE SERPL-SCNC: 2.5 MMOL/L (ref 0.5–2)
LEUKOCYTE ESTERASE UR QL STRIP.AUTO: ABNORMAL
LYMPHOCYTES # BLD AUTO: 0.73 K/UL (ref 1–4.8)
LYMPHOCYTES NFR BLD: 5.3 % (ref 22–41)
MAGNESIUM SERPL-MCNC: 2.1 MG/DL (ref 1.5–2.5)
MCH RBC QN AUTO: 27.5 PG (ref 27–33)
MCHC RBC AUTO-ENTMCNC: 33.1 G/DL (ref 32.3–36.5)
MCV RBC AUTO: 83.3 FL (ref 81.4–97.8)
MICRO URNS: ABNORMAL
MONOCYTES # BLD AUTO: 1.68 K/UL (ref 0–0.85)
MONOCYTES NFR BLD AUTO: 12.1 % (ref 0–13.4)
NEUTROPHILS # BLD AUTO: 11.19 K/UL (ref 1.82–7.42)
NEUTROPHILS NFR BLD: 80.9 % (ref 44–72)
NITRITE UR QL STRIP.AUTO: NEGATIVE
NRBC # BLD AUTO: 0 K/UL
NRBC BLD-RTO: 0 /100 WBC (ref 0–0.2)
PH UR STRIP.AUTO: 6 [PH] (ref 5–8)
PHOSPHATE SERPL-MCNC: 2.3 MG/DL (ref 2.5–4.5)
PLATELET # BLD AUTO: 191 K/UL (ref 164–446)
PMV BLD AUTO: 12.8 FL (ref 9–12.9)
POTASSIUM SERPL-SCNC: 4 MMOL/L (ref 3.6–5.5)
PROT UR QL STRIP: NEGATIVE MG/DL
PROTHROMBIN TIME: 15.5 SEC (ref 12–14.6)
RBC # BLD AUTO: 4.36 M/UL (ref 4.7–6.1)
RBC # URNS HPF: NORMAL /HPF
RBC UR QL AUTO: NEGATIVE
SODIUM SERPL-SCNC: 142 MMOL/L (ref 135–145)
SP GR UR STRIP.AUTO: 1.02
TROPONIN T SERPL-MCNC: 28 NG/L (ref 6–19)
TSH SERPL DL<=0.005 MIU/L-ACNC: 5.28 UIU/ML (ref 0.38–5.33)
UROBILINOGEN UR STRIP.AUTO-MCNC: 1 EU/DL
WBC # BLD AUTO: 13.8 K/UL (ref 4.8–10.8)
WBC #/AREA URNS HPF: NORMAL /HPF

## 2025-02-19 PROCEDURE — 700105 HCHG RX REV CODE 258: Performed by: EMERGENCY MEDICINE

## 2025-02-19 PROCEDURE — 82962 GLUCOSE BLOOD TEST: CPT

## 2025-02-19 PROCEDURE — 93005 ELECTROCARDIOGRAM TRACING: CPT | Mod: TC | Performed by: EMERGENCY MEDICINE

## 2025-02-19 PROCEDURE — 83605 ASSAY OF LACTIC ACID: CPT

## 2025-02-19 PROCEDURE — 71045 X-RAY EXAM CHEST 1 VIEW: CPT

## 2025-02-19 PROCEDURE — 87641 MR-STAPH DNA AMP PROBE: CPT

## 2025-02-19 PROCEDURE — 770022 HCHG ROOM/CARE - ICU (200)

## 2025-02-19 PROCEDURE — 96375 TX/PRO/DX INJ NEW DRUG ADDON: CPT

## 2025-02-19 PROCEDURE — 94760 N-INVAS EAR/PLS OXIMETRY 1: CPT

## 2025-02-19 PROCEDURE — 96374 THER/PROPH/DIAG INJ IV PUSH: CPT

## 2025-02-19 PROCEDURE — 99291 CRITICAL CARE FIRST HOUR: CPT | Performed by: STUDENT IN AN ORGANIZED HEALTH CARE EDUCATION/TRAINING PROGRAM

## 2025-02-19 PROCEDURE — A9270 NON-COVERED ITEM OR SERVICE: HCPCS | Performed by: STUDENT IN AN ORGANIZED HEALTH CARE EDUCATION/TRAINING PROGRAM

## 2025-02-19 PROCEDURE — 700111 HCHG RX REV CODE 636 W/ 250 OVERRIDE (IP): Performed by: STUDENT IN AN ORGANIZED HEALTH CARE EDUCATION/TRAINING PROGRAM

## 2025-02-19 PROCEDURE — 99291 CRITICAL CARE FIRST HOUR: CPT

## 2025-02-19 PROCEDURE — 81001 URINALYSIS AUTO W/SCOPE: CPT

## 2025-02-19 PROCEDURE — 84443 ASSAY THYROID STIM HORMONE: CPT

## 2025-02-19 PROCEDURE — 96366 THER/PROPH/DIAG IV INF ADDON: CPT

## 2025-02-19 PROCEDURE — 85025 COMPLETE CBC W/AUTO DIFF WBC: CPT

## 2025-02-19 PROCEDURE — 700105 HCHG RX REV CODE 258: Performed by: STUDENT IN AN ORGANIZED HEALTH CARE EDUCATION/TRAINING PROGRAM

## 2025-02-19 PROCEDURE — 84100 ASSAY OF PHOSPHORUS: CPT

## 2025-02-19 PROCEDURE — 87086 URINE CULTURE/COLONY COUNT: CPT

## 2025-02-19 PROCEDURE — 84484 ASSAY OF TROPONIN QUANT: CPT

## 2025-02-19 PROCEDURE — 93005 ELECTROCARDIOGRAM TRACING: CPT | Mod: TC

## 2025-02-19 PROCEDURE — 85610 PROTHROMBIN TIME: CPT

## 2025-02-19 PROCEDURE — 83735 ASSAY OF MAGNESIUM: CPT

## 2025-02-19 PROCEDURE — 96365 THER/PROPH/DIAG IV INF INIT: CPT

## 2025-02-19 PROCEDURE — 80048 BASIC METABOLIC PNL TOTAL CA: CPT

## 2025-02-19 PROCEDURE — 87389 HIV-1 AG W/HIV-1&-2 AB AG IA: CPT

## 2025-02-19 PROCEDURE — 36415 COLL VENOUS BLD VENIPUNCTURE: CPT

## 2025-02-19 PROCEDURE — 700101 HCHG RX REV CODE 250: Performed by: EMERGENCY MEDICINE

## 2025-02-19 PROCEDURE — 700111 HCHG RX REV CODE 636 W/ 250 OVERRIDE (IP): Performed by: EMERGENCY MEDICINE

## 2025-02-19 PROCEDURE — 87040 BLOOD CULTURE FOR BACTERIA: CPT | Mod: 91

## 2025-02-19 PROCEDURE — 700101 HCHG RX REV CODE 250: Performed by: STUDENT IN AN ORGANIZED HEALTH CARE EDUCATION/TRAINING PROGRAM

## 2025-02-19 PROCEDURE — 700102 HCHG RX REV CODE 250 W/ 637 OVERRIDE(OP): Performed by: STUDENT IN AN ORGANIZED HEALTH CARE EDUCATION/TRAINING PROGRAM

## 2025-02-19 RX ORDER — METOPROLOL TARTRATE 1 MG/ML
5 INJECTION, SOLUTION INTRAVENOUS ONCE
Status: COMPLETED | OUTPATIENT
Start: 2025-02-19 | End: 2025-02-19

## 2025-02-19 RX ORDER — ACETAMINOPHEN 325 MG/1
650 TABLET ORAL EVERY 6 HOURS PRN
Status: DISCONTINUED | OUTPATIENT
Start: 2025-02-19 | End: 2025-02-19

## 2025-02-19 RX ORDER — OXYCODONE HYDROCHLORIDE 5 MG/1
2.5 TABLET ORAL
Refills: 0 | Status: DISCONTINUED | OUTPATIENT
Start: 2025-02-19 | End: 2025-02-21

## 2025-02-19 RX ORDER — OXYCODONE HYDROCHLORIDE 10 MG/1
10 TABLET ORAL EVERY 8 HOURS PRN
Status: ON HOLD | COMMUNITY
End: 2025-03-05

## 2025-02-19 RX ORDER — HYDROMORPHONE HYDROCHLORIDE 1 MG/ML
0.25 INJECTION, SOLUTION INTRAMUSCULAR; INTRAVENOUS; SUBCUTANEOUS
Status: DISCONTINUED | OUTPATIENT
Start: 2025-02-19 | End: 2025-02-21

## 2025-02-19 RX ORDER — INSULIN LISPRO 100 [IU]/ML
2-9 INJECTION, SOLUTION INTRAVENOUS; SUBCUTANEOUS
Status: DISCONTINUED | OUTPATIENT
Start: 2025-02-19 | End: 2025-02-21

## 2025-02-19 RX ORDER — SODIUM CHLORIDE, SODIUM LACTATE, POTASSIUM CHLORIDE, CALCIUM CHLORIDE 600; 310; 30; 20 MG/100ML; MG/100ML; MG/100ML; MG/100ML
1000 INJECTION, SOLUTION INTRAVENOUS ONCE
Status: COMPLETED | OUTPATIENT
Start: 2025-02-19 | End: 2025-02-19

## 2025-02-19 RX ORDER — AMOXICILLIN 250 MG
2 CAPSULE ORAL EVERY EVENING
Status: DISCONTINUED | OUTPATIENT
Start: 2025-02-19 | End: 2025-02-21

## 2025-02-19 RX ORDER — SODIUM CHLORIDE, SODIUM LACTATE, POTASSIUM CHLORIDE, AND CALCIUM CHLORIDE .6; .31; .03; .02 G/100ML; G/100ML; G/100ML; G/100ML
1000 INJECTION, SOLUTION INTRAVENOUS ONCE
Status: COMPLETED | OUTPATIENT
Start: 2025-02-19 | End: 2025-02-19

## 2025-02-19 RX ORDER — DEXTROSE MONOHYDRATE 25 G/50ML
25 INJECTION, SOLUTION INTRAVENOUS
Status: DISCONTINUED | OUTPATIENT
Start: 2025-02-19 | End: 2025-02-21

## 2025-02-19 RX ORDER — MAGNESIUM SULFATE HEPTAHYDRATE 40 MG/ML
2 INJECTION, SOLUTION INTRAVENOUS ONCE
Status: COMPLETED | OUTPATIENT
Start: 2025-02-19 | End: 2025-02-19

## 2025-02-19 RX ORDER — OXYCODONE HYDROCHLORIDE 5 MG/1
5 TABLET ORAL
Refills: 0 | Status: DISCONTINUED | OUTPATIENT
Start: 2025-02-19 | End: 2025-02-21

## 2025-02-19 RX ORDER — ACETAMINOPHEN 500 MG
1000 TABLET ORAL EVERY 6 HOURS PRN
Status: DISCONTINUED | OUTPATIENT
Start: 2025-02-24 | End: 2025-02-21

## 2025-02-19 RX ORDER — VANCOMYCIN 1.5 G/300ML
1500 INJECTION, SOLUTION INTRAVENOUS EVERY 12 HOURS
Status: DISCONTINUED | OUTPATIENT
Start: 2025-02-20 | End: 2025-02-20

## 2025-02-19 RX ORDER — ACETAMINOPHEN 325 MG/1
650 TABLET ORAL EVERY 4 HOURS PRN
Status: ON HOLD | COMMUNITY
End: 2025-03-05

## 2025-02-19 RX ORDER — THIAMINE HYDROCHLORIDE 100 MG/ML
200 INJECTION, SOLUTION INTRAMUSCULAR; INTRAVENOUS 2 TIMES DAILY
Status: COMPLETED | OUTPATIENT
Start: 2025-02-19 | End: 2025-02-20

## 2025-02-19 RX ORDER — ACETAMINOPHEN 325 MG/1
650 TABLET ORAL EVERY 6 HOURS
Status: DISCONTINUED | OUTPATIENT
Start: 2025-02-19 | End: 2025-02-21

## 2025-02-19 RX ORDER — SODIUM CHLORIDE, SODIUM LACTATE, POTASSIUM CHLORIDE, CALCIUM CHLORIDE 600; 310; 30; 20 MG/100ML; MG/100ML; MG/100ML; MG/100ML
INJECTION, SOLUTION INTRAVENOUS CONTINUOUS
Status: ACTIVE | OUTPATIENT
Start: 2025-02-19 | End: 2025-02-20

## 2025-02-19 RX ORDER — METOPROLOL TARTRATE 1 MG/ML
5 INJECTION, SOLUTION INTRAVENOUS ONCE
Status: ACTIVE | OUTPATIENT
Start: 2025-02-19 | End: 2025-02-20

## 2025-02-19 RX ORDER — ENOXAPARIN SODIUM 100 MG/ML
40 INJECTION SUBCUTANEOUS DAILY
Status: DISCONTINUED | OUTPATIENT
Start: 2025-02-19 | End: 2025-02-21

## 2025-02-19 RX ORDER — POLYETHYLENE GLYCOL 3350 17 G/17G
1 POWDER, FOR SOLUTION ORAL
Status: DISCONTINUED | OUTPATIENT
Start: 2025-02-19 | End: 2025-02-21

## 2025-02-19 RX ORDER — DILTIAZEM HYDROCHLORIDE 5 MG/ML
10 INJECTION INTRAVENOUS ONCE
Status: COMPLETED | OUTPATIENT
Start: 2025-02-19 | End: 2025-02-19

## 2025-02-19 RX ADMIN — PIPERACILLIN AND TAZOBACTAM 4.5 G: 4; .5 INJECTION, POWDER, LYOPHILIZED, FOR SOLUTION INTRAVENOUS; PARENTERAL at 18:10

## 2025-02-19 RX ADMIN — POTASSIUM PHOSPHATE, MONOBASIC AND POTASSIUM PHOSPHATE, DIBASIC 15 MMOL: 224; 236 INJECTION, SOLUTION, CONCENTRATE INTRAVENOUS at 19:25

## 2025-02-19 RX ADMIN — SODIUM CHLORIDE, POTASSIUM CHLORIDE, SODIUM LACTATE AND CALCIUM CHLORIDE 1000 ML: 600; 310; 30; 20 INJECTION, SOLUTION INTRAVENOUS at 13:15

## 2025-02-19 RX ADMIN — DILTIAZEM HYDROCHLORIDE 10 MG/HR: 5 INJECTION INTRAVENOUS at 13:56

## 2025-02-19 RX ADMIN — MAGNESIUM SULFATE HEPTAHYDRATE 2 G: 2 INJECTION, SOLUTION INTRAVENOUS at 16:40

## 2025-02-19 RX ADMIN — THIAMINE HYDROCHLORIDE 200 MG: 100 INJECTION, SOLUTION INTRAMUSCULAR; INTRAVENOUS at 18:20

## 2025-02-19 RX ADMIN — SODIUM CHLORIDE, POTASSIUM CHLORIDE, SODIUM LACTATE AND CALCIUM CHLORIDE 1000 ML: 600; 310; 30; 20 INJECTION, SOLUTION INTRAVENOUS at 12:14

## 2025-02-19 RX ADMIN — SODIUM CHLORIDE, POTASSIUM CHLORIDE, SODIUM LACTATE AND CALCIUM CHLORIDE: 600; 310; 30; 20 INJECTION, SOLUTION INTRAVENOUS at 19:35

## 2025-02-19 RX ADMIN — ACETAMINOPHEN 650 MG: 325 TABLET ORAL at 19:19

## 2025-02-19 RX ADMIN — METOPROLOL TARTRATE 5 MG: 5 INJECTION INTRAVENOUS at 12:14

## 2025-02-19 RX ADMIN — PIPERACILLIN AND TAZOBACTAM 4.5 G: 4; .5 INJECTION, POWDER, LYOPHILIZED, FOR SOLUTION INTRAVENOUS; PARENTERAL at 20:49

## 2025-02-19 RX ADMIN — METOPROLOL TARTRATE 5 MG: 5 INJECTION INTRAVENOUS at 12:35

## 2025-02-19 RX ADMIN — DILTIAZEM HYDROCHLORIDE 20 MG/HR: 5 INJECTION INTRAVENOUS at 19:45

## 2025-02-19 RX ADMIN — DILTIAZEM HYDROCHLORIDE 5 MG/HR: 5 INJECTION INTRAVENOUS at 13:46

## 2025-02-19 RX ADMIN — VANCOMYCIN HYDROCHLORIDE 3 G: 5 INJECTION, POWDER, LYOPHILIZED, FOR SOLUTION INTRAVENOUS at 18:19

## 2025-02-19 RX ADMIN — DILTIAZEM HYDROCHLORIDE 10 MG: 5 INJECTION INTRAVENOUS at 13:10

## 2025-02-19 RX ADMIN — ENOXAPARIN SODIUM 40 MG: 100 INJECTION SUBCUTANEOUS at 18:20

## 2025-02-19 RX ADMIN — DILTIAZEM HYDROCHLORIDE 15 MG/HR: 5 INJECTION INTRAVENOUS at 16:25

## 2025-02-19 ASSESSMENT — PAIN DESCRIPTION - PAIN TYPE
TYPE: ACUTE PAIN
TYPE: ACUTE PAIN

## 2025-02-19 ASSESSMENT — FIBROSIS 4 INDEX
FIB4 SCORE: 2.6
FIB4 SCORE: 3.49

## 2025-02-19 NOTE — CONSULTS
Pulmonary Consultation    Date of consult: 2/19/2025    Referring Physician  Joe Meehan M.D.    Reason for Consultation  Afib with RVR    History of Presenting Illness  64 y.o. 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 [place] ICU 2/10/25-2/14/25 with T3 and T4 metastasis and pathological collapse of T4 vertebral body and epidural tumor spread levels T2-T4 with severe canal compromise at T3 noted on MRI. That admission received IV systemic steroids and decompressive laminectomy L3-L4, and instrumented stabilization of C7-T5. Discharged on oral opiate and steroid regimen to SNF. He presented 2/19/2025 with altered mental status at his SNF. Found to be in Afib RVR in the ED.    In the ED today afebrile 36.2, HR 180s-190s Afib RVR, BP 110s/60s, RR 20s, SpO2 94% on RA. Labs showed WBC 13.8 81% PMN with L-shift(up from 12.5 2/13/25), Hb 12.0, Plt 191, BMP wnl, lactate 2.5, troponin 28, INR 1.2, UA with 0-2WBC, trace LE and trace ketones and spec grav 1.023. CXR showing multiple bilateral nodules c/w metastatic disease. Recent CT CHEST/Ab/Pel 2/11/25 with innumerable pulmonary nodules, retroperitoneal LAD, common bile duct dilation, hepatic contour suggesting cirrhosis, retroperitoneal varices suggesting portal hypertension, R nephrolithiasis, L inguinal hernia, coronary artery disease. Recent TTE 2/14/25 with normalization of LV systolic function 59%, normal RV systolic function. Has anterolateral WMA, indeterminate diastolic function. Trace TR with eRVSP 50mmHg.    In the ED, received metop tartrate 5mg twice, diltiazem 10mg, started on dilt drip, 2L IVF.    Upon arrival to the ICU is altered and making nonsensical statements and hyperemotional. I spoke with friend Keon Tapia. Also spoke with legal wife Aurelio Green ([phone]) who elects for Maximino to be DNR/DNI    Code  Status  DNAR/DNI    Review of Systems  Review of Systems   Reason unable to perform ROS: altered mental status.       Past Medical History   has a past medical history of Alcoholic (HCC), Cancer (HCC) (2019), Dental disorder, Diabetes (HCC), High cholesterol (11/01/2022), Hypertension, Paroxysmal atrial fibrillation (HCC), and Tonsil cancer (HCC).    Surgical History   has a past surgical history that includes cholecystectomy (2003); other (12/27/2018); thoracic laminectomy (2/11/2025); and thoracic fusion o-arm (2/11/2025).    Family History  family history includes Alcohol abuse in his mother; Cancer in his mother and sister; GI Disease in his sister; Heart Disease in his father; Respiratory Disease in his mother.    Social History   reports that he quit smoking about 6 years ago. His smoking use included cigarettes and cigars. He started smoking about 46 years ago. He has a 4 pack-year smoking history. He has never used smokeless tobacco. He reports that he does not drink alcohol and does not use drugs.    Medications  Home Medications       Reviewed by Aniya Pagan (Pharmacy Tech) on 02/19/25 at 1243  Med List Status: Complete     Medication Last Dose Status   acetaminophen (TYLENOL) 325 MG Tab 2/15/2025 Active   acetaminophen (TYLENOL) 500 MG Tab Not Taking Active   lidocaine (ASPERFLEX) 4 % Patch 2/17/2025 Active   lisinopril (PRINIVIL) 5 MG Tab 2/18/2025 Active   methylPREDNISolone (MEDROL DOSEPAK) 4 MG Tablet Therapy Pack 2/19/2025 Active   metoprolol SR (TOPROL XL) 50 MG TABLET SR 24 HR 2/18/2025 Active   oxyCODONE immediate release (ROXICODONE) 10 MG immediate release tablet 2/16/2025 Active   polyethylene glycol/lytes (MIRALAX) Pack Not Taking Active   rosuvastatin (CRESTOR) 10 MG Tab 2/18/2025 Active                  Audit from Redirected Encounters    **Home medications have not yet been reviewed for this encounter**       Current Facility-Administered Medications   Medication Dose Route  Frequency Provider Last Rate Last Admin    Metoprolol Tartrate (Lopressor) injection 5 mg  5 mg Intravenous Once Ericka Guevara D.KINGSTON        enoxaparin (Lovenox) inj 40 mg  40 mg Subcutaneous DAILY AT 1800 Joe Meehan M.D.        senna-docusate (Pericolace Or Senokot S) 8.6-50 MG per tablet 2 Tablet  2 Tablet Oral Q EVENING Joe Meehan M.D.        And    polyethylene glycol/lytes (Miralax) Packet 1 Packet  1 Packet Oral QDAY PRN Joe Meehan M.D.        dilTIAZem (Cardizem) 125 mg in dextrose 5% 125 mL Infusion  0-20 mg/hr Intravenous Continuous Jeo Meehan M.D. 15 mL/hr at 02/19/25 1625 15 mg/hr at 02/19/25 1625    magnesium sulfate IVPB premix 2 g  2 g Intravenous Once Joe Meehan M.D. 25 mL/hr at 02/19/25 1640 2 g at 02/19/25 1640    MD Alert...Vancomycin per Pharmacy   Other PHARMACY TO DOSE Joe Meehan M.D.        piperacillin-tazobactam (Zosyn) 4.5 g in  mL IVPB  4.5 g Intravenous Once Joe Meehan M.D.        And    piperacillin-tazobactam (Zosyn) 4.5 g in  mL IVPB  4.5 g Intravenous Q8HRS Joe Meehan M.D.        vancomycin 3000 mg/500mL NS IVPB premix  3 g Intravenous Once Joe Meehan M.D.        potassium phosphate IVPB 15 mmol in 250 mL D5W (premix)  15 mmol Intravenous Once Joe Meehan M.D.        thiamine (B-1) injection 200 mg  200 mg Intravenous BID Joe Meehan M.D.        insulin lispro (HumaLOG,AdmeLOG) subcutaneous injection  2-9 Units Subcutaneous 4X/DAY ACHS Joe Meehan M.D.        And    dextrose 50% (D50W) injection 25 g  25 g Intravenous Q15 MIN PRN Joe Meehan M.D.        Pharmacy Consult Request ...Pain Management Review 1 Each  1 Each Other PHARMACY TO DOSE Joe Meehan M.D.        oxyCODONE immediate-release (Roxicodone) tablet 2.5 mg  2.5 mg Oral Q3HRS PRN Joe Meehan M.D.        Or    oxyCODONE immediate-release (Roxicodone) tablet 5 mg  5 mg Oral Q3HRS PRN Joe Meehan M.D.        Or    HYDROmorphone (Dilaudid) injection 0.25 mg   0.25 mg Intravenous Q3HRS PRN Joe Meehan M.D.        acetaminophen (Tylenol) tablet 650 mg  650 mg Oral Q6HRS Joe Meehan M.D.        Followed by    [START ON 2/24/2025] acetaminophen (Tylenol) tablet 1,000 mg  1,000 mg Oral Q6HRS PRN Joe Meehan M.D.        lactated ringers infusion   Intravenous Once Joe Meehan M.D.           Allergies  No Known Allergies    Vital Signs last 24 hours  Temp:  [36.2 °C (97.2 °F)-36.6 °C (97.9 °F)] 36.6 °C (97.9 °F)  Pulse:  [] 159  Resp:  [15-41] 41  BP: ()/(58-95) 105/65  SpO2:  [94 %-97 %] 95 %    Physical Exam  Physical Exam  Constitutional:       General: He is in acute distress.      Appearance: He is ill-appearing.   HENT:      Head: Normocephalic and atraumatic.      Comments: Midline incision along C/T/L spine is c/d/I       Mouth/Throat:      Mouth: Mucous membranes are dry.   Eyes:      Extraocular Movements: Extraocular movements intact.      Pupils: Pupils are equal, round, and reactive to light.   Neck:      Comments: ROM limited due to recent surgery  Cardiovascular:      Rate and Rhythm: Tachycardia present. Rhythm irregular.      Pulses: Normal pulses.   Pulmonary:      Effort: Pulmonary effort is normal.      Breath sounds: Normal breath sounds.   Abdominal:      Palpations: Abdomen is soft.      Comments: +abdominal obesity   Musculoskeletal:      Cervical back: No rigidity or tenderness.      Right lower leg: No edema.      Left lower leg: No edema.   Neurological:      Comments: LLE 2/5 motor  RLE 4/5 motor   Psychiatric:      Comments: Encephalopathic and confabulating         Fluids    Intake/Output Summary (Last 24 hours) at 2/19/2025 1757  Last data filed at 2/19/2025 1700  Gross per 24 hour   Intake 0 ml   Output 225 ml   Net -225 ml       Laboratory  Recent Results (from the past 48 hours)   CBC WITH DIFFERENTIAL    Collection Time: 02/19/25 12:13 PM   Result Value Ref Range    WBC 13.8 (H) 4.8 - 10.8 K/uL    RBC 4.36 (L) 4.70 -  6.10 M/uL    Hemoglobin 12.0 (L) 14.0 - 18.0 g/dL    Hematocrit 36.3 (L) 42.0 - 52.0 %    MCV 83.3 81.4 - 97.8 fL    MCH 27.5 27.0 - 33.0 pg    MCHC 33.1 32.3 - 36.5 g/dL    RDW 39.7 35.9 - 50.0 fL    Platelet Count 191 164 - 446 K/uL    MPV 12.8 9.0 - 12.9 fL    Neutrophils-Polys 80.90 (H) 44.00 - 72.00 %    Lymphocytes 5.30 (L) 22.00 - 41.00 %    Monocytes 12.10 0.00 - 13.40 %    Eosinophils 0.40 0.00 - 6.90 %    Basophils 0.10 0.00 - 1.80 %    Immature Granulocytes 1.20 (H) 0.00 - 0.90 %    Nucleated RBC 0.00 0.00 - 0.20 /100 WBC    Neutrophils (Absolute) 11.19 (H) 1.82 - 7.42 K/uL    Lymphs (Absolute) 0.73 (L) 1.00 - 4.80 K/uL    Monos (Absolute) 1.68 (H) 0.00 - 0.85 K/uL    Eos (Absolute) 0.05 0.00 - 0.51 K/uL    Baso (Absolute) 0.02 0.00 - 0.12 K/uL    Immature Granulocytes (abs) 0.17 (H) 0.00 - 0.11 K/uL    NRBC (Absolute) 0.00 K/uL   Basic Metabolic Panel    Collection Time: 02/19/25 12:13 PM   Result Value Ref Range    Sodium 142 135 - 145 mmol/L    Potassium 4.0 3.6 - 5.5 mmol/L    Chloride 109 96 - 112 mmol/L    Co2 20 20 - 33 mmol/L    Glucose 121 (H) 65 - 99 mg/dL    Bun 29 (H) 8 - 22 mg/dL    Creatinine 0.90 0.50 - 1.40 mg/dL    Calcium 8.8 8.4 - 10.2 mg/dL    Anion Gap 13.0 7.0 - 16.0   TROPONIN    Collection Time: 02/19/25 12:13 PM   Result Value Ref Range    Troponin T 28 (H) 6 - 19 ng/L   PT/INR    Collection Time: 02/19/25 12:13 PM   Result Value Ref Range    PT 15.5 (H) 12.0 - 14.6 sec    INR 1.20 (H) 0.87 - 1.13   LACTIC ACID    Collection Time: 02/19/25 12:13 PM   Result Value Ref Range    Lactic Acid 2.5 (H) 0.5 - 2.0 mmol/L   ESTIMATED GFR    Collection Time: 02/19/25 12:13 PM   Result Value Ref Range    GFR (CKD-EPI) 95 >60 mL/min/1.73 m 2   Magnesium    Collection Time: 02/19/25 12:13 PM   Result Value Ref Range    Magnesium 2.1 1.5 - 2.5 mg/dL   PHOSPHORUS    Collection Time: 02/19/25 12:13 PM   Result Value Ref Range    Phosphorus 2.3 (L) 2.5 - 4.5 mg/dL   TSH WITH REFLEX TO FT4     Collection Time: 25 12:13 PM   Result Value Ref Range    TSH 5.280 0.380 - 5.330 uIU/mL   EKG (NOW)    Collection Time: 25 12:27 PM   Result Value Ref Range    Report       [facility] Emergency Dept.    Test Date:  2025  Pt Name:    VALENTIN YADAV                 Department: [facility]  MRN:        [id number]                      [facility]  Gender:     Male                         Technician: [id number]  :        1960                   Requested By:ER TRIAGE PROTOCOL  Order #:    [id number]                    Reading MD: PAULETTE TRIMBLE DO    Measurements  Intervals                                Axis  Rate:       182                          P:          155  TX:         112                          QRS:        23  QRSD:       103                          T:          133  QT:         289  QTc:        503    Interpretive Statements  AF with RVR  Ventricular premature complex  Aberrant conduction of SV complex(es)  Repolarization abnormality, prob rate related  Compared to ECG 2025 05:09:20  Ventricular premature complex(es) now present  Aberrant conduction of supraventricular beat(s) now present  Early repolarization  now present  Sinus rhythm no longer present  Myocardial infarct finding no longer present  Prolonged QT interval no longer present  Electronically Signed On 2025 12:27:18 PST by PAULETTE TRIMBLE DO     Urinalysis    Collection Time: 25  1:25 PM    Specimen: Urine   Result Value Ref Range    Color Dark Yellow     Character Clear     Specific Gravity 1.023 <1.035    Ph 6.0 5.0 - 8.0    Glucose Negative Negative mg/dL    Ketones Trace (A) Negative mg/dL    Protein Negative Negative mg/dL    Bilirubin Negative Negative    Urobilinogen, Urine 1.0 <=1.0 EU/dL    Nitrite Negative Negative    Leukocyte Esterase Trace (A) Negative    Occult Blood Negative Negative    Micro Urine Req Microscopic    URINE MICROSCOPIC (W/UA)    Collection Time: 25   1:25 PM   Result Value Ref Range    WBC 0-2 /hpf    RBC 0-2 /hpf    Bacteria None Seen None /hpf    Epithelial Cells 0-2 0 - 5 /hpf    Urine Casts 0-2 0 - 2 /lpf   Lactic Acid    Collection Time: 02/19/25  2:49 PM   Result Value Ref Range    Lactic Acid 2.0 0.5 - 2.0 mmol/L       Imaging  CXR with multiple pulm mets    Assessment/Plan  * Atrial fibrillation with rapid ventricular response (HCC)- (present on admission)  Assessment & Plan  Patient with chronic A-fib that is not anticoagulated at baseline.  Presented with rates in the 180s to 190s.  Differential diagnosis of etiology of RVR includes hypovolemia, PE, infection.  Status post 2 L IV fluid in ED, give 1 more liter LR  Replete K, Mg, Phos  Obtain CTPA  Start Vanc/Zosyn to cover for post-op spinal infection  CXR and UA appear noninfectious  Obtain MRI C/T/L spine w and w/o when stable (may require intubation so will need to discuss with patient and wife)  Continue dilt drip goal HR < 120  Will not anticoagulate for now given recent spinal instrumentation  Patient with normal LV and RV function within 1 week ago on TTE. Hyperdynamic LVEF and no pericardial effusion on POCUS.    Other cirrhosis of liver (HCC)- (present on admission)  Assessment & Plan  Patient has retroperitoneal varices on imaging implying portal hypertension.  No evidence of bleeding today.  Nothing to do.  Obtain CMP.    Spinal cord compression (HCC)- (present on admission)  Assessment & Plan  As above    Type 2 diabetes mellitus with hyperglycemia, with long-term current use of insulin (HCC)- (present on admission)  Assessment & Plan  ACHS accuchecks and SSI    History of cancer tonsil- (present on admission)  Assessment & Plan  Reportedly with stage II tonsillar cancer in 2022.  However during recent hospitalization found to have multiple bilateral pulmonary nodules consistent with metastatic process as well as T3 and T4 metastasis.  Pathology shows carcinoma from T2-T4 stabilization  surgery.  Patient's wife was unaware of the recurrence of cancer but was updated on admission today.  - discuss goals of care with family tomorrow  - neuro exam is at baseline from recent hospitalization with residual L leg weakness        Discussed patient condition and risk of morbidity and/or mortality with RN, RT, family, patient.    The patient remains critically ill.  Critical care time = 70 minutes in directly providing and coordinating critical care and extensive data review.  No time overlap and excludes procedures.    Joe Meehan MD  Pulmonary and Critical Care Medicine  [facility]

## 2025-02-19 NOTE — ED NOTES
Iv placed , labs drawn and taken to lab. Pt medicated as directed by ESCOBAR long, poc update given to pt. Further orders and dispo pending at this time. No further questions from pt at this time

## 2025-02-19 NOTE — ED TRIAGE NOTES
Pt to ed by [facility] from [facility]  Pt there for recent neck surgery  Per staff was was confused at facility  Pt alert awake at arrival to ed.  Noted to be in rapid afib . Rate 180

## 2025-02-19 NOTE — ED NOTES
Report from DARRIAN Griffin   Rounded on pt. POC explained to pt, denies needs or complaints at this time. Call light in reach.

## 2025-02-19 NOTE — ED PROVIDER NOTES
ED Provider Note    CHIEF COMPLAINT  Chief Complaint   Patient presents with    Rapid Heart Beat     afib       EXTERNAL RECORDS REVIEWED    Patient's discharge summary from her recent hospitalization was reviewed.  He was admitted on February 10 of this year, discharged on February 14.  Brought in by ambulance after a fall.  Complained of bilateral lower extremity weakness.  Hospital course reviewed.  Patient has a history of hypertension, atrial fibrillation and is not on anticoagulation.  History of type 2 diabetes with chronic normocytic anemia.  History of chronic heart failure.  History of chemotherapy induced pancytopenia.  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  He was admitted to the ICU during this hospitalization for T3 and T4 metastases with pathologic collapse of T4.  There was severe canal compromise noted at T3.  Patient was seen by neurosurgery and underwent decompressive laminectomy and stabilization of C7-T5 on February 11 of this year.  He was able to be transition to oral steroids and oral opiates.  He was discharged to a skilled nursing facility for continued physical and occupational as well as skilled nursing needs.    HPI/ROS  LIMITATION TO HISTORY   Select: patient condition  OUTSIDE HISTORIAN(S):  EMS record noted.  Blood pressure 111/69 in atrial fibrillation with a rate 106.  96% on room air, typically wears 2 L of nasal cannula oxygen.  His blood sugar was 166.  Peripheral IV was established and route.    Maximino Green is a 64 y.o. male who presents to the emergency department via EMS.  He presents from the skilled nursing facility where he was recently discharged to.  He does have a history of atrial fibrillation but was found to be in a rapid rate today.  Patient states that he has not been drinking and feels very dehydrated.  He denies having pain.  Has not reported fever.  Recently had spinal surgery.  His biggest concern is that he does  not want family members visiting him.  He has no other complaints.    Med records and documentation that accompanies the patient reviewed.  Medications include Tylenol lidocaine patch, lisinopril metoprolol rosuvastatin, methylprednisolone.,  Fleets enema naloxone female.  WBC, oxycodone    PAST MEDICAL HISTORY   has a past medical history of Alcoholic (HCC), Cancer (HCC) (), Dental disorder, Diabetes (HCC), High cholesterol (2022), Hypertension, Paroxysmal atrial fibrillation (HCC), and Tonsil cancer (HCC).    SURGICAL HISTORY   has a past surgical history that includes cholecystectomy (); other (2018); thoracic laminectomy (2025); and thoracic fusion o-arm (2025).    FAMILY HISTORY  Family History   Problem Relation Age of Onset    Cancer Mother         colon    Respiratory Disease Mother         emphysema- smoker    Alcohol abuse Mother     Heart Disease Father         Detials not known    Cancer Sister         brain tumor    GI Disease Sister        SOCIAL HISTORY  Social History     Tobacco Use    Smoking status: Former     Current packs/day: 0.00     Average packs/day: 0.1 packs/day for 40.0 years (4.0 ttl pk-yrs)     Types: Cigarettes, Cigars     Start date: 1979     Quit date: 2019     Years since quittin.0    Smokeless tobacco: Never   Vaping Use    Vaping status: Never Used   Substance and Sexual Activity    Alcohol use: No     Comment: Alchohlism quit     Drug use: No    Sexual activity: Yes     Partners: Female       CURRENT MEDICATIONS  Home Medications       Reviewed by Aniya Pagan (Pharmacy Tech) on 25 at 1243  Med List Status: Complete     Medication Last Dose Status   acetaminophen (TYLENOL) 325 MG Tab 2/15/2025 Active   acetaminophen (TYLENOL) 500 MG Tab Not Taking Active   lidocaine (ASPERFLEX) 4 % Patch 2025 Active   lisinopril (PRINIVIL) 5 MG Tab 2025 Active   methylPREDNISolone (MEDROL DOSEPAK) 4 MG Tablet Therapy Pack  2/19/2025 Active   metoprolol SR (TOPROL XL) 50 MG TABLET SR 24 HR 2/18/2025 Active   oxyCODONE immediate release (ROXICODONE) 10 MG immediate release tablet 2/16/2025 Active   polyethylene glycol/lytes (MIRALAX) Pack Not Taking Active   rosuvastatin (CRESTOR) 10 MG Tab 2/18/2025 Active                  Audit from Redirected Encounters    **Home medications have not yet been reviewed for this encounter**         ALLERGIES  No Known Allergies    PHYSICAL EXAM  VITAL SIGNS: BP (!) 155/71   Pulse (!) 54   Temp 36.3 °C (97.4 °F) (Temporal)   Resp (!) 21   Ht 1.829 m (6')   Wt (!) 128 kg (282 lb 3 oz)   SpO2 93%   BMI 38.27 kg/m²    Vitals reviewed.  Constitutional: Patient is oriented to person, place, and time.  Obese male.  Chronically ill-appearing.  Mild distress.    Head: Normocephalic and atraumatic.   Ears: Normal external ears bilaterally.   Mouth/Throat: Oropharynx is clear with dry mucous membranes.  Eyes: Conjunctivae are normal. Pupils are equal, round, and reactive to light.   Neck: Normal range of motion. Neck supple. No tracheal deviation present.  Staples in place over the midline neck extending to the mid back.  Overlying dressing has curled up and is no longer covering the wound.  Cardiovascular: Tachycardia, irregular rate. Normal peripheral pulses.  Pulmonary/Chest: Effort normal and breath sounds normal. No respiratory distress, no wheezes, rhonchi, or rales. No chest wall tenderness.  Abdominal: Soft. Bowel sounds are normal. There is no tenderness, rebound or guarding, or peritoneal signs. No CVA tenderness. In an adult diaper.  Musculoskeletal: No edema and no tenderness.   Neurological: No cranial nerve deficits. Normal motor and sensory exam. No focal deficits.   Skin: Skin is warm and dry. No erythema. No pallor. No sacral wound or skin breakdown.  Psychiatric: Patient has a normal mood and affect.     EKG/LABS  Results for orders placed or performed during the hospital encounter of  02/19/25   CBC WITH DIFFERENTIAL    Collection Time: 02/19/25 12:13 PM   Result Value Ref Range    WBC 13.8 (H) 4.8 - 10.8 K/uL    RBC 4.36 (L) 4.70 - 6.10 M/uL    Hemoglobin 12.0 (L) 14.0 - 18.0 g/dL    Hematocrit 36.3 (L) 42.0 - 52.0 %    MCV 83.3 81.4 - 97.8 fL    MCH 27.5 27.0 - 33.0 pg    MCHC 33.1 32.3 - 36.5 g/dL    RDW 39.7 35.9 - 50.0 fL    Platelet Count 191 164 - 446 K/uL    MPV 12.8 9.0 - 12.9 fL    Neutrophils-Polys 80.90 (H) 44.00 - 72.00 %    Lymphocytes 5.30 (L) 22.00 - 41.00 %    Monocytes 12.10 0.00 - 13.40 %    Eosinophils 0.40 0.00 - 6.90 %    Basophils 0.10 0.00 - 1.80 %    Immature Granulocytes 1.20 (H) 0.00 - 0.90 %    Nucleated RBC 0.00 0.00 - 0.20 /100 WBC    Neutrophils (Absolute) 11.19 (H) 1.82 - 7.42 K/uL    Lymphs (Absolute) 0.73 (L) 1.00 - 4.80 K/uL    Monos (Absolute) 1.68 (H) 0.00 - 0.85 K/uL    Eos (Absolute) 0.05 0.00 - 0.51 K/uL    Baso (Absolute) 0.02 0.00 - 0.12 K/uL    Immature Granulocytes (abs) 0.17 (H) 0.00 - 0.11 K/uL    NRBC (Absolute) 0.00 K/uL   Basic Metabolic Panel    Collection Time: 02/19/25 12:13 PM   Result Value Ref Range    Sodium 142 135 - 145 mmol/L    Potassium 4.0 3.6 - 5.5 mmol/L    Chloride 109 96 - 112 mmol/L    Co2 20 20 - 33 mmol/L    Glucose 121 (H) 65 - 99 mg/dL    Bun 29 (H) 8 - 22 mg/dL    Creatinine 0.90 0.50 - 1.40 mg/dL    Calcium 8.8 8.4 - 10.2 mg/dL    Anion Gap 13.0 7.0 - 16.0   TROPONIN    Collection Time: 02/19/25 12:13 PM   Result Value Ref Range    Troponin T 28 (H) 6 - 19 ng/L   PT/INR    Collection Time: 02/19/25 12:13 PM   Result Value Ref Range    PT 15.5 (H) 12.0 - 14.6 sec    INR 1.20 (H) 0.87 - 1.13   LACTIC ACID    Collection Time: 02/19/25 12:13 PM   Result Value Ref Range    Lactic Acid 2.5 (H) 0.5 - 2.0 mmol/L   ESTIMATED GFR    Collection Time: 02/19/25 12:13 PM   Result Value Ref Range    GFR (CKD-EPI) 95 >60 mL/min/1.73 m 2   Magnesium    Collection Time: 02/19/25 12:13 PM   Result Value Ref Range    Magnesium 2.1 1.5 - 2.5  mg/dL   PHOSPHORUS    Collection Time: 25 12:13 PM   Result Value Ref Range    Phosphorus 2.3 (L) 2.5 - 4.5 mg/dL   TSH WITH REFLEX TO FT4    Collection Time: 25 12:13 PM   Result Value Ref Range    TSH 5.280 0.380 - 5.330 uIU/mL   HIV AG/AB COMBO ASSAY SCREENING    Collection Time: 25 12:13 PM   Result Value Ref Range    HIV Ag/Ab Combo Assay Non-Reactive Non Reactive   Blood Culture - Draw one from central line and one from peripheral site    Collection Time: 25 12:25 PM    Specimen: Peripheral; Blood   Result Value Ref Range    Significant Indicator NEG     Source BLD     Site PERIPHERAL     Culture Result       No Growth  Note: Blood cultures are incubated for 5 days and  are monitored continuously.Positive blood cultures  are called to the RN and reported as soon as  they are identified.     EKG (NOW)    Collection Time: 25 12:27 PM   Result Value Ref Range    Report       [facility] Emergency Dept.    Test Date:  2025  Pt Name:    VALENTIN YADAV                 Department: [facility]  MRN:        [id number]                      [facility]  Gender:     Male                         Technician: [id number]  :        1960                   Requested By:ER TRIAGE PROTOCOL  Order #:    [id number]                    Reading MD: PAULETTE TRIMBLE, DO    Measurements  Intervals                                Axis  Rate:       182                          P:          155  OH:         112                          QRS:        23  QRSD:       103                          T:          133  QT:         289  QTc:        503    Interpretive Statements  AF with RVR  Ventricular premature complex  Aberrant conduction of SV complex(es)  Repolarization abnormality, prob rate related  Compared to ECG 2025 05:09:20  Ventricular premature complex(es) now present  Aberrant conduction of supraventricular beat(s) now present  Early repolarization  now present  Sinus rhythm  no longer present  Myocardial infarct finding no longer present  Prolonged QT interval no longer present  Electronically Signed On 02- 12:27:18 PST by PAULETTE TRIMBLE, DO     Blood Culture - Draw one from central line and one from peripheral site    Collection Time: 02/19/25 12:50 PM    Specimen: Line; Blood   Result Value Ref Range    Significant Indicator NEG     Source BLD     Site PERIPHERAL     Culture Result       No Growth  Note: Blood cultures are incubated for 5 days and  are monitored continuously.Positive blood cultures  are called to the RN and reported as soon as  they are identified.     Urinalysis    Collection Time: 02/19/25  1:25 PM    Specimen: Urine   Result Value Ref Range    Color Dark Yellow     Character Clear     Specific Gravity 1.023 <1.035    Ph 6.0 5.0 - 8.0    Glucose Negative Negative mg/dL    Ketones Trace (A) Negative mg/dL    Protein Negative Negative mg/dL    Bilirubin Negative Negative    Urobilinogen, Urine 1.0 <=1.0 EU/dL    Nitrite Negative Negative    Leukocyte Esterase Trace (A) Negative    Occult Blood Negative Negative    Micro Urine Req Microscopic    Urine Culture (New)    Collection Time: 02/19/25  1:25 PM    Specimen: Urine   Result Value Ref Range    Significant Indicator NEG     Source UR     Site -     Culture Result No growth at 24 hours.    URINE MICROSCOPIC (W/UA)    Collection Time: 02/19/25  1:25 PM   Result Value Ref Range    WBC 0-2 /hpf    RBC 0-2 /hpf    Bacteria None Seen None /hpf    Epithelial Cells 0-2 0 - 5 /hpf    Urine Casts 0-2 0 - 2 /lpf   Lactic Acid    Collection Time: 02/19/25  2:49 PM   Result Value Ref Range    Lactic Acid 2.0 0.5 - 2.0 mmol/L   POCT glucose device results    Collection Time: 02/19/25  6:44 PM   Result Value Ref Range    POC Glucose, Blood 111 (H) 65 - 99 mg/dL   MRSA By PCR (Amp)    Collection Time: 02/19/25  7:38 PM    Specimen: Nares; Respirate   Result Value Ref Range    MRSA by PCR Negative Negative   POCT glucose  device results    Collection Time: 02/19/25  8:50 PM   Result Value Ref Range    POC Glucose, Blood 109 (H) 65 - 99 mg/dL   CBC without Differential    Collection Time: 02/20/25  3:07 AM   Result Value Ref Range    WBC 11.0 (H) 4.8 - 10.8 K/uL    RBC 3.69 (L) 4.70 - 6.10 M/uL    Hemoglobin 10.2 (L) 14.0 - 18.0 g/dL    Hematocrit 31.7 (L) 42.0 - 52.0 %    MCV 85.9 81.4 - 97.8 fL    MCH 27.6 27.0 - 33.0 pg    MCHC 32.2 (L) 32.3 - 36.5 g/dL    RDW 41.7 35.9 - 50.0 fL    Platelet Count 107 (L) 164 - 446 K/uL    MPV 13.7 (H) 9.0 - 12.9 fL   Comp Metabolic Panel    Collection Time: 02/20/25  3:07 AM   Result Value Ref Range    Sodium 141 135 - 145 mmol/L    Potassium 3.9 3.6 - 5.5 mmol/L    Chloride 113 (H) 96 - 112 mmol/L    Co2 16 (L) 20 - 33 mmol/L    Anion Gap 12.0 7.0 - 16.0    Glucose 97 65 - 99 mg/dL    Bun 24 (H) 8 - 22 mg/dL    Creatinine 0.80 0.50 - 1.40 mg/dL    Calcium 7.9 (L) 8.4 - 10.2 mg/dL    Correct Calcium 9.4 8.5 - 10.5 mg/dL    AST(SGOT) 37 12 - 45 U/L    ALT(SGPT) 19 2 - 50 U/L    Alkaline Phosphatase 74 30 - 99 U/L    Total Bilirubin 1.2 0.1 - 1.5 mg/dL    Albumin 2.1 (L) 3.2 - 4.9 g/dL    Total Protein 4.8 (L) 6.0 - 8.2 g/dL    Globulin 2.7 1.9 - 3.5 g/dL    A-G Ratio 0.8 g/dL   ESTIMATED GFR    Collection Time: 02/20/25  3:07 AM   Result Value Ref Range    GFR (CKD-EPI) 98 >60 mL/min/1.73 m 2   VITAMIN B12    Collection Time: 02/20/25  3:07 AM   Result Value Ref Range    Vitamin B12 -True Cobalamin 1544 (H) 211 - 911 pg/mL   POCT glucose device results    Collection Time: 02/20/25  7:29 AM   Result Value Ref Range    POC Glucose, Blood 120 (H) 65 - 99 mg/dL   AMMONIA    Collection Time: 02/20/25 10:30 AM   Result Value Ref Range    Ammonia 33 11 - 45 umol/L   POCT glucose device results    Collection Time: 02/20/25 11:50 AM   Result Value Ref Range    POC Glucose, Blood 142 (H) 65 - 99 mg/dL   EKG    Collection Time: 02/20/25  4:58 PM   Result Value Ref Range    Report       [facility]    Test Date:  2025  Pt Name:    VALENTIN YADAV                 Department: ICU  MRN:        [id number]                      Room:       3324  Gender:     Male                         Technician: [id number]  :        1960                   Requested By:ELMER MICHEL  Order #:    [id number]                    Reading MD: Pao Loyd MD    Measurements  Intervals                                Axis  Rate:       59                           P:          54  DE:         144                          QRS:        42  QRSD:       108                          T:          57  QT:         490  QTc:        486    Interpretive Statements  Sinus bradycardia  Electronically Signed On 2025 16:58:33 PST by Pao Loyd MD     POCT glucose device results    Collection Time: 25  5:18 PM   Result Value Ref Range    POC Glucose, Blood 125 (H) 65 - 99 mg/dL   POCT glucose device results    Collection Time: 25  8:19 PM   Result Value Ref Range    POC Glucose, Blood 134 (H) 65 - 99 mg/dL     *Note: Due to a large number of results and/or encounters for the requested time period, some results have not been displayed. A complete set of results can be found in Results Review.     I have independently interpreted this EKG    RADIOLOGY/PROCEDURES   I have independently interpreted the diagnostic imaging associated with this visit and am waiting the final reading from the radiologist.    My preliminary interpretation is as follows: Bilateral, increased pulmonary markings/nodules.    Radiologist interpretation:  DX-CHEST-PORTABLE (1 VIEW)   Final Result      Extensive bilateral nodular infiltrates are new from the prior chest x-ray but innumerable pulmonary nodules were present on a more recent CT of the chest, abdomen, and pelvis dated 2025      CT-CTA CHEST PULMONARY ARTERY W/ RECONS    (Results Pending)   CT-ABDOMEN-PELVIS WITH    (Results Pending)       COURSE & MEDICAL DECISION MAKING    ASSESSMENT, COURSE  AND PLAN  Care Narrative:     This is a pleasant 64-year-old male.  He presents via EMS from skilled nursing facility, that he was recently discharged to on February 14.  Known history of atrial fibrillation.  Now in his ED rapid rate.  Clinically he appears very dehydrated.  He is not anticoagulated.  Med rec's that accompany the patient are reviewed.  He states that he is a full code and he makes his own medical decisions.    12:23 PM HR improved after 5 mg of IV metoprolol.  Previously in the 190s to 200s, now 150s to 160s.  IV fluids infusing.    2:43 PM patient's been reevaluated multiple times.  After multiple doses of IV metoprolol, a loading dose of Cardizem, now he has been on a Cardizem drip starting at 5, it was cream increased to 10 and now 15/h.  He has decreased now to a rate in the 140s occasionally 130s.  Have spoken with the ICU physician given that we are continuing to try to treat his Cardizem, he is not a candidate for the floor.  It is not clear at this time, what is driving his A-fib with rapid rate.  At this time, he does not appear to be a candidate for cardioversion as he is not anticoagulated unless we were able to obtain a MAGEN.  Patient's urine was unrevealing overall.  Though the dressing on his neck had come off, the wound appears to be healing well there is no tenderness to the area there is no drainage or redness.  Chest x-ray does not reveal an infiltrate.  Physician agrees to admit the patient to their unit.     Hydration: Based on the patient's presentation of Dehydration and Tachycardia the patient was given IV fluids. IV Hydration was used because oral hydration was not adequate alone. Upon recheck following hydration, the patient was improved.    ADDITIONAL PROBLEMS MANAGED    DISPOSITION AND DISCUSSIONS  I have discussed management of the patient with the following physicians and RENAY's:  Intensivist    Discussion of management with other QHP or appropriate source(s): None      Escalation of care considered, and ultimately not performed: None    Barriers to care at this time, including but not limited to:  None.     FINAL DIAGNOSIS  1. Atrial fibrillation with rapid ventricular response (HCC)    2. Dehydration    3. Generalized weakness    4. Physical deconditioning    5. Metastatic carcinoma (HCC)         Electronically signed by: Ericka Guevara D.O., 2/19/2025 11:55 AM

## 2025-02-19 NOTE — ED NOTES
Medication history reviewed with pts MAR from [place]. Med rec is complete.  Allergies reviewed, per pts MAR from [place]    Patient has not had any outpatient antibiotics in the last 30 days.    Pt is not on any anticoagulants

## 2025-02-20 ENCOUNTER — APPOINTMENT (OUTPATIENT)
Dept: RADIOLOGY | Facility: MEDICAL CENTER | Age: 65
DRG: 309 | End: 2025-02-20
Attending: STUDENT IN AN ORGANIZED HEALTH CARE EDUCATION/TRAINING PROGRAM
Payer: COMMERCIAL

## 2025-02-20 LAB
ALBUMIN SERPL BCP-MCNC: 2.1 G/DL (ref 3.2–4.9)
ALBUMIN/GLOB SERPL: 0.8 G/DL
ALP SERPL-CCNC: 74 U/L (ref 30–99)
ALT SERPL-CCNC: 19 U/L (ref 2–50)
AMMONIA PLAS-SCNC: 33 UMOL/L (ref 11–45)
ANION GAP SERPL CALC-SCNC: 12 MMOL/L (ref 7–16)
AST SERPL-CCNC: 37 U/L (ref 12–45)
BILIRUB SERPL-MCNC: 1.2 MG/DL (ref 0.1–1.5)
BUN SERPL-MCNC: 24 MG/DL (ref 8–22)
CALCIUM ALBUM COR SERPL-MCNC: 9.4 MG/DL (ref 8.5–10.5)
CALCIUM SERPL-MCNC: 7.9 MG/DL (ref 8.4–10.2)
CHLORIDE SERPL-SCNC: 113 MMOL/L (ref 96–112)
CO2 SERPL-SCNC: 16 MMOL/L (ref 20–33)
CREAT SERPL-MCNC: 0.8 MG/DL (ref 0.5–1.4)
EKG IMPRESSION: NORMAL
ERYTHROCYTE [DISTWIDTH] IN BLOOD BY AUTOMATED COUNT: 41.7 FL (ref 35.9–50)
GFR SERPLBLD CREATININE-BSD FMLA CKD-EPI: 98 ML/MIN/1.73 M 2
GLOBULIN SER CALC-MCNC: 2.7 G/DL (ref 1.9–3.5)
GLUCOSE BLD STRIP.AUTO-MCNC: 120 MG/DL (ref 65–99)
GLUCOSE BLD STRIP.AUTO-MCNC: 125 MG/DL (ref 65–99)
GLUCOSE BLD STRIP.AUTO-MCNC: 134 MG/DL (ref 65–99)
GLUCOSE BLD STRIP.AUTO-MCNC: 142 MG/DL (ref 65–99)
GLUCOSE SERPL-MCNC: 97 MG/DL (ref 65–99)
HCT VFR BLD AUTO: 31.7 % (ref 42–52)
HGB BLD-MCNC: 10.2 G/DL (ref 14–18)
HIV 1+2 AB+HIV1 P24 AG SERPL QL IA: NORMAL
MCH RBC QN AUTO: 27.6 PG (ref 27–33)
MCHC RBC AUTO-ENTMCNC: 32.2 G/DL (ref 32.3–36.5)
MCV RBC AUTO: 85.9 FL (ref 81.4–97.8)
PLATELET # BLD AUTO: 107 K/UL (ref 164–446)
PMV BLD AUTO: 13.7 FL (ref 9–12.9)
POTASSIUM SERPL-SCNC: 3.9 MMOL/L (ref 3.6–5.5)
PROT SERPL-MCNC: 4.8 G/DL (ref 6–8.2)
RBC # BLD AUTO: 3.69 M/UL (ref 4.7–6.1)
SCCMEC + MECA PNL NOSE NAA+PROBE: NEGATIVE
SODIUM SERPL-SCNC: 141 MMOL/L (ref 135–145)
VIT B12 SERPL-MCNC: 1544 PG/ML (ref 211–911)
WBC # BLD AUTO: 11 K/UL (ref 4.8–10.8)

## 2025-02-20 PROCEDURE — 82140 ASSAY OF AMMONIA: CPT

## 2025-02-20 PROCEDURE — 93010 ELECTROCARDIOGRAM REPORT: CPT | Performed by: STUDENT IN AN ORGANIZED HEALTH CARE EDUCATION/TRAINING PROGRAM

## 2025-02-20 PROCEDURE — A9270 NON-COVERED ITEM OR SERVICE: HCPCS | Performed by: STUDENT IN AN ORGANIZED HEALTH CARE EDUCATION/TRAINING PROGRAM

## 2025-02-20 PROCEDURE — 99291 CRITICAL CARE FIRST HOUR: CPT | Performed by: STUDENT IN AN ORGANIZED HEALTH CARE EDUCATION/TRAINING PROGRAM

## 2025-02-20 PROCEDURE — 82607 VITAMIN B-12: CPT

## 2025-02-20 PROCEDURE — 80053 COMPREHEN METABOLIC PANEL: CPT

## 2025-02-20 PROCEDURE — 770022 HCHG ROOM/CARE - ICU (200)

## 2025-02-20 PROCEDURE — 700111 HCHG RX REV CODE 636 W/ 250 OVERRIDE (IP): Performed by: STUDENT IN AN ORGANIZED HEALTH CARE EDUCATION/TRAINING PROGRAM

## 2025-02-20 PROCEDURE — 94760 N-INVAS EAR/PLS OXIMETRY 1: CPT

## 2025-02-20 PROCEDURE — 700101 HCHG RX REV CODE 250: Performed by: HOSPITALIST

## 2025-02-20 PROCEDURE — 82962 GLUCOSE BLOOD TEST: CPT | Mod: 91

## 2025-02-20 PROCEDURE — 700102 HCHG RX REV CODE 250 W/ 637 OVERRIDE(OP): Performed by: STUDENT IN AN ORGANIZED HEALTH CARE EDUCATION/TRAINING PROGRAM

## 2025-02-20 PROCEDURE — 85027 COMPLETE CBC AUTOMATED: CPT

## 2025-02-20 PROCEDURE — 93005 ELECTROCARDIOGRAM TRACING: CPT | Mod: TC | Performed by: STUDENT IN AN ORGANIZED HEALTH CARE EDUCATION/TRAINING PROGRAM

## 2025-02-20 PROCEDURE — 700105 HCHG RX REV CODE 258: Performed by: STUDENT IN AN ORGANIZED HEALTH CARE EDUCATION/TRAINING PROGRAM

## 2025-02-20 RX ORDER — DEXMEDETOMIDINE HYDROCHLORIDE 4 UG/ML
.1-1.5 INJECTION, SOLUTION INTRAVENOUS CONTINUOUS
Status: DISCONTINUED | OUTPATIENT
Start: 2025-02-20 | End: 2025-02-21

## 2025-02-20 RX ORDER — METOPROLOL TARTRATE 1 MG/ML
2.5 INJECTION, SOLUTION INTRAVENOUS EVERY 6 HOURS
Status: DISCONTINUED | OUTPATIENT
Start: 2025-02-20 | End: 2025-02-21

## 2025-02-20 RX ORDER — OLANZAPINE 5 MG/1
2.5 TABLET, ORALLY DISINTEGRATING ORAL DAILY
Status: DISCONTINUED | OUTPATIENT
Start: 2025-02-20 | End: 2025-03-06 | Stop reason: HOSPADM

## 2025-02-20 RX ADMIN — DEXMEDETOMIDINE HYDROCHLORIDE 0.2 MCG/KG/HR: 4 INJECTION, SOLUTION INTRAVENOUS at 04:46

## 2025-02-20 RX ADMIN — DEXMEDETOMIDINE HYDROCHLORIDE 1.2 MCG/KG/HR: 4 INJECTION, SOLUTION INTRAVENOUS at 10:41

## 2025-02-20 RX ADMIN — DILTIAZEM HYDROCHLORIDE 10 MG/HR: 5 INJECTION INTRAVENOUS at 06:06

## 2025-02-20 RX ADMIN — DEXMEDETOMIDINE HYDROCHLORIDE 1.2 MCG/KG/HR: 4 INJECTION, SOLUTION INTRAVENOUS at 13:35

## 2025-02-20 RX ADMIN — THIAMINE HYDROCHLORIDE 200 MG: 100 INJECTION, SOLUTION INTRAMUSCULAR; INTRAVENOUS at 05:07

## 2025-02-20 RX ADMIN — OLANZAPINE 2.5 MG: 5 TABLET, ORALLY DISINTEGRATING ORAL at 11:47

## 2025-02-20 RX ADMIN — DEXMEDETOMIDINE HYDROCHLORIDE 1 MCG/KG/HR: 4 INJECTION, SOLUTION INTRAVENOUS at 19:03

## 2025-02-20 RX ADMIN — PIPERACILLIN AND TAZOBACTAM 4.5 G: 4; .5 INJECTION, POWDER, LYOPHILIZED, FOR SOLUTION INTRAVENOUS; PARENTERAL at 05:07

## 2025-02-20 RX ADMIN — THIAMINE HYDROCHLORIDE 200 MG: 100 INJECTION, SOLUTION INTRAMUSCULAR; INTRAVENOUS at 17:21

## 2025-02-20 RX ADMIN — ENOXAPARIN SODIUM 40 MG: 100 INJECTION SUBCUTANEOUS at 17:21

## 2025-02-20 RX ADMIN — VANCOMYCIN 1500 MG: 1.5 INJECTION, SOLUTION INTRAVENOUS at 05:11

## 2025-02-20 RX ADMIN — DEXMEDETOMIDINE HYDROCHLORIDE 1.5 MCG/KG/HR: 4 INJECTION, SOLUTION INTRAVENOUS at 22:30

## 2025-02-20 ASSESSMENT — PAIN DESCRIPTION - PAIN TYPE
TYPE: ACUTE PAIN;CHRONIC PAIN
TYPE: ACUTE PAIN
TYPE: ACUTE PAIN;CHRONIC PAIN
TYPE: ACUTE PAIN

## 2025-02-20 NOTE — ACP (ADVANCE CARE PLANNING)
Advance Care Planning Note    Discussion date:  2/19/2025  Discussion participants:  spouse, Aurelio Green [phone]    The patient wishes to discuss Advanced Care Planning today and the following is a brief summary of our discussion.    Patient does not have  capacity to make their own medical decisions.  Health Care Agent/Surrogate Decision Maker not documented in chart.    Documents of Advance Directives:  None    Communication of relevant diagnoses: Discussed that Maximino has metastatic carcinoma including of his spine and lungs    Communication of prognosis: guarded    Communication of treatment goals/options: change of code status    Treatment decisions:  Aurelio elects to make Maximino DNR/DNI    Code status:  do not resuscitate (DNR)    The patient's family would like:   Life-sustaining antibiotics: antibiotics by IV as necessary   Artificially administered fluids: defined trial period of IV fluids, 5 days   Artificially administered nutrition: defined trial period of feeding tube, 5 days     Time statement:  I discussed advance care planning with the patient's family for at least 15 minutes, including diagnosis, prognosis, plan of care, risks and benefits of any therapies that could be offered, as well as alternatives including palliation and hospice, as appropriate, exclusive of evaluation and management or other separately billable procedures.    Joe Meehan M.D.

## 2025-02-20 NOTE — ASSESSMENT & PLAN NOTE
Likely from Afib and possibly sepsis.  Treatment of Afib and sepsis as above  MRI Brain when more stable rule out intracranial metastases

## 2025-02-20 NOTE — PROGRESS NOTES
"Pharmacy Vancomycin Kinetics Note for 2/19/2025     64 y.o. male on Vancomycin day # 1     Vancomycin Indication (AUC Dosing):  (Unknown source of infection)      Provider specified end date: 02/26/25    Active Antibiotics (From admission, onward)      Ordered     Ordering Provider       Wed Feb 19, 2025  6:04 PM    02/19/25 1804  vancomycin 1500 mg in  mL ivpb premix  (vancomycin (VANCOCIN) IV (LD + Maintenance))  EVERY 12 HOURS         Joe Meehan M.D.       Wed Feb 19, 2025  4:32 PM    02/19/25 1632  vancomycin 3000 mg/500mL NS IVPB premix  (vancomycin (VANCOCIN) IV (LD + Maintenance))  ONCE         Joe Meehan M.D.       Wed Feb 19, 2025  4:28 PM    02/19/25 1628  MD Alert...Vancomycin per Pharmacy  (MD Alert...Vancomycin per Pharmacy)  PHARMACY TO DOSE        Question:  Indication(s) for vancomycin?  Answer:  Unknown source of infection    Joe Meehan M.D.    02/19/25 1628  piperacillin-tazobactam (Zosyn) 4.5 g in  mL IVPB  (piperacillin-tazobactam (ZOSYN) IV (Extended-infusion) PANEL )  ONCE        Placed in \"And\" Linked Group    Joe Meehan M.D.    02/19/25 1628  piperacillin-tazobactam (Zosyn) 4.5 g in  mL IVPB  (piperacillin-tazobactam (ZOSYN) IV (Extended-infusion) PANEL )  EVERY 8 HOURS        Placed in \"And\" Linked Group    Joe Meehan M.D.            Dosing Weight: 122 kg (268 lb 15.4 oz)      Admission History: Admitted on 2/19/2025 for Atrial fibrillation with rapid ventricular response (HCC) [I48.91]  Pertinent history: 63 yo male in ICU starting on empiric day 1 Vancomycin and Zosyn for possible infection, which includes possible post-op spine infection. SCr is at baseline with elevated BUN.    Allergies:     Patient has no known allergies.     Pertinent cultures to date:     Results       Procedure Component Value Units Date/Time    BLOOD CULTURE [[id number]]     Order Status: Sent Specimen: Blood from Peripheral     BLOOD CULTURE [[id number]]     Order Status: Sent " Specimen: Blood from Peripheral     MRSA By PCR (Amp) [[id number]]     Order Status: No result Specimen: Respirate from Nares     Urinalysis [[id number]]  (Abnormal) Collected: 25 1325    Order Status: Sent Specimen: Urine Updated: 25 1348     Color Dark Yellow     Character Clear     Specific Gravity 1.023     Ph 6.0     Glucose Negative mg/dL      Ketones Trace mg/dL      Protein Negative mg/dL      Bilirubin Negative     Urobilinogen, Urine 1.0 EU/dL      Nitrite Negative     Leukocyte Esterase Trace     Occult Blood Negative     Micro Urine Req Microscopic    Blood Culture - Draw one from central line and one from peripheral site [[id number]] Collected: 25 1225    Order Status: Sent Specimen: Blood from Peripheral Updated: 25 1338    Blood Culture - Draw one from central line and one from peripheral site [[id number]] Collected: 25 1250    Order Status: Sent Specimen: Blood from Line Updated: 25 1336    Urine Culture (New) [[id number]]     Order Status: Sent Specimen: Urine             Labs:     Estimated Creatinine Clearance: 114.7 mL/min (by C-G formula based on SCr of 0.9 mg/dL).  Recent Labs     25  1213   WBC 13.8*   NEUTSPOLYS 80.90*     Recent Labs     25  1213   BUN 29*   CREATININE 0.90       Intake/Output Summary (Last 24 hours) at 2025 1805  Last data filed at 2025 1700  Gross per 24 hour   Intake 0 ml   Output 225 ml   Net -225 ml      /65   Pulse (!) 159   Temp 36.6 °C (97.9 °F) (Temporal)   Resp (!) 41   Ht 1.829 m (6')   Wt (!) 128 kg (282 lb 3 oz)   SpO2 95%  Temp (24hrs), Av.4 °C (97.6 °F), Min:36.2 °C (97.2 °F), Max:36.6 °C (97.9 °F)      List concerns for Vancomycin clearance:     Nephrotoxic drugs;Obesity;Pressors/Hypotension;Age;Malnutrition/Low albumin;CHF    Pharmacokinetics:     AUC kinetics:   Ke (hr ^-1): 0.0965 hr^-1  Half life: 7.18 hr  Clearance: 5.414  Estimated TDD: 2707  Estimated Dose: 1038  Estimated interval:  "9.2    Two level kinetics:   Ke (hr ^-1):    Half life:    Vd:    Calculated AUC:   mg·hr/L    Trough kinetics:   No results for input(s): \"VANCOTROUGH\", \"VANCOPEAK\", \"VANCORANDOM\" in the last 72 hours.    A/P:     -  Vancomycin dose: 3 gm loading dose followed by 1.5 gm q12h     -  Next vancomycin level(s):    Pharmacy to order if remains on vancomycin. If renal remains stable, anticipate Peak/Trough after 4th maintenance dose.    -  Predicted vancomycin AUC from initial AUC test calculator: 554 mg·hr/L      -  Comments: Pharmacy to follow renal function given q12h scheduled regimen with accumulation risk factors noted above.     Yamil Robins, PharmD    "

## 2025-02-20 NOTE — ASSESSMENT & PLAN NOTE
Reportedly with stage II tonsillar cancer in 2022.  However during recent hospitalization found to have multiple bilateral pulmonary nodules consistent with metastatic process as well as T3 and T4 metastasis.  Pathology shows carcinoma from T2-T4 stabilization surgery.  Patient's wife was unaware of the recurrence of cancer but was updated on admission today.  - discuss goals of care with family tomorrow  - neuro exam is at baseline from recent hospitalization with residual L leg weakness

## 2025-02-20 NOTE — PROGRESS NOTES
4 Eyes Skin Assessment Completed by DARRIAN Crawford and DARRIAN Gardner.    Head WDL  Ears WDL  Nose WDL  Mouth WDL  Neck Incision, incision with staples from neck down to upper back  Breast/Chest WDL  Shoulder Blades WDL  Spine incision, incision with staples down spine starting at neck  (R) Arm/Elbow/Hand WDL  (L) Arm/Elbow/Hand WDL  Abdomen WDL  Groin WDL  Scrotum/Coccyx/Buttocks Redness  (R) Leg WDL  (L) Leg WDL  (R) Heel/Foot/Toe WDL  (L) Heel/Foot/Toe WDL          Devices In Places ECG, Blood Pressure Cuff, Pulse Ox, and Rivero      Interventions In Place TAP System and Low Air Loss Mattress    Possible Skin Injury No    Pictures Uploaded Into Epic Yes  Wound Consult Placed N/A  RN Wound Prevention Protocol Ordered No

## 2025-02-20 NOTE — PROGRESS NOTES
12-hour chart check complete.    Monitor Summary  Rhythm: AFIB  Rate:   Ectopy: rPVC's rCOUP rTRIG  Measurements: 0.12/0.12/0.50

## 2025-02-20 NOTE — DISCHARGE PLANNING
"Post Acute Navigator Team    Post Acute Navigator attempted to meet with patient's spouse Aurelio at bedside. This RN attempted to contact Aurelio prior to arrival at bedside, LVM.  This RN observed patient at bedside. He is ill appearing, and sedated. No family present. Patient did not respond when questioned, he is sedated per RN due to agitation and possible pain. Aurelio left bedside prior to this RN arriving.   This RN spoke with Maury COLMENARES who is covering for Yvetet COLMENARES. Maury RN was able to contact Aurelio. This RN spoke with Aurelio at length regarding patient's condition and wishes. Aurelio stated \"Maximino wants to be comfortable and he does not want to suffer. We wants hospice. I am worried that his pain will not be controlled if he comes home with hospice. Can't you just make him comfortable in the hospital?\"   This RN did explain that patient will not receive any life sustaining treatment once patient is on comfort measures and hospice. Aurelio confirmed understanding and was still wanting to proceed with hospice/comfort measures. Aurelio was very emotional and overwhelmed. This RN listened to Aurelio voice her frustration, fears, and concerns. This RN provided education on hospice including philosophy, goals of care, individualized care plan, and core CMS requirements hospice agencies provide. Choice form explained, possible placement discussed, hospice options provided for [place]. Aurelio stated \"He doesn't want to go back to rehab or a facility, he wants to be home if he can't stay in the hospital, but I don't know how I will care for him because I broke my femur and just got home from rehab.\" This RN explained that hospice referral can be sent to [facility] for an evaluation of level of care. However, should patient not be a candidate for higher level of care for hospice, patient might need a second hospice choice as [facility] is not able to serve patient's in [place]. Aurelio stated \"I have [facility] and " "would like to use them. Maybe they can see us both.\" Aurelio was very emotional and stated \"I have PTSD and am having an anxiety attack,\" This RN listened to Aurelio express her concerns and grief, and provided emotional support throughout discussion. This RN explained that hospice referral will be sent to [facility] and they will be contacting her tomorrow morning to discuss hospice referral. This RN informed Aurelio that Dr. Meehan and Yvette RN will be updated regarding discussion. This RN will follow-up with unit SKYLAR and Aurelio tomorrow if needed.   Aurelio requested this RN contact patient's sister Rizwana to provide further hospice information.     1755- This MALLIKA RN LVM for Rizwana. Request for call back.     Hospice Choice Obtained:[facility]    Hospice Referral requested from Dr. Meehan  Post Acute Navigator was unable to notify CM as it is now after hours.  Hospice Referral sent to [facility] per care team request.  This PAN RN will follow up with CM tomorrow morning due to complexity of case.      Please reach out to me directly should you have any questions or concerns.        "

## 2025-02-20 NOTE — CARE PLAN
The patient is Watcher - Medium risk of patient condition declining or worsening    Shift Goals  Clinical Goals: HR 60, SBP>90  Patient Goals: Relax  Family Goals: JESSICA    Progress made toward(s) clinical / shift goals:    Problem: Knowledge Deficit - Standard  Goal: Patient and family/care givers will demonstrate understanding of plan of care, disease process/condition, diagnostic tests and medications  Outcome: Progressing     Problem: Skin Integrity  Goal: Skin integrity is maintained or improved  Outcome: Progressing     Problem: Fall Risk  Goal: Patient will remain free from falls  Outcome: Progressing       Patient is not progressing towards the following goals:

## 2025-02-20 NOTE — PROGRESS NOTES
Critical Care Progress Note    Date of consult: 2/19/2025    Referring Physician  Joe Meehan M.D.    Reason for Consultation  Afib with RVR    History of Presenting Illness  64 y.o. 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 That admission received IV systemic steroids and decompressive laminectomy L3-L4, and instrumented stabilization of C7-T5. Discharged on oral opiate and steroid regimen to SNF. He presented 2/19/2025 with altered mental status at his SNF. Found to be in Afib RVR in the ED.    In the ED today afebrile 36.2, HR 180s-190s Afib RVR, BP 110s/60s, RR 20s, SpO2 94% on RA. Labs showed WBC 13.8 81% PMN with L-shift(up from 12.5 2/13/25), Hb 12.0, Plt 191, BMP wnl, lactate 2.5, troponin 28, INR 1.2, UA with 0-2WBC, trace LE and trace ketones and spec grav 1.023. CXR showing multiple bilateral nodules c/w metastatic disease. Recent CT CHEST/Ab/Pel 2/11/25 with innumerable pulmonary nodules, retroperitoneal LAD, common bile duct dilation, hepatic contour suggesting cirrhosis, retroperitoneal varices suggesting portal hypertension, R nephrolithiasis, L inguinal hernia, coronary artery disease. Recent TTE 2/14/25 with normalization of LV systolic function 59%, normal RV systolic function. Has anterolateral WMA, indeterminate diastolic function. Trace TR with eRVSP 50mmHg.    In the ED, received metop tartrate 5mg twice, diltiazem 10mg, started on dilt drip, 2L IVF. Upon arrival to the ICU was altered and making nonsensical statements and hyperemotional. I spoke with friend Keon Tapia. Also spoke with legal wife Aurelio Green ([phone]) who elected for Maximino to be DNR/DNI    Reviewed last 24 hour  events:  CV: NSR 57, Bps good  Pulm: room air  Neuro: Precedex 1.2 and requiring restraints, altered  GI: NPO  Renal: Saunders in for 24h  ID/Abx: d/c vanc/zosyn, consult palliative  Endo: BS ok  Labs/imaging: reviewed  Lines/Tubes/Drains: PIVs, saunders    Code Status  DNAR/DNI    Review of Systems  Review of Systems   Reason unable to perform ROS: altered mental status.       Medications  Home Medications       Reviewed by Aniya Pagan (Pharmacy Tech) on 02/19/25 at 1243  Med List Status: Complete     Medication Last Dose Status   acetaminophen (TYLENOL) 325 MG Tab 2/15/2025 Active   acetaminophen (TYLENOL) 500 MG Tab Not Taking Active   lidocaine (ASPERFLEX) 4 % Patch 2/17/2025 Active   lisinopril (PRINIVIL) 5 MG Tab 2/18/2025 Active   methylPREDNISolone (MEDROL DOSEPAK) 4 MG Tablet Therapy Pack 2/19/2025 Active   metoprolol SR (TOPROL XL) 50 MG TABLET SR 24 HR 2/18/2025 Active   oxyCODONE immediate release (ROXICODONE) 10 MG immediate release tablet 2/16/2025 Active   polyethylene glycol/lytes (MIRALAX) Pack Not Taking Active   rosuvastatin (CRESTOR) 10 MG Tab 2/18/2025 Active                  Audit from Redirected Encounters    **Home medications have not yet been reviewed for this encounter**       Current Facility-Administered Medications   Medication Dose Route Frequency Provider Last Rate Last Admin    dexmedetomidine (Precedex) 400 mcg/100mL infusion  0.1-1.5 mcg/kg/hr (Ideal) Intravenous Continuous Zoie Ewing M.D. 23.3 mL/hr at 02/20/25 0907 1.2 mcg/kg/hr at 02/20/25 0907    Metoprolol Tartrate (Lopressor) injection 5 mg  5 mg Intravenous Once Ericka Guevara D.O.        enoxaparin (Lovenox) inj 40 mg  40 mg Subcutaneous DAILY AT 1800 Joe Meehan M.D.   40 mg at 02/19/25 1820    senna-docusate (Pericolace Or Senokot S) 8.6-50 MG per tablet 2 Tablet  2 Tablet Oral Q EVENING Joe Meehan M.D.        And    polyethylene glycol/lytes (Miralax) Packet 1 Packet  1 Packet Oral QDAY PRN Joe  NIMA Meehan M.D.        dilTIAZem (Cardizem) 125 mg in dextrose 5% 125 mL Infusion  0-20 mg/hr Intravenous Continuous Joe Meehan M.D.   Stopped at 02/20/25 0633    MD Alert...Vancomycin per Pharmacy   Other PHARMACY TO DOSE Joe Meehan M.D.        piperacillin-tazobactam (Zosyn) 4.5 g in  mL IVPB  4.5 g Intravenous Q8HRS Joe Meehan M.D.   Stopped at 02/20/25 0907    thiamine (B-1) injection 200 mg  200 mg Intravenous BID Joe Meehan M.D.   200 mg at 02/20/25 0507    insulin lispro (HumaLOG,AdmeLOG) subcutaneous injection  2-9 Units Subcutaneous 4X/DAY ACHS Joe Meehan M.D.        And    dextrose 50% (D50W) injection 25 g  25 g Intravenous Q15 MIN PRN Joe Meehan M.D.        Pharmacy Consult Request ...Pain Management Review 1 Each  1 Each Other PHARMACY TO DOSE Joe Meehan M.D.        oxyCODONE immediate-release (Roxicodone) tablet 2.5 mg  2.5 mg Oral Q3HRS PRN Joe Meehan M.D.        Or    oxyCODONE immediate-release (Roxicodone) tablet 5 mg  5 mg Oral Q3HRS PRN Joe Meehan M.D.        Or    HYDROmorphone (Dilaudid) injection 0.25 mg  0.25 mg Intravenous Q3HRS PRN Joe Meehan M.D.        acetaminophen (Tylenol) tablet 650 mg  650 mg Oral Q6HRS Joe Meehan M.D.   650 mg at 02/19/25 1919    Followed by    [START ON 2/24/2025] acetaminophen (Tylenol) tablet 1,000 mg  1,000 mg Oral Q6HRS PRN Joe Meehan M.D.        vancomycin 1500 mg in  mL ivpb premix  1,500 mg Intravenous Q12HR Joe Meehan M.D.   Stopped at 02/20/25 0711       Allergies  No Known Allergies    Vital Signs last 24 hours  Temp:  [36.1 °C (96.9 °F)-37.3 °C (99.2 °F)] 36.1 °C (96.9 °F)  Pulse:  [] 56  Resp:  [9-93] 26  BP: ()/(44-95) 108/55  SpO2:  [88 %-98 %] 90 %    Physical Exam  Physical Exam  Constitutional:       General: He is not in acute distress.     Appearance: He is not ill-appearing.      Comments: Encephalopathic, altered mental status   HENT:      Head: Normocephalic and  atraumatic.      Comments: Midline incision along C/T/L spine is c/d/I       Mouth/Throat:      Mouth: Mucous membranes are dry.   Eyes:      Extraocular Movements: Extraocular movements intact.      Pupils: Pupils are equal, round, and reactive to light.   Neck:      Comments: ROM limited due to recent surgery  Cardiovascular:      Rate and Rhythm: Normal rate and regular rhythm.      Pulses: Normal pulses.   Pulmonary:      Effort: Pulmonary effort is normal.      Breath sounds: Normal breath sounds.   Abdominal:      Palpations: Abdomen is soft.      Comments: +abdominal obesity   Musculoskeletal:      Cervical back: No rigidity or tenderness.      Right lower leg: No edema.      Left lower leg: No edema.   Neurological:      Comments: LLE 2/5 motor  RLE 4/5 motor   Psychiatric:      Comments: Encephalopathic and confabulating         Fluids    Intake/Output Summary (Last 24 hours) at 2/20/2025 0954  Last data filed at 2/20/2025 0800  Gross per 24 hour   Intake 4863.97 ml   Output 965 ml   Net 3898.97 ml       Laboratory  Recent Results (from the past 48 hours)   CBC WITH DIFFERENTIAL    Collection Time: 02/19/25 12:13 PM   Result Value Ref Range    WBC 13.8 (H) 4.8 - 10.8 K/uL    RBC 4.36 (L) 4.70 - 6.10 M/uL    Hemoglobin 12.0 (L) 14.0 - 18.0 g/dL    Hematocrit 36.3 (L) 42.0 - 52.0 %    MCV 83.3 81.4 - 97.8 fL    MCH 27.5 27.0 - 33.0 pg    MCHC 33.1 32.3 - 36.5 g/dL    RDW 39.7 35.9 - 50.0 fL    Platelet Count 191 164 - 446 K/uL    MPV 12.8 9.0 - 12.9 fL    Neutrophils-Polys 80.90 (H) 44.00 - 72.00 %    Lymphocytes 5.30 (L) 22.00 - 41.00 %    Monocytes 12.10 0.00 - 13.40 %    Eosinophils 0.40 0.00 - 6.90 %    Basophils 0.10 0.00 - 1.80 %    Immature Granulocytes 1.20 (H) 0.00 - 0.90 %    Nucleated RBC 0.00 0.00 - 0.20 /100 WBC    Neutrophils (Absolute) 11.19 (H) 1.82 - 7.42 K/uL    Lymphs (Absolute) 0.73 (L) 1.00 - 4.80 K/uL    Monos (Absolute) 1.68 (H) 0.00 - 0.85 K/uL    Eos (Absolute) 0.05 0.00 - 0.51 K/uL     Baso (Absolute) 0.02 0.00 - 0.12 K/uL    Immature Granulocytes (abs) 0.17 (H) 0.00 - 0.11 K/uL    NRBC (Absolute) 0.00 K/uL   Basic Metabolic Panel    Collection Time: 02/19/25 12:13 PM   Result Value Ref Range    Sodium 142 135 - 145 mmol/L    Potassium 4.0 3.6 - 5.5 mmol/L    Chloride 109 96 - 112 mmol/L    Co2 20 20 - 33 mmol/L    Glucose 121 (H) 65 - 99 mg/dL    Bun 29 (H) 8 - 22 mg/dL    Creatinine 0.90 0.50 - 1.40 mg/dL    Calcium 8.8 8.4 - 10.2 mg/dL    Anion Gap 13.0 7.0 - 16.0   TROPONIN    Collection Time: 02/19/25 12:13 PM   Result Value Ref Range    Troponin T 28 (H) 6 - 19 ng/L   PT/INR    Collection Time: 02/19/25 12:13 PM   Result Value Ref Range    PT 15.5 (H) 12.0 - 14.6 sec    INR 1.20 (H) 0.87 - 1.13   LACTIC ACID    Collection Time: 02/19/25 12:13 PM   Result Value Ref Range    Lactic Acid 2.5 (H) 0.5 - 2.0 mmol/L   ESTIMATED GFR    Collection Time: 02/19/25 12:13 PM   Result Value Ref Range    GFR (CKD-EPI) 95 >60 mL/min/1.73 m 2   Magnesium    Collection Time: 02/19/25 12:13 PM   Result Value Ref Range    Magnesium 2.1 1.5 - 2.5 mg/dL   PHOSPHORUS    Collection Time: 02/19/25 12:13 PM   Result Value Ref Range    Phosphorus 2.3 (L) 2.5 - 4.5 mg/dL   TSH WITH REFLEX TO FT4    Collection Time: 02/19/25 12:13 PM   Result Value Ref Range    TSH 5.280 0.380 - 5.330 uIU/mL   Blood Culture - Draw one from central line and one from peripheral site    Collection Time: 02/19/25 12:25 PM    Specimen: Peripheral; Blood   Result Value Ref Range    Significant Indicator NEG     Source BLD     Site PERIPHERAL     Culture Result       No Growth  Note: Blood cultures are incubated for 5 days and  are monitored continuously.Positive blood cultures  are called to the RN and reported as soon as  they are identified.     EKG (NOW)    Collection Time: 02/19/25 12:27 PM   Result Value Ref Range    Report       [facility] Emergency Dept.    Test Date:  2025-02-19  Pt Name:    VALENTIN YADAV                  Department: EDS  MRN:        [id number]                      [facility]  Gender:     Male                         Technician: [id number]  :        1960                   Requested By:ER TRIAGE PROTOCOL  Order #:    [id number]                    Reading MD: PAULETTE TRIMBLE DO    Measurements  Intervals                                Axis  Rate:       182                          P:          155  NC:         112                          QRS:        23  QRSD:       103                          T:          133  QT:         289  QTc:        503    Interpretive Statements  AF with RVR  Ventricular premature complex  Aberrant conduction of SV complex(es)  Repolarization abnormality, prob rate related  Compared to ECG 2025 05:09:20  Ventricular premature complex(es) now present  Aberrant conduction of supraventricular beat(s) now present  Early repolarization  now present  Sinus rhythm no longer present  Myocardial infarct finding no longer present  Prolonged QT interval no longer present  Electronically Signed On 2025 12:27:18 PST by PAULETTE TRIMBLE DO     Blood Culture - Draw one from central line and one from peripheral site    Collection Time: 25 12:50 PM    Specimen: Line; Blood   Result Value Ref Range    Significant Indicator NEG     Source BLD     Site PERIPHERAL     Culture Result       No Growth  Note: Blood cultures are incubated for 5 days and  are monitored continuously.Positive blood cultures  are called to the RN and reported as soon as  they are identified.     Urinalysis    Collection Time: 25  1:25 PM    Specimen: Urine   Result Value Ref Range    Color Dark Yellow     Character Clear     Specific Gravity 1.023 <1.035    Ph 6.0 5.0 - 8.0    Glucose Negative Negative mg/dL    Ketones Trace (A) Negative mg/dL    Protein Negative Negative mg/dL    Bilirubin Negative Negative    Urobilinogen, Urine 1.0 <=1.0 EU/dL    Nitrite Negative Negative    Leukocyte Esterase  Trace (A) Negative    Occult Blood Negative Negative    Micro Urine Req Microscopic    URINE MICROSCOPIC (W/UA)    Collection Time: 02/19/25  1:25 PM   Result Value Ref Range    WBC 0-2 /hpf    RBC 0-2 /hpf    Bacteria None Seen None /hpf    Epithelial Cells 0-2 0 - 5 /hpf    Urine Casts 0-2 0 - 2 /lpf   Lactic Acid    Collection Time: 02/19/25  2:49 PM   Result Value Ref Range    Lactic Acid 2.0 0.5 - 2.0 mmol/L   POCT glucose device results    Collection Time: 02/19/25  6:44 PM   Result Value Ref Range    POC Glucose, Blood 111 (H) 65 - 99 mg/dL   MRSA By PCR (Amp)    Collection Time: 02/19/25  7:38 PM    Specimen: Nares; Respirate   Result Value Ref Range    MRSA by PCR Negative Negative   POCT glucose device results    Collection Time: 02/19/25  8:50 PM   Result Value Ref Range    POC Glucose, Blood 109 (H) 65 - 99 mg/dL   CBC without Differential    Collection Time: 02/20/25  3:07 AM   Result Value Ref Range    WBC 11.0 (H) 4.8 - 10.8 K/uL    RBC 3.69 (L) 4.70 - 6.10 M/uL    Hemoglobin 10.2 (L) 14.0 - 18.0 g/dL    Hematocrit 31.7 (L) 42.0 - 52.0 %    MCV 85.9 81.4 - 97.8 fL    MCH 27.6 27.0 - 33.0 pg    MCHC 32.2 (L) 32.3 - 36.5 g/dL    RDW 41.7 35.9 - 50.0 fL    Platelet Count 107 (L) 164 - 446 K/uL    MPV 13.7 (H) 9.0 - 12.9 fL   Comp Metabolic Panel    Collection Time: 02/20/25  3:07 AM   Result Value Ref Range    Sodium 141 135 - 145 mmol/L    Potassium 3.9 3.6 - 5.5 mmol/L    Chloride 113 (H) 96 - 112 mmol/L    Co2 16 (L) 20 - 33 mmol/L    Anion Gap 12.0 7.0 - 16.0    Glucose 97 65 - 99 mg/dL    Bun 24 (H) 8 - 22 mg/dL    Creatinine 0.80 0.50 - 1.40 mg/dL    Calcium 7.9 (L) 8.4 - 10.2 mg/dL    Correct Calcium 9.4 8.5 - 10.5 mg/dL    AST(SGOT) 37 12 - 45 U/L    ALT(SGPT) 19 2 - 50 U/L    Alkaline Phosphatase 74 30 - 99 U/L    Total Bilirubin 1.2 0.1 - 1.5 mg/dL    Albumin 2.1 (L) 3.2 - 4.9 g/dL    Total Protein 4.8 (L) 6.0 - 8.2 g/dL    Globulin 2.7 1.9 - 3.5 g/dL    A-G Ratio 0.8 g/dL   ESTIMATED GFR     Collection Time: 02/20/25  3:07 AM   Result Value Ref Range    GFR (CKD-EPI) 98 >60 mL/min/1.73 m 2   POCT glucose device results    Collection Time: 02/20/25  7:29 AM   Result Value Ref Range    POC Glucose, Blood 120 (H) 65 - 99 mg/dL       Imaging  CXR with multiple pulm mets    Assessment/Plan  * Atrial fibrillation with rapid ventricular response (HCC)- (present on admission)  Assessment & Plan  Patient with chronic A-fib that is not anticoagulated at baseline.  Presented with rates in the 180s to 190s.  Differential diagnosis of etiology of RVR was likely hypovolemia given very dehydrated exam on presentation and improvement of Afib with fluids. Infection considered but UA normal, CXR without PNA, recent surgical site is c/d/I.  Replete K, Mg, Phos  Considered obtaining MRI C/T/L spine w and w/o but would require intubation and per wife this would be against goals of care.  Wean dilt drip  Will not anticoagulate for now given recent spinal instrumentation  Patient with normal LV and RV function within 1 week ago on TTE. Hyperdynamic LVEF and no pericardial effusion on POCUS on admit  Improved, in NSR today    Other cirrhosis of liver (HCC)- (present on admission)  Assessment & Plan  Patient has retroperitoneal varices on imaging implying portal hypertension.  No evidence of bleeding today.  Nothing to do.  Obtain ammonia, consider lactulose/rifaximin if elevated    Spinal cord compression (HCC)- (present on admission)  Assessment & Plan  As above    Type 2 diabetes mellitus with hyperglycemia, with long-term current use of insulin (HCC)- (present on admission)  Assessment & Plan  ACHS accuchecks and SSI    History of cancer tonsil- (present on admission)  Assessment & Plan  Reportedly with stage II tonsillar cancer in 2022.  However during recent hospitalization found to have multiple bilateral pulmonary nodules consistent with metastatic process as well as T3 and T4 metastasis.  Surgical pathology confirms  carcinoma from T2-T4 stabilization surgery.  Patient's wife was unaware of the recurrence of cancer but was updated at length today. Given Maximino's previous challenges with chemotherapy she elects to pursue hospice care.  - palliative consult for hospice      Discussed patient condition and risk of morbidity and/or mortality with RN, RT, family, patient.    The patient remains critically ill.  Critical care time = 40 minutes in directly providing and coordinating critical care and extensive data review.  No time overlap and excludes procedures.    Joe Meehan MD  Pulmonary and Critical Care Medicine  [facility]

## 2025-02-20 NOTE — CONSULTS
PALLIATIVE CARE SOCIAL WORK NOTE    Patient: Maximino Green  Age: 64  Gender: Male  MRN: [id number]  Insurance: [facility]  Date Admitted: 2/19/25  Date of Service: 2/20/25    Palliative care consult acknowledged. Per chart review, patient/family would like to discuss hospice. Post acute navigator will assist with options/obtaining choice.    Primary team notified.     Recommend assistance with POLST completion prior to discharge.     Palliative care to sign off at this time. Please reconsult if needs arise.    Bee Lyn LMSW  Palliative Care

## 2025-02-20 NOTE — ASSESSMENT & PLAN NOTE
Patient has retroperitoneal varices on imaging implying portal hypertension.  No evidence of bleeding today.  Nothing to do.  Obtain CMP.

## 2025-02-20 NOTE — ASSESSMENT & PLAN NOTE
Patient with chronic A-fib that is not anticoagulated at baseline.  Presented with rates in the 180s to 190s.  Differential diagnosis of etiology of RVR includes hypovolemia, PE, infection.  Status post 2 L IV fluid in ED, give 1 more liter LR  Replete K, Mg, Phos  Obtain CTPA  Start Vanc/Zosyn to cover for post-op spinal infection  CXR and UA appear noninfectious  Obtain MRI C/T/L spine w and w/o when stable (may require intubation so will need to discuss with patient and wife)  Continue dilt drip goal HR < 120  Will not anticoagulate for now given recent spinal instrumentation

## 2025-02-21 ENCOUNTER — APPOINTMENT (OUTPATIENT)
Dept: RADIOLOGY | Facility: MEDICAL CENTER | Age: 65
DRG: 309 | End: 2025-02-21
Attending: STUDENT IN AN ORGANIZED HEALTH CARE EDUCATION/TRAINING PROGRAM
Payer: COMMERCIAL

## 2025-02-21 ENCOUNTER — HOME CARE VISIT (OUTPATIENT)
Dept: HOSPICE | Facility: HOSPICE | Age: 65
End: 2025-02-21
Payer: COMMERCIAL

## 2025-02-21 ENCOUNTER — HOSPICE ADMISSION (OUTPATIENT)
Dept: HOSPICE | Facility: HOSPICE | Age: 65
End: 2025-02-21
Payer: COMMERCIAL

## 2025-02-21 LAB
BACTERIA UR CULT: NORMAL
GLUCOSE BLD STRIP.AUTO-MCNC: 136 MG/DL (ref 65–99)
SIGNIFICANT IND 70042: NORMAL
SITE SITE: NORMAL
SOURCE SOURCE: NORMAL
TROPONIN T SERPL-MCNC: 19 NG/L (ref 6–19)

## 2025-02-21 PROCEDURE — 84484 ASSAY OF TROPONIN QUANT: CPT

## 2025-02-21 PROCEDURE — 82962 GLUCOSE BLOOD TEST: CPT

## 2025-02-21 PROCEDURE — 99291 CRITICAL CARE FIRST HOUR: CPT

## 2025-02-21 PROCEDURE — 99233 SBSQ HOSP IP/OBS HIGH 50: CPT | Performed by: STUDENT IN AN ORGANIZED HEALTH CARE EDUCATION/TRAINING PROGRAM

## 2025-02-21 PROCEDURE — 36415 COLL VENOUS BLD VENIPUNCTURE: CPT

## 2025-02-21 PROCEDURE — 700102 HCHG RX REV CODE 250 W/ 637 OVERRIDE(OP): Performed by: STUDENT IN AN ORGANIZED HEALTH CARE EDUCATION/TRAINING PROGRAM

## 2025-02-21 PROCEDURE — 700101 HCHG RX REV CODE 250: Performed by: STUDENT IN AN ORGANIZED HEALTH CARE EDUCATION/TRAINING PROGRAM

## 2025-02-21 PROCEDURE — 86480 TB TEST CELL IMMUN MEASURE: CPT

## 2025-02-21 PROCEDURE — A9270 NON-COVERED ITEM OR SERVICE: HCPCS | Performed by: STUDENT IN AN ORGANIZED HEALTH CARE EDUCATION/TRAINING PROGRAM

## 2025-02-21 PROCEDURE — 770001 HCHG ROOM/CARE - MED/SURG/GYN PRIV*

## 2025-02-21 PROCEDURE — 700101 HCHG RX REV CODE 250: Performed by: HOSPITALIST

## 2025-02-21 RX ORDER — LORAZEPAM 2 MG/ML
1 INJECTION INTRAMUSCULAR
Status: DISCONTINUED | OUTPATIENT
Start: 2025-02-21 | End: 2025-02-23

## 2025-02-21 RX ORDER — ACETAMINOPHEN 325 MG/1
650 TABLET ORAL EVERY 4 HOURS PRN
Status: DISCONTINUED | OUTPATIENT
Start: 2025-02-21 | End: 2025-03-06 | Stop reason: HOSPADM

## 2025-02-21 RX ORDER — ONDANSETRON 4 MG/1
8 TABLET, ORALLY DISINTEGRATING ORAL EVERY 8 HOURS PRN
Status: DISCONTINUED | OUTPATIENT
Start: 2025-02-21 | End: 2025-03-06 | Stop reason: HOSPADM

## 2025-02-21 RX ORDER — LORAZEPAM 2 MG/ML
0.5 INJECTION INTRAMUSCULAR EVERY 4 HOURS PRN
Status: DISCONTINUED | OUTPATIENT
Start: 2025-02-21 | End: 2025-02-21

## 2025-02-21 RX ORDER — ATROPINE SULFATE 10 MG/ML
2 SOLUTION/ DROPS OPHTHALMIC EVERY 4 HOURS PRN
Status: DISCONTINUED | OUTPATIENT
Start: 2025-02-21 | End: 2025-03-06 | Stop reason: HOSPADM

## 2025-02-21 RX ORDER — MORPHINE SULFATE 100 MG/5ML
10 SOLUTION ORAL
Refills: 0 | Status: DISCONTINUED | OUTPATIENT
Start: 2025-02-21 | End: 2025-03-06 | Stop reason: HOSPADM

## 2025-02-21 RX ORDER — LORAZEPAM 2 MG/ML
1 CONCENTRATE ORAL
Status: DISCONTINUED | OUTPATIENT
Start: 2025-02-21 | End: 2025-03-06 | Stop reason: HOSPADM

## 2025-02-21 RX ORDER — METOPROLOL SUCCINATE 25 MG/1
50 TABLET, EXTENDED RELEASE ORAL
Status: DISCONTINUED | OUTPATIENT
Start: 2025-02-21 | End: 2025-02-28

## 2025-02-21 RX ORDER — ONDANSETRON 2 MG/ML
8 INJECTION INTRAMUSCULAR; INTRAVENOUS EVERY 8 HOURS PRN
Status: DISCONTINUED | OUTPATIENT
Start: 2025-02-21 | End: 2025-02-23

## 2025-02-21 RX ORDER — MORPHINE SULFATE 100 MG/5ML
20 SOLUTION ORAL
Refills: 0 | Status: DISCONTINUED | OUTPATIENT
Start: 2025-02-21 | End: 2025-03-06 | Stop reason: HOSPADM

## 2025-02-21 RX ORDER — OLANZAPINE 5 MG/1
2.5 TABLET, ORALLY DISINTEGRATING ORAL EVERY 6 HOURS PRN
Status: DISCONTINUED | OUTPATIENT
Start: 2025-02-21 | End: 2025-03-06 | Stop reason: HOSPADM

## 2025-02-21 RX ORDER — LORAZEPAM 0.5 MG/1
0.5 TABLET ORAL EVERY 4 HOURS PRN
Status: DISCONTINUED | OUTPATIENT
Start: 2025-02-21 | End: 2025-02-21

## 2025-02-21 RX ORDER — ACETAMINOPHEN 650 MG/1
650 SUPPOSITORY RECTAL EVERY 4 HOURS PRN
Status: DISCONTINUED | OUTPATIENT
Start: 2025-02-21 | End: 2025-03-06 | Stop reason: HOSPADM

## 2025-02-21 RX ADMIN — MORPHINE SULFATE 10 MG: 10 SOLUTION ORAL at 21:43

## 2025-02-21 RX ADMIN — METOPROLOL TARTRATE 2.5 MG: 5 INJECTION INTRAVENOUS at 09:23

## 2025-02-21 RX ADMIN — DEXMEDETOMIDINE HYDROCHLORIDE 1.5 MCG/KG/HR: 4 INJECTION, SOLUTION INTRAVENOUS at 02:04

## 2025-02-21 RX ADMIN — OLANZAPINE 2.5 MG: 5 TABLET, ORALLY DISINTEGRATING ORAL at 05:04

## 2025-02-21 RX ADMIN — LORAZEPAM 1 MG: 2 LIQUID ORAL at 12:34

## 2025-02-21 RX ADMIN — OLANZAPINE 2.5 MG: 5 TABLET, ORALLY DISINTEGRATING ORAL at 10:48

## 2025-02-21 RX ADMIN — ACETAMINOPHEN 650 MG: 325 TABLET ORAL at 05:12

## 2025-02-21 RX ADMIN — ACETAMINOPHEN 650 MG: 325 TABLET ORAL at 10:47

## 2025-02-21 RX ADMIN — METOPROLOL SUCCINATE 50 MG: 25 TABLET, FILM COATED, EXTENDED RELEASE ORAL at 10:47

## 2025-02-21 RX ADMIN — DEXMEDETOMIDINE HYDROCHLORIDE 1.5 MCG/KG/HR: 4 INJECTION, SOLUTION INTRAVENOUS at 05:08

## 2025-02-21 RX ADMIN — MORPHINE SULFATE 20 MG: 10 SOLUTION ORAL at 12:35

## 2025-02-21 ASSESSMENT — PAIN SCALES - PAIN ASSESSMENT IN ADVANCED DEMENTIA (PAINAD)
NEGVOCALIZATION: OCCASIONAL MOAN/GROAN, LOW SPEECH, NEGATIVE/DISAPPROVING QUALITY
FACIALEXPRESSION: SMILING OR INEXPRESSIVE
FACIALEXPRESSION: SAD, FRIGHTENED, FROWN
TOTALSCORE: 0
CONSOLABILITY: NO NEED TO CONSOLE
CONSOLABILITY: DISTRACTED OR REASSURED BY VOICE/TOUCH
BREATHING: NORMAL
BREATHING: NOISY LABORED BREATHING, LONG PERIODS OF HYPERVENTILATION, CHEYNE-STOKES RESPIRATIONS
BODYLANGUAGE: RELAXED
TOTALSCORE: 6
BODYLANGUAGE: TENSE, DISTRESSED PACING, FIDGETING

## 2025-02-21 ASSESSMENT — PAIN DESCRIPTION - PAIN TYPE
TYPE: ACUTE PAIN
TYPE: ACUTE PAIN;CHRONIC PAIN
TYPE: ACUTE PAIN
TYPE: ACUTE PAIN

## 2025-02-21 ASSESSMENT — ACTIVITIES OF DAILY LIVING (ADL)
PHYSICAL_TRANSFER_REQUIRES_ASSISTANCE: 1
EATING_REQUIRES_ASSISTANCE: 1
DRESSING_REQUIRES_ASSISTANCE: 1
BATHING_REQUIRES_ASSISTANCE: 1
AMBULATION_REQUIRES_ASSISTANCE: 1
CONTINENCE_REQUIRES_ASSISTANCE: 1

## 2025-02-21 ASSESSMENT — FIBROSIS 4 INDEX: FIB4 SCORE: 5.08

## 2025-02-21 ASSESSMENT — ENCOUNTER SYMPTOMS: CHANGE IN LEVEL OF CONSCIOUSNESS: 1

## 2025-02-21 NOTE — PROGRESS NOTES
12 hour chart check complete.    Monitor summary:    Rhythm: SB-SR    Heart Rate: 55-77    Ectopy: rPVC, rPAC    Measurements: 0.14/0.10/0.48

## 2025-02-21 NOTE — PROGRESS NOTES
12-hour chart check complete.    Monitor Summary  Rhythm: SB  Rate: 54-58  Ectopy: rPAC/PVC  Measurements: 0.12/0.1/0.46

## 2025-02-21 NOTE — DISCHARGE PLANNING
Care Transition Team Assessment    LMSW was asked to come to bedside to talk with pts family. Pt wife Aurelio, pts two sisters and niece were all in the family waiting area. LMSW Introduced self and department roles. Pts wife was confused thought her spouse would be placed in a hospice home without her approval. Apparently she signed a change in pts code status and assumed that it meant pt would be transferred to a hospice home. This writer explained hospice briefly reassured her pt would not be transferred anywhere without her knowledge or approval. Pt wife was still somewhat confused on the plan of care.  Pts TRAYK in [place] is Riz[place]ana her number was added to pt contact list.  Prior to this hospitalization pt was at [facility]. Pt has extended family support but no one to care for him at home with Hospice.  Prior to SNF and previous hospital admission pt was working full time as a  and was completely independent with his ADLS and IADLS.   Addendum: Pt is unable to discharge home with hospice wife unable to care for pt due to her own health issues.     Information Source  Orientation Level: Disoriented to place, Disoriented to time, Disoriented to situation, Disoriented to person  Information Given By: Spouse, Relative, Friend  Informant's Name: Rizwana Carey Phil    Readmission Evaluation  Is this a readmission?: Yes - unplanned readmission    Elopement Risk  Legal Hold: No  Ambulatory or Self Mobile in Wheelchair: No-Not an Elopement Risk  Elopement Risk: Not at Risk for Elopement    Discharge Preparedness  What is your plan after discharge?: Uncertain - pending medical team collaboration    Finances  Financial Barriers to Discharge: No  Prescription Coverage: Yes    Vision / Hearing Impairment  Right Eye Vision: Impaired, Wears Glasses  Left Eye Vision: Impaired, Wears Glasses    Advance Directive  Advance Directive?: None    Discharge Risks or Barriers  Discharge risks or barriers?: Complex medical  needs  Patient risk factors: Complex medical needs, Cognitive / sensory / physical deficit    Anticipated Discharge Information  Discharge Disposition: D/T to SNF with Medicare cert in anticipation of skilled care (03)

## 2025-02-21 NOTE — DISCHARGE PLANNING
Case Management Discharge Planning    Admission Date: 2/19/2025  GMLOS: 2.3  ALOS: 1    Social work: LMSW spoke with pts nitaran Wagoner who reports pt does not have the support at home to discharge with hospice or the financial means to discharge to a hospice home. Rizwana also reports the family was told pt would most likely not be able to leave the hospital due to his condition worsening.

## 2025-02-21 NOTE — CARE PLAN
The patient is Watcher - Medium risk of patient condition declining or worsening    Shift Goals  Clinical Goals: RASS -1 to +1, SBP>90, MAP>65  Patient Goals: Be comfortable  Family Goals: Updates    Progress made toward(s) clinical / shift goals:    Problem: Knowledge Deficit - Standard  Goal: Patient and family/care givers will demonstrate understanding of plan of care, disease process/condition, diagnostic tests and medications  Outcome: Progressing     Problem: Skin Integrity  Goal: Skin integrity is maintained or improved  Outcome: Progressing     Problem: Fall Risk  Goal: Patient will remain free from falls  Outcome: Progressing     Problem: Pain - Standard  Goal: Alleviation of pain or a reduction in pain to the patient’s comfort goal  Outcome: Progressing     Problem: Hemodynamics  Goal: Patient's hemodynamics, fluid balance and neurologic status will be stable or improve  Outcome: Progressing       Patient is not progressing towards the following goals:

## 2025-02-21 NOTE — CARE PLAN
The patient is Stable - Low risk of patient condition declining or worsening    Shift Goals  Clinical Goals: RASS -1 to +1, SBP<180, HR>50, pain<5  Patient Goals: get water  Family Goals: updates    Progress made toward(s) clinical / shift goals:      0800 Pt able to tolerate sips of ice water, refused breakfast and turn at this time. Denies pain. Plan to wean precedex today.    Patient is not progressing towards the following goals:      Problem: Knowledge Deficit - Standard  Goal: Patient and family/care givers will demonstrate understanding of plan of care, disease process/condition, diagnostic tests and medications  Description: Target End Date:  1-3 days or as soon as patient condition allows    Document in Patient Education    1.  Patient and family/caregiver oriented to unit, equipment, visitation policy and means for communicating concern  2.  Complete/review Learning Assessment  3.  Assess knowledge level of disease process/condition, treatment plan, diagnostic tests and medications  4.  Explain disease process/condition, treatment plan, diagnostic tests and medications  Outcome: Not Progressing

## 2025-02-21 NOTE — CARE PLAN
The patient is Watcher - Medium risk of patient condition declining or worsening    Shift Goals  Clinical Goals: HR 60, SBP >90and MAP >65, RASS -1 to +1  Patient Goals: JESSICA  Family Goals: JESSICA    Progress made toward(s) clinical / shift goals:  jjj    Patient is not progressing towards the following goals:  Problem: Hemodynamics  Goal: Patient's hemodynamics, fluid balance and neurologic status will be stable or improve  Outcome: Not Progressing

## 2025-02-22 LAB
GAMMA INTERFERON BACKGROUND BLD IA-ACNC: 0.02 IU/ML
M TB IFN-G BLD-IMP: NEGATIVE
M TB IFN-G CD4+ BCKGRND COR BLD-ACNC: 0 IU/ML
MITOGEN IGNF BCKGRD COR BLD-ACNC: 1.06 IU/ML
QFT TB2 - NIL TBQ2: 0 IU/ML

## 2025-02-22 PROCEDURE — 700102 HCHG RX REV CODE 250 W/ 637 OVERRIDE(OP): Performed by: STUDENT IN AN ORGANIZED HEALTH CARE EDUCATION/TRAINING PROGRAM

## 2025-02-22 PROCEDURE — A9270 NON-COVERED ITEM OR SERVICE: HCPCS | Performed by: STUDENT IN AN ORGANIZED HEALTH CARE EDUCATION/TRAINING PROGRAM

## 2025-02-22 PROCEDURE — 770001 HCHG ROOM/CARE - MED/SURG/GYN PRIV*

## 2025-02-22 PROCEDURE — 99232 SBSQ HOSP IP/OBS MODERATE 35: CPT | Performed by: HOSPITALIST

## 2025-02-22 RX ADMIN — MORPHINE SULFATE 20 MG: 10 SOLUTION ORAL at 15:15

## 2025-02-22 RX ADMIN — OLANZAPINE 2.5 MG: 5 TABLET, ORALLY DISINTEGRATING ORAL at 04:30

## 2025-02-22 RX ADMIN — MORPHINE SULFATE 10 MG: 10 SOLUTION ORAL at 11:05

## 2025-02-22 RX ADMIN — METOPROLOL SUCCINATE 50 MG: 25 TABLET, FILM COATED, EXTENDED RELEASE ORAL at 04:30

## 2025-02-22 ASSESSMENT — PATIENT HEALTH QUESTIONNAIRE - PHQ9
SUM OF ALL RESPONSES TO PHQ9 QUESTIONS 1 AND 2: 0
SUM OF ALL RESPONSES TO PHQ9 QUESTIONS 1 AND 2: 0
1. LITTLE INTEREST OR PLEASURE IN DOING THINGS: NOT AT ALL
1. LITTLE INTEREST OR PLEASURE IN DOING THINGS: NOT AT ALL
2. FEELING DOWN, DEPRESSED, IRRITABLE, OR HOPELESS: NOT AT ALL
1. LITTLE INTEREST OR PLEASURE IN DOING THINGS: NOT AT ALL
SUM OF ALL RESPONSES TO PHQ9 QUESTIONS 1 AND 2: 0

## 2025-02-22 ASSESSMENT — SOCIAL DETERMINANTS OF HEALTH (SDOH)
WITHIN THE LAST YEAR, HAVE YOU BEEN HUMILIATED OR EMOTIONALLY ABUSED IN OTHER WAYS BY YOUR PARTNER OR EX-PARTNER?: NO
WITHIN THE LAST YEAR, HAVE YOU BEEN AFRAID OF YOUR PARTNER OR EX-PARTNER?: NO
WITHIN THE LAST YEAR, HAVE TO BEEN RAPED OR FORCED TO HAVE ANY KIND OF SEXUAL ACTIVITY BY YOUR PARTNER OR EX-PARTNER?: NO
WITHIN THE LAST YEAR, HAVE YOU BEEN KICKED, HIT, SLAPPED, OR OTHERWISE PHYSICALLY HURT BY YOUR PARTNER OR EX-PARTNER?: NO

## 2025-02-22 ASSESSMENT — COGNITIVE AND FUNCTIONAL STATUS - GENERAL
MOVING TO AND FROM BED TO CHAIR: TOTAL
DRESSING REGULAR LOWER BODY CLOTHING: A LOT
SUGGESTED CMS G CODE MODIFIER MOBILITY: CM
DRESSING REGULAR UPPER BODY CLOTHING: A LOT
DAILY ACTIVITIY SCORE: 14
EATING MEALS: A LITTLE
PERSONAL GROOMING: A LITTLE
TOILETING: A LOT
MOBILITY SCORE: 7
SUGGESTED CMS G CODE MODIFIER DAILY ACTIVITY: CK
STANDING UP FROM CHAIR USING ARMS: TOTAL
MOVING FROM LYING ON BACK TO SITTING ON SIDE OF FLAT BED: TOTAL
TURNING FROM BACK TO SIDE WHILE IN FLAT BAD: A LOT
CLIMB 3 TO 5 STEPS WITH RAILING: TOTAL
HELP NEEDED FOR BATHING: A LOT
WALKING IN HOSPITAL ROOM: TOTAL

## 2025-02-22 ASSESSMENT — LIFESTYLE VARIABLES
ON A TYPICAL DAY WHEN YOU DRINK ALCOHOL HOW MANY DRINKS DO YOU HAVE: 0
EVER HAD A DRINK FIRST THING IN THE MORNING TO STEADY YOUR NERVES TO GET RID OF A HANGOVER: NO
AVERAGE NUMBER OF DAYS PER WEEK YOU HAVE A DRINK CONTAINING ALCOHOL: 0
TOTAL SCORE: 0
CONSUMPTION TOTAL: NEGATIVE
EVER FELT BAD OR GUILTY ABOUT YOUR DRINKING: NO
TOTAL SCORE: 0
TOTAL SCORE: 0
HAVE YOU EVER FELT YOU SHOULD CUT DOWN ON YOUR DRINKING: NO
HAVE PEOPLE ANNOYED YOU BY CRITICIZING YOUR DRINKING: NO
HOW MANY TIMES IN THE PAST YEAR HAVE YOU HAD 5 OR MORE DRINKS IN A DAY: 0
ALCOHOL_USE: NO
DOES PATIENT WANT TO STOP DRINKING: NO

## 2025-02-22 ASSESSMENT — PAIN SCALES - PAIN ASSESSMENT IN ADVANCED DEMENTIA (PAINAD)
FACIALEXPRESSION: SMILING OR INEXPRESSIVE
BODYLANGUAGE: RELAXED
CONSOLABILITY: NO NEED TO CONSOLE
BREATHING: NORMAL
TOTALSCORE: 0

## 2025-02-22 ASSESSMENT — PAIN DESCRIPTION - PAIN TYPE
TYPE: ACUTE PAIN;CHRONIC PAIN
TYPE: ACUTE PAIN

## 2025-02-22 NOTE — PROGRESS NOTES
Hospital Medicine Daily Progress Note    Date of Service  2/22/2025    Chief Complaint  Maximino Green is a 64 y.o. 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  Patient underwentC7 - T5 fusion & L3-4 Laminectomy and was discharged to skilled nursing facility.  He presented again on 2/19/2022 with altered mental status; found to be in A-fib with RVR.    During the stay in the hospital, patient received IV metoprolol along with Dilt drip.  After extensive discussion by pulmonary physician to the patient/family, decision was made for comfort care on 2/21/2025 and patient was transferred to medical floor.      Interval events:  -- Is alert and awake x 2-3, currently saturating well on room air.  Patient noted to be comfortable  --Patient does have medication work pain and anxiety  --Patient family is looking for group home, QuantiFERON pending, case management aware    I have discussed this patient's plan of care and discharge plan at IDT rounds today with Case Management, Nursing, Nursing leadership, and other members of the IDT team.        Code Status  Comfort Care/DNR    Disposition  The patient is not medically cleared for discharge to home or a post-acute facility.  Anticipate discharge to: home with close outpatient follow-up    I have placed the appropriate orders for post-discharge needs.    Review of Systems  Review of Systems   Constitutional:  Positive for malaise/fatigue.   Cardiovascular:  Positive for chest pain (chest wall pain).         Physical Exam  Pulse:  [109-128] 128  Resp:  [17-22] 17  BP: (108)/(62) 108/62  SpO2:  [94 %] 94 %    Physical Exam  Vitals and nursing note reviewed.   Constitutional:       Appearance: Normal appearance.   HENT:      Head: Normocephalic and atraumatic.   Eyes:      Extraocular Movements: Extraocular movements intact.   Cardiovascular:      Rate and Rhythm: Tachycardia present.   Abdominal:      General: There is no distension.      Palpations: Abdomen is soft.   Musculoskeletal:      Cervical back: Neck supple.      Right lower leg: Edema present.      Left lower leg: Edema present.   Neurological:      Mental Status: He is alert.      Comments: Alert and oriented x 2-3         Fluids    Intake/Output Summary (Last 24 hours) at 2/22/2025 1055  Last data filed at 2/22/2025 0800  Gross per 24 hour   Intake 860 ml   Output 400 ml   Net 460 ml        Laboratory  Recent Labs     02/19/25  1213 02/20/25  0307   WBC 13.8* 11.0*   RBC 4.36* 3.69*   HEMOGLOBIN 12.0* 10.2*   HEMATOCRIT 36.3* 31.7*   MCV 83.3 85.9   MCH 27.5 27.6   MCHC 33.1 32.2*   RDW 39.7 41.7   PLATELETCT 191 107*   MPV 12.8 13.7*     Recent Labs     02/19/25  1213 02/20/25  0307   SODIUM 142 141   POTASSIUM 4.0 3.9   CHLORIDE 109 113*   CO2 20 16*   GLUCOSE 121* 97   BUN 29* 24*   CREATININE 0.90 0.80   CALCIUM 8.8 7.9*     Recent Labs     02/19/25  1213   INR 1.20*               Imaging  DX-CHEST-PORTABLE (1 VIEW)   Final Result      Extensive bilateral nodular infiltrates are new from the prior chest x-ray but innumerable pulmonary nodules were present on a more recent CT of the chest, abdomen, and pelvis dated 2/11/2025           Assessment/Plan  * Atrial fibrillation with rapid ventricular response (HCC)- (present on admission)  Assessment & Plan  Patient with chronic A-fib that is not anticoagulated at baseline.  Presented with rates in the 180s to 190s.  Differential diagnosis of etiology of RVR was likely hypovolemia given very dehydrated  exam on presentation and improvement of Afib with fluids   -- less likely infectious etiology   -- was on BB    2/22/2025  On comfort measure    Acute encephalopathy  Assessment & Plan  Possibly due to intracranial metastases but patient cannot lie still for MRI with intubation and family has elected hospice car  he is alert and oriented x 2      Other cirrhosis of liver (HCC)- (present on admission)  Assessment & Plan  Patient has retroperitoneal varices on imaging implying portal hypertension.  No evidence of bleeding today.  Ammonia, b12, HIV wnl    2/22/2025  On comfort measure , noted to be comfortable    Spinal cord compression (HCC)- (present on admission)  Assessment & Plan  Patient underwentC7 - T5 fusion & L3-4 Laminectomy   .  On comfort measure    Type 2 diabetes mellitus with hyperglycemia, with long-term current use of insulin (HCC)- (present on admission)  Assessment & Plan  Patient hemoglobin A1c C is 5.7, currently on comfort care, continue comfort care measures  Provide whatever pt would like to eat    History of cancer tonsil- (present on admission)  Assessment & Plan  Reportedly with stage II tonsillar cancer in 2022.  However during recent hospitalization found to have multiple bilateral pulmonary nodules consistent with metastatic process as well as T3 and T4 metastasis.  Surgical pathology confirms carcinoma from T2-T4 stabilization surgery.  Patient's wife was unaware of the recurrence of cancer but was updated at length 2/20.   --Given Maximino's previous challenges with chemotherapy she elects to pursue hospice care  -- on comfort measure         VTE prophylaxis:  scd     Total time spent 36,  minutes. I spent greater than 50% of the time for patient care, counseling, and coordination on this date, including unit/floor time, and face-to-face time with the patient as per interval events, my own review of patient's imaging and lab analysis and developing my assessment and plan above.

## 2025-02-22 NOTE — HOSPITAL COURSE
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  Patient underwentC7 - T5 fusion & L3-4 Laminectomy and was discharged to skilled nursing facility.  He presented again on 2/19/2022 with altered mental status; found to be in A-fib with RVR.    During the stay in the hospital, patient received IV metoprolol along with Dilt drip.  After extensive discussion by pulmonary physician to the patient/family, decision was made for comfort care on 2/21/2025 and patient was transferred to medical floor.      Interval events:  -- Is alert and awake x 2-3, currently saturating well on room air.  Patient noted to be comfortable  --Patient does have medication work pain and anxiety  --Patient family is looking for group home, QuantiFERON pending, case management aware

## 2025-02-22 NOTE — CARE PLAN
"The patient is Stable - Low risk of patient condition declining or worsening    Shift Goals  Clinical Goals: Pt will remain comfortable this shift  Patient Goals: Rest, sleep, pain mgt  Family Goals:    Progress made toward(s) clinical / shift goals:  Bed bath given, warm blankets, wedges used for repositioning. Fluids offered. Pt seen resting in bed, reports \"I'm ok, I'm comfortable\" during rounds.    Patient is not progressing towards the following goals:      "

## 2025-02-22 NOTE — ASSESSMENT & PLAN NOTE
Reportedly with stage II tonsillar cancer in 2022.  However during recent hospitalization found to have multiple bilateral pulmonary nodules consistent with metastatic process as well as T3 and T4 metastasis.  Surgical pathology confirms carcinoma from T2-T4 stabilization surgery.  Patient's wife was unaware of the recurrence of cancer but was updated at length 2/20.   --Given Maximino's previous challenges with chemotherapy she elects to pursue hospice care  -- on comfort measure      2/28: Patient is on comfort measures only, pending placement with hospice

## 2025-02-22 NOTE — ASSESSMENT & PLAN NOTE
Patient has retroperitoneal varices on imaging implying portal hypertension.  No evidence of bleeding today.  Ammonia, b12, HIV wnl    2/23/2025  On comfort measure , noted to be comfortable    2/28: Patient is on comfort measures only, pending placement with hospice

## 2025-02-22 NOTE — ASSESSMENT & PLAN NOTE
Patient underwentC7 - T5 fusion & L3-4 Laminectomy   .  On comfort measure    2/28: Patient is on comfort measures only, pending placement with hospice

## 2025-02-22 NOTE — DISCHARGE PLANNING
Case Management Discharge Planning    Admission Date: 2/19/2025  GMLOS: 2.3  ALOS: 2    Anticipated Discharge Dispo: Discharge Disposition: D/T to hospice home (50)    DME Needed: Potentially.    Action(s) Taken: Pt was discussed during morning rounds. Per care team, [facility] will be meeting with family today to discuss GOC. LMSW met with pt and family at bedside to discuss discharge plan. Per pt's sister, they are likely leaning towards group home and received [facility] list from hospice RN this morning. Quant Gold was ordered. Per family, they will discuss financial options with pt's wife and family, and will discuss placement options with CM over weekend vs Monday if possible.    Escalations Completed: None    Medically Clear: No    Next Steps: LMSW to follow for any additional CM needs.    Barriers to Discharge: Medical clearance and Pending Placement    Is the patient up for discharge tomorrow: No

## 2025-02-22 NOTE — ASSESSMENT & PLAN NOTE
Patient hemoglobin A1c C is 5.7, currently on comfort care, continue comfort care measures  Provide whatever pt would like to eat    2/23/2025  On comfort measure    2/28: Patient is on comfort measures only, pending placement with hospice

## 2025-02-22 NOTE — ASSESSMENT & PLAN NOTE
Possibly due to intracranial metastases but patient cannot lie still for MRI with intubation and family has elected hospice care  he is alert and oriented x 2  Quantiferon  has been negative  Possible group home pending family finances with hospice    2/28: Patient is on comfort measures only, pending placement with hospice

## 2025-02-22 NOTE — ASSESSMENT & PLAN NOTE
Patient with chronic A-fib that is not anticoagulated at baseline.  Presented with rates in the 180s to 190s.  Differential diagnosis of etiology of RVR was likely hypovolemia given very dehydrated exam on presentation and improvement of Afib with fluids   -- less likely infectious etiology   -- was on BB    2/23/2025  On comfort measure      2/28: Patient is on comfort measures only, pending placement with hospice

## 2025-02-22 NOTE — PROGRESS NOTES
4 Eyes Skin Assessment Completed by DARRIAN Herndon and DARRIAN Agarwal.    Head WDL  Ears WDL  Nose WDL  Mouth WDL  Neck Incision - Island Dressing  Breast/Chest WDL  Shoulder Blades WDL  Spine Incision  Island Dressing  (R) Arm/Elbow/Hand Bruising and Discoloration  (L) Arm/Elbow/Hand Bruising and Discoloration   Abdomen WDL  Groin WDL  Scrotum/Coccyx/Buttocks WDL  (R) Leg WDL  (L) Leg WDL  (R) Heel/Foot/Toe Callused   (L) Heel/Foot/Toe Callused       Devices In Places Rivero and SCD's      Interventions In Place TAP System and Pillows    Possible Skin Injury Yes    Pictures Uploaded Into Epic N/A  Wound Consult Placed N/A  RN Wound Prevention Protocol Ordered No

## 2025-02-23 ENCOUNTER — HOME CARE VISIT (OUTPATIENT)
Dept: HOSPICE | Facility: HOSPICE | Age: 65
End: 2025-02-23
Payer: COMMERCIAL

## 2025-02-23 PROCEDURE — 700102 HCHG RX REV CODE 250 W/ 637 OVERRIDE(OP): Performed by: STUDENT IN AN ORGANIZED HEALTH CARE EDUCATION/TRAINING PROGRAM

## 2025-02-23 PROCEDURE — A9270 NON-COVERED ITEM OR SERVICE: HCPCS | Performed by: STUDENT IN AN ORGANIZED HEALTH CARE EDUCATION/TRAINING PROGRAM

## 2025-02-23 PROCEDURE — 770001 HCHG ROOM/CARE - MED/SURG/GYN PRIV*

## 2025-02-23 PROCEDURE — 99232 SBSQ HOSP IP/OBS MODERATE 35: CPT | Performed by: HOSPITALIST

## 2025-02-23 PROCEDURE — 700111 HCHG RX REV CODE 636 W/ 250 OVERRIDE (IP): Performed by: STUDENT IN AN ORGANIZED HEALTH CARE EDUCATION/TRAINING PROGRAM

## 2025-02-23 RX ADMIN — ONDANSETRON 8 MG: 4 TABLET, ORALLY DISINTEGRATING ORAL at 02:28

## 2025-02-23 RX ADMIN — MORPHINE SULFATE 20 MG: 10 SOLUTION ORAL at 19:56

## 2025-02-23 RX ADMIN — OLANZAPINE 2.5 MG: 5 TABLET, ORALLY DISINTEGRATING ORAL at 06:06

## 2025-02-23 RX ADMIN — MORPHINE SULFATE 20 MG: 10 SOLUTION ORAL at 15:57

## 2025-02-23 RX ADMIN — LORAZEPAM 1 MG: 2 LIQUID ORAL at 03:51

## 2025-02-23 ASSESSMENT — PAIN DESCRIPTION - PAIN TYPE
TYPE: ACUTE PAIN

## 2025-02-23 ASSESSMENT — PAIN SCALES - PAIN ASSESSMENT IN ADVANCED DEMENTIA (PAINAD)
NEGVOCALIZATION: OCCASIONAL MOAN/GROAN, LOW SPEECH, NEGATIVE/DISAPPROVING QUALITY
FACIALEXPRESSION: SMILING OR INEXPRESSIVE
FACIALEXPRESSION: SMILING OR INEXPRESSIVE
CONSOLABILITY: NO NEED TO CONSOLE
BREATHING: OCCASIONAL LABORED BREATHING, SHORT PERIOD OF HYPERVENTILATION
TOTALSCORE: 5
TOTALSCORE: 0
BODYLANGUAGE: RELAXED
BREATHING: NORMAL
CONSOLABILITY: NO NEED TO CONSOLE
TOTALSCORE: 0
FACIALEXPRESSION: FACIAL GRIMACING
CONSOLABILITY: NO NEED TO CONSOLE
BODYLANGUAGE: TENSE, DISTRESSED PACING, FIDGETING
BODYLANGUAGE: RELAXED
BREATHING: NORMAL

## 2025-02-23 ASSESSMENT — PATIENT HEALTH QUESTIONNAIRE - PHQ9
1. LITTLE INTEREST OR PLEASURE IN DOING THINGS: NOT AT ALL
2. FEELING DOWN, DEPRESSED, IRRITABLE, OR HOPELESS: NOT AT ALL
SUM OF ALL RESPONSES TO PHQ9 QUESTIONS 1 AND 2: 0

## 2025-02-23 NOTE — PROGRESS NOTES
4 Eyes Skin Assessment Completed by DARRIAN Spencer and Ma, RN.    Head WDL  Ears WDL  Nose WDL  Mouth WDL  Neck WDL  Breast/Chest WDL  Shoulder Blades WDL  Spine Incision- island dressing covering staples  (R) Arm/Elbow/Hand Bruising and Discoloration  (L) Arm/Elbow/Hand Bruising and Discoloration  Abdomen WDL  Groin WDL  Scrotum/Coccyx/Buttocks Redness and Blanching  (R) Leg WDL  (L) Leg WDL  (R) Heel/Foot/Toe Boggy and Swelling, Dry and Flaky, Scab  (L) Heel/Foot/Toe Boggy and Swelling, Dry and Flaky          Devices In Places Rivero and SCD's      Interventions In Place Pillows, Low Air Loss Mattress, Dri-Mynor Pads, Heels Loaded W/Pillows, and Pressure Redistribution Mattress    Possible Skin Injury No    Pictures Uploaded Into Epic N/A  Wound Consult Placed N/A  RN Wound Prevention Protocol Ordered No

## 2025-02-23 NOTE — PROGRESS NOTES
BSSR received from night RN. Pt resting comfortably in bed not in any distress on RA at 93%. AAOx2 disoriented to time/date and place. Denies any pain or nausea. Discussed POC. Fall risk precautions in place, locked bed in lowest position, bed alarm on and call light within reach. All needs met at this time. Hourly rounding in place.

## 2025-02-23 NOTE — PROGRESS NOTES
Received report from day shift RN. Assumed patient care at 1900. No signs of distress or discomfort. Respirations even and unlabored. Patient resting in bed comfortably. Safety precautions in place. Bed in low and locked position. Low air loss mattress in use. All patient belongings and call light within reach.

## 2025-02-23 NOTE — CARE PLAN
The patient is Stable - Low risk of patient condition declining or worsening    Shift Goals  Clinical Goals: Patient pain will be managed at a tolerable level of 2/10 or less throughout night, Patient will remain free from fall or injury, Patient will remain comfortable  Patient Goals: Rest comfortably, pain management  Family Goals: updates    Progress made toward(s) clinical / shift goals:  Patient's pain maintained at a tolerable level of 2/10 or less throughout shift with medication per MAR and rest. Pt did not sustain a fall or injury during shift. Able to rest comfortably throughout shift. Pt remained comfortable with PRN medications.     Patient is not progressing towards the following goals:

## 2025-02-23 NOTE — PROGRESS NOTES
[facility] Hospital Medicine Daily Progress Note    Date of Service  2/23/2025    Chief Complaint  Maximino Green is a 64 y.o. 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  Patient underwentC7 - T5 fusion & L3-4 Laminectomy and was discharged to skilled nursing facility.  He presented again on 2/19/2022 with altered mental status; found to be in A-fib with RVR.    During the stay in the hospital, patient received IV metoprolol along with Dilt drip.  After extensive discussion by pulmonary physician to the patient/family, decision was made for comfort care on 2/21/2025 and patient was transferred to medical floor.      Interval events:  -- Is alert and awake x 2-3, currently saturating well on room air.  Patient noted to be comfortable  --Patient does have medication work pain and anxiety  --Patient family is looking for group home, QuantiFERON pending, case management aware    2/23:  -- Alert and oriented x 2-3, nursing staff noted to be at bedside, patient denied any pain, noted to be comfortable.    I discussed with the patient wife over the phone that plan is to start the patient to group home on hospice.   QuantiFERON has been negative, hospice team following.  Pain management including temi ativan for anxiety  I have discussed this patient's plan of care and discharge plan at IDT rounds today with Case Management, Nursing, Nursing leadership, and other members of the IDT team.        Code Status  Comfort Care/DNR    Disposition  The patient is not  medically cleared for discharge to home or a post-acute facility.  Anticipate discharge to: hospice    I have placed the appropriate orders for post-discharge needs.    Review of Systems  Review of Systems   Constitutional:  Positive for malaise/fatigue.   Cardiovascular:  Positive for chest pain (chest wall pain).        Physical Exam  Temp:  [36.7 °C (98 °F)] 36.7 °C (98 °F)  Pulse:  [76] 76  Resp:  [18] 18  BP: (108)/(69) 108/69  SpO2:  [94 %] 94 %    Physical Exam  Vitals and nursing note reviewed.   Constitutional:       Appearance: Normal appearance.   HENT:      Head: Normocephalic and atraumatic.   Eyes:      Extraocular Movements: Extraocular movements intact.   Cardiovascular:      Rate and Rhythm: Tachycardia present.   Abdominal:      General: There is no distension.      Palpations: Abdomen is soft.   Musculoskeletal:      Cervical back: Neck supple.      Right lower leg: Edema present.      Left lower leg: Edema present.   Neurological:      Mental Status: He is alert.      Comments: Alert and oriented x 2-3         Fluids    Intake/Output Summary (Last 24 hours) at 2/23/2025 0911  Last data filed at 2/23/2025 0500  Gross per 24 hour   Intake 300 ml   Output 600 ml   Net -300 ml        Laboratory                              Imaging  DX-CHEST-PORTABLE (1 VIEW)   Final Result      Extensive bilateral nodular infiltrates are new from the prior chest x-ray but innumerable pulmonary nodules were present on a more recent CT of the chest, abdomen, and pelvis dated 2/11/2025           Assessment/Plan  * Atrial fibrillation with rapid ventricular response (HCC)- (present on admission)  Assessment & Plan  Patient with chronic A-fib that is not anticoagulated at baseline.  Presented with rates in the 180s to 190s.  Differential diagnosis of etiology of RVR was likely hypovolemia given very dehydrated exam on presentation and improvement of Afib with fluids   -- less likely infectious etiology   -- was on  BB    2/23/2025  On comfort measure    Acute encephalopathy  Assessment & Plan  Possibly due to intracranial metastases but patient cannot lie still for MRI with intubation and family has elected hospice car  he is alert and oriented x 2  Quantiferon  has been negative  Updated case      Other cirrhosis of liver (HCC)- (present on admission)  Assessment & Plan  Patient has retroperitoneal varices on imaging implying portal hypertension.  No evidence of bleeding today.  Ammonia, b12, HIV wnl    2/23/2025  On comfort measure , noted to be comfortable    Spinal cord compression (HCC)- (present on admission)  Assessment & Plan  Patient underwentC7 - T5 fusion & L3-4 Laminectomy   .  On comfort measure    Type 2 diabetes mellitus with hyperglycemia, with long-term current use of insulin (HCC)- (present on admission)  Assessment & Plan  Patient hemoglobin A1c C is 5.7, currently on comfort care, continue comfort care measures  Provide whatever pt would like to eat    2/23/2025  On comfort measure    History of cancer tonsil- (present on admission)  Assessment & Plan  Reportedly with stage II tonsillar cancer in 2022.  However during recent hospitalization found to have multiple bilateral pulmonary nodules consistent with metastatic process as well as T3 and T4 metastasis.  Surgical pathology confirms carcinoma from T2-T4 stabilization surgery.  Patient's wife was unaware of the recurrence of cancer but was updated at length 2/20.   --Given Maximino's previous challenges with chemotherapy she elects to pursue hospice care  -- on comfort measure         VTE prophylaxis:  scd     Total time spent 39 minutes. I spent greater than 50% of the time for patient care, counseling, and coordination on this date, including unit/floor time, and face-to-face time with the patient as per interval events, my own review of patient's imaging and lab analysis and developing my assessment and plan above.

## 2025-02-23 NOTE — PROGRESS NOTES
4 Eyes Skin Assessment Completed by DARRIAN Gifford and DARRIAN Fang.    Head WDL  Ears WDL  Nose WDL  Mouth WDL  Neck WDL  Breast/Chest WDL  Shoulder Blades WDL  Spine Incision with staples, dressing in place. Old drainage noted.   (R) Arm/Elbow/Hand Bruising and Discoloration  (L) Arm/Elbow/Hand Bruising and Discoloration  Abdomen WDL  Groin WDL  Scrotum/Coccyx/Buttocks Redness and Blanching  (R) Leg WDL  (L) Leg WDL  (R) Heel/Foot/Toe Boggy and Swelling, Dry, Flaky, Scab  (L) Heel/Foot/Toe Boggy and Swelling, Dry, Flaky, Scab           Devices In Places Rivero and SCD's      Interventions In Place TAP System, Pillows, Low Air Loss Mattress, Barrier Cream, and Heels Loaded W/Pillows    Possible Skin Injury No    Pictures Uploaded Into Epic N/A  Wound Consult Placed N/A  RN Wound Prevention Protocol Ordered No

## 2025-02-23 NOTE — CARE PLAN
The patient is Stable - Low risk of patient condition declining or worsening    Shift Goals  Clinical Goals: Remain comfortable throughout shift  Patient Goals: get more energy  Family Goals: updates    Progress made toward(s) clinical / shift goals:  Patient has been resting comfortably throughout shift. Medicated twice with PRN pain medication    Patient is not progressing towards the following goals:

## 2025-02-24 ENCOUNTER — HOME CARE VISIT (OUTPATIENT)
Dept: HOSPICE | Facility: HOSPICE | Age: 65
End: 2025-02-24
Payer: COMMERCIAL

## 2025-02-24 LAB
BACTERIA BLD CULT: NORMAL
BACTERIA BLD CULT: NORMAL
SIGNIFICANT IND 70042: NORMAL
SIGNIFICANT IND 70042: NORMAL
SITE SITE: NORMAL
SITE SITE: NORMAL
SOURCE SOURCE: NORMAL
SOURCE SOURCE: NORMAL

## 2025-02-24 PROCEDURE — 770001 HCHG ROOM/CARE - MED/SURG/GYN PRIV*

## 2025-02-24 PROCEDURE — A9270 NON-COVERED ITEM OR SERVICE: HCPCS | Performed by: STUDENT IN AN ORGANIZED HEALTH CARE EDUCATION/TRAINING PROGRAM

## 2025-02-24 PROCEDURE — 700102 HCHG RX REV CODE 250 W/ 637 OVERRIDE(OP): Performed by: STUDENT IN AN ORGANIZED HEALTH CARE EDUCATION/TRAINING PROGRAM

## 2025-02-24 PROCEDURE — 700101 HCHG RX REV CODE 250: Performed by: INTERNAL MEDICINE

## 2025-02-24 PROCEDURE — 99231 SBSQ HOSP IP/OBS SF/LOW 25: CPT | Performed by: INTERNAL MEDICINE

## 2025-02-24 RX ORDER — LIDOCAINE 4 G/G
1 PATCH TOPICAL EVERY 24 HOURS
Status: DISCONTINUED | OUTPATIENT
Start: 2025-02-24 | End: 2025-03-06 | Stop reason: HOSPADM

## 2025-02-24 RX ADMIN — MORPHINE SULFATE 10 MG: 10 SOLUTION ORAL at 11:45

## 2025-02-24 RX ADMIN — LIDOCAINE 1 PATCH: 4 PATCH TOPICAL at 09:27

## 2025-02-24 RX ADMIN — OLANZAPINE 2.5 MG: 5 TABLET, ORALLY DISINTEGRATING ORAL at 06:09

## 2025-02-24 RX ADMIN — METOPROLOL SUCCINATE 50 MG: 25 TABLET, FILM COATED, EXTENDED RELEASE ORAL at 06:09

## 2025-02-24 RX ADMIN — MORPHINE SULFATE 10 MG: 10 SOLUTION ORAL at 09:27

## 2025-02-24 ASSESSMENT — ENCOUNTER SYMPTOMS
CHANGE IN LEVEL OF CONSCIOUSNESS: 1
INCREASED FATIGUE: 1
DECREASED ORAL INTAKE: 1
INCREASED SLEEPING: 1

## 2025-02-24 ASSESSMENT — ACTIVITIES OF DAILY LIVING (ADL)
CONTINENCE_REQUIRES_ASSISTANCE: 1
AMBULATION_REQUIRES_ASSISTANCE: 1
BATHING_REQUIRES_ASSISTANCE: 1
EATING_REQUIRES_ASSISTANCE: 1
DRESSING_REQUIRES_ASSISTANCE: 1
PHYSICAL_TRANSFER_REQUIRES_ASSISTANCE: 1

## 2025-02-24 ASSESSMENT — PAIN DESCRIPTION - PAIN TYPE
TYPE: ACUTE PAIN;CHRONIC PAIN
TYPE: ACUTE PAIN
TYPE: CHRONIC PAIN;ACUTE PAIN

## 2025-02-24 NOTE — PROGRESS NOTES
4 Eyes Skin Assessment Completed by DARRIAN Spencre and DARRIAN Pineda.    Head WDL  Ears WDL  Nose WDL  Mouth WDL  Neck Incision posterior with staples, island dressing CDI  Breast/Chest WDL  Shoulder Blades WDL  Spine Incision posterior with staples, island dressing CDI  (R) Arm/Elbow/Hand Bruising and Discoloration  (L) Arm/Elbow/Hand Bruising and Discoloration  Abdomen WDL  Groin WDL  Scrotum/Coccyx/Buttocks Redness and Blanching  (R) Leg WDL  (L) Leg WDL  (R) Heel/Foot/Toe Boggy, Swelling, and Scab, Dry and Flaky  (L) Heel/Foot/Toe Boggy and Swelling, Dry and Flaky          Devices In Places Pulse Ox and Rivero      Interventions In Place Sacral Mepilex, TAP System, Pillows, Q2 Turns, Low Air Loss Mattress, Barrier Cream, Dri-Mynor Pads, Heels Loaded W/Pillows, and Pressure Redistribution Mattress    Possible Skin Injury No    Pictures Uploaded Into Epic N/A  Wound Consult Placed N/A  RN Wound Prevention Protocol Ordered No

## 2025-02-24 NOTE — CARE PLAN
The patient is Watcher - Medium risk of patient condition declining or worsening    Shift Goals  Clinical Goals: Patient pain will be managed at a tolerable level of 2/10 or less throughout night, Patient will remain free from fall or injury, Maintain safety and comfort  Patient Goals: Rest comfortably, pain management  Family Goals: n/a    Progress made toward(s) clinical / shift goals:  Patient's pain maintained at a tolerable level of 2/10 or less throughout shift with medication per MAR and rest. Pt did not sustain a fall or injury during shift. Able to rest comfortably throughout shift. Patient remained comfortable during night with use of PRN medications and therapeutic communication.     Patient is not progressing towards the following goals:

## 2025-02-24 NOTE — PROGRESS NOTES
Hospital Medicine Daily Progress Note    Date of Service  2/24/2025    Chief Complaint  Maximino Green is a 64 y.o. 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  Patient underwentC7 - T5 fusion & L3-4 Laminectomy and was discharged to skilled nursing facility.  He presented again on 2/19/2022 with altered mental status; found to be in A-fib with RVR.    During the stay in the hospital, patient received IV metoprolol along with Dilt drip.  After extensive discussion by pulmonary physician to the patient/family, decision was made for comfort care on 2/21/2025 and patient was transferred to medical floor.      Interval events:  -- Is alert and awake x 2-3, currently saturating well on room air.  Patient noted to be comfortable  --Patient does have medication work pain and anxiety  --Patient family is looking for group home, QuantiFERON pending, case management aware    2/23:  -- Alert and oriented x 2-3, nursing staff noted to be at bedside, patient denied any pain, noted to be comfortable.      2/24: Alert and conversive.  Complains of right chest wall pain, lidocaine patch added.  Pending dispo plan whether it is group home versus home with hospice      Code Status  Comfort Care/DNR    Disposition  The patient is medically cleared for discharge to home or a post-acute facility.  Anticipate discharge to: hospice    I have placed the appropriate orders for post-discharge needs.    Review of Systems  Review of Systems   Constitutional:  Positive  for malaise/fatigue.   Cardiovascular:  Positive for chest pain (chest wall pain, right sided).        Physical Exam  Pulse:  [89] 89  BP: (162)/(73) 162/73    Physical Exam  Vitals and nursing note reviewed.   Constitutional:       Comments: Speaking and answering questions appropriately   HENT:      Head: Normocephalic and atraumatic.   Eyes:      Extraocular Movements: Extraocular movements intact.   Cardiovascular:      Rate and Rhythm: Tachycardia present.   Abdominal:      General: There is no distension.      Palpations: Abdomen is soft.   Musculoskeletal:      Cervical back: Neck supple.      Right lower leg: Edema present.      Left lower leg: Edema present.   Neurological:      Mental Status: He is alert.      Comments: Alert and oriented x 2-3         Fluids    Intake/Output Summary (Last 24 hours) at 2/24/2025 1342  Last data filed at 2/24/2025 0900  Gross per 24 hour   Intake 360 ml   Output 1100 ml   Net -740 ml        Laboratory                              Imaging  DX-CHEST-PORTABLE (1 VIEW)   Final Result      Extensive bilateral nodular infiltrates are new from the prior chest x-ray but innumerable pulmonary nodules were present on a more recent CT of the chest, abdomen, and pelvis dated 2/11/2025           Assessment/Plan  * Atrial fibrillation with rapid ventricular response (HCC)- (present on admission)  Assessment & Plan  Patient with chronic A-fib that is not anticoagulated at baseline.  Presented with rates in the 180s to 190s.  Differential diagnosis of etiology of RVR was likely hypovolemia given very dehydrated exam on presentation and improvement of Afib with fluids   -- less likely infectious etiology   -- was on BB    2/23/2025  On comfort measure    Acute encephalopathy  Assessment & Plan  Possibly due to intracranial metastases but patient cannot lie still for MRI with intubation and family has elected hospice care  he is alert and oriented x 2  Quantiferon  has been  negative  Possible group home pending family finances with hospice    Other cirrhosis of liver (HCC)- (present on admission)  Assessment & Plan  Patient has retroperitoneal varices on imaging implying portal hypertension.  No evidence of bleeding today.  Ammonia, b12, HIV wnl    2/23/2025  On comfort measure , noted to be comfortable    Spinal cord compression (HCC)- (present on admission)  Assessment & Plan  Patient underwentC7 - T5 fusion & L3-4 Laminectomy   .  On comfort measure    Type 2 diabetes mellitus with hyperglycemia, with long-term current use of insulin (HCC)- (present on admission)  Assessment & Plan  Patient hemoglobin A1c C is 5.7, currently on comfort care, continue comfort care measures  Provide whatever pt would like to eat    2/23/2025  On comfort measure    History of cancer tonsil- (present on admission)  Assessment & Plan  Reportedly with stage II tonsillar cancer in 2022.  However during recent hospitalization found to have multiple bilateral pulmonary nodules consistent with metastatic process as well as T3 and T4 metastasis.  Surgical pathology confirms carcinoma from T2-T4 stabilization surgery.  Patient's wife was unaware of the recurrence of cancer but was updated at length 2/20.   --Given Maximino's previous challenges with chemotherapy she elects to pursue hospice care  -- on comfort measure         VTE prophylaxis:  scd     Total time spent 39 minutes. I spent greater than 50% of the time for patient care, counseling, and coordination on this date, including unit/floor time, and face-to-face time with the patient as per interval events, my own review of patient's imaging and lab analysis and developing my assessment and plan above.

## 2025-02-24 NOTE — HOSPICE
Hospice Discussion Handoff Report  Patient name and MRN: Maximino Green MR# [id number]  Referral order received? (y/n) yes  Decision-maker requests hospice? (y/n) yes  Hospice diagnosis? Metastaic cancer to head and neck  Approving provider? Dr. Gonzalez  Name of decision-maker: Aurelio Hinds,spouse  Consents signed? (y/n) No, wife's ride to hospital did not show up.  Anticipated hospice admission date (and time, if known): unknown  Anticipated level of care (routine/GIP): routine  HOSPITAL REFERRALS:  If discharging to a facility, what is the name of the person at the facility that you spoke to today for verification of the patient's planned admission to the facility? Spoke to nancy Masterson via text.She states she has not had a discussion on finances or placement options. Aurelio, wife  did not show for meeting today. She states health issues and her ride fell through. Aurelio also states she does not know their finances.  Anticipated transport via family or medical transport? medical transport  What DME was ordered? (include liter flow for oxygen, if applicable) TBD  Scheduled DME delivery date and time: TBD  Medications reviewed with provider/ordered? (y/n) No

## 2025-02-24 NOTE — CARE PLAN
The patient is Stable - Low risk of patient condition declining or worsening    Shift Goals  Clinical Goals: Pt's comfort will be provided, maintain safety, Q2 repositioning and free from falls during this shift  Patient Goals: Able to manage pain, rest comfortably  Family Goals: n/a    Progress made toward(s) clinical / shift goals:  Comfort care with Q2 repositioning provided. Reported pain, medicated per MAR. Rivero care done. Maintain safety, pt did not sustain any fall during this shift.     Patient is not progressing towards the following goals:

## 2025-02-24 NOTE — PROGRESS NOTES
Received report from day shift RN. Assumed patient care at 1900. Patient is A&O2, on RA at 92%, no SOB noted. Respirations even and unlabored. Mouth swabbed per patient request. Plan of care for the night discussed with patient. Patient verbalized understanding. Patient resting in bed comfortably. Safety precautions in place. Bed in low and locked position. All patient belongings and call light within reach. All needs addressed at this time. Patient will call with any needs. Low air loss mattress in use. Rivero catheter in place for comfort.

## 2025-02-25 PROCEDURE — 99233 SBSQ HOSP IP/OBS HIGH 50: CPT | Performed by: INTERNAL MEDICINE

## 2025-02-25 PROCEDURE — 770001 HCHG ROOM/CARE - MED/SURG/GYN PRIV*

## 2025-02-25 PROCEDURE — A9270 NON-COVERED ITEM OR SERVICE: HCPCS | Performed by: STUDENT IN AN ORGANIZED HEALTH CARE EDUCATION/TRAINING PROGRAM

## 2025-02-25 PROCEDURE — 700101 HCHG RX REV CODE 250: Performed by: INTERNAL MEDICINE

## 2025-02-25 PROCEDURE — 700102 HCHG RX REV CODE 250 W/ 637 OVERRIDE(OP): Performed by: STUDENT IN AN ORGANIZED HEALTH CARE EDUCATION/TRAINING PROGRAM

## 2025-02-25 RX ADMIN — OLANZAPINE 2.5 MG: 5 TABLET, ORALLY DISINTEGRATING ORAL at 05:25

## 2025-02-25 RX ADMIN — MORPHINE SULFATE 20 MG: 10 SOLUTION ORAL at 14:37

## 2025-02-25 RX ADMIN — METOPROLOL SUCCINATE 50 MG: 25 TABLET, FILM COATED, EXTENDED RELEASE ORAL at 05:25

## 2025-02-25 RX ADMIN — LIDOCAINE 1 PATCH: 4 PATCH TOPICAL at 10:20

## 2025-02-25 ASSESSMENT — PATIENT HEALTH QUESTIONNAIRE - PHQ9
2. FEELING DOWN, DEPRESSED, IRRITABLE, OR HOPELESS: NOT AT ALL
1. LITTLE INTEREST OR PLEASURE IN DOING THINGS: NOT AT ALL
SUM OF ALL RESPONSES TO PHQ9 QUESTIONS 1 AND 2: 0
1. LITTLE INTEREST OR PLEASURE IN DOING THINGS: NOT AT ALL
SUM OF ALL RESPONSES TO PHQ9 QUESTIONS 1 AND 2: 0
2. FEELING DOWN, DEPRESSED, IRRITABLE, OR HOPELESS: NOT AT ALL

## 2025-02-25 ASSESSMENT — PAIN SCALES - PAIN ASSESSMENT IN ADVANCED DEMENTIA (PAINAD)
TOTALSCORE: 0
BREATHING: NORMAL
CONSOLABILITY: NO NEED TO CONSOLE
BODYLANGUAGE: RELAXED
FACIALEXPRESSION: SMILING OR INEXPRESSIVE

## 2025-02-25 ASSESSMENT — PAIN DESCRIPTION - PAIN TYPE
TYPE: CHRONIC PAIN;ACUTE PAIN
TYPE: CHRONIC PAIN

## 2025-02-25 NOTE — PROGRESS NOTES
"Hospital Medicine Daily Progress Note    Date of Service  2/25/2025    Chief Complaint  Maximino Green is a 64 y.o. 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  Patient underwentC7 - T5 fusion & L3-4 Laminectomy and was discharged to skilled nursing facility.  He presented again on 2/19/2022 with altered mental status; found to be in A-fib with RVR.    During the stay in the hospital, patient received IV metoprolol along with Dilt drip.  After extensive discussion by pulmonary physician to the patient/family, decision was made for comfort care on 2/21/2025 and patient was transferred to medical floor.      Interval events:  -- Is alert and awake x 2-3, currently saturating well on room air.  Patient noted to be comfortable  --Patient does have medication work pain and anxiety  --Patient family is looking for group home, QuantiFERON pending, case management aware    2/23:  -- Alert and oriented x 2-3, nursing staff noted to be at bedside, patient denied any pain, noted to be comfortable.      2/24: Alert and conversive.  Complains of right chest wall pain, lidocaine patch added.  Pending dispo plan whether it is group home versus home with hospice\"      PATIENT SEEN BY PREVIOUS HOSPITALIST UNTIL 2/24 2/25: Patient seen at bedside this morning.  The patient seems to be comfortable at the time of my evaluation.  No family member present at the time of my evaluation.  Patient is comfort care measures only awaiting " placement with hospice.  We appreciate further recommendations by case management.      Code Status  Comfort Care/DNR    Disposition  The patient is medically cleared for discharge to home or a post-acute facility.  Anticipate discharge to: hospice        Review of Systems  Review of Systems   Unable to perform ROS: Acuity of condition        Physical Exam  Temp:  [37.3 °C (99.2 °F)] 37.3 °C (99.2 °F)  Pulse:  [77] 77  Resp:  [18] 18  BP: (161)/(131) 161/131  SpO2:  [90 %] 90 %    Physical Exam  Vitals and nursing note reviewed.   Constitutional:       Appearance: He is ill-appearing.      Comments: Speaking and answering questions appropriately   HENT:      Head: Normocephalic and atraumatic.   Eyes:      General:         Right eye: No discharge.         Left eye: No discharge.   Cardiovascular:      Rate and Rhythm: Tachycardia present.   Abdominal:      General: There is no distension.      Palpations: Abdomen is soft.   Musculoskeletal:      Cervical back: Neck supple.      Right lower leg: Edema present.      Left lower leg: Edema present.   Neurological:      Mental Status: He is alert.      Comments: Alert and oriented x 1-2         Fluids    Intake/Output Summary (Last 24 hours) at 2/25/2025 1433  Last data filed at 2/25/2025 0930  Gross per 24 hour   Intake 300 ml   Output 800 ml   Net -500 ml        Laboratory                              Imaging  DX-CHEST-PORTABLE (1 VIEW)   Final Result      Extensive bilateral nodular infiltrates are new from the prior chest x-ray but innumerable pulmonary nodules were present on a more recent CT of the chest, abdomen, and pelvis dated 2/11/2025           Assessment/Plan  * Atrial fibrillation with rapid ventricular response (HCC)- (present on admission)  Assessment & Plan  Patient with chronic A-fib that is not anticoagulated at baseline.  Presented with rates in the 180s to 190s.  Differential diagnosis of etiology of RVR was likely hypovolemia given very dehydrated  exam on presentation and improvement of Afib with fluids   -- less likely infectious etiology   -- was on BB    2/23/2025  On comfort measure      2/25: Patient is on comfort measures only, pending placement with hospice    Acute encephalopathy  Assessment & Plan  Possibly due to intracranial metastases but patient cannot lie still for MRI with intubation and family has elected hospice care  he is alert and oriented x 2  Quantiferon  has been negative  Possible group home pending family finances with hospice    2/25: Patient is on comfort measures only, pending placement with hospice    Other cirrhosis of liver (HCC)- (present on admission)  Assessment & Plan  Patient has retroperitoneal varices on imaging implying portal hypertension.  No evidence of bleeding today.  Ammonia, b12, HIV wnl    2/23/2025  On comfort measure , noted to be comfortable    2/25: Patient is on comfort measures only, pending placement with hospice    Spinal cord compression (HCC)- (present on admission)  Assessment & Plan  Patient underwentC7 - T5 fusion & L3-4 Laminectomy   .  On comfort measure    2/25: Patient is on comfort measures only, pending placement with hospice    Chronic systolic congestive heart failure (HCC)- (present on admission)  Assessment & Plan  2/25: Hx of? Patient is on comfort measures only, pending placement with hospice    Type 2 diabetes mellitus with hyperglycemia, with long-term current use of insulin (HCC)- (present on admission)  Assessment & Plan  Patient hemoglobin A1c C is 5.7, currently on comfort care, continue comfort care measures  Provide whatever pt would like to eat    2/23/2025  On comfort measure    2/25: Patient is on comfort measures only, pending placement with hospice    Hypothyroidism- (present on admission)  Assessment & Plan  2/25: Hx of? Patient is on comfort measures only, pending placement with hospice    History of cancer tonsil- (present on admission)  Assessment & Plan  Reportedly with  stage II tonsillar cancer in 2022.  However during recent hospitalization found to have multiple bilateral pulmonary nodules consistent with metastatic process as well as T3 and T4 metastasis.  Surgical pathology confirms carcinoma from T2-T4 stabilization surgery.  Patient's wife was unaware of the recurrence of cancer but was updated at length 2/20.   --Given Maximino's previous challenges with chemotherapy she elects to pursue hospice care  -- on comfort measure      2/25: Patient is on comfort measures only, pending placement with hospice    Pancytopenia (HCC)- (present on admission)  Assessment & Plan  2/25: Patient is on comfort measures only, pending placement with hospice    Essential hypertension- (present on admission)  Assessment & Plan  2/25: Hx of? Patient is on comfort measures only, pending placement with hospice         VTE prophylaxis:  Patient is on comfort measures only      I spend at least 51 minutes providing care for this patient.  This included face-to-face interview, physical examination.  Review of previous lab work including CBC, CMP, ammonia, troponin.  Discussing with multidisciplinary team including case management, nursing staff and pharmacy.  Creating plan of care.  Reviewing orders.

## 2025-02-25 NOTE — DISCHARGE PLANNING
Case Management Discharge Planning    Call placed to pt's wife, Aurelio. Aurelio reported she just got out of rehab for a broken femur. Pt stated she hasn't made it to the hospital yet, is planning to be here tomorrow. Pt stated she is depending on rides from friend and . Aurelio stated she is unsure about finances, stated her  knows it all. Aurelio stated it's been so long she doesn't remember how to do things financially. Aurelio stated her daughter has been helping out, is requesting [facility] to speak with her daughter. Aurelio reported she has the beginning of Alzheimer's and it is difficult for her to talk and remember things.   Aurelio's daughter is Mindy Mcpherson [phone].

## 2025-02-25 NOTE — PROGRESS NOTES
Assumed care of pt at 1912, bedside report with DARRIAN Dong. Pt is AAOx 3, resting comfortably in bed with NADN, pain currently 3/10.  Pt using call light appropriately and to get up with assistance. Discussed plan of care for the night with patient, bed in lowest position, call light in reach, personal belongings in reach. No complaints at this time.

## 2025-02-25 NOTE — CARE PLAN
The patient is Stable - Low risk of patient condition declining or worsening    Shift Goals  Clinical Goals: maintain pt comfort, pain control, no falls, q2hr turns  Patient Goals: rest  Family Goals: n/a    Progress made toward(s) clinical / shift goals:  comfort care continues, pt does not complain of pain >3/10 throughout night, declines RN pain medication. Turns q2hrs, no falls    Patient is not progressing towards the following goals:

## 2025-02-25 NOTE — DISCHARGE PLANNING
"Case Management Discharge Planning    Admission Date: 2/19/2025  GMLOS: 2.3  ALOS: 6    6-Clicks ADL Score: 14  6-Clicks Mobility Score: 7    Anticipated Discharge Dispo: Discharge Disposition: D/T to hospice home (50)    DME Needed: Yes    DME Ordered: No    Action(s) Taken: Updated Provider/Nurse on Discharge Plan    Placed call to pt's daughter, Mindy. Mindy reported Aurelio will be at hospital around noon. Mindy is currently on a business trip and is trying to come back next week. Discussed placement barriers with Mindy, home vs group home and cost of group home. Mindy reported she is unsure about their finances but is confident they would not be able to afford a group home. Mindy reported Aurelio's caregiver would potentially be able to assist, but stated she is unsure how formal the relationship is. Mindy stated she will assist as much as she can.     LSW to meet with family at bedside when they arrive.     1215-  LSW was notified pt's family arrived at bedside. Went to bedside to meet with family. Family present 2 sisters and niece. Pt's wife has not arrived.   Pt alert while LSW at bedside, discussed options with pt. Pt stated he is not wanting to go home just to \"lay in bed the whole time\". Discussed group home placement and expected cost. Pt reported he was making 70,000/year as a , without working he has no income. Pt's wife makes approx [phone]/year with social security.     Discussed with leadership, potential for 3 month [facility] with option for extension if needed.     1325-  Received call from unit clerk, pt's wife is at bedside requesting to speak with LSW. Met with pt's wife and daughter Mindy on speaker. Aurelio was able to confirm she receives between 3873-3763/month in social security. Discussed group home placement and potential for [facility], would need to know how much family can contribute toward group home. Aurelio reported their mortgage is approx 1000/month, unsure about any other expenses. Pt stated she " is needing to get into their [place] [employer] but doesn't have the password. Aurelio reported pt has a 401K but does not know how to access it. Mindy will assist pt's wife with sorting out fiances and will follow up with LSW.     1500-  Call placed to Josh with [facility]. Josh stated they would not be able to accommodate pt's size.     Call placed to [facility] [phone]. Gay[facility] reported they will have a private room available tomorrow, reported flat rate would be 6000/month.     Call placed to Maciej with [facility] [phone]. Maciej reported he will come assess pt tomorrow, requested for H&P to be faxed to [phone]. Maciej stated due to pt's size and level of assist, it would be costly. Unable to give a number until he assesses pt tomorrow.     Requested documents faxed.       Escalations Completed: None    Medically Clear: Yes    Next Steps: LSW to follow    Barriers to Discharge: Pending Placement    Is the patient up for discharge tomorrow: No

## 2025-02-25 NOTE — PROGRESS NOTES
BSSR received from night RN. Pt sleeping easily wakes to voice not distress on RA at 90%. AAOx2 oriented to person and situation. Reported pain, resting for relief. Denies nausea. Rivero draining clear urine, no clots noted. Discussed POC. Fall risk precautions in place, locked bed in lowest position, bed alarm on and call light within reach. All needs met at this time. Hourly rounding in place.

## 2025-02-26 PROCEDURE — A9270 NON-COVERED ITEM OR SERVICE: HCPCS | Performed by: STUDENT IN AN ORGANIZED HEALTH CARE EDUCATION/TRAINING PROGRAM

## 2025-02-26 PROCEDURE — 770001 HCHG ROOM/CARE - MED/SURG/GYN PRIV*

## 2025-02-26 PROCEDURE — 700101 HCHG RX REV CODE 250: Performed by: INTERNAL MEDICINE

## 2025-02-26 PROCEDURE — 700102 HCHG RX REV CODE 250 W/ 637 OVERRIDE(OP): Performed by: STUDENT IN AN ORGANIZED HEALTH CARE EDUCATION/TRAINING PROGRAM

## 2025-02-26 PROCEDURE — 99233 SBSQ HOSP IP/OBS HIGH 50: CPT | Performed by: INTERNAL MEDICINE

## 2025-02-26 RX ORDER — CYCLOBENZAPRINE HCL 10 MG
10 TABLET ORAL 3 TIMES DAILY PRN
Status: DISCONTINUED | OUTPATIENT
Start: 2025-02-26 | End: 2025-03-06 | Stop reason: HOSPADM

## 2025-02-26 RX ADMIN — OLANZAPINE 2.5 MG: 5 TABLET, ORALLY DISINTEGRATING ORAL at 05:06

## 2025-02-26 RX ADMIN — CYCLOBENZAPRINE 10 MG: 10 TABLET, FILM COATED ORAL at 05:47

## 2025-02-26 RX ADMIN — LIDOCAINE 1 PATCH: 4 PATCH TOPICAL at 11:31

## 2025-02-26 RX ADMIN — CYCLOBENZAPRINE 10 MG: 10 TABLET, FILM COATED ORAL at 08:06

## 2025-02-26 RX ADMIN — MORPHINE SULFATE 20 MG: 10 SOLUTION ORAL at 11:31

## 2025-02-26 RX ADMIN — METOPROLOL SUCCINATE 50 MG: 25 TABLET, FILM COATED, EXTENDED RELEASE ORAL at 05:06

## 2025-02-26 ASSESSMENT — PATIENT HEALTH QUESTIONNAIRE - PHQ9
SUM OF ALL RESPONSES TO PHQ9 QUESTIONS 1 AND 2: 0
2. FEELING DOWN, DEPRESSED, IRRITABLE, OR HOPELESS: NOT AT ALL
1. LITTLE INTEREST OR PLEASURE IN DOING THINGS: NOT AT ALL
1. LITTLE INTEREST OR PLEASURE IN DOING THINGS: NOT AT ALL
2. FEELING DOWN, DEPRESSED, IRRITABLE, OR HOPELESS: NOT AT ALL
SUM OF ALL RESPONSES TO PHQ9 QUESTIONS 1 AND 2: 0

## 2025-02-26 ASSESSMENT — PAIN SCALES - PAIN ASSESSMENT IN ADVANCED DEMENTIA (PAINAD)
BREATHING: NORMAL
BODYLANGUAGE: RELAXED
FACIALEXPRESSION: SMILING OR INEXPRESSIVE
CONSOLABILITY: NO NEED TO CONSOLE
TOTALSCORE: 0

## 2025-02-26 ASSESSMENT — PAIN DESCRIPTION - PAIN TYPE
TYPE: ACUTE PAIN;CHRONIC PAIN
TYPE: ACUTE PAIN;CHRONIC PAIN

## 2025-02-26 NOTE — CARE PLAN
The patient is Stable - Low risk of patient condition declining or worsening    Shift Goals  Clinical Goals: maintain pt comfort, pain control, no falls, q2hr turns  Patient Goals: rest  Family Goals: n/a    Progress made toward(s) clinical / shift goals:  pt had no falls, repositioned q2hrs, pain managed per MAR.    Patient is not progressing towards the following goals:

## 2025-02-26 NOTE — PROGRESS NOTES
4 Eyes Skin Assessment Completed by DARRIAN Gifford and DARIRAN Dumas.    Head WDL  Ears WDL  Nose WDL  Mouth WDL  Neck WDL  Breast/Chest WDL  Shoulder Blades WDL  Spine Dressing in place.   (R) Arm/Elbow/Hand Bruising and Discoloration  (L) Arm/Elbow/Hand Bruising and Discoloration  Abdomen WDL  Groin WDL  Scrotum/Coccyx/Buttocks Redness and Blanching  (R) Leg WDL  (L) Leg WDL  (R) Heel/Foot/Toe Boggy and Swelling, Dry, Flaky, Scab   (L) Heel/Foot/Toe  Boggy and Swelling, Dry, Flaky, Scab           Devices In Places Rivero and SCD's      Interventions In Place TAP System, Pillows, Low Air Loss Mattress, Barrier Cream, and Heels Loaded W/Pillows    Possible Skin Injury No    Pictures Uploaded Into Epic N/A  Wound Consult Placed N/A  RN Wound Prevention Protocol Ordered No

## 2025-02-26 NOTE — PROGRESS NOTES
4 Eyes Skin Assessment Completed by DARRIAN Kay and Eun RN.    Head WDL  Ears WDL  Nose WDL  Mouth WDL  Neck WDL  Breast/Chest WDL  Shoulder Blades Redness and Blanching  Spine Redness, Blanching, and Incision along upper neck/spine s/p spinal fusion, island dressing changed  (R) Arm/Elbow/Hand Bruising  (L) Arm/Elbow/Hand Bruising  Abdomen Scar and Bruising  Groin WDL  Scrotum/Coccyx/Buttocks Redness and Blanching  (R) Leg discoloration, blanching  (L) Leg discoloration, blanching  (R) Heel/Foot/Toe calluses and dryness along heel and lateral foot. Scab to anterior 2nd toe.   (L) Heel/Foot/Toe calluses and dryness          Devices In Places Rivero and SCD's      Interventions In Place Sacral Mepilex, TAP System, Q2 Turns, and Low Air Loss Mattress    Possible Skin Injury No    Pictures Uploaded Into Epic N/A  Wound Consult Placed N/A  RN Wound Prevention Protocol Ordered No

## 2025-02-26 NOTE — PROGRESS NOTES
Assumed care of pt at 1955, bedside report with DARRIAN Gifford. Pt is AAOx 3, resting comfortably in bed with NADN, pain currently 3/10 and declining medication at this time, states pain is tolerable at this time.    Pt using call light appropriately and to get up with assistance. Discussed plan of care for the night with patient, bed in lowest position, call light in reach, personal belongings in reach.

## 2025-02-26 NOTE — DISCHARGE PLANNING
Case Management Discharge Planning    Admission Date: 2/19/2025  GMLOS: 2.3  ALOS: 7    6-Clicks ADL Score: 14  6-Clicks Mobility Score: 7    Anticipated Discharge Dispo: Discharge Disposition: D/T to hospice home (50)    DME Needed: No    Action(s) Taken:     Contacted Maciej from [facility] to follow up. Maciej reported he would not be able to place pt in [place] but has a bigger facility in [place] with 5 caregivers that would potentially be able to accommodate pt. Maciej stated he is waiting for them to get back to him on if they are willing to take pt. Maciej would not give bentley, stating he needs to speak with them first, however did say it would be more than his normal rates.   Maciej requested for LSW to contact him before 1200 tomorrow for an answer.     Escalations Completed: None    Medically Clear: Yes    Next Steps: LSW to follow    Barriers to Discharge: Pending Placement    Is the patient up for discharge tomorrow: No

## 2025-02-26 NOTE — CARE PLAN
The patient is Stable - Low risk of patient condition declining or worsening    Shift Goals  Clinical Goals: Pt's comfort will be provided, maintain safety, Q2 repositioning and free from falls during this shift  Patient Goals: Able to rest comfortably  Family Goals: n/a    Progress made toward(s) clinical / shift goals:  Pt comfort provided. Refused Q2 repositioning. Tolerated diet, denies nausea. Pt did not sustain any fall during this shift.     Patient is not progressing towards the following goals:

## 2025-02-26 NOTE — PROGRESS NOTES
"Hospital Medicine Daily Progress Note    Date of Service  2/26/2025    Chief Complaint  Maximino Green is a 64 y.o. 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  Patient underwentC7 - T5 fusion & L3-4 Laminectomy and was discharged to skilled nursing facility.  He presented again on 2/19/2022 with altered mental status; found to be in A-fib with RVR.    During the stay in the hospital, patient received IV metoprolol along with Dilt drip.  After extensive discussion by pulmonary physician to the patient/family, decision was made for comfort care on 2/21/2025 and patient was transferred to medical floor.      Interval events:  -- Is alert and awake x 2-3, currently saturating well on room air.  Patient noted to be comfortable  --Patient does have medication work pain and anxiety  --Patient family is looking for group home, QuantiFERON pending, case management aware    2/23:  -- Alert and oriented x 2-3, nursing staff noted to be at bedside, patient denied any pain, noted to be comfortable.      2/24: Alert and conversive.  Complains of right chest wall pain, lidocaine patch added.  Pending dispo plan whether it is group home versus home with hospice\"      PATIENT SEEN BY PREVIOUS HOSPITALIST UNTIL 2/24 2/25: Patient seen at bedside this morning.  The patient seems to be comfortable at the time of my evaluation.  No family member present at the time of my evaluation.  Patient is comfort care measures only awaiting " placement with hospice.  We appreciate further recommendations by case management.    2/26: Patient seen at bedside this morning. Still awaiting placement with hospice. I spoke with patient's wife over the phone regarding plan of care.      Code Status  Comfort Care/DNR    Disposition  The patient is not medically cleared for discharge to home or a post-acute facility.          Review of Systems  Review of Systems   Unable to perform ROS: Acuity of condition        Physical Exam  Temp:  [37.1 °C (98.8 °F)] 37.1 °C (98.8 °F)  Pulse:  [75] 75  Resp:  [18] 18  BP: (133)/(51) 133/51  SpO2:  [90 %-92 %] 90 %    Physical Exam  Vitals and nursing note reviewed.   Constitutional:       Appearance: He is ill-appearing.      Comments: Speaking and answering questions appropriately   HENT:      Head: Normocephalic and atraumatic.   Eyes:      General:         Right eye: No discharge.         Left eye: No discharge.   Cardiovascular:      Rate and Rhythm: Tachycardia present.   Abdominal:      General: There is no distension.      Palpations: Abdomen is soft.   Musculoskeletal:      Cervical back: Neck supple.      Right lower leg: Edema present.      Left lower leg: Edema present.   Neurological:      Mental Status: He is alert.      Comments: Alert and oriented x 1-2         Fluids    Intake/Output Summary (Last 24 hours) at 2/26/2025 1335  Last data filed at 2/26/2025 0815  Gross per 24 hour   Intake 460 ml   Output 1000 ml   Net -540 ml        Laboratory                              Imaging  DX-CHEST-PORTABLE (1 VIEW)   Final Result      Extensive bilateral nodular infiltrates are new from the prior chest x-ray but innumerable pulmonary nodules were present on a more recent CT of the chest, abdomen, and pelvis dated 2/11/2025           Assessment/Plan  * Atrial fibrillation with rapid ventricular response (HCC)- (present on admission)  Assessment & Plan  Patient with chronic A-fib that is not anticoagulated at baseline.   Presented with rates in the 180s to 190s.  Differential diagnosis of etiology of RVR was likely hypovolemia given very dehydrated exam on presentation and improvement of Afib with fluids   -- less likely infectious etiology   -- was on BB    2/23/2025  On comfort measure      2/26: Patient is on comfort measures only, pending placement with hospice    Acute encephalopathy  Assessment & Plan  Possibly due to intracranial metastases but patient cannot lie still for MRI with intubation and family has elected hospice care  he is alert and oriented x 2  Quantiferon  has been negative  Possible group home pending family finances with hospice    2/26: Patient is on comfort measures only, pending placement with hospice    Other cirrhosis of liver (HCC)- (present on admission)  Assessment & Plan  Patient has retroperitoneal varices on imaging implying portal hypertension.  No evidence of bleeding today.  Ammonia, b12, HIV wnl    2/23/2025  On comfort measure , noted to be comfortable    2/26: Patient is on comfort measures only, pending placement with hospice    Spinal cord compression (HCC)- (present on admission)  Assessment & Plan  Patient underwentC7 - T5 fusion & L3-4 Laminectomy   .  On comfort measure    2/26: Patient is on comfort measures only, pending placement with hospice    Chronic systolic congestive heart failure (HCC)- (present on admission)  Assessment & Plan  2/26: Hx of? Patient is on comfort measures only, pending placement with hospice    Type 2 diabetes mellitus with hyperglycemia, with long-term current use of insulin (HCC)- (present on admission)  Assessment & Plan  Patient hemoglobin A1c C is 5.7, currently on comfort care, continue comfort care measures  Provide whatever pt would like to eat    2/23/2025  On comfort measure    2/26: Patient is on comfort measures only, pending placement with hospice    Hypothyroidism- (present on admission)  Assessment & Plan  2/26: Hx of? Patient is on comfort  measures only, pending placement with hospice    History of cancer tonsil- (present on admission)  Assessment & Plan  Reportedly with stage II tonsillar cancer in 2022.  However during recent hospitalization found to have multiple bilateral pulmonary nodules consistent with metastatic process as well as T3 and T4 metastasis.  Surgical pathology confirms carcinoma from T2-T4 stabilization surgery.  Patient's wife was unaware of the recurrence of cancer but was updated at length 2/20.   --Given Maximino's previous challenges with chemotherapy she elects to pursue hospice care  -- on comfort measure      2/26: Patient is on comfort measures only, pending placement with hospice    Pancytopenia (HCC)- (present on admission)  Assessment & Plan  2/25: Patient is on comfort measures only, pending placement with hospice    Essential hypertension- (present on admission)  Assessment & Plan  2/26: Hx of? Patient is on comfort measures only, pending placement with hospice         VTE prophylaxis:  Patient is on comfort measures only      I spend at least 52 minutes providing care for this patient.  This included face-to-face interview, physical examination.  I spoke with the patient's wife over the phone for a long time regarding plan of care.  Discussing with multidisciplinary team including case management, nursing staff and pharmacy.  Creating plan of care.  Reviewing orders.

## 2025-02-26 NOTE — PROGRESS NOTES
BSSR received from night RN. Pt resting in bed not in any distress on RA at 90%. AAOx2 oriented to person and situation. Reported muscle spasm and pain, medicated per MAR. Denies nausea. Rivero draining clear urine, no clots noted. Discussed POC. Fall risk precautions in place, locked bed in lowest position, bed alarm on and call light within reach. All needs met at this time. Hourly rounding in place.

## 2025-02-27 PROCEDURE — 700102 HCHG RX REV CODE 250 W/ 637 OVERRIDE(OP): Performed by: STUDENT IN AN ORGANIZED HEALTH CARE EDUCATION/TRAINING PROGRAM

## 2025-02-27 PROCEDURE — 770001 HCHG ROOM/CARE - MED/SURG/GYN PRIV*

## 2025-02-27 PROCEDURE — 94760 N-INVAS EAR/PLS OXIMETRY 1: CPT

## 2025-02-27 PROCEDURE — A9270 NON-COVERED ITEM OR SERVICE: HCPCS | Performed by: STUDENT IN AN ORGANIZED HEALTH CARE EDUCATION/TRAINING PROGRAM

## 2025-02-27 PROCEDURE — 700101 HCHG RX REV CODE 250: Performed by: INTERNAL MEDICINE

## 2025-02-27 PROCEDURE — 99233 SBSQ HOSP IP/OBS HIGH 50: CPT | Performed by: INTERNAL MEDICINE

## 2025-02-27 RX ORDER — POLYETHYLENE GLYCOL 3350 17 G/17G
1 POWDER, FOR SOLUTION ORAL DAILY
Status: DISCONTINUED | OUTPATIENT
Start: 2025-02-27 | End: 2025-03-06 | Stop reason: HOSPADM

## 2025-02-27 RX ORDER — SENNOSIDES A AND B 8.6 MG/1
17.2 TABLET, FILM COATED ORAL
Status: DISCONTINUED | OUTPATIENT
Start: 2025-02-27 | End: 2025-03-06 | Stop reason: HOSPADM

## 2025-02-27 RX ADMIN — LIDOCAINE 1 PATCH: 4 PATCH TOPICAL at 10:30

## 2025-02-27 RX ADMIN — CYCLOBENZAPRINE 10 MG: 10 TABLET, FILM COATED ORAL at 05:03

## 2025-02-27 RX ADMIN — OLANZAPINE 2.5 MG: 5 TABLET, ORALLY DISINTEGRATING ORAL at 05:03

## 2025-02-27 RX ADMIN — POLYETHYLENE GLYCOL 3350 1 PACKET: 17 POWDER, FOR SOLUTION ORAL at 06:06

## 2025-02-27 RX ADMIN — MORPHINE SULFATE 10 MG: 10 SOLUTION ORAL at 05:04

## 2025-02-27 RX ADMIN — METOPROLOL SUCCINATE 50 MG: 25 TABLET, FILM COATED, EXTENDED RELEASE ORAL at 05:03

## 2025-02-27 ASSESSMENT — PAIN DESCRIPTION - PAIN TYPE
TYPE: ACUTE PAIN

## 2025-02-27 NOTE — CARE PLAN
The patient is Stable - Low risk of patient condition declining or worsening    Shift Goals  Clinical Goals: Pt's comfort care will be provided, repositioning and free from falls during this shift  Patient Goals: Able to manage pain, rest comfortably  Family Goals: n/a    Progress made toward(s) clinical / shift goals:  Pt reported pain, medicated per MAR. Repositioning. Denies nausea. Pt did not sustain any fall during this shift.     Patient is not progressing towards the following goals:  Pt refused to eat meals during this shift

## 2025-02-27 NOTE — PROGRESS NOTES
Reanna Graf over noted lack of BM since patient's admission date (2/19). Patient is a poor historian and cannot recall if he has had a BM while in the hospital or not. Patient has distended abd, but no complaints of pain or nausea.     Orders received for sennosides and miralax.

## 2025-02-27 NOTE — PROGRESS NOTES
Assumed care of pt at 1855, bedside report with DARRIAN Gifford. Pt is AAOx 3, resting comfortably in bed with NADN, pain currently 4/10.    Pt using call light appropriately and to get up with assistance. Discussed plan of care for the night with patient, bed in lowest position, call light in reach, personal belongings in reach. No complaints at this time.

## 2025-02-27 NOTE — DISCHARGE PLANNING
Case Management Discharge Planning    Call placed to Maciej with [facility] to follow up. Maciej stated he has not been able to meet with his facility in [place], and stated he would call LSW back around 1400.    1025-  Received call from pt's daughter, Mindy. Mindy reported she will be here on Monday, still working on the financial portion with Aurelio.     1210-  Voicemail left for Joaquina Trejo.     1600-  Received call back from oJaquina Trejo, she has an open male bed. Joaquina Trejo will come see pt tomorrow.

## 2025-02-27 NOTE — CARE PLAN
The patient is Watcher - Medium risk of patient condition declining or worsening    Shift Goals  Clinical Goals: maintain pt's comfort, pain control, no falls, q2hr turns  Patient Goals: rest  Family Goals: n/a    Progress made toward(s) clinical / shift goals:  pain managed per MAR, patient required some reorientation throughout the night, but is easily reorientable to place and time.pt had no falls, turned q2hrs, saunders care completed.    Patient is not progressing towards the following goals:

## 2025-02-27 NOTE — PROGRESS NOTES
4 Eyes Skin Assessment Completed by DARRIAN Kay and DARRIAN King.    Head WDL  Ears WDL  Nose WDL  Mouth WDL  Neck Incision (posterior neck)   Breast/Chest WDL  Shoulder Blades Redness and Blanching  Spine Incision from spinal fusion. CDI, island dressing in place, redness noted to periwound area but is blanching  (R) Arm/Elbow/Hand Bruising and Discoloration  (L) Arm/Elbow/Hand Bruising and Discoloration  Abdomen Bruising  Groin WDL  Scrotum/Coccyx/Buttocks Redness and Blanching  (R) Leg discoloration, blanching  (L) Leg discoloration, blanching  (R) Heel/Foot/Toe Redness and Blanching, scab to 2nd toe. Dry and callused  (L) Heel/Foot/Toe Redness and Blanching, dry, calluses          Devices In Places Rivero      Interventions In Place Sacral Mepilex, TAP System, Pillows, Q2 Turns, Low Air Loss Mattress, Dri-Mynor Pads, and Heels Loaded W/Pillows    Possible Skin Injury No    Pictures Uploaded Into Epic N/A  Wound Consult Placed N/A  RN Wound Prevention Protocol Ordered No

## 2025-02-27 NOTE — PROGRESS NOTES
4 Eyes Skin Assessment Completed by DARRIAN Gifford and DARRIAN Agarwal.    Head WDL  Ears WDL  Nose WDL  Mouth WDL  Neck WDL  Breast/Chest WDL  Shoulder Blades WDL  Spine Dressing in place. Redness, Blanching, Scratch     (R) Arm/Elbow/Hand Bruising and Discoloration  (L) Arm/Elbow/Hand Bruising and Discoloration  Abdomen Bruising  Groin WDL  Scrotum/Coccyx/Buttocks Redness and Blanching  (R) Leg WDL  (L) Leg WDL  (R) Heel/Foot/Toe Boggy and Swelling, Dry, Flaky, Scab   (L) Heel/Foot/Toe Boggy and Swelling, Dry, Flaky, Scab           Devices In Places Rivero and SCD's      Interventions In Place TAP System, Pillows, Low Air Loss Mattress, Barrier Cream, and Heels Loaded W/Pillows    Possible Skin Injury No    Pictures Uploaded Into Epic Yes  Wound Consult Placed N/A  RN Wound Prevention Protocol Ordered No

## 2025-02-27 NOTE — PROGRESS NOTES
4 Eyes Skin Assessment Completed by DARRIAN Gifford and DARRIAN Agarwal.    Head WDL  Ears WDL  Nose WDL  Mouth WDL  Neck Incision  Breast/Chest WDL  Shoulder Blades Redness and Blanching  Spine Incision, dressing in place. Redness and Scab  (R) Arm/Elbow/Hand Bruising and Discoloration  (L) Arm/Elbow/Hand Bruising and Discoloration  Abdomen Bruising  Groin WDL  Scrotum/Coccyx/Buttocks Redness and Blanching, Blisters. Barrier Cream in place and changed Mepilex.    (R) Leg Blanching, Discoloration   (L) Leg Blanching, Discoloration  (R) Heel/Foot/Toe Boggy and Swelling, Dry, Flaky, Scab   (L) Heel/Foot/Toe Boggy and Swelling, Dry, Flaky, Scab           Devices In Places Rivero and SCD's      Interventions In Place Sacral Mepilex, TAP System, Pillows, Q2 Turns, Low Air Loss Mattress, Barrier Cream, and Heels Loaded W/Pillows    Possible Skin Injury Yes    Pictures Uploaded Into Epic Yes  Wound Consult Placed N/A. Comfort Care   RN Wound Prevention Protocol Ordered Yes

## 2025-02-27 NOTE — PROGRESS NOTES
BSSR received from night RN. Pt resting in bed not in any distress on RA at 92%. AAOx3 oriented to person and situation. Reported pain, medicated per MAR. Denies nausea. Rivero draining clear urine, no clots noted. Spinal dressing in place, CDI. Discussed POC. Fall risk precautions in place, locked bed in lowest position, bed alarm on and call light within reach. All needs met at this time. Hourly rounding in place.

## 2025-02-27 NOTE — PROGRESS NOTES
"Hospital Medicine Daily Progress Note    Date of Service  2/27/2025    Chief Complaint  Maximino Green is a 64 y.o. 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  Patient underwentC7 - T5 fusion & L3-4 Laminectomy and was discharged to skilled nursing facility.  He presented again on 2/19/2022 with altered mental status; found to be in A-fib with RVR.    During the stay in the hospital, patient received IV metoprolol along with Dilt drip.  After extensive discussion by pulmonary physician to the patient/family, decision was made for comfort care on 2/21/2025 and patient was transferred to medical floor.      Interval events:  -- Is alert and awake x 2-3, currently saturating well on room air.  Patient noted to be comfortable  --Patient does have medication work pain and anxiety  --Patient family is looking for group home, QuantiFERON pending, case management aware    2/23:  -- Alert and oriented x 2-3, nursing staff noted to be at bedside, patient denied any pain, noted to be comfortable.      2/24: Alert and conversive.  Complains of right chest wall pain, lidocaine patch added.  Pending dispo plan whether it is group home versus home with hospice\"      PATIENT SEEN BY PREVIOUS HOSPITALIST UNTIL 2/24 2/25: Patient seen at bedside this morning.  The patient seems to be comfortable at the time of my evaluation.  No family member present at the time of my evaluation.  Patient is comfort care measures only awaiting " placement with hospice.  We appreciate further recommendations by case management.    2/27: Patient seen at bedside this morning.  Awaiting placement for hospice.  I spoke to the patient's wife over the phone that she is trying to gather finances to get placement with hospice.  We appreciate further recommendations by case management.      Code Status  Comfort Care/DNR    Disposition  The patient is not medically cleared for discharge to home or a post-acute facility.          Review of Systems  Review of Systems   Unable to perform ROS: Acuity of condition        Physical Exam  Temp:  [36.4 °C (97.6 °F)-36.8 °C (98.3 °F)] 36.8 °C (98.3 °F)  Pulse:  [65-69] 65  Resp:  [17-18] 18  BP: (122-123)/(61-71) 122/71  SpO2:  [93 %-94 %] 93 %    Physical Exam  Vitals and nursing note reviewed.   Constitutional:       Appearance: He is ill-appearing.      Comments: Speaking and answering questions appropriately   HENT:      Head: Normocephalic and atraumatic.   Eyes:      General:         Right eye: No discharge.         Left eye: No discharge.   Cardiovascular:      Rate and Rhythm: Tachycardia present.   Abdominal:      General: There is no distension.      Palpations: Abdomen is soft.   Musculoskeletal:      Cervical back: Neck supple.      Right lower leg: Edema present.      Left lower leg: Edema present.   Neurological:      Mental Status: He is alert.      Comments: Alert and oriented x 1-2         Fluids    Intake/Output Summary (Last 24 hours) at 2/27/2025 1427  Last data filed at 2/27/2025 1300  Gross per 24 hour   Intake 460 ml   Output 400 ml   Net 60 ml        Laboratory                              Imaging  DX-CHEST-PORTABLE (1 VIEW)   Final Result      Extensive bilateral nodular infiltrates are new from the prior chest x-ray but innumerable pulmonary nodules were present on a more recent CT of the chest, abdomen, and pelvis dated 2/11/2025           Assessment/Plan  * Atrial fibrillation with rapid ventricular  response (HCC)- (present on admission)  Assessment & Plan  Patient with chronic A-fib that is not anticoagulated at baseline.  Presented with rates in the 180s to 190s.  Differential diagnosis of etiology of RVR was likely hypovolemia given very dehydrated exam on presentation and improvement of Afib with fluids   -- less likely infectious etiology   -- was on BB    2/23/2025  On comfort measure      2/27: Patient is on comfort measures only, pending placement with hospice    Acute encephalopathy  Assessment & Plan  Possibly due to intracranial metastases but patient cannot lie still for MRI with intubation and family has elected hospice care  he is alert and oriented x 2  Quantiferon  has been negative  Possible group home pending family finances with hospice    2/27: Patient is on comfort measures only, pending placement with hospice    Other cirrhosis of liver (HCC)- (present on admission)  Assessment & Plan  Patient has retroperitoneal varices on imaging implying portal hypertension.  No evidence of bleeding today.  Ammonia, b12, HIV wnl    2/23/2025  On comfort measure , noted to be comfortable    2/27: Patient is on comfort measures only, pending placement with hospice    Spinal cord compression (HCC)- (present on admission)  Assessment & Plan  Patient underwentC7 - T5 fusion & L3-4 Laminectomy   .  On comfort measure    2/27: Patient is on comfort measures only, pending placement with hospice    Chronic systolic congestive heart failure (HCC)- (present on admission)  Assessment & Plan  2/27: Hx of? Patient is on comfort measures only, pending placement with hospice    Type 2 diabetes mellitus with hyperglycemia, with long-term current use of insulin (HCC)- (present on admission)  Assessment & Plan  Patient hemoglobin A1c C is 5.7, currently on comfort care, continue comfort care measures  Provide whatever pt would like to eat    2/23/2025  On comfort measure    2/27: Patient is on comfort measures only,  pending placement with hospice    Hypothyroidism- (present on admission)  Assessment & Plan  2/27: Hx of? Patient is on comfort measures only, pending placement with hospice    History of cancer tonsil- (present on admission)  Assessment & Plan  Reportedly with stage II tonsillar cancer in 2022.  However during recent hospitalization found to have multiple bilateral pulmonary nodules consistent with metastatic process as well as T3 and T4 metastasis.  Surgical pathology confirms carcinoma from T2-T4 stabilization surgery.  Patient's wife was unaware of the recurrence of cancer but was updated at length 2/20.   --Given Maximino's previous challenges with chemotherapy she elects to pursue hospice care  -- on comfort measure      2/27: Patient is on comfort measures only, pending placement with hospice    Pancytopenia (HCC)- (present on admission)  Assessment & Plan  2/27: Patient is on comfort measures only, pending placement with hospice    Essential hypertension- (present on admission)  Assessment & Plan  2/27: Hx of? Patient is on comfort measures only, pending placement with hospice         VTE prophylaxis:  Patient is on comfort measures only      I spend at least 51 minutes providing care for this patient.  This included face-to-face interview, physical examination. Talking to patient's wife over the phone regarding plan of care.  Discussing with multidisciplinary team including case management, nursing staff and pharmacy.  Creating plan of care.  Reviewing orders.

## 2025-02-28 ENCOUNTER — HOME CARE VISIT (OUTPATIENT)
Dept: HOSPICE | Facility: HOSPICE | Age: 65
End: 2025-02-28
Payer: COMMERCIAL

## 2025-02-28 VITALS
OXYGEN SATURATION: 92 % | BODY MASS INDEX: 38.01 KG/M2 | HEIGHT: 72 IN | HEART RATE: 82 BPM | TEMPERATURE: 98.6 F | WEIGHT: 280.65 LBS | SYSTOLIC BLOOD PRESSURE: 106 MMHG | RESPIRATION RATE: 18 BRPM | DIASTOLIC BLOOD PRESSURE: 56 MMHG

## 2025-02-28 PROCEDURE — 700101 HCHG RX REV CODE 250: Performed by: INTERNAL MEDICINE

## 2025-02-28 PROCEDURE — A9270 NON-COVERED ITEM OR SERVICE: HCPCS | Performed by: STUDENT IN AN ORGANIZED HEALTH CARE EDUCATION/TRAINING PROGRAM

## 2025-02-28 PROCEDURE — 700102 HCHG RX REV CODE 250 W/ 637 OVERRIDE(OP): Performed by: STUDENT IN AN ORGANIZED HEALTH CARE EDUCATION/TRAINING PROGRAM

## 2025-02-28 PROCEDURE — 770001 HCHG ROOM/CARE - MED/SURG/GYN PRIV*

## 2025-02-28 PROCEDURE — 99233 SBSQ HOSP IP/OBS HIGH 50: CPT | Performed by: INTERNAL MEDICINE

## 2025-02-28 RX ADMIN — LIDOCAINE 1 PATCH: 4 PATCH TOPICAL at 10:02

## 2025-02-28 RX ADMIN — CYCLOBENZAPRINE 10 MG: 10 TABLET, FILM COATED ORAL at 05:58

## 2025-02-28 RX ADMIN — MORPHINE SULFATE 10 MG: 10 SOLUTION ORAL at 10:08

## 2025-02-28 RX ADMIN — SENNOSIDES 17.2 MG: 8.6 TABLET, FILM COATED ORAL at 20:59

## 2025-02-28 RX ADMIN — OLANZAPINE 2.5 MG: 5 TABLET, ORALLY DISINTEGRATING ORAL at 05:58

## 2025-02-28 RX ADMIN — POLYETHYLENE GLYCOL 3350 1 PACKET: 17 POWDER, FOR SOLUTION ORAL at 05:58

## 2025-02-28 RX ADMIN — MORPHINE SULFATE 10 MG: 10 SOLUTION ORAL at 20:59

## 2025-02-28 RX ADMIN — METOPROLOL SUCCINATE 50 MG: 25 TABLET, FILM COATED, EXTENDED RELEASE ORAL at 05:57

## 2025-02-28 ASSESSMENT — PAIN DESCRIPTION - PAIN TYPE
TYPE: ACUTE PAIN

## 2025-02-28 NOTE — CARE PLAN
The patient is Watcher - Medium risk of patient condition declining or worsening    Shift Goals  Clinical Goals: Comfort care to be provided, q2 turns, pain management, free from falls  Patient Goals: pain control, sleep  Family Goals: N/A    Progress made toward(s) clinical / shift goals:    Pain medication available per MAR. Q2 turns utilized for patient comfort and maintaining skin integrity. Patient remains free from falls this shift.     Patient is not progressing towards the following goals:    No BM this shift. Patient refused laxatives.     Problem: Bowel Elimination  Goal: Establish and maintain regular bowel function  Outcome: Not Progressing

## 2025-02-28 NOTE — PROGRESS NOTES
Bedside report received from day RN, and assumed care of patient at change of shift. Chart, labs, and orders reviewed. Pt resting in bed, breathing even and unlabored, denies pain and in no acute distress. A&Ox3 on RA. Rivero in place draining clear urine. Fall precautions  in place and education provided. Call light within reach, bed locked and in lowest position, denies other needs at this time. Hourly rounding in progress.

## 2025-02-28 NOTE — PROGRESS NOTES
"Hospital Medicine Daily Progress Note    Date of Service  2/28/2025    Chief Complaint  Maximino Green is a 64 y.o. 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  Patient underwentC7 - T5 fusion & L3-4 Laminectomy and was discharged to skilled nursing facility.  He presented again on 2/19/2022 with altered mental status; found to be in A-fib with RVR.    During the stay in the hospital, patient received IV metoprolol along with Dilt drip.  After extensive discussion by pulmonary physician to the patient/family, decision was made for comfort care on 2/21/2025 and patient was transferred to medical floor.      Interval events:  -- Is alert and awake x 2-3, currently saturating well on room air.  Patient noted to be comfortable  --Patient does have medication work pain and anxiety  --Patient family is looking for group home, QuantiFERON pending, case management aware    2/23:  -- Alert and oriented x 2-3, nursing staff noted to be at bedside, patient denied any pain, noted to be comfortable.      2/24: Alert and conversive.  Complains of right chest wall pain, lidocaine patch added.  Pending dispo plan whether it is group home versus home with hospice\"      PATIENT SEEN BY PREVIOUS HOSPITALIST UNTIL 2/24 2/25: Patient seen at bedside this morning.  The patient seems to be comfortable at the time of my evaluation.  No family member present at the time of my evaluation.  Patient is comfort care measures only awaiting " placement with hospice.  We appreciate further recommendations by case management.    2/27: Patient seen at bedside this morning.  Awaiting placement for hospice.  I spoke to the patient's wife over the phone that she is trying to gather finances to get placement with hospice.  We appreciate further recommendations by case management.    2/28: Patient seen at bedside this morning.  No overnight event reported.  We are still awaiting placement for hospice.  It seems that the earliest the patient could go to hospice is on Tuesday as per case management.  Family member, niece, was at bedside this morning.  I discussed case with her.  The patient seems to be comfortable at time of my evaluation complaining of some leg pain.  Nursing to give pain medication.      Code Status  Comfort Care/DNR    Disposition  The patient is not medically cleared for discharge to home or a post-acute facility.          Review of Systems  Review of Systems   Unable to perform ROS: Acuity of condition        Physical Exam  Temp:  [37 °C (98.6 °F)] 37 °C (98.6 °F)  Pulse:  [82] 82  Resp:  [18] 18  BP: (106)/(56) 106/56  SpO2:  [92 %] 92 %    Physical Exam  Vitals and nursing note reviewed.   Constitutional:       Appearance: He is ill-appearing.      Comments: Speaking and answering questions appropriately   HENT:      Head: Normocephalic and atraumatic.   Eyes:      General:         Right eye: No discharge.         Left eye: No discharge.   Cardiovascular:      Rate and Rhythm: Tachycardia present.   Abdominal:      General: There is no distension.      Palpations: Abdomen is soft.   Musculoskeletal:      Cervical back: Neck supple.      Right lower leg: Edema present.      Left lower leg: Edema present.   Neurological:      Mental Status: He is alert.      Comments: Alert and oriented x 1-2         Fluids    Intake/Output Summary (Last 24 hours) at 2/28/2025 8872  Last data filed at 2/28/2025 1300  Gross per 24 hour   Intake 360 ml   Output  800 ml   Net -440 ml        Laboratory                              Imaging  DX-CHEST-PORTABLE (1 VIEW)   Final Result      Extensive bilateral nodular infiltrates are new from the prior chest x-ray but innumerable pulmonary nodules were present on a more recent CT of the chest, abdomen, and pelvis dated 2/11/2025           Assessment/Plan  * Atrial fibrillation with rapid ventricular response (HCC)- (present on admission)  Assessment & Plan  Patient with chronic A-fib that is not anticoagulated at baseline.  Presented with rates in the 180s to 190s.  Differential diagnosis of etiology of RVR was likely hypovolemia given very dehydrated exam on presentation and improvement of Afib with fluids   -- less likely infectious etiology   -- was on BB    2/23/2025  On comfort measure      2/28: Patient is on comfort measures only, pending placement with hospice    Acute encephalopathy  Assessment & Plan  Possibly due to intracranial metastases but patient cannot lie still for MRI with intubation and family has elected hospice care  he is alert and oriented x 2  Quantiferon  has been negative  Possible group home pending family finances with hospice    2/28: Patient is on comfort measures only, pending placement with hospice    Other cirrhosis of liver (HCC)- (present on admission)  Assessment & Plan  Patient has retroperitoneal varices on imaging implying portal hypertension.  No evidence of bleeding today.  Ammonia, b12, HIV wnl    2/23/2025  On comfort measure , noted to be comfortable    2/28: Patient is on comfort measures only, pending placement with hospice    Spinal cord compression (HCC)- (present on admission)  Assessment & Plan  Patient underwentC7 - T5 fusion & L3-4 Laminectomy   .  On comfort measure    2/28: Patient is on comfort measures only, pending placement with hospice    Chronic systolic congestive heart failure (HCC)- (present on admission)  Assessment & Plan  2/28: Hx of? Patient is on comfort measures  only, pending placement with hospice    Type 2 diabetes mellitus with hyperglycemia, with long-term current use of insulin (HCC)- (present on admission)  Assessment & Plan  Patient hemoglobin A1c C is 5.7, currently on comfort care, continue comfort care measures  Provide whatever pt would like to eat    2/23/2025  On comfort measure    2/28: Patient is on comfort measures only, pending placement with hospice    Hypothyroidism- (present on admission)  Assessment & Plan  2/28: Hx of? Patient is on comfort measures only, pending placement with hospice    History of cancer tonsil- (present on admission)  Assessment & Plan  Reportedly with stage II tonsillar cancer in 2022.  However during recent hospitalization found to have multiple bilateral pulmonary nodules consistent with metastatic process as well as T3 and T4 metastasis.  Surgical pathology confirms carcinoma from T2-T4 stabilization surgery.  Patient's wife was unaware of the recurrence of cancer but was updated at length 2/20.   --Given Maximino's previous challenges with chemotherapy she elects to pursue hospice care  -- on comfort measure      2/28: Patient is on comfort measures only, pending placement with hospice    Pancytopenia (HCC)- (present on admission)  Assessment & Plan  2/28: Patient is on comfort measures only, pending placement with hospice    Essential hypertension- (present on admission)  Assessment & Plan  2/28: Hx of? Patient is on comfort measures only, pending placement with hospice         VTE prophylaxis:  Patient is on comfort measures only      I spend at least 52 minutes providing care for this patient.  This included face-to-face interview, physical examination. Talking to patient's niece at bedside.  Discussing with multidisciplinary team including case management, nursing staff and pharmacy.  Creating plan of care.  Reviewing orders.

## 2025-02-28 NOTE — CARE PLAN
The patient is Stable - Low risk of patient condition declining or worsening    Shift Goals  Clinical Goals: Pt's comfort care will be provided, pain controlled, repositioning and free from falls during this shift  Patient Goals: Able to manage pain, rest comfortably  Family Goals: n/a    Progress made toward(s) clinical / shift goals:  Comfort care provided. Pain controlled. Repositioning. Pt did not sustain any fall during this shift.    Patient is not progressing towards the following goals:  No BM today     Problem: Bowel Elimination  Goal: Establish and maintain regular bowel function  Outcome: Not Progressing

## 2025-02-28 NOTE — HOSPICE
[facility] referral/consult response    Is this patient accepted to [facility]?: Yes  What hospice level of care?: Routine  Approved by provider: Previously approved by Dr. Gonzalez for [facility].    Anticipated DC date: TBD  DC Barriers: hospice consents, placement  Additional Information:    Spoke with spouse, Aurelio to obtain hospice consents. She notes that she will not be able to go to the hospital today since she does not drive and has no reliable transport person. She has an email address, but she has not been using it and does not know how to do [employer]. Aurelio added that her daughter (Veronique), patient's stepdaughter who lives in [place] is currently on a business trip, but they will be coming to the hospital on Monday, 3/3/25 (unknown time in the afternoon) and will sign hospice consents. They also have an appointment with a  on Tuesday, 3/4/25 to establish POA for Veronique. Visited patient at bedside, he seems comfortable, niece (Hali) was at bedside. They were just talking to Aurelio over the phone as well. Updated unit nurse, Elizabeth, and Rashida (RACHEL). QuantiFeron is negative, awaiting assessment from Betzaida Trejo (Group Home owner).

## 2025-03-01 PROCEDURE — A9270 NON-COVERED ITEM OR SERVICE: HCPCS | Performed by: STUDENT IN AN ORGANIZED HEALTH CARE EDUCATION/TRAINING PROGRAM

## 2025-03-01 PROCEDURE — 94760 N-INVAS EAR/PLS OXIMETRY 1: CPT

## 2025-03-01 PROCEDURE — 99233 SBSQ HOSP IP/OBS HIGH 50: CPT | Performed by: INTERNAL MEDICINE

## 2025-03-01 PROCEDURE — 700102 HCHG RX REV CODE 250 W/ 637 OVERRIDE(OP): Performed by: STUDENT IN AN ORGANIZED HEALTH CARE EDUCATION/TRAINING PROGRAM

## 2025-03-01 PROCEDURE — 700101 HCHG RX REV CODE 250: Performed by: INTERNAL MEDICINE

## 2025-03-01 PROCEDURE — 770001 HCHG ROOM/CARE - MED/SURG/GYN PRIV*

## 2025-03-01 RX ADMIN — MORPHINE SULFATE 10 MG: 10 SOLUTION ORAL at 21:21

## 2025-03-01 RX ADMIN — LIDOCAINE 1 PATCH: 4 PATCH TOPICAL at 09:30

## 2025-03-01 RX ADMIN — CYCLOBENZAPRINE 10 MG: 10 TABLET, FILM COATED ORAL at 21:21

## 2025-03-01 RX ADMIN — MORPHINE SULFATE 10 MG: 10 SOLUTION ORAL at 04:52

## 2025-03-01 RX ADMIN — POLYETHYLENE GLYCOL 3350 1 PACKET: 17 POWDER, FOR SOLUTION ORAL at 04:52

## 2025-03-01 RX ADMIN — SENNOSIDES 17.2 MG: 8.6 TABLET, FILM COATED ORAL at 21:21

## 2025-03-01 RX ADMIN — OLANZAPINE 2.5 MG: 5 TABLET, ORALLY DISINTEGRATING ORAL at 04:52

## 2025-03-01 ASSESSMENT — PAIN DESCRIPTION - PAIN TYPE
TYPE: ACUTE PAIN

## 2025-03-01 NOTE — CARE PLAN
The patient is Stable - Low risk of patient condition declining or worsening    Shift Goals  Clinical Goals: Provide comfort to the pt throughout the shift.  Patient Goals: rest, comfort  Family Goals: n/a    Progress made toward(s) clinical / shift goals:  Pt provided with comfort throughout the shift. Pt seen resting and sleeping comfortably; even and unlabored breathing noted. Hourly rounding in progress.       Patient is not progressing towards the following goals: n/a

## 2025-03-01 NOTE — PROGRESS NOTES
Received bedside report from day shift nurse, Elizabeth COLMENARES. Assumed pt care at 1915.   Pt is resting with eyes comfortably in bed. Pt on room air; no signs of SOB/respiratory distress. Rivero catheter in place and draining well. Labs noted, VSS. Needs attended well. Fall precautions in place. Bed at lowest position. Call light and personal belongings within reach. Encouraged to call for assistance. Plan of care on going, no further concerns as of present.   Hourly rounding in progress.

## 2025-03-01 NOTE — PROGRESS NOTES
"Hospital Medicine Daily Progress Note    Date of Service  3/1/2025    Chief Complaint  Maximino Green is a 64 y.o. 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  Patient underwentC7 - T5 fusion & L3-4 Laminectomy and was discharged to skilled nursing facility.  He presented again on 2/19/2022 with altered mental status; found to be in A-fib with RVR.    During the stay in the hospital, patient received IV metoprolol along with Dilt drip.  After extensive discussion by pulmonary physician to the patient/family, decision was made for comfort care on 2/21/2025 and patient was transferred to medical floor.      Interval events:  -- Is alert and awake x 2-3, currently saturating well on room air.  Patient noted to be comfortable  --Patient does have medication work pain and anxiety  --Patient family is looking for group home, QuantiFERON pending, case management aware    2/23:  -- Alert and oriented x 2-3, nursing staff noted to be at bedside, patient denied any pain, noted to be comfortable.      2/24: Alert and conversive.  Complains of right chest wall pain, lidocaine patch added.  Pending dispo plan whether it is group home versus home with hospice\"      PATIENT SEEN BY PREVIOUS HOSPITALIST UNTIL 2/24 2/25: Patient seen at bedside this morning.  The patient seems to be comfortable at the time of my evaluation.  No family member present at the time of my evaluation.  Patient is comfort care measures only awaiting " placement with hospice.  We appreciate further recommendations by case management.    2/27: Patient seen at bedside this morning.  Awaiting placement for hospice.  I spoke to the patient's wife over the phone that she is trying to gather finances to get placement with hospice.  We appreciate further recommendations by case management.    2/28: Patient seen at bedside this morning.  No overnight event reported.  We are still awaiting placement for hospice.  It seems that the earliest the patient could go to hospice is on Tuesday as per case management.  Family member, niece, was at bedside this morning.  I discussed case with her.  The patient seems to be comfortable at time of my evaluation complaining of some leg pain.  Nursing to give pain medication.    3/1: No overnight events reported.  I talked to the patient's wife over the phone.  The patient seems to be comfortable.  Still pending placement.  We appreciate further recommendations by case management.      Code Status  Comfort Care/DNR    Disposition  The patient is not medically cleared for discharge to home or a post-acute facility.          Review of Systems  Review of Systems   Unable to perform ROS: Acuity of condition        Physical Exam  Temp:  [36.9 °C (98.4 °F)] 36.9 °C (98.4 °F)  Pulse:  [75] 75  Resp:  [17] 17  BP: (109)/(65) 109/65  SpO2:  [92 %] 92 %    Physical Exam  Vitals and nursing note reviewed.   Constitutional:       Appearance: He is ill-appearing.      Comments: Speaking and answering questions appropriately   HENT:      Head: Normocephalic and atraumatic.   Eyes:      General:         Right eye: No discharge.         Left eye: No discharge.   Cardiovascular:      Rate and Rhythm: Tachycardia present.   Abdominal:      General: There is no distension.      Palpations: Abdomen is soft.   Musculoskeletal:      Cervical back: Neck supple.      Right lower leg: Edema present.      Left lower leg: Edema present.   Neurological:      Mental  Status: He is alert.      Comments: Alert and oriented x 1-2         Fluids    Intake/Output Summary (Last 24 hours) at 3/1/2025 1400  Last data filed at 3/1/2025 0453  Gross per 24 hour   Intake 120 ml   Output 600 ml   Net -480 ml        Laboratory                              Imaging  DX-CHEST-PORTABLE (1 VIEW)   Final Result      Extensive bilateral nodular infiltrates are new from the prior chest x-ray but innumerable pulmonary nodules were present on a more recent CT of the chest, abdomen, and pelvis dated 2/11/2025           Assessment/Plan  * Atrial fibrillation with rapid ventricular response (HCC)- (present on admission)  Assessment & Plan  Patient with chronic A-fib that is not anticoagulated at baseline.  Presented with rates in the 180s to 190s.  Differential diagnosis of etiology of RVR was likely hypovolemia given very dehydrated exam on presentation and improvement of Afib with fluids   -- less likely infectious etiology   -- was on BB    2/23/2025  On comfort measure      3/1: Patient is on comfort measures only, pending placement with hospice    Acute encephalopathy  Assessment & Plan  Possibly due to intracranial metastases but patient cannot lie still for MRI with intubation and family has elected hospice care  he is alert and oriented x 2  Quantiferon  has been negative  Possible group home pending family finances with hospice    3/1: Patient is on comfort measures only, pending placement with hospice    Other cirrhosis of liver (HCC)- (present on admission)  Assessment & Plan  Patient has retroperitoneal varices on imaging implying portal hypertension.  No evidence of bleeding today.  Ammonia, b12, HIV wnl    2/23/2025  On comfort measure , noted to be comfortable    3/1: Patient is on comfort measures only, pending placement with hospice    Spinal cord compression (HCC)- (present on admission)  Assessment & Plan  Patient underwentC7 - T5 fusion & L3-4 Laminectomy   .  On comfort  measure    3/1: Patient is on comfort measures only, pending placement with hospice    Chronic systolic congestive heart failure (HCC)- (present on admission)  Assessment & Plan  3/1: Hx of? Patient is on comfort measures only, pending placement with hospice    Type 2 diabetes mellitus with hyperglycemia, with long-term current use of insulin (HCC)- (present on admission)  Assessment & Plan  Patient hemoglobin A1c C is 5.7, currently on comfort care, continue comfort care measures  Provide whatever pt would like to eat    2/23/2025  On comfort measure    3/1: Patient is on comfort measures only, pending placement with hospice    Hypothyroidism- (present on admission)  Assessment & Plan  3/1: Hx of? Patient is on comfort measures only, pending placement with hospice    History of cancer tonsil- (present on admission)  Assessment & Plan  Reportedly with stage II tonsillar cancer in 2022.  However during recent hospitalization found to have multiple bilateral pulmonary nodules consistent with metastatic process as well as T3 and T4 metastasis.  Surgical pathology confirms carcinoma from T2-T4 stabilization surgery.  Patient's wife was unaware of the recurrence of cancer but was updated at length 2/20.   --Given Maximino's previous challenges with chemotherapy she elects to pursue hospice care  -- on comfort measure      3/1: Patient is on comfort measures only, pending placement with hospice    Pancytopenia (HCC)- (present on admission)  Assessment & Plan  3/1: Patient is on comfort measures only, pending placement with hospice    Essential hypertension- (present on admission)  Assessment & Plan  3/1: Hx of? Patient is on comfort measures only, pending placement with hospice         VTE prophylaxis:  Patient is on comfort measures only      I spend at least 51 minutes providing care for this patient.  This included face-to-face interview, physical examination. Talking to patient's wife over the phone regarding plan of  care.  Discussing with multidisciplinary team including case management, nursing staff and pharmacy.  Creating plan of care.  Reviewing orders.

## 2025-03-02 PROCEDURE — 700102 HCHG RX REV CODE 250 W/ 637 OVERRIDE(OP): Performed by: STUDENT IN AN ORGANIZED HEALTH CARE EDUCATION/TRAINING PROGRAM

## 2025-03-02 PROCEDURE — 99233 SBSQ HOSP IP/OBS HIGH 50: CPT | Performed by: INTERNAL MEDICINE

## 2025-03-02 PROCEDURE — 700101 HCHG RX REV CODE 250: Performed by: INTERNAL MEDICINE

## 2025-03-02 PROCEDURE — A9270 NON-COVERED ITEM OR SERVICE: HCPCS | Performed by: STUDENT IN AN ORGANIZED HEALTH CARE EDUCATION/TRAINING PROGRAM

## 2025-03-02 PROCEDURE — 770001 HCHG ROOM/CARE - MED/SURG/GYN PRIV*

## 2025-03-02 RX ADMIN — MORPHINE SULFATE 10 MG: 10 SOLUTION ORAL at 20:11

## 2025-03-02 RX ADMIN — MORPHINE SULFATE 10 MG: 10 SOLUTION ORAL at 04:35

## 2025-03-02 RX ADMIN — CYCLOBENZAPRINE 10 MG: 10 TABLET, FILM COATED ORAL at 04:35

## 2025-03-02 RX ADMIN — POLYETHYLENE GLYCOL 3350 1 PACKET: 17 POWDER, FOR SOLUTION ORAL at 04:35

## 2025-03-02 RX ADMIN — SENNOSIDES 17.2 MG: 8.6 TABLET, FILM COATED ORAL at 20:11

## 2025-03-02 RX ADMIN — LIDOCAINE 1 PATCH: 4 PATCH TOPICAL at 08:59

## 2025-03-02 RX ADMIN — CYCLOBENZAPRINE 10 MG: 10 TABLET, FILM COATED ORAL at 20:11

## 2025-03-02 RX ADMIN — MORPHINE SULFATE 10 MG: 10 SOLUTION ORAL at 11:43

## 2025-03-02 RX ADMIN — OLANZAPINE 2.5 MG: 5 TABLET, ORALLY DISINTEGRATING ORAL at 04:35

## 2025-03-02 RX ADMIN — MORPHINE SULFATE 10 MG: 10 SOLUTION ORAL at 17:46

## 2025-03-02 ASSESSMENT — PAIN DESCRIPTION - PAIN TYPE
TYPE: ACUTE PAIN

## 2025-03-02 NOTE — PROGRESS NOTES
"Hospital Medicine Daily Progress Note    Date of Service  3/2/2025    Chief Complaint  Maximino Green is a 64 y.o. 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  Patient underwentC7 - T5 fusion & L3-4 Laminectomy and was discharged to skilled nursing facility.  He presented again on 2/19/2022 with altered mental status; found to be in A-fib with RVR.    During the stay in the hospital, patient received IV metoprolol along with Dilt drip.  After extensive discussion by pulmonary physician to the patient/family, decision was made for comfort care on 2/21/2025 and patient was transferred to medical floor.      Interval events:  -- Is alert and awake x 2-3, currently saturating well on room air.  Patient noted to be comfortable  --Patient does have medication work pain and anxiety  --Patient family is looking for group home, QuantiFERON pending, case management aware    2/23:  -- Alert and oriented x 2-3, nursing staff noted to be at bedside, patient denied any pain, noted to be comfortable.      2/24: Alert and conversive.  Complains of right chest wall pain, lidocaine patch added.  Pending dispo plan whether it is group home versus home with hospice\"      PATIENT SEEN BY PREVIOUS HOSPITALIST UNTIL 2/24 2/25: Patient seen at bedside this morning.  The patient seems to be comfortable at the time of my evaluation.  No family member present at the time of my evaluation.  Patient is comfort care measures only awaiting " placement with hospice.  We appreciate further recommendations by case management.    2/27: Patient seen at bedside this morning.  Awaiting placement for hospice.  I spoke to the patient's wife over the phone that she is trying to gather finances to get placement with hospice.  We appreciate further recommendations by case management.    2/28: Patient seen at bedside this morning.  No overnight event reported.  We are still awaiting placement for hospice.  It seems that the earliest the patient could go to hospice is on Tuesday as per case management.  Family member, niece, was at bedside this morning.  I discussed case with her.  The patient seems to be comfortable at time of my evaluation complaining of some leg pain.  Nursing to give pain medication.    3/2: Patient seen at bedside, he was complaining of some leg discomfort at the time of my evaluation. Patient on comfort care measures only, waiting on placement for hospice. Potential placement on Tuesday.      Code Status  Comfort Care/DNR    Disposition  The patient is not medically cleared for discharge to home or a post-acute facility.          Review of Systems  Review of Systems   Unable to perform ROS: Acuity of condition        Physical Exam  Temp:  [36.5 °C (97.7 °F)-36.6 °C (97.8 °F)] 36.6 °C (97.8 °F)  Pulse:  [77-91] 91  Resp:  [17] 17  BP: (106-115)/(60-61) 106/61  SpO2:  [90 %-93 %] 90 %    Physical Exam  Vitals and nursing note reviewed.   Constitutional:       Appearance: He is ill-appearing.      Comments: Speaking and answering questions appropriately   HENT:      Head: Normocephalic and atraumatic.   Eyes:      General:         Right eye: No discharge.         Left eye: No discharge.   Cardiovascular:      Rate and Rhythm: Tachycardia present.   Abdominal:      General: There is no distension.      Palpations: Abdomen is soft.   Musculoskeletal:      Cervical back: Neck supple.      Right lower leg: Edema present.      Left lower leg: Edema  present.   Neurological:      Mental Status: He is alert.      Comments: Alert and oriented x 1-2         Fluids    Intake/Output Summary (Last 24 hours) at 3/2/2025 1044  Last data filed at 3/1/2025 2121  Gross per 24 hour   Intake 1040 ml   Output 400 ml   Net 640 ml        Laboratory                              Imaging  DX-CHEST-PORTABLE (1 VIEW)   Final Result      Extensive bilateral nodular infiltrates are new from the prior chest x-ray but innumerable pulmonary nodules were present on a more recent CT of the chest, abdomen, and pelvis dated 2/11/2025           Assessment/Plan  * Atrial fibrillation with rapid ventricular response (HCC)- (present on admission)  Assessment & Plan  Patient with chronic A-fib that is not anticoagulated at baseline.  Presented with rates in the 180s to 190s.  Differential diagnosis of etiology of RVR was likely hypovolemia given very dehydrated exam on presentation and improvement of Afib with fluids   -- less likely infectious etiology   -- was on BB    2/23/2025  On comfort measure      3/2: Patient is on comfort measures only, pending placement with hospice    Acute encephalopathy  Assessment & Plan  Possibly due to intracranial metastases but patient cannot lie still for MRI with intubation and family has elected hospice care  he is alert and oriented x 2  Quantiferon  has been negative  Possible group home pending family finances with hospice    3/2 Patient is on comfort measures only, pending placement with hospice    Other cirrhosis of liver (HCC)- (present on admission)  Assessment & Plan  Patient has retroperitoneal varices on imaging implying portal hypertension.  No evidence of bleeding today.  Ammonia, b12, HIV wnl    2/23/2025  On comfort measure , noted to be comfortable    3/2: Patient is on comfort measures only, pending placement with hospice    Spinal cord compression (HCC)- (present on admission)  Assessment & Plan  Patient underwentC7 - T5 fusion & L3-4  Laminectomy   .  On comfort measure    3/2: Patient is on comfort measures only, pending placement with hospice    Chronic systolic congestive heart failure (HCC)- (present on admission)  Assessment & Plan  3/2: Hx of? Patient is on comfort measures only, pending placement with hospice    Type 2 diabetes mellitus with hyperglycemia, with long-term current use of insulin (HCC)- (present on admission)  Assessment & Plan  Patient hemoglobin A1c C is 5.7, currently on comfort care, continue comfort care measures  Provide whatever pt would like to eat    2/23/2025  On comfort measure    3/2: Patient is on comfort measures only, pending placement with hospice    Hypothyroidism- (present on admission)  Assessment & Plan  3/2: Hx of? Patient is on comfort measures only, pending placement with hospice    History of cancer tonsil- (present on admission)  Assessment & Plan  Reportedly with stage II tonsillar cancer in 2022.  However during recent hospitalization found to have multiple bilateral pulmonary nodules consistent with metastatic process as well as T3 and T4 metastasis.  Surgical pathology confirms carcinoma from T2-T4 stabilization surgery.  Patient's wife was unaware of the recurrence of cancer but was updated at length 2/20.   --Given Maximino's previous challenges with chemotherapy she elects to pursue hospice care  -- on comfort measure      3/2: Patient is on comfort measures only, pending placement with hospice    Pancytopenia (HCC)- (present on admission)  Assessment & Plan  3/2: Patient is on comfort measures only, pending placement with hospice    Essential hypertension- (present on admission)  Assessment & Plan  3/2: Hx of? Patient is on comfort measures only, pending placement with hospice         VTE prophylaxis:  Patient is on comfort measures only      I spend at least 52 minutes providing care for this patient.  This included face-to-face interview, physical examination. Talking to patient's wife over the  phone.  Discussing with multidisciplinary team including case management, nursing staff and pharmacy.  Creating plan of care.  Reviewing orders.

## 2025-03-02 NOTE — PROGRESS NOTES
Received bedside report from day shift nurse, Elizabeth COLMENARES. Assumed pt care at 1915.  Pt is A&Ox4, resting comfortably in bed. Pt on room air; no signs of SOB/respiratory distress. Labs noted, VSS. Needs attended well. Fall precautions in place. Bed at lowest position. Call light and personal belongings within reach. Bed alarm on. Encouraged to call for assistance. Plan of care on going, no further concerns as of present.   Hourly rounding in progress.     2121 Pt refused repositioning and skin check at this time; education provided. Pt feels comfortable and would like to rest.  Hourly rounding in progress.

## 2025-03-02 NOTE — CARE PLAN
The patient is Stable - Low risk of patient condition declining or worsening    Shift Goals  Clinical Goals: Keep pt comfortable. Manage pt's pain to tolerable level at 3/10 or less. Pt will remain free from falls or injury throughout the shift.  Patient Goals: pain control, comfort, sleep  Family Goals: n/a    Progress made toward(s) clinical / shift goals:  Pt kept comfortable throughout the shift. Pt's pain managed with prn pain medication as per MAR. Pt seen resting and sleeping comfortably in between rounds; even and unlabored breathing noted. Pt declines repositioning every 2 hrs; education provided. Pt is free  from falls or injury throughout the shift. Hourly rounding in progress.     Patient is not progressing towards the following goals: n/a

## 2025-03-03 ENCOUNTER — HOME CARE VISIT (OUTPATIENT)
Dept: HOSPICE | Facility: HOSPICE | Age: 65
End: 2025-03-03
Payer: COMMERCIAL

## 2025-03-03 PROCEDURE — 700102 HCHG RX REV CODE 250 W/ 637 OVERRIDE(OP): Performed by: STUDENT IN AN ORGANIZED HEALTH CARE EDUCATION/TRAINING PROGRAM

## 2025-03-03 PROCEDURE — 770001 HCHG ROOM/CARE - MED/SURG/GYN PRIV*

## 2025-03-03 PROCEDURE — A9270 NON-COVERED ITEM OR SERVICE: HCPCS | Performed by: STUDENT IN AN ORGANIZED HEALTH CARE EDUCATION/TRAINING PROGRAM

## 2025-03-03 PROCEDURE — 99233 SBSQ HOSP IP/OBS HIGH 50: CPT | Performed by: INTERNAL MEDICINE

## 2025-03-03 PROCEDURE — 700101 HCHG RX REV CODE 250: Performed by: INTERNAL MEDICINE

## 2025-03-03 RX ADMIN — SENNOSIDES 17.2 MG: 8.6 TABLET, FILM COATED ORAL at 20:07

## 2025-03-03 RX ADMIN — POLYETHYLENE GLYCOL 3350 1 PACKET: 17 POWDER, FOR SOLUTION ORAL at 04:45

## 2025-03-03 RX ADMIN — MORPHINE SULFATE 10 MG: 10 SOLUTION ORAL at 12:34

## 2025-03-03 RX ADMIN — CYCLOBENZAPRINE 10 MG: 10 TABLET, FILM COATED ORAL at 04:45

## 2025-03-03 RX ADMIN — MORPHINE SULFATE 10 MG: 10 SOLUTION ORAL at 20:08

## 2025-03-03 RX ADMIN — MORPHINE SULFATE 10 MG: 10 SOLUTION ORAL at 04:45

## 2025-03-03 RX ADMIN — OLANZAPINE 2.5 MG: 5 TABLET, ORALLY DISINTEGRATING ORAL at 04:45

## 2025-03-03 RX ADMIN — MORPHINE SULFATE 10 MG: 10 SOLUTION ORAL at 16:11

## 2025-03-03 RX ADMIN — LIDOCAINE 1 PATCH: 4 PATCH TOPICAL at 09:30

## 2025-03-03 ASSESSMENT — ENCOUNTER SYMPTOMS
COUGH: 0
NERVOUS/ANXIOUS: 0
FEVER: 0
HEADACHES: 0
HEARTBURN: 0
SHORTNESS OF BREATH: 0
BLURRED VISION: 0
VOMITING: 0
DOUBLE VISION: 0
PALPITATIONS: 0
ABDOMINAL PAIN: 0
MYALGIAS: 1
CHILLS: 0
DIZZINESS: 0

## 2025-03-03 ASSESSMENT — LIFESTYLE VARIABLES: SUBSTANCE_ABUSE: 0

## 2025-03-03 ASSESSMENT — PAIN DESCRIPTION - PAIN TYPE
TYPE: ACUTE PAIN

## 2025-03-03 NOTE — DISCHARGE PLANNING
Case Management Discharge Planning    Admission Date: 2/19/2025  GMLOS: 2.3  ALOS: 12    6-Clicks ADL Score: 14  6-Clicks Mobility Score: 7    Anticipated Discharge Dispo: Discharge Disposition: D/T to hospice home (50)    DME Needed: No    Action(s) Taken:     LSW spoke with Joaquina Trejo to follow up on assessment of pt on Friday. Joaquina Trejo reported she is able to take pt for 6000/month.   LSW will discuss with leadership.     1500-  Met with pt, pt's wife Aurelio, and pt's daughter Mindy.   Pt appears more alert today. Per Mindy, they are now wanting to take pt home on hospice. Pt is able to participate in conversation, confirms he wants to go home. Discussed what home hospice would entail, [facility] does not service [place] where pt lives. Aurelio reported she is on service with [facility], is interested in getting pt set up with [place] for hospice services. LSW acknowledged.     Call placed to Noni with [facility]. She will wait for referral and will reach out to family to coordinate. Notified Noni point of contact will be Mindy.   Choice form faxed to [facility], requested for referral to be sent to [facility].     Spoke with Jaquelin from [facility] to notify her of family and pt's decision. Since [facility] does not service [place], referral changed to declined.       Escalations Completed: None    Medically Clear: Yes    Next Steps: LSW to follow    Barriers to Discharge: Pending Placement    Is the patient up for discharge tomorrow: No

## 2025-03-03 NOTE — HOSPICE
[id number] Met w/ family to discuss group home and hospice. Family decided to conference before making a decision.  1523: Received text from nancy Field family decided to take pt home.

## 2025-03-03 NOTE — PROGRESS NOTES
Received bedside report from day shift nurse, Elizabeth COLMENARES. Assumed pt care at 1915.  Pt is awake, resting comfortably in bed. Pt on room air; no signs of SOB/respiratory distress. Labs noted, VSS. Needs attended well. Pt declines to be repositioned at this time. Fall precautions in place. Bed at lowest position. Call light and personal belongings within reach. Bed alarm on. Low airloss mattress on. Encouraged to call for assistance. Plan of care on going, no further concerns as of present.   Hourly rounding in progress.

## 2025-03-03 NOTE — PROGRESS NOTES
4 Eyes Skin Assessment Completed by DALOTN, RN and Ma, RN.    Head WDL  Ears WDL  Nose WDL  Mouth WDL  Neck Redness and Blanching, incision at back of neck  Breast/Chest WDL  Shoulder Blades Redness and Blanching  Spine Redness and Blanching, incision from spinal fusion, island dressing in place  (R) Arm/Elbow/Hand Bruising  (L) Arm/Elbow/Hand Bruising  Abdomen WDL  Groin WDL  Scrotum/Coccyx/Buttocks Redness and Blanching, Blisters, Discoloration    (R) Leg Discoloration, Dry, Flaky  (L) Leg Discoloration, Dry, Flaky  (R) Heel/Foot/Toe Redness, Blanching, Boggy, and Swelling, Dy and Flaky  (L) Heel/Foot/Toe Redness, Blanching, Boggy, and Swelling, Dry and Flaky          Devices In Places Pulse Ox and SCD's      Interventions In Place Sacral Mepilex, TAP System, Pillows, Q2 Turns, Low Air Loss Mattress, Barrier Cream, and Heels Loaded W/Pillows    Possible Skin Injury Yes    Pictures Uploaded Into Epic Yes  Wound Consult Placed Yes  RN Wound Prevention Protocol Ordered Yes

## 2025-03-03 NOTE — CARE PLAN
The patient is Stable - Low risk of patient condition declining or worsening    Shift Goals  Clinical Goals: Provide comfort care throughout the shift.  Patient Goals: pain control, comfort, sleep  Family Goals: n/a    Progress made toward(s) clinical / shift goals:  Comfort care provided to pt throughout the shift. Pt seen resting and sleeping comfortably in between rounds; even and unlabored breathing noted. Hourly rounding in progress.     Patient is not progressing towards the following goals: n/a

## 2025-03-03 NOTE — PROGRESS NOTES
"Hospital Medicine Daily Progress Note    Date of Service  3/3/2025    Chief Complaint  Maximino Green is a 64 y.o. 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  Patient underwentC7 - T5 fusion & L3-4 Laminectomy and was discharged to skilled nursing facility.  He presented again on 2/19/2022 with altered mental status; found to be in A-fib with RVR.    During the stay in the hospital, patient received IV metoprolol along with Dilt drip.  After extensive discussion by pulmonary physician to the patient/family, decision was made for comfort care on 2/21/2025 and patient was transferred to medical floor.      Interval events:  -- Is alert and awake x 2-3, currently saturating well on room air.  Patient noted to be comfortable  --Patient does have medication work pain and anxiety  --Patient family is looking for group home, QuantiFERON pending, case management aware    2/23:  -- Alert and oriented x 2-3, nursing staff noted to be at bedside, patient denied any pain, noted to be comfortable.      2/24: Alert and conversive.  Complains of right chest wall pain, lidocaine patch added.  Pending dispo plan whether it is group home versus home with hospice\"      PATIENT SEEN BY PREVIOUS HOSPITALIST UNTIL 2/24 2/25: Patient seen at bedside this morning.  The patient seems to be comfortable at the time of my evaluation.  No family member present at the time of my evaluation.  Patient is comfort care measures only awaiting " placement with hospice.  We appreciate further recommendations by case management.    2/27: Patient seen at bedside this morning.  Awaiting placement for hospice.  I spoke to the patient's wife over the phone that she is trying to gather finances to get placement with hospice.  We appreciate further recommendations by case management.    2/28: Patient seen at bedside this morning.  No overnight event reported.  We are still awaiting placement for hospice.  It seems that the earliest the patient could go to hospice is on Tuesday as per case management.  Family member, niece, was at bedside this morning.  I discussed case with her.  The patient seems to be comfortable at time of my evaluation complaining of some leg pain.  Nursing to give pain medication.    3/2: Patient seen at bedside, he was complaining of some leg discomfort at the time of my evaluation. Patient on comfort care measures only, waiting on placement for hospice. Potential placement on Tuesday.    3/3: Patient seen at bedside this morning.  The patient was lying in bed comfortably.  Currently not complaining of pain at the time of my evaluation.  The patient was getting ready to get his breakfast at the time of my evaluation.  I spoke to the patient's family over the phone regarding plan of care.  The plan is for them to have a meeting today with case management and the group home to see if the patient can be discharged tomorrow to group home with hospice.  We appreciate further recommendations by case management.      Code Status  Comfort Care/DNR    Disposition  The patient is not medically cleared for discharge to home or a post-acute facility.          Review of Systems  Review of Systems   Constitutional:  Positive for malaise/fatigue. Negative for chills and fever.   HENT:  Negative for hearing loss and nosebleeds.    Eyes:  Negative for blurred vision and double vision.   Respiratory:  Negative for cough and shortness of breath.     Cardiovascular:  Negative for chest pain and palpitations.   Gastrointestinal:  Negative for abdominal pain, heartburn and vomiting.   Genitourinary:  Negative for dysuria and urgency.   Musculoskeletal:  Positive for joint pain and myalgias.   Skin:  Negative for itching and rash.   Neurological:  Negative for dizziness and headaches.   Psychiatric/Behavioral:  Negative for substance abuse. The patient is not nervous/anxious.    All other systems reviewed and are negative.       Physical Exam       Physical Exam  Vitals and nursing note reviewed.   Constitutional:       Appearance: He is ill-appearing.      Comments: Speaking and answering questions appropriately   HENT:      Head: Normocephalic and atraumatic.   Eyes:      General:         Right eye: No discharge.         Left eye: No discharge.   Cardiovascular:      Rate and Rhythm: Tachycardia present.   Abdominal:      General: There is no distension.      Palpations: Abdomen is soft.   Musculoskeletal:      Cervical back: Neck supple.      Right lower leg: Edema present.      Left lower leg: Edema present.   Neurological:      Mental Status: He is alert.      Comments: Alert and oriented x 1-2         Fluids    Intake/Output Summary (Last 24 hours) at 3/3/2025 1037  Last data filed at 3/3/2025 0900  Gross per 24 hour   Intake 460 ml   Output 1150 ml   Net -690 ml        Laboratory                              Imaging  DX-CHEST-PORTABLE (1 VIEW)   Final Result      Extensive bilateral nodular infiltrates are new from the prior chest x-ray but innumerable pulmonary nodules were present on a more recent CT of the chest, abdomen, and pelvis dated 2/11/2025           Assessment/Plan  * Atrial fibrillation with rapid ventricular response (HCC)- (present on admission)  Assessment & Plan  Patient with chronic A-fib that is not anticoagulated at baseline.  Presented with rates in the 180s to 190s.  Differential diagnosis of etiology of RVR was likely hypovolemia  given very dehydrated exam on presentation and improvement of Afib with fluids   -- less likely infectious etiology   -- was on BB    2/23/2025  On comfort measure      3/3: Patient is on comfort measures only, pending placement with hospice    Acute encephalopathy  Assessment & Plan  Possibly due to intracranial metastases but patient cannot lie still for MRI with intubation and family has elected hospice care  he is alert and oriented x 2  Quantiferon  has been negative  Possible group home pending family finances with hospice    3/3 Patient is on comfort measures only, pending placement with hospice    Other cirrhosis of liver (HCC)- (present on admission)  Assessment & Plan  Patient has retroperitoneal varices on imaging implying portal hypertension.  No evidence of bleeding today.  Ammonia, b12, HIV wnl    2/23/2025  On comfort measure , noted to be comfortable    3/3: Patient is on comfort measures only, pending placement with hospice    Spinal cord compression (HCC)- (present on admission)  Assessment & Plan  Patient underwentC7 - T5 fusion & L3-4 Laminectomy   .  On comfort measure    3/3: Patient is on comfort measures only, pending placement with hospice    Chronic systolic congestive heart failure (HCC)- (present on admission)  Assessment & Plan  3/3: Hx of? Patient is on comfort measures only, pending placement with hospice    Type 2 diabetes mellitus with hyperglycemia, with long-term current use of insulin (HCC)- (present on admission)  Assessment & Plan  Patient hemoglobin A1c C is 5.7, currently on comfort care, continue comfort care measures  Provide whatever pt would like to eat    2/23/2025  On comfort measure    3/3: Patient is on comfort measures only, pending placement with hospice    Hypothyroidism- (present on admission)  Assessment & Plan  3/3: Hx of? Patient is on comfort measures only, pending placement with hospice    History of cancer tonsil- (present on admission)  Assessment &  Plan  Reportedly with stage II tonsillar cancer in 2022.  However during recent hospitalization found to have multiple bilateral pulmonary nodules consistent with metastatic process as well as T3 and T4 metastasis.  Surgical pathology confirms carcinoma from T2-T4 stabilization surgery.  Patient's wife was unaware of the recurrence of cancer but was updated at length 2/20.   --Given Maximino's previous challenges with chemotherapy she elects to pursue hospice care  -- on comfort measure      3/3: Patient is on comfort measures only, pending placement with hospice    Pancytopenia (HCC)- (present on admission)  Assessment & Plan  3/3: Patient is on comfort measures only, pending placement with hospice    Essential hypertension- (present on admission)  Assessment & Plan  3/3: Hx of? Patient is on comfort measures only, pending placement with hospice         VTE prophylaxis:  Patient is on comfort measures only      I spend at least 51 minutes providing care for this patient.  This included face-to-face interview, physical examination. Talking to patient family over the phone regarding plan of care.  Discussing with multidisciplinary team including case management, nursing staff and pharmacy.  Creating plan of care.  Reviewing orders.

## 2025-03-04 PROCEDURE — A9270 NON-COVERED ITEM OR SERVICE: HCPCS | Performed by: STUDENT IN AN ORGANIZED HEALTH CARE EDUCATION/TRAINING PROGRAM

## 2025-03-04 PROCEDURE — 700102 HCHG RX REV CODE 250 W/ 637 OVERRIDE(OP): Performed by: STUDENT IN AN ORGANIZED HEALTH CARE EDUCATION/TRAINING PROGRAM

## 2025-03-04 PROCEDURE — 99233 SBSQ HOSP IP/OBS HIGH 50: CPT | Performed by: INTERNAL MEDICINE

## 2025-03-04 PROCEDURE — 700101 HCHG RX REV CODE 250: Performed by: INTERNAL MEDICINE

## 2025-03-04 PROCEDURE — 97602 WOUND(S) CARE NON-SELECTIVE: CPT

## 2025-03-04 PROCEDURE — 770001 HCHG ROOM/CARE - MED/SURG/GYN PRIV*

## 2025-03-04 RX ADMIN — SENNOSIDES 17.2 MG: 8.6 TABLET, FILM COATED ORAL at 21:14

## 2025-03-04 RX ADMIN — OLANZAPINE 2.5 MG: 5 TABLET, ORALLY DISINTEGRATING ORAL at 05:49

## 2025-03-04 RX ADMIN — MORPHINE SULFATE 10 MG: 10 SOLUTION ORAL at 15:41

## 2025-03-04 RX ADMIN — MORPHINE SULFATE 10 MG: 10 SOLUTION ORAL at 21:21

## 2025-03-04 RX ADMIN — POLYETHYLENE GLYCOL 3350 1 PACKET: 17 POWDER, FOR SOLUTION ORAL at 05:47

## 2025-03-04 RX ADMIN — LIDOCAINE 1 PATCH: 4 PATCH TOPICAL at 10:04

## 2025-03-04 ASSESSMENT — SOCIAL DETERMINANTS OF HEALTH (SDOH)
IN THE PAST 12 MONTHS, HAS THE ELECTRIC, GAS, OIL, OR WATER COMPANY THREATENED TO SHUT OFF SERVICE IN YOUR HOME?: NO
WITHIN THE PAST 12 MONTHS, THE FOOD YOU BOUGHT JUST DIDN'T LAST AND YOU DIDN'T HAVE MONEY TO GET MORE: NEVER TRUE
WITHIN THE PAST 12 MONTHS, YOU WORRIED THAT YOUR FOOD WOULD RUN OUT BEFORE YOU GOT THE MONEY TO BUY MORE: NEVER TRUE

## 2025-03-04 ASSESSMENT — ENCOUNTER SYMPTOMS
SHORTNESS OF BREATH: 0
FEVER: 0
NERVOUS/ANXIOUS: 0
HEADACHES: 0
COUGH: 0
ABDOMINAL PAIN: 0
PALPITATIONS: 0
HEARTBURN: 0
CHILLS: 0
MYALGIAS: 1
VOMITING: 0
BLURRED VISION: 0
DOUBLE VISION: 0
DIZZINESS: 0

## 2025-03-04 ASSESSMENT — PAIN DESCRIPTION - PAIN TYPE
TYPE: ACUTE PAIN;CHRONIC PAIN
TYPE: ACUTE PAIN
TYPE: ACUTE PAIN;CHRONIC PAIN
TYPE: ACUTE PAIN;CHRONIC PAIN

## 2025-03-04 ASSESSMENT — LIFESTYLE VARIABLES: SUBSTANCE_ABUSE: 0

## 2025-03-04 NOTE — CARE PLAN
The patient is Stable - Low risk of patient condition declining or worsening    Shift Goals  Clinical Goals: Maintain patient comfort overnight  Patient Goals: Sleep/rest overnight  Family Goals: n/a    Progress made toward(s) clinical / shift goals:  Patient has remained comfortable overnight.    Patient is not progressing towards the following goals:Patient has not had a recent bowel movement.      Problem: Bowel Elimination  Goal: Establish and maintain regular bowel function  Outcome: Not Progressing

## 2025-03-04 NOTE — PROGRESS NOTES
4 Eyes Skin Assessment Completed by David RN and Eun RN.    Head WDL  Ears WDL  Nose WDL  Mouth WDL  Neck Redness and Blanching  Breast/Chest WDL  Shoulder Blades WDL  Spine Redness, Blanching, and Incision  (R) Arm/Elbow/Hand Bruising, Abrasion, and Scab  (L) Arm/Elbow/Hand Bruising and Scab  Abdomen WDL  Groin WDL  Scrotum/Coccyx/Buttocks Redness, Blanching, and Scar  (R) Leg WDL  (L) Leg WDL  (R) Heel/Foot/Toe Swelling, Dry, Flaky  (L) Heel/Foot/Toe Swelling, Dry, Flaky          Devices In Places Pulse Ox, Rivero, and SCD's      Interventions In Place Sacral Mepilex, TAP System, Pillows, Q2 Turns, Low Air Loss Mattress, Barrier Cream, and Heels Loaded W/Pillows    Possible Skin Injury Yes    Pictures Uploaded Into Epic Yes  Wound Consult Placed Yes  RN Wound Prevention Protocol Ordered Yes

## 2025-03-04 NOTE — PROGRESS NOTES
Received bedside report from night shift RN.  Assumed pt care.   Assessment and chart check complete.  Pt is AA0X4, respirations even and unlabored, no signs of distress, denies nausea/vomiting.  Updated for the POC of the day.  Fall precautions in place, treaded socks on pt, bed in low position.   Call light within reach.   Educated pt to call if needing anything.   Hourly rounding.  Pt is refusing for skin check and Q2 turning. Charge RN aware.

## 2025-03-04 NOTE — WOUND TEAM
[facility]  Inpatient Services  Wound and Skin Care Brief Evaluation    Admission Date: 2/19/2025     Last order of IP CONSULT TO WOUND CARE was found on 3/2/2025 from Hospital Encounter on 2/19/2025     HPI, PMH, SH: Reviewed    Chief Complaint   Patient presents with    Rapid Heart Beat     afib     Diagnosis: Atrial fibrillation with rapid ventricular response (HCC) [I48.91]    Unit where seen by Wound Team: 2209/01     Wound consult placed regarding sacrum. Chart and images reviewed. This discussed with bedside RN. This clinician in to assess patient. Patient pleasant and agreeable. Non-selectively debrided with No rinse foam soap and Moist warm washcloth. Patient noted with blanchable redness and a sacral rash. Barrier paste applied. Heels were assessed at this time and found intact.    No pressure injuries or advanced wound care needs identified. Wound consult completed. No further follow up unless indicated and consulted.     Wound 03/02/25 Sacrum (Active)   Date First Assessed/Time First Assessed: 03/02/25 2100   Present on Original Admission: Yes  Hand Hygiene Completed: Yes  Location: Sacrum      Assessments 3/4/2025 10:00 AM   Wound Image          PREVENTATIVE INTERVENTIONS:    Q shift Hugh - performed per nursing policy  Q shift pressure point assessments - performed per nursing policy    Surface/Positioning  Standard/trauma mattress - Currently in Place  Reposition q 2 hours - Currently in Place

## 2025-03-04 NOTE — CARE PLAN
The patient is Stable - Low risk of patient condition declining or worsening    Shift Goals  Clinical Goals: Comfort care  Patient Goals: Pain control  Family Goals: n/a    Progress made toward(s) clinical / shift goals:      Problem: Psychosocial - Comfort Care  Goal: Patient and family will demonstrate ability to cope with life altering diagnosis and/or procedure  Outcome: Progressing     Problem: Psychosocial - Comfort Care  Goal: Privacy will be maintained for patient and family  Outcome: Progressing     Problem: Discharge Planning - Comfort Care  Goal: Patient's continuum of care needs are met  Outcome: Progressing       Patient is not progressing towards the following goals:

## 2025-03-04 NOTE — PROGRESS NOTES
"Hospital Medicine Daily Progress Note    Date of Service  3/4/2025    Chief Complaint  Maximino Green is a 64 y.o. 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  Patient underwentC7 - T5 fusion & L3-4 Laminectomy and was discharged to skilled nursing facility.  He presented again on 2/19/2022 with altered mental status; found to be in A-fib with RVR.    During the stay in the hospital, patient received IV metoprolol along with Dilt drip.  After extensive discussion by pulmonary physician to the patient/family, decision was made for comfort care on 2/21/2025 and patient was transferred to medical floor.      Interval events:  -- Is alert and awake x 2-3, currently saturating well on room air.  Patient noted to be comfortable  --Patient does have medication work pain and anxiety  --Patient family is looking for group home, QuantiFERON pending, case management aware    2/23:  -- Alert and oriented x 2-3, nursing staff noted to be at bedside, patient denied any pain, noted to be comfortable.      2/24: Alert and conversive.  Complains of right chest wall pain, lidocaine patch added.  Pending dispo plan whether it is group home versus home with hospice\"      PATIENT SEEN BY PREVIOUS HOSPITALIST UNTIL 2/24 2/25: Patient seen at bedside this morning.  The patient seems to be comfortable at the time of my evaluation.  No family member present at the time of my evaluation.  Patient is comfort care measures only awaiting " placement with hospice.  We appreciate further recommendations by case management.    2/27: Patient seen at bedside this morning.  Awaiting placement for hospice.  I spoke to the patient's wife over the phone that she is trying to gather finances to get placement with hospice.  We appreciate further recommendations by case management.    2/28: Patient seen at bedside this morning.  No overnight event reported.  We are still awaiting placement for hospice.  It seems that the earliest the patient could go to hospice is on Tuesday as per case management.  Family member, niece, was at bedside this morning.  I discussed case with her.  The patient seems to be comfortable at time of my evaluation complaining of some leg pain.  Nursing to give pain medication.    3/2: Patient seen at bedside, he was complaining of some leg discomfort at the time of my evaluation. Patient on comfort care measures only, waiting on placement for hospice. Potential placement on Tuesday.    3/4: Patient seen at bedside this morning.  No overnight events reported.  I spoke to the patient's wife it seems that they are contemplating taking the patient home with hospice.  However she does not know if this is the right thing to do so she is contemplating of that idea or group home with hospice.  We appreciate further recommendations by case management.  The patient's wife says that if the patient goes home, she still needs to fix some things at home before taking him home.  She mentions that she will meet with a  today regarding her financial options as well.      Code Status  Comfort Care/DNR    Disposition  The patient is not medically cleared for discharge to home or a post-acute facility.          Review of Systems  Review of Systems   Constitutional:  Positive for malaise/fatigue. Negative for chills and fever.   HENT:  Negative for hearing loss and nosebleeds.    Eyes:  Negative for blurred vision and double vision.   Respiratory:   Negative for cough and shortness of breath.    Cardiovascular:  Negative for chest pain and palpitations.   Gastrointestinal:  Negative for abdominal pain, heartburn and vomiting.   Genitourinary:  Negative for dysuria and urgency.   Musculoskeletal:  Positive for joint pain and myalgias.   Skin:  Negative for itching and rash.   Neurological:  Negative for dizziness and headaches.   Psychiatric/Behavioral:  Negative for substance abuse. The patient is not nervous/anxious.    All other systems reviewed and are negative.       Physical Exam  Temp:  [36.5 °C (97.7 °F)] 36.5 °C (97.7 °F)  Pulse:  [89] 89  Resp:  [18] 18  BP: (139)/(63) 139/63  SpO2:  [91 %] 91 %    Physical Exam  Vitals and nursing note reviewed.   Constitutional:       Appearance: He is ill-appearing.      Comments: Speaking and answering questions appropriately   HENT:      Head: Normocephalic and atraumatic.   Eyes:      General:         Right eye: No discharge.         Left eye: No discharge.   Cardiovascular:      Rate and Rhythm: Tachycardia present.   Abdominal:      General: There is no distension.      Palpations: Abdomen is soft.   Musculoskeletal:      Cervical back: Neck supple.      Right lower leg: Edema present.      Left lower leg: Edema present.   Neurological:      Mental Status: He is alert.      Comments: Alert and oriented x 1-2         Fluids    Intake/Output Summary (Last 24 hours) at 3/4/2025 1012  Last data filed at 3/4/2025 0400  Gross per 24 hour   Intake 580 ml   Output 1450 ml   Net -870 ml        Laboratory                              Imaging  DX-CHEST-PORTABLE (1 VIEW)   Final Result      Extensive bilateral nodular infiltrates are new from the prior chest x-ray but innumerable pulmonary nodules were present on a more recent CT of the chest, abdomen, and pelvis dated 2/11/2025           Assessment/Plan  * Atrial fibrillation with rapid ventricular response (HCC)- (present on admission)  Assessment & Plan  Patient with  chronic A-fib that is not anticoagulated at baseline.  Presented with rates in the 180s to 190s.  Differential diagnosis of etiology of RVR was likely hypovolemia given very dehydrated exam on presentation and improvement of Afib with fluids   -- less likely infectious etiology   -- was on BB    2/23/2025  On comfort measure      3/4: Patient is on comfort measures only, pending placement with hospice    Acute encephalopathy  Assessment & Plan  Possibly due to intracranial metastases but patient cannot lie still for MRI with intubation and family has elected hospice care  he is alert and oriented x 2  Quantiferon  has been negative  Possible group home pending family finances with hospice    3/4 Patient is on comfort measures only, pending placement with hospice    Other cirrhosis of liver (HCC)- (present on admission)  Assessment & Plan  Patient has retroperitoneal varices on imaging implying portal hypertension.  No evidence of bleeding today.  Ammonia, b12, HIV wnl    2/23/2025  On comfort measure , noted to be comfortable    3/4: Patient is on comfort measures only, pending placement with hospice    Spinal cord compression (HCC)- (present on admission)  Assessment & Plan  Patient underwentC7 - T5 fusion & L3-4 Laminectomy   .  On comfort measure    3/4: Patient is on comfort measures only, pending placement with hospice    Chronic systolic congestive heart failure (HCC)- (present on admission)  Assessment & Plan  3/4: Hx of? Patient is on comfort measures only, pending placement with hospice    Type 2 diabetes mellitus with hyperglycemia, with long-term current use of insulin (HCC)- (present on admission)  Assessment & Plan  Patient hemoglobin A1c C is 5.7, currently on comfort care, continue comfort care measures  Provide whatever pt would like to eat    2/23/2025  On comfort measure    3/4: Patient is on comfort measures only, pending placement with hospice    Hypothyroidism- (present on admission)  Assessment  & Plan  3/4: Hx of? Patient is on comfort measures only, pending placement with hospice    History of cancer tonsil- (present on admission)  Assessment & Plan  Reportedly with stage II tonsillar cancer in 2022.  However during recent hospitalization found to have multiple bilateral pulmonary nodules consistent with metastatic process as well as T3 and T4 metastasis.  Surgical pathology confirms carcinoma from T2-T4 stabilization surgery.  Patient's wife was unaware of the recurrence of cancer but was updated at length 2/20.   --Given Maximino's previous challenges with chemotherapy she elects to pursue hospice care  -- on comfort measure      3/4: Patient is on comfort measures only, pending placement with hospice    Pancytopenia (HCC)- (present on admission)  Assessment & Plan  3/4: Patient is on comfort measures only, pending placement with hospice    Essential hypertension- (present on admission)  Assessment & Plan  3/4: Hx of? Patient is on comfort measures only, pending placement with hospice         VTE prophylaxis:  Patient is on comfort measures only      I spend at least 52 minutes providing care for this patient.  This included face-to-face interview, physical examination. Talking to patient's wiife over the phone.  Discussing with multidisciplinary team including case management, nursing staff and pharmacy.  Creating plan of care.  Reviewing orders.

## 2025-03-05 PROCEDURE — 700102 HCHG RX REV CODE 250 W/ 637 OVERRIDE(OP): Performed by: STUDENT IN AN ORGANIZED HEALTH CARE EDUCATION/TRAINING PROGRAM

## 2025-03-05 PROCEDURE — A9270 NON-COVERED ITEM OR SERVICE: HCPCS | Performed by: STUDENT IN AN ORGANIZED HEALTH CARE EDUCATION/TRAINING PROGRAM

## 2025-03-05 PROCEDURE — 99232 SBSQ HOSP IP/OBS MODERATE 35: CPT | Performed by: STUDENT IN AN ORGANIZED HEALTH CARE EDUCATION/TRAINING PROGRAM

## 2025-03-05 PROCEDURE — 700101 HCHG RX REV CODE 250: Performed by: INTERNAL MEDICINE

## 2025-03-05 PROCEDURE — 770001 HCHG ROOM/CARE - MED/SURG/GYN PRIV*

## 2025-03-05 PROCEDURE — 94760 N-INVAS EAR/PLS OXIMETRY 1: CPT

## 2025-03-05 RX ADMIN — OLANZAPINE 2.5 MG: 5 TABLET, ORALLY DISINTEGRATING ORAL at 05:24

## 2025-03-05 RX ADMIN — LIDOCAINE 1 PATCH: 4 PATCH TOPICAL at 11:45

## 2025-03-05 RX ADMIN — POLYETHYLENE GLYCOL 3350 1 PACKET: 17 POWDER, FOR SOLUTION ORAL at 05:24

## 2025-03-05 RX ADMIN — MORPHINE SULFATE 10 MG: 10 SOLUTION ORAL at 22:57

## 2025-03-05 RX ADMIN — MORPHINE SULFATE 10 MG: 10 SOLUTION ORAL at 14:30

## 2025-03-05 ASSESSMENT — PAIN DESCRIPTION - PAIN TYPE
TYPE: ACUTE PAIN;CHRONIC PAIN
TYPE: ACUTE PAIN
TYPE: ACUTE PAIN;CHRONIC PAIN
TYPE: ACUTE PAIN
TYPE: ACUTE PAIN

## 2025-03-05 ASSESSMENT — ENCOUNTER SYMPTOMS
MYALGIAS: 1
SHORTNESS OF BREATH: 0
NERVOUS/ANXIOUS: 0
VOMITING: 0
HEARTBURN: 0
HEADACHES: 0
FEVER: 0
CHILLS: 0
DIZZINESS: 0
PALPITATIONS: 0
COUGH: 0
DOUBLE VISION: 0
BLURRED VISION: 0
ABDOMINAL PAIN: 0

## 2025-03-05 ASSESSMENT — PATIENT HEALTH QUESTIONNAIRE - PHQ9
6. FEELING BAD ABOUT YOURSELF - OR THAT YOU ARE A FAILURE OR HAVE LET YOURSELF OR YOUR FAMILY DOWN: NOT AL ALL
9. THOUGHTS THAT YOU WOULD BE BETTER OFF DEAD, OR OF HURTING YOURSELF: NOT AT ALL
SUM OF ALL RESPONSES TO PHQ QUESTIONS 1-9: 1
2. FEELING DOWN, DEPRESSED, IRRITABLE, OR HOPELESS: SEVERAL DAYS
5. POOR APPETITE OR OVEREATING: NOT AT ALL
8. MOVING OR SPEAKING SO SLOWLY THAT OTHER PEOPLE COULD HAVE NOTICED. OR THE OPPOSITE, BEING SO FIGETY OR RESTLESS THAT YOU HAVE BEEN MOVING AROUND A LOT MORE THAN USUAL: NOT AT ALL
7. TROUBLE CONCENTRATING ON THINGS, SUCH AS READING THE NEWSPAPER OR WATCHING TELEVISION: NOT AT ALL
SUM OF ALL RESPONSES TO PHQ9 QUESTIONS 1 AND 2: 1
3. TROUBLE FALLING OR STAYING ASLEEP OR SLEEPING TOO MUCH: NOT AT ALL
4. FEELING TIRED OR HAVING LITTLE ENERGY: NOT AT ALL
1. LITTLE INTEREST OR PLEASURE IN DOING THINGS: NOT AT ALL

## 2025-03-05 ASSESSMENT — LIFESTYLE VARIABLES: SUBSTANCE_ABUSE: 0

## 2025-03-05 NOTE — DISCHARGE PLANNING
DC Transport Scheduled    Transport Company Scheduled:  [facility]    Scheduled Date: 3/6/2025  Scheduled Time: 1300    Transport Type: [place]  Destination: home   Destination address: [address] [place] [id number]    Notified care team of scheduled transport via [employer].     If there are any changes needed to the DC transportation scheduled, please contact [facility] at ext. [phone] between the hours of [phone]. If outside those hours, contact the ED Case Manager at ext. [phone].

## 2025-03-05 NOTE — PROGRESS NOTES
Received report from day shift RN. Assumed care of pt. Pt A&O X 4, on RA, repositioned by staff for comfort. Pt reports tolerable pain at this time. Pt updated with POC, fall precautions in place, call light within reach. All needs met at this time.    2200 Pt refusing skin check at this time.

## 2025-03-05 NOTE — CARE PLAN
The patient is Stable - Low risk of patient condition declining or worsening    Shift Goals  Clinical Goals: remain free from falls or injury throughout the shift, comfort care  Patient Goals: rest comfortably  Family Goals: n/a    Progress made toward(s) clinical / shift goals:  Administered PRN medication per MAR for comfort. Fall precaution in place. Pt is resting in bed comfortably.    Patient is not progressing towards the following goals:      Problem: Skin Integrity  Goal: Skin integrity is maintained or improved  Outcome: Progressing     Problem: Fall Risk  Goal: Patient will remain free from falls  Outcome: Progressing     Problem: Pain - Standard  Goal: Alleviation of pain or a reduction in pain to the patient’s comfort goal  Outcome: Progressing     Problem: Respiratory - Comfort Care  Goal: Patient's respiratory function will be supported  Outcome: Progressing

## 2025-03-05 NOTE — CARE PLAN
The patient is Stable - Low risk of patient condition declining or worsening    Shift Goals  Clinical Goals: Pt will remain free from falls; assist pt with frequent repositioning  Patient Goals: Go home tomorrow  Family Goals: NA    Progress made toward(s) clinical / shift goals:  Pt remained free from falls; assisted with q2hr repositioning.      Problem: Skin Integrity  Goal: Skin integrity is maintained or improved  Outcome: Progressing     Problem: Fall Risk  Goal: Patient will remain free from falls  Outcome: Progressing     Problem: Pain - Standard  Goal: Alleviation of pain or a reduction in pain to the patient’s comfort goal  Outcome: Progressing     Problem: Knowledge Deficit - Comfort Care  Goal: Patient and family/care givers will demonstrate understanding of dying process and grieving  Outcome: Progressing     Problem: Psychosocial - Comfort Care  Goal: Patient and family will demonstrate ability to cope with life altering diagnosis and/or procedure  Outcome: Progressing     Problem: Discharge Planning - Comfort Care  Goal: Patient's continuum of care needs are met  Outcome: Progressing     Patient is not progressing towards the following goals: Last BM was 2/22, given scheduled Miralax.      Problem: Bowel Elimination  Goal: Establish and maintain regular bowel function  Outcome: Not Progressing

## 2025-03-05 NOTE — PROGRESS NOTES
"Hospital Medicine Daily Progress Note    Date of Service  3/5/2025    Chief Complaint  Maximino Green is a 64 y.o. 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  Patient underwentC7 - T5 fusion & L3-4 Laminectomy and was discharged to skilled nursing facility.  He presented again on 2/19/2022 with altered mental status; found to be in A-fib with RVR.    During the stay in the hospital, patient received IV metoprolol along with Dilt drip.  After extensive discussion by pulmonary physician to the patient/family, decision was made for comfort care on 2/21/2025 and patient was transferred to medical floor.      Interval events:  -- Is alert and awake x 2-3, currently saturating well on room air.  Patient noted to be comfortable  --Patient does have medication work pain and anxiety  --Patient family is looking for group home, QuantiFERON pending, case management aware    2/23:  -- Alert and oriented x 2-3, nursing staff noted to be at bedside, patient denied any pain, noted to be comfortable.      2/24: Alert and conversive.  Complains of right chest wall pain, lidocaine patch added.  Pending dispo plan whether it is group home versus home with hospice\"      PATIENT SEEN BY PREVIOUS HOSPITALIST UNTIL 2/24 2/25: Patient seen at bedside this morning.  The patient seems to be comfortable at the time of my evaluation.  No family member present at the time of my evaluation.  Patient is comfort care measures only awaiting " placement with hospice.  We appreciate further recommendations by case management.    2/27: Patient seen at bedside this morning.  Awaiting placement for hospice.  I spoke to the patient's wife over the phone that she is trying to gather finances to get placement with hospice.  We appreciate further recommendations by case management.    2/28: Patient seen at bedside this morning.  No overnight event reported.  We are still awaiting placement for hospice.  It seems that the earliest the patient could go to hospice is on Tuesday as per case management.  Family member, niece, was at bedside this morning.  I discussed case with her.  The patient seems to be comfortable at time of my evaluation complaining of some leg pain.  Nursing to give pain medication.    3/2: Patient seen at bedside, he was complaining of some leg discomfort at the time of my evaluation. Patient on comfort care measures only, waiting on placement for hospice. Potential placement on Tuesday.    3/4: Patient seen at bedside this morning.  No overnight events reported.  I spoke to the patient's wife it seems that they are contemplating taking the patient home with hospice.  However she does not know if this is the right thing to do so she is contemplating of that idea or group home with hospice.  We appreciate further recommendations by case management.  The patient's wife says that if the patient goes home, she still needs to fix some things at home before taking him home.  She mentions that she will meet with a  today regarding her financial options as well.    3/5/2025  In NAD this AM  No pain  Pending outpatient hospice set up  CM/SW following    Code Status  Comfort Care/DNR    Disposition  The patient is not medically cleared for discharge to home or a post-acute facility.          Review of Systems  Review of Systems   Constitutional:  Positive for malaise/fatigue. Negative for chills and fever.   HENT:  Negative for hearing loss and  nosebleeds.    Eyes:  Negative for blurred vision and double vision.   Respiratory:  Negative for cough and shortness of breath.    Cardiovascular:  Negative for chest pain and palpitations.   Gastrointestinal:  Negative for abdominal pain, heartburn and vomiting.   Genitourinary:  Negative for dysuria and urgency.   Musculoskeletal:  Positive for joint pain and myalgias.   Skin:  Negative for itching and rash.   Neurological:  Negative for dizziness and headaches.   Psychiatric/Behavioral:  Negative for substance abuse. The patient is not nervous/anxious.    All other systems reviewed and are negative.       Physical Exam  Temp:  [36.1 °C (97 °F)] 36.1 °C (97 °F)  Pulse:  [87] 87  Resp:  [17] 17  BP: (158)/(72) 158/72  SpO2:  [91 %] 91 %    Physical Exam  Vitals and nursing note reviewed.   Constitutional:       Appearance: He is ill-appearing.      Comments: Speaking and answering questions appropriately   HENT:      Head: Normocephalic and atraumatic.   Eyes:      General:         Right eye: No discharge.         Left eye: No discharge.   Cardiovascular:      Rate and Rhythm: Tachycardia present.   Abdominal:      General: There is no distension.      Palpations: Abdomen is soft.   Musculoskeletal:      Cervical back: Neck supple.      Right lower leg: Edema present.      Left lower leg: Edema present.   Neurological:      Mental Status: He is alert.      Comments: Alert and oriented x 1-2         Fluids    Intake/Output Summary (Last 24 hours) at 3/5/2025 0725  Last data filed at 3/5/2025 0500  Gross per 24 hour   Intake 1140 ml   Output 1650 ml   Net -510 ml        Laboratory                              Imaging  DX-CHEST-PORTABLE (1 VIEW)   Final Result      Extensive bilateral nodular infiltrates are new from the prior chest x-ray but innumerable pulmonary nodules were present on a more recent CT of the chest, abdomen, and pelvis dated 2/11/2025           Assessment/Plan  * Atrial fibrillation with rapid  ventricular response (HCC)- (present on admission)  Assessment & Plan  Patient with chronic A-fib that is not anticoagulated at baseline.  Presented with rates in the 180s to 190s.  Differential diagnosis of etiology of RVR was likely hypovolemia given very dehydrated exam on presentation and improvement of Afib with fluids   -- less likely infectious etiology   -- was on BB    2/23/2025  On comfort measure      3/4: Patient is on comfort measures only, pending placement with hospice    Acute encephalopathy  Assessment & Plan  Possibly due to intracranial metastases but patient cannot lie still for MRI with intubation and family has elected hospice care  he is alert and oriented x 2  Quantiferon  has been negative  Possible group home pending family finances with hospice    3/4 Patient is on comfort measures only, pending placement with hospice    Other cirrhosis of liver (HCC)- (present on admission)  Assessment & Plan  Patient has retroperitoneal varices on imaging implying portal hypertension.  No evidence of bleeding today.  Ammonia, b12, HIV wnl    2/23/2025  On comfort measure , noted to be comfortable    3/4: Patient is on comfort measures only, pending placement with hospice    Spinal cord compression (HCC)- (present on admission)  Assessment & Plan  Patient underwentC7 - T5 fusion & L3-4 Laminectomy   .  On comfort measure    3/4: Patient is on comfort measures only, pending placement with hospice    Chronic systolic congestive heart failure (HCC)- (present on admission)  Assessment & Plan  3/4: Hx of? Patient is on comfort measures only, pending placement with hospice    Type 2 diabetes mellitus with hyperglycemia, with long-term current use of insulin (HCC)- (present on admission)  Assessment & Plan  Patient hemoglobin A1c C is 5.7, currently on comfort care, continue comfort care measures  Provide whatever pt would like to eat    2/23/2025  On comfort measure    3/4: Patient is on comfort measures only,  pending placement with hospice    History of cancer tonsil- (present on admission)  Assessment & Plan  Reportedly with stage II tonsillar cancer in 2022.  However during recent hospitalization found to have multiple bilateral pulmonary nodules consistent with metastatic process as well as T3 and T4 metastasis.  Surgical pathology confirms carcinoma from T2-T4 stabilization surgery.  Patient's wife was unaware of the recurrence of cancer but was updated at length 2/20.   --Given Maximino's previous challenges with chemotherapy she elects to pursue hospice care  -- on comfort measure      3/4: Patient is on comfort measures only, pending placement with hospice    Pancytopenia (HCC)- (present on admission)  Assessment & Plan  3/4: Patient is on comfort measures only, pending placement with hospice    Hypothyroidism- (present on admission)  Assessment & Plan  3/4: Hx of? Patient is on comfort measures only, pending placement with hospice    Essential hypertension- (present on admission)  Assessment & Plan  3/4: Hx of? Patient is on comfort measures only, pending placement with hospice         VTE prophylaxis:  Patient is on comfort measures only

## 2025-03-05 NOTE — DISCHARGE PLANNING
Case Management Discharge Planning    Admission Date: 2/19/2025  GMLOS: 2.3  ALOS: 14    6-Clicks ADL Score: 14  6-Clicks Mobility Score: 7  PT and/or OT Eval ordered: No  Post-acute Referrals Ordered: Yes  Post-acute Choice Obtained: Yes  Has referral(s) been sent to post-acute provider:  Yes      Anticipated Discharge Dispo: Discharge Disposition: D/T to hospice home (50)    DME Needed: Yes    DME Ordered: Yes    Action(s) Taken: LSW spoke with [facility] liaison who reported after speaking with pt and family plan is to DC home with hospice. DME will be delivered to the pt's home tomorrow by 1200 and pt will need transportation arranged in the afternoon. LSW spoke with pt's rizwanarMindy, and verified pt's home address.Transportation request sent to ride line for 1300  and care team notified.    Escalations Completed: None    Medically Clear: Yes    Next Steps: Care coordination will f/u to confirm transportation time    Barriers to Discharge: None    Is the patient up for discharge tomorrow: Yes    Is transport arranged for discharge disposition: Yes

## 2025-03-05 NOTE — PROGRESS NOTES
Report received from Ma RN, assumed care of pt at 0700.   POC and medications reviewed with pt. Pt verbalized understanding.   AOx4  C/o nothing at this time.  Denies pain, SOB, or dizziness at this time.   Safety measures in place.  Hourly rounding in place.

## 2025-03-06 VITALS
DIASTOLIC BLOOD PRESSURE: 69 MMHG | SYSTOLIC BLOOD PRESSURE: 134 MMHG | RESPIRATION RATE: 16 BRPM | WEIGHT: 280.65 LBS | TEMPERATURE: 97.5 F | OXYGEN SATURATION: 94 % | HEIGHT: 72 IN | BODY MASS INDEX: 38.01 KG/M2 | HEART RATE: 95 BPM

## 2025-03-06 PROCEDURE — 700102 HCHG RX REV CODE 250 W/ 637 OVERRIDE(OP): Performed by: STUDENT IN AN ORGANIZED HEALTH CARE EDUCATION/TRAINING PROGRAM

## 2025-03-06 PROCEDURE — A9270 NON-COVERED ITEM OR SERVICE: HCPCS | Performed by: STUDENT IN AN ORGANIZED HEALTH CARE EDUCATION/TRAINING PROGRAM

## 2025-03-06 PROCEDURE — 99239 HOSP IP/OBS DSCHRG MGMT >30: CPT | Performed by: INTERNAL MEDICINE

## 2025-03-06 RX ADMIN — POLYETHYLENE GLYCOL 3350 1 PACKET: 17 POWDER, FOR SOLUTION ORAL at 04:55

## 2025-03-06 RX ADMIN — OLANZAPINE 2.5 MG: 5 TABLET, ORALLY DISINTEGRATING ORAL at 04:54

## 2025-03-06 ASSESSMENT — PAIN DESCRIPTION - PAIN TYPE: TYPE: ACUTE PAIN

## 2025-03-06 NOTE — PROGRESS NOTES
4 Eyes Skin Assessment Completed by DARRIAN Armstrong and DARRIAN Ivey.    Head WDL  Ears WDL  Nose WDL  Mouth WDL  Neck Redness and Blanching  Breast/Chest WDL  Shoulder Blades WDL  Spine Redness and Blanching,incision with staples  (R) Arm/Elbow/Hand Bruising, Abrasion, and Scab  (L) Arm/Elbow/Hand Bruising and Scab  Abdomen Bruising right lower  Groin Redness and Blanching  Scrotum/Coccyx/Buttocks Redness, Blanching, Excoriation, and Scar  (R) Leg WDL  (L) Leg WDL  (R) Heel/Foot/Toe flaky,dry  (L) Heel/Foot/Toe flaky,dry          Devices In Places Pulse Ox, Rivero, and SCD's      Interventions In Place Sacral Mepilex, TAP System, Pillows, Q2 Turns, Low Air Loss Mattress, Barrier Cream, and Heels Loaded W/Pillows    Possible Skin Injury Yes    Pictures Uploaded Into Epic Yes  Wound Consult Placed Yes  RN Wound Prevention Protocol Ordered Yes

## 2025-03-06 NOTE — DISCHARGE INSTRUCTIONS
You were hospitalized for heart rhythm problems and given ongoing complications from cancer, you pursued hospice which has been arranged. For further questions regarding any symptom control, please call the hospice agency.

## 2025-03-06 NOTE — PROGRESS NOTES
Report received from Nathaniel COLMENARES, assumed care of pt at 0700.   POC and medications reviewed with pt. Pt verbalized understanding.   AOx4  C/o nothing at this time.  Denies pain, SOB, or dizziness at this time.   Safety measures in place.  Hourly rounding in place.

## 2025-03-06 NOTE — DISCHARGE SUMMARY
"Hospital Medicine Discharge Note     Admit Date:  2/19/2025       Discharge Date:   3/6/2025  LOS: 15 days     Primary Care Provider:    Giancarlo Lomeli M.D.    Attending Physician:  Nicol Plummer M.D.     Discharge Diagnoses:   Principal Problem:    Atrial fibrillation with rapid ventricular response (HCC)  Active Problems:    Essential hypertension    Pancytopenia (HCC)    History of cancer tonsil    Hypothyroidism    Type 2 diabetes mellitus with hyperglycemia, with long-term current use of insulin (HCC)    Chronic systolic congestive heart failure (HCC)    Spinal cord compression (HCC)    Other cirrhosis of liver (HCC)    Acute encephalopathy        Hospital Summary (Brief Narrative):         Per H&P \"64 year-old male with a past medical significant for hypertension, atrial fibrillation, not ion anticoagulation, diabetes mellitus, chronic, HFrEF, 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 Patient underwentC7 - T5 fusion & L3-4 Laminectomy and was discharged to skilled nursing facility. He presented again on 2/19/2022 with altered mental status; found to be in A-fib with RVR. During the stay in the hospital, patient received IV metoprolol along with Dilt drip.  After extensive discussion by pulmonary physician to the patient/family, decision was made for comfort care on 2/21/2025 and patient was transferred to medical floor. \"    Patient was alert, oriented at time of discharge to home hospice. Home metoprolol was continued. [facility] to order home meds as needed for comfort. Rivero removed at time of discharge.      Disposition:   Discharge home w hospice    Condition:  Poor    Activity:   As tolerated     Diet:   Regular    Discharge Medications:           Medication List        CONTINUE taking these medications "        Instructions   lidocaine 4 % Ptch  Commonly known as: Asperflex   Place 1 Patch on the skin every 24 hours.  Dose: 1 Patch     metoprolol SR 50 MG Tb24  Commonly known as: Toprol XL   Take 1 Tablet by mouth every day.  Dose: 50 mg     polyethylene glycol/lytes Pack  Commonly known as: Miralax   Take 1 Packet by mouth 1 time a day as needed (if sennosides and docusate ineffective after 24 hours).  Dose: 17 g            STOP taking these medications      acetaminophen 325 MG Tabs  Commonly known as: Tylenol     acetaminophen 500 MG Tabs  Commonly known as: Tylenol     lisinopril 5 MG Tabs  Commonly known as: Prinivil     methylPREDNISolone 4 MG Tbpk  Commonly known as: Medrol Dosepak     oxyCODONE immediate release 10 MG immediate release tablet  Commonly known as: Roxicodone     rosuvastatin 10 MG Tabs  Commonly known as: Crestor                Follow up appointment details :      I encouraged him to call his PCP to confirm follow up after discharge.    No future appointments.      Consultants:      None    Studies:    Imaging/ Testing:      DX-CHEST-PORTABLE (1 VIEW)   Final Result      Extensive bilateral nodular infiltrates are new from the prior chest x-ray but innumerable pulmonary nodules were present on a more recent CT of the chest, abdomen, and pelvis dated 2/11/2025          Procedures:        None      Instructions:      The were given instructions to return to the ER if patient's condition worsens      Time Spent on Discharge:     Discharge instructions were discussed with the patient at bedside. Patient  expressed understanding and agreed to comply with all discharge instructions.    37 minutes were spent in the discharge planning and management of this  patient, including more than 50% of the time spent face to face in   Counseling.

## 2025-03-06 NOTE — DISCHARGE PLANNING
Case Management Discharge Planning    Admission Date: 2/19/2025  GMLOS: 2.3  ALOS: 15    6-Clicks ADL Score: 14  6-Clicks Mobility Score: 7    Anticipated Discharge Dispo: Discharge Disposition: D/T to hospice home (50)    DME Needed: No    Action(s) Taken: Updated Provider/Nurse on Discharge Plan    Per chart review, pt is scheduled for transport home with [facility] today at 1300.     Spoke with Noni from [facility] to confirm, Noni confirmed everything is good to go for pt.     Escalations Completed: None    Medically Clear: Yes    Next Steps: LSW to follow    Barriers to Discharge: None    Is the patient up for discharge tomorrow: No

## 2025-03-06 NOTE — CARE PLAN
Problem: Skin Integrity  Goal: Skin integrity is maintained or improved  Outcome: Progressing     Problem: Fall Risk  Goal: Patient will remain free from falls  Outcome: Progressing   The patient is Stable - Low risk of patient condition declining or worsening    Shift Goals  Clinical Goals: comfort care  Patient Goals: rest  Family Goals: NA    Progress made toward(s) clinical / shift goals:  pain at tolerable level post nursing interventions.    Patient is not progressing towards the following goals:

## 2025-03-06 NOTE — CARE PLAN
The patient is Stable - Low risk of patient condition declining or worsening    Shift Goals  Clinical Goals: Patient will remain free from falls, pain managed post prn meds per MAR.  Patient Goals: [facility]  Family Goals: NA    Progress made toward(s) clinical / shift goals:  Patient remained free from falls throughout shift with fall precautions including pt education, call light and personal belongings within reach, bed in locked and lowest position. Pain managed to tolerable level post prn meds per MAR.    Patient is not progressing towards the following goals:

## (undated) DEVICE — GLOVE BIOGEL PI INDICATOR SZ 9.0 SURGICAL PF LF -(50PR/BX 4BX/CA)

## (undated) DEVICE — SET EPIDURAL BURRON NON-SAFETY (12EA/CA)

## (undated) DEVICE — TUBING C&T SET FLYING LEADS DRAIN TUBING (10EA/BX)

## (undated) DEVICE — TUBING CLEARLINK DUO-VENT - C-FLO (48EA/CA)

## (undated) DEVICE — HEADREST PRONEVIEW LARGE - (10/CA)

## (undated) DEVICE — SUTURE 3-0 VICRYL PLUS RB-1 - 8 X 18 INCH (12/BX)

## (undated) DEVICE — DEVICE MONOPOLAR RF PEAK PLASMABLADE 3.0S

## (undated) DEVICE — INTRAOP NEURO IN OR 1:1 PER 15 MIN

## (undated) DEVICE — DRAPE PATIENT STERILE FOR USE WITH O OR C ARMS (10EA/BX)

## (undated) DEVICE — SLEEVE, VASO, THIGH, MED

## (undated) DEVICE — SEALER BIPOLAR 2.3 AQUAMANTYS

## (undated) DEVICE — GLOVE BIOGEL SZ 8.5 SURGICAL PF LTX - (50PR/BX 4BX/CA)

## (undated) DEVICE — COVER LIGHT HANDLE ALC PLUS DISP (18EA/BX)

## (undated) DEVICE — SODIUM CHL IRRIGATION 0.9% 1000ML (12EA/CA)

## (undated) DEVICE — DRAPE LAPAROTOMY T SHEET - (12EA/CA)

## (undated) DEVICE — PACK NEURO - (2EA/CA)

## (undated) DEVICE — LACTATED RINGERS INJ. 500 ML - (24EA/CA)

## (undated) DEVICE — GLOVE BIOGEL SZ 9 SURGICAL PF LTX - (50/BX 4BX/CA)

## (undated) DEVICE — SUTURE 1 VICRYL PLUS CTX - 8 X 18 INCH (12/BX)

## (undated) DEVICE — KIT EVACUATER 3 SPRING PVC LF 1/8 DRAIN SIZE (10EA/CA)"

## (undated) DEVICE — Device

## (undated) DEVICE — TOOL MR8 14CM BALL SYM-TRI 5MM DIAMETER (1/EA)

## (undated) DEVICE — DRAPE CHEST/BREAST - (12EA/CA)

## (undated) DEVICE — NEEDLE SPINAL NON-SAFETY 18 GA X 3 IN (25EA/BX)

## (undated) DEVICE — SUTURE GENERAL

## (undated) DEVICE — SYRINGE NON SAFETY 5 CC 20 GA X 1-1/2 IN (100/BX 4BX/CA)

## (undated) DEVICE — SET EXTENSION WITH 2 PORTS (48EA/CA) ***PART #2C8610 IS A SUBSTITUTE*****

## (undated) DEVICE — LIGHT SOURCE MIS 12FT

## (undated) DEVICE — SYRINGE NON SAFETY 10 CC 21 GA X 1-1/2 IN (100/BX 4BX/CA)

## (undated) DEVICE — KIT SURGIFLO W/OUT THROMBIN - (6EA/BX)

## (undated) DEVICE — SYRINGE NON SAFETY 3 CC 21 GA X 1 1/2 IN (100/BX 8BX/CA)

## (undated) DEVICE — CHLORAPREP 26 ML APPLICATOR - ORANGE TINT(25/CA)

## (undated) DEVICE — LACTATED RINGERS INJ 1000 ML - (14EA/CA 60CA/PF)

## (undated) DEVICE — SET LEADWIRE 5 LEAD BEDSIDE DISPOSABLE ECG (1SET OF 5/EA)

## (undated) DEVICE — BLADE SURGICAL CLIPPER - (50EA/CA)

## (undated) DEVICE — PACK JACKSON TABLE KIT W/OUT - HR (6EA/CA)

## (undated) DEVICE — MIDAS LUBRICATOR DIFFUSER PACK (4EA/CA)

## (undated) DEVICE — GOWN SURGICAL XX-LARGE - (28EA/CA) SIRUS NON REINFORCED

## (undated) DEVICE — DRAPE 36X28IN RAD CARM BND BG - (25/CA) O

## (undated) DEVICE — GLOVE BIOGEL SZ 6.5 SURGICAL PF LTX (50PR/BX 4BX/CA)

## (undated) DEVICE — TUBE CONNECT SUCTION CLEAR 120 X 1/4" (50EA/CA)"

## (undated) DEVICE — TOOL MR8 14CM MATCH HD SYM-TRI 3MM DIAMETER (1/EA)

## (undated) DEVICE — WARMING BLANKET, SPINAL UNDERBODY JACKSON TABLE

## (undated) DEVICE — COVER MAYO STAND X-LG - (22EA/CA)

## (undated) DEVICE — CANISTER SUCTION 3000ML MECHANICAL FILTER AUTO SHUTOFF MEDI-VAC NONSTERILE LF DISP (40EA/CA)

## (undated) DEVICE — SENSOR OXIMETER ADULT SPO2 RD SET (20EA/BX)

## (undated) DEVICE — FORCEPS IRRIGATING 9 X 1.5MM (5EA/BX)

## (undated) DEVICE — GOWN WARMING STANDARD FLEX - (30/CA)

## (undated) DEVICE — GLOVE SZ 7.5 BIOGEL PI MICRO - PF LF (50PR/BX)

## (undated) DEVICE — SPHERE NAVIGATION STEALTH (5EA/TY 12TY/PK)

## (undated) DEVICE — BONE PRESS SPINAL EDITION HENSLER (10EA/CA)

## (undated) DEVICE — SUCTION INSTRUMENT YANKAUER BULBOUS TIP W/O VENT (50EA/CA)

## (undated) DEVICE — DRAPE STRLE REG TOWEL 18X24 - (10/BX 4BX/CA)"

## (undated) DEVICE — GLOVE BIOGEL PI INDICATOR SZ 6.5 SURGICAL PF LF - (50/BX 4BX/CA)

## (undated) DEVICE — GLOVE PROTEXIS W/NEU-THERA SZ 8.5 (50PR/BX)

## (undated) DEVICE — NEEDLE NON-SAFETY HYPO 21 GA X 1 1/2 IN HYPO (100/BX)

## (undated) DEVICE — GLOVE BIOGEL PI INDICATOR SZ 7.5 SURGICAL PF LF -(50/BX 4BX/CA)

## (undated) DEVICE — WAX BONE ALKYLENE OXIDE HEMOSTASIS OSTENE 3.5GM (10EA/CA)

## (undated) DEVICE — ARMREST CRADLE FOAM - (2PR/PK 12PR/CA)

## (undated) DEVICE — SUTURE 2-0 VICRYL PLUS CT-1 - 8 X 18 INCH(12/BX)

## (undated) DEVICE — ELECTRODE DUAL RETURN W/ CORD - (50/PK)

## (undated) DEVICE — SLEEVE VASO DVT COMPRESSION CALF MED - (10PR/CA)